# Patient Record
Sex: FEMALE | Race: WHITE | NOT HISPANIC OR LATINO | Employment: FULL TIME | URBAN - METROPOLITAN AREA
[De-identification: names, ages, dates, MRNs, and addresses within clinical notes are randomized per-mention and may not be internally consistent; named-entity substitution may affect disease eponyms.]

---

## 2017-02-16 DIAGNOSIS — J30.9 ALLERGIC RHINITIS, CAUSE UNSPECIFIED: ICD-10-CM

## 2017-02-16 RX ORDER — MONTELUKAST SODIUM 10 MG/1
TABLET ORAL
Qty: 90 TABLET | Refills: 1 | Status: SHIPPED | OUTPATIENT
Start: 2017-02-16 | End: 2017-08-25 | Stop reason: SDUPTHER

## 2017-02-20 ENCOUNTER — PATIENT MESSAGE (OUTPATIENT)
Dept: FAMILY MEDICINE | Facility: CLINIC | Age: 47
End: 2017-02-20

## 2017-02-20 DIAGNOSIS — Z00.00 ROUTINE PHYSICAL EXAMINATION: ICD-10-CM

## 2017-02-20 DIAGNOSIS — E11.9 TYPE 2 DIABETES MELLITUS WITHOUT COMPLICATION, WITHOUT LONG-TERM CURRENT USE OF INSULIN: ICD-10-CM

## 2017-02-20 DIAGNOSIS — I10 HTN (HYPERTENSION), BENIGN: Primary | ICD-10-CM

## 2017-02-24 ENCOUNTER — LAB VISIT (OUTPATIENT)
Dept: LAB | Facility: HOSPITAL | Age: 47
End: 2017-02-24
Attending: INTERNAL MEDICINE
Payer: COMMERCIAL

## 2017-02-24 DIAGNOSIS — Z00.00 ROUTINE PHYSICAL EXAMINATION: ICD-10-CM

## 2017-02-24 DIAGNOSIS — I10 HTN (HYPERTENSION), BENIGN: ICD-10-CM

## 2017-02-24 DIAGNOSIS — E11.9 TYPE 2 DIABETES MELLITUS WITHOUT COMPLICATION, WITHOUT LONG-TERM CURRENT USE OF INSULIN: ICD-10-CM

## 2017-02-24 LAB
ALBUMIN SERPL BCP-MCNC: 3.4 G/DL
ALP SERPL-CCNC: 48 U/L
ALT SERPL W/O P-5'-P-CCNC: 40 U/L
ANION GAP SERPL CALC-SCNC: 11 MMOL/L
AST SERPL-CCNC: 51 U/L
BASOPHILS # BLD AUTO: 0.03 K/UL
BASOPHILS NFR BLD: 0.4 %
BILIRUB SERPL-MCNC: 0.7 MG/DL
BUN SERPL-MCNC: 18 MG/DL
CALCIUM SERPL-MCNC: 9.5 MG/DL
CHLORIDE SERPL-SCNC: 102 MMOL/L
CHOLEST/HDLC SERPL: 3.6 {RATIO}
CO2 SERPL-SCNC: 25 MMOL/L
CREAT SERPL-MCNC: 0.8 MG/DL
DIFFERENTIAL METHOD: ABNORMAL
EOSINOPHIL # BLD AUTO: 0.3 K/UL
EOSINOPHIL NFR BLD: 4.3 %
ERYTHROCYTE [DISTWIDTH] IN BLOOD BY AUTOMATED COUNT: 15 %
EST. GFR  (AFRICAN AMERICAN): >60 ML/MIN/1.73 M^2
EST. GFR  (NON AFRICAN AMERICAN): >60 ML/MIN/1.73 M^2
GLUCOSE SERPL-MCNC: 170 MG/DL
HCT VFR BLD AUTO: 41.7 %
HDL/CHOLESTEROL RATIO: 27.5 %
HDLC SERPL-MCNC: 167 MG/DL
HDLC SERPL-MCNC: 46 MG/DL
HGB BLD-MCNC: 13.2 G/DL
LDLC SERPL CALC-MCNC: 94.4 MG/DL
LYMPHOCYTES # BLD AUTO: 2.1 K/UL
LYMPHOCYTES NFR BLD: 30.6 %
MCH RBC QN AUTO: 25.5 PG
MCHC RBC AUTO-ENTMCNC: 31.7 %
MCV RBC AUTO: 81 FL
MONOCYTES # BLD AUTO: 0.4 K/UL
MONOCYTES NFR BLD: 5.5 %
NEUTROPHILS # BLD AUTO: 4.1 K/UL
NEUTROPHILS NFR BLD: 59.1 %
NONHDLC SERPL-MCNC: 121 MG/DL
PLATELET # BLD AUTO: 270 K/UL
PMV BLD AUTO: 10.4 FL
POTASSIUM SERPL-SCNC: 4.3 MMOL/L
PROT SERPL-MCNC: 7.1 G/DL
RBC # BLD AUTO: 5.18 M/UL
SODIUM SERPL-SCNC: 138 MMOL/L
TRIGL SERPL-MCNC: 133 MG/DL
WBC # BLD AUTO: 6.95 K/UL

## 2017-02-24 PROCEDURE — 85025 COMPLETE CBC W/AUTO DIFF WBC: CPT

## 2017-02-24 PROCEDURE — 80061 LIPID PANEL: CPT

## 2017-02-24 PROCEDURE — 80053 COMPREHEN METABOLIC PANEL: CPT

## 2017-02-24 PROCEDURE — 83036 HEMOGLOBIN GLYCOSYLATED A1C: CPT

## 2017-02-24 PROCEDURE — 36415 COLL VENOUS BLD VENIPUNCTURE: CPT | Mod: PO

## 2017-02-25 LAB
ESTIMATED AVG GLUCOSE: 180 MG/DL
HBA1C MFR BLD HPLC: 7.9 %

## 2017-03-06 ENCOUNTER — OFFICE VISIT (OUTPATIENT)
Dept: FAMILY MEDICINE | Facility: CLINIC | Age: 47
End: 2017-03-06
Payer: COMMERCIAL

## 2017-03-06 ENCOUNTER — TELEPHONE (OUTPATIENT)
Dept: ADMINISTRATIVE | Facility: HOSPITAL | Age: 47
End: 2017-03-06

## 2017-03-06 VITALS
OXYGEN SATURATION: 96 % | BODY MASS INDEX: 47.57 KG/M2 | HEIGHT: 65 IN | HEART RATE: 88 BPM | WEIGHT: 285.5 LBS | TEMPERATURE: 98 F | SYSTOLIC BLOOD PRESSURE: 124 MMHG | DIASTOLIC BLOOD PRESSURE: 86 MMHG

## 2017-03-06 DIAGNOSIS — R06.02 SHORTNESS OF BREATH: ICD-10-CM

## 2017-03-06 DIAGNOSIS — I10 HTN (HYPERTENSION), BENIGN: Chronic | ICD-10-CM

## 2017-03-06 DIAGNOSIS — E11.9 TYPE 2 DIABETES MELLITUS WITHOUT COMPLICATION, WITHOUT LONG-TERM CURRENT USE OF INSULIN: Chronic | ICD-10-CM

## 2017-03-06 DIAGNOSIS — Z82.49 FAMILY HISTORY OF HEART DISEASE IN MALE FAMILY MEMBER BEFORE AGE 55: ICD-10-CM

## 2017-03-06 DIAGNOSIS — Z00.00 ROUTINE MEDICAL EXAM: Primary | ICD-10-CM

## 2017-03-06 DIAGNOSIS — Z23 FLU VACCINE NEED: ICD-10-CM

## 2017-03-06 DIAGNOSIS — K21.9 GASTROESOPHAGEAL REFLUX DISEASE WITHOUT ESOPHAGITIS: Chronic | ICD-10-CM

## 2017-03-06 DIAGNOSIS — E66.01 MORBID OBESITY WITH BMI OF 45.0-49.9, ADULT: Chronic | ICD-10-CM

## 2017-03-06 PROCEDURE — 3079F DIAST BP 80-89 MM HG: CPT | Mod: S$GLB,,, | Performed by: INTERNAL MEDICINE

## 2017-03-06 PROCEDURE — 99396 PREV VISIT EST AGE 40-64: CPT | Mod: 25,S$GLB,, | Performed by: INTERNAL MEDICINE

## 2017-03-06 PROCEDURE — 90471 IMMUNIZATION ADMIN: CPT | Mod: S$GLB,,, | Performed by: INTERNAL MEDICINE

## 2017-03-06 PROCEDURE — 90686 IIV4 VACC NO PRSV 0.5 ML IM: CPT | Mod: S$GLB,,, | Performed by: INTERNAL MEDICINE

## 2017-03-06 PROCEDURE — 99999 PR PBB SHADOW E&M-EST. PATIENT-LVL IV: CPT | Mod: PBBFAC,,, | Performed by: INTERNAL MEDICINE

## 2017-03-06 PROCEDURE — 3074F SYST BP LT 130 MM HG: CPT | Mod: S$GLB,,, | Performed by: INTERNAL MEDICINE

## 2017-03-06 RX ORDER — METFORMIN HYDROCHLORIDE 850 MG/1
850 TABLET ORAL 2 TIMES DAILY
Qty: 180 TABLET | Refills: 1 | Status: CANCELLED | OUTPATIENT
Start: 2017-03-06

## 2017-03-06 NOTE — ASSESSMENT & PLAN NOTE
Will add januvia and maximize metformin.  Sees outside [facility].  Coupon provided.  3 month follow up.

## 2017-03-06 NOTE — TELEPHONE ENCOUNTER
"----- Message from Jaylon Kothari MD sent at 3/6/2017 11:20 AM CST -----  Regarding: RE: Eye exam  Ok thank you.  She informed me that she "was due" and will call them to make the appt.      ----- Message -----     From: Yolande Spencer LPN     Sent: 3/6/2017  11:09 AM       To: Jaylon Kothari MD  Subject: RE: Eye exam                                     Good morning, a request was sent to Dr. Olayinka Phelan's office on today to get the patient's most recent eye exam. The patient has not been seen in the office since 2013. She will send the report on today. Thanks  ----- Message -----     From: Jaylon Kothari MD     Sent: 3/6/2017  10:31 AM       To: Yolande Spencer LPN  Subject: Eye exam                                         Sees Dr. Phelan with [facility] on [place] ([facility])    Thank you,  David      "

## 2017-03-06 NOTE — MR AVS SNAPSHOT
[facility]  [address]  [place] [address]  Phone: [phone]  Fax: [phone]                  Iqra Duque   3/6/2017 9:40 AM   Office Visit    Description:  Female : 1970   Provider:  Jaylon Kothari MD   Department:  [facility]           Reason for Visit     Annual Exam           Diagnoses this Visit        Comments    Routine medical exam    -  Primary     Type 2 diabetes mellitus without complication, without long-term current use of insulin         Shortness of breath         Family history of heart disease in male family member before age 55         Flu vaccine need         HTN (hypertension), benign         Morbid obesity with BMI of 45.0-49.9, adult         Gastroesophageal reflux disease without esophagitis                To Do List           Future Appointments        Provider Department Dept Phone    2017 8:00 AM LAB, [place] [facility] [phone]    2017 9:00 AM Jaylon Kothari MD [facility] [phone]      Goals (5 Years of Data)     None      Follow-Up and Disposition     Return in about 3 months (around 2017) for DM follow up.       These Medications        Disp Refills Start End    SITagliptan-metformin (JANUMET) 50-1,000 mg per tablet 180 tablet 3 3/6/2017 3/6/2018    Take 1 tablet by mouth 2 (two) times daily with meals. - Oral    Pharmacy: [facility]/pharmacy #[id number] - [place] - [address] Ph #: [phone]         [facility]     [facility] Nurse Care Line -  Assistance  Registered nurses in the [facility] Center provide clinical advisement, health education, appointment booking, and other advisory services.  Call for this free service at [phone].             Medications           Message regarding Medications     Verify the changes and/or additions to your medication regime listed below are the same as discussed with your clinician today.  If  "any of these changes or additions are incorrect, please notify your healthcare provider.        START taking these NEW medications        Refills    SITagliptan-metformin (JANUMET) 50-1,000 mg per tablet 3    Sig: Take 1 tablet by mouth 2 (two) times daily with meals.    Class: Normal    Route: Oral      STOP taking these medications     metformin (GLUCOPHAGE) 850 MG tablet Take 1 tablet (850 mg total) by mouth 2 (two) times daily.           Verify that the below list of medications is an accurate representation of the medications you are currently taking.  If none reported, the list may be blank. If incorrect, please contact your healthcare provider. Carry this list with you in case of emergency.           Current Medications     CONTOUR TEST STRIPS Strp TEST ONCE DAILY    inhalation device (AEROCHAMBER PLUS FLOW-VU) Use as directed for inhalation.    lisinopril-hydrochlorothiazide (PRINZIDE,ZESTORETIC) 20-25 mg Tab Take 1 tablet by mouth once daily.    MICROLET LANCET Misc TEST ONCE DAILY    montelukast (SINGULAIR) 10 mg tablet TAKE 1 TABLET (10 MG TOTAL) BY MOUTH ONCE DAILY.    omeprazole (PRILOSEC) 20 MG capsule TAKE 1 CAPSULE (20 MG TOTAL) BY MOUTH EVERY MORNING.    albuterol 90 mcg/actuation inhaler Inhale 1-2 puffs into the lungs every 4 to 6 hours as needed for Wheezing or Shortness of Breath (chest tightness).    meclizine (ANTIVERT) 25 mg tablet Take 1 tablet (25 mg total) by mouth 3 (three) times daily as needed for Dizziness.    SITagliptan-metformin (JANUMET) 50-1,000 mg per tablet Take 1 tablet by mouth 2 (two) times daily with meals.           Clinical Reference Information           Your Vitals Were     BP Pulse Temp Height Weight SpO2    124/86 88 98 °F (36.7 °C) (Oral) 5' 5" (1.651 m) 129.5 kg (285 lb 7.9 oz) 96%    BMI                47.51 kg/m2          Blood Pressure          Most Recent Value    BP  124/86      Allergies as of 3/6/2017     Bactrim [Sulfamethoxazole-trimethoprim]    Keflex " [Cephalexin]    Neosporin (Neomycin-polymyx)    Amoxicillin      Immunizations Administered on Date of Encounter - 3/6/2017     Name Date Dose VIS Date Route    influenza - Quadrivalent - PF (ADULT) 3/6/2017 0.5 mL 8/7/2015 Intramuscular      Orders Placed During Today's Visit      Normal Orders This Visit    Ambulatory referral to Cardiology     Influenza - Quadrivalent (3 years & older) (PF)     Future Labs/Procedures Expected by Expires    Comprehensive metabolic panel  6/6/2017 3/6/2018    Hemoglobin A1c  6/6/2017 (Approximate) 3/6/2018      Instructions    I will see you in 3 months.        Language Assistance Services     ATTENTION: Language assistance services are available, free of charge. Please call [phone].      ATENCIÓN: Si habla wen, tiene a mims disposición servicios gratuitos de asistencia lingüística. Llame al [phone].     CHÚ Ý: N?u b?n nói Ti?ng Vi?t, có các d?ch v? h? tr? ngôn ng? mi?n phí dành cho b?n. G?i s? [phone].         [facility] complies with applicable Federal civil rights laws and does not discriminate on the basis of race, color, national origin, age, disability, or sex.

## 2017-03-06 NOTE — ASSESSMENT & PLAN NOTE
The patient is asked to make an attempt to improve diet and exercise patterns to aid in medical management of this problem.  Has been walking more.

## 2017-03-06 NOTE — PROGRESS NOTES
Chief Complaint  Chief Complaint   Patient presents with    Annual Exam       HPI  Iqra Duque is a 46 y.o. female with medical diagnoses as listed in the medical history and problem list that presents for routine physical.  Pt is known to me with her last appointment 9/14/2016.  Having some SOB with walking across the room.  No CP, palpitations,  HA, vision change.  Can walk 2.5 mi without symptoms.        PAST MEDICAL HISTORY:  Past Medical History:   Diagnosis Date    Allergy     Diabetes mellitus type II     Diverticulitis 09/2014    Fever blister     Hypertension     Migraine headache        PAST SURGICAL HISTORY:  Past Surgical History:   Procedure Laterality Date    HYSTERECTOMY  06/2009    K[facility]---DLH/BSO ATYPICAL HYPERPLASIA    TUBAL LIGATION         SOCIAL HISTORY:  Social History     Social History    Marital status:      Spouse name: N/A    Number of children: N/A    Years of education: N/A     Occupational History    [employer]     Social History Main Topics    Smoking status: Never Smoker    Smokeless tobacco: Not on file    Alcohol use No    Drug use: No    Sexual activity: Yes     Partners: Male     Birth control/ protection: None     Other Topics Concern    Not on file     Social History Narrative    No narrative on file       FAMILY HISTORY:  Family History   Problem Relation Age of Onset    Ovarian cancer Mother 43    Cancer Mother      ovarian    Diabetes Mother     Heart disease Father     Melanoma Neg Hx     Psoriasis Neg Hx     Lupus Neg Hx        ALLERGIES AND MEDICATIONS: updated and reviewed.  Review of patient's allergies indicates:   Allergen Reactions    Bactrim [sulfamethoxazole-trimethoprim] Hives     Also Septra    Keflex [cephalexin] Shortness Of Breath    Neosporin (neomycin-polymyx) Swelling    Amoxicillin Rash     Current Outpatient Prescriptions   Medication Sig Dispense Refill    CONTOUR TEST STRIPS Strp TEST ONCE  DAILY 100 strip 3    inhalation device (AEROCHAMBER PLUS FLOW-VU) Use as directed for inhalation. 1 Device 0    lisinopril-hydrochlorothiazide (PRINZIDE,ZESTORETIC) 20-25 mg Tab Take 1 tablet by mouth once daily. 90 tablet 1    MICROLET LANCET Misc TEST ONCE DAILY 100 each 0    montelukast (SINGULAIR) 10 mg tablet TAKE 1 TABLET (10 MG TOTAL) BY MOUTH ONCE DAILY. 90 tablet 1    omeprazole (PRILOSEC) 20 MG capsule TAKE 1 CAPSULE (20 MG TOTAL) BY MOUTH EVERY MORNING. 90 capsule 3    albuterol 90 mcg/actuation inhaler Inhale 1-2 puffs into the lungs every 4 to 6 hours as needed for Wheezing or Shortness of Breath (chest tightness). 1 Inhaler 6    meclizine (ANTIVERT) 25 mg tablet Take 1 tablet (25 mg total) by mouth 3 (three) times daily as needed for Dizziness. 30 tablet 0    SITagliptan-metformin (JANUMET) 50-1,000 mg per tablet Take 1 tablet by mouth 2 (two) times daily with meals. 180 tablet 3     No current facility-administered medications for this visit.          ROS  Review of Systems   Constitutional: Negative for chills, fever and unexpected weight change.   HENT: Negative for congestion, ear pain, hearing loss, rhinorrhea, sore throat and trouble swallowing.    Eyes: Negative for discharge, redness and visual disturbance.   Respiratory: Positive for shortness of breath. Negative for cough, chest tightness and wheezing.    Cardiovascular: Negative for chest pain, palpitations and leg swelling.   Gastrointestinal: Negative for abdominal pain, constipation, diarrhea, nausea and vomiting.   Endocrine: Negative for polydipsia, polyphagia and polyuria.   Genitourinary: Negative for decreased urine volume, dysuria and hematuria.   Musculoskeletal: Negative for arthralgias, joint swelling and myalgias.   Skin: Negative for color change and rash.   Neurological: Negative for dizziness, weakness, light-headedness and headaches.   Psychiatric/Behavioral: Negative for decreased concentration, dysphoric mood,  "sleep disturbance and suicidal ideas.           Physical Exam  Vitals:    03/06/17 1010 03/06/17 1041   BP: 138/86 124/86   BP Location: Left arm    Patient Position: Sitting    BP Method: Manual    Pulse: 88    Temp: 98 °F (36.7 °C)    TempSrc: Oral    SpO2: 96%    Weight: 129.5 kg (285 lb 7.9 oz)    Height: 5' 5" (1.651 m)     Body mass index is 47.51 kg/(m^2).  Weight: 129.5 kg (285 lb 7.9 oz)   Height: 5' 5" (165.1 cm)   General appearance: alert, appears stated age, cooperative and no distress  Head: Normocephalic, without obvious abnormality, atraumatic  Eyes: PERRL EOMI conjunctiva clear  Ears: normal TM's and external ear canals both ears  Nose: Nares normal. Septum midline. Mucosa normal. No drainage or sinus tenderness.  Throat: lips, mucosa, and tongue normal; teeth and gums normal  Neck: no adenopathy, no carotid bruit, no JVD, supple, symmetrical, trachea midline and thyroid not enlarged, symmetric, no tenderness/mass/nodules  Back: symmetric, no curvature. ROM normal. No CVA tenderness.  Lungs: clear to auscultation bilaterally  Heart: regular rate and rhythm, S1, S2 normal, no murmur, click, rub or gallop  Abdomen: soft, non-tender; bowel sounds normal; no masses,  no organomegaly  Extremities: extremities normal, atraumatic, no cyanosis or edema  Pulses: 2+ and symmetric  Neurologic: Mental status: Alert, oriented, thought content appropriate  Sensory: normal  Motor:grossly normal  Coordination: normal  Gait: Normal   Symmetric facial movements palate elevated symmetrically tongue midline       Health Maintenance       Date Due Completion Date    Eye Exam 7/16/1979 ---    Influenza Vaccine 8/1/2016 2/25/2016 (Declined)    Override on 2/25/2016: Declined    Mammogram 4/18/2017 4/18/2016    Foot Exam 5/11/2017 5/11/2016    Hemoglobin A1c 8/24/2017 2/24/2017    Lipid Panel 2/24/2018 2/24/2017    Urine Microalbumin 2/24/2018 2/24/2017    TETANUS VACCINE 8/29/2026 8/29/2016    Pneumococcal PPSV23 (Medium " Risk) (2) 7/16/2034 3/26/2015            Assessment & Plan  Problem List Items Addressed This Visit        Cardiac    HTN (hypertension), benign (Chronic)    Current Assessment & Plan     The current medical regimen is effective;  continue present plan and medications.             Endocrine    Type 2 diabetes mellitus without complication (Chronic)    Current Assessment & Plan     Will add januvia and maximize metformin.  Sees outside [facility].  Coupon provided.  3 month follow up.          Relevant Medications    SITagliptan-metformin (JANUMET) 50-1,000 mg per tablet    Other Relevant Orders    Hemoglobin A1c    Comprehensive metabolic panel       Fluids/Electrolytes/Nutrition/GI    Gastroesophageal reflux (Chronic)    Current Assessment & Plan     The current medical regimen is effective;  continue present plan and medications.          Morbid obesity with BMI of 45.0-49.9, adult (Chronic)    Current Assessment & Plan     The patient is asked to make an attempt to improve diet and exercise patterns to aid in medical management of this problem.  Has been walking more.            Other Visit Diagnoses     Routine medical exam    -  Primary    Shortness of breath    -   Inconsistent pattern.  Will still refer to cardiology given risk factors and early family history.      Relevant Orders    Ambulatory referral to Cardiology    Family history of heart disease in male family member before age 55    -  As above    Relevant Orders    Ambulatory referral to Cardiology    Flu vaccine need    -  vaccinate    Relevant Orders    Influenza - Quadrivalent (3 years & older) (PF)            Health Maintenance reviewed, as above.  MMG due in April.    Follow-up: Return in about 3 months (around 6/6/2017) for DM follow up.

## 2017-03-06 NOTE — TELEPHONE ENCOUNTER
JEWEL Pena MD                     Ok, thanks for the update.            Previous Messages

## 2017-03-07 DIAGNOSIS — E11.9 TYPE 2 DIABETES MELLITUS WITHOUT COMPLICATION, WITHOUT LONG-TERM CURRENT USE OF INSULIN: Chronic | ICD-10-CM

## 2017-03-07 RX ORDER — METFORMIN HYDROCHLORIDE 850 MG/1
TABLET ORAL
Qty: 180 TABLET | Refills: 1
Start: 2017-03-07

## 2017-03-15 NOTE — TELEPHONE ENCOUNTER
The patient's recent diabetic eye exam was received in our office on today. Health Maintenance was updated.

## 2017-03-16 ENCOUNTER — TELEPHONE (OUTPATIENT)
Dept: FAMILY MEDICINE | Facility: CLINIC | Age: 47
End: 2017-03-16

## 2017-03-16 NOTE — TELEPHONE ENCOUNTER
----- Message from Ewa Burt sent at 3/6/2017  2:29 PM CST -----  Contact: self  Medication SITagliptan-metformin (JANUMET) 50-1,000 mg per tablet requires a prior Auth, Patient states we can reach PA line at [phone] Thank you!

## 2017-03-20 ENCOUNTER — INITIAL CONSULT (OUTPATIENT)
Dept: CARDIOLOGY | Facility: CLINIC | Age: 47
End: 2017-03-20
Payer: COMMERCIAL

## 2017-03-20 VITALS
HEART RATE: 94 BPM | SYSTOLIC BLOOD PRESSURE: 126 MMHG | DIASTOLIC BLOOD PRESSURE: 80 MMHG | OXYGEN SATURATION: 96 % | HEIGHT: 65 IN | WEIGHT: 284 LBS | BODY MASS INDEX: 47.32 KG/M2

## 2017-03-20 DIAGNOSIS — I10 ESSENTIAL HYPERTENSION: ICD-10-CM

## 2017-03-20 DIAGNOSIS — E11.21 CONTROLLED TYPE 2 DIABETES MELLITUS WITH DIABETIC NEPHROPATHY, WITHOUT LONG-TERM CURRENT USE OF INSULIN: ICD-10-CM

## 2017-03-20 DIAGNOSIS — R06.02 SHORTNESS OF BREATH: Primary | ICD-10-CM

## 2017-03-20 DIAGNOSIS — R94.31 ABNORMAL EKG: ICD-10-CM

## 2017-03-20 DIAGNOSIS — E66.01 MORBID OBESITY, UNSPECIFIED OBESITY TYPE: ICD-10-CM

## 2017-03-20 DIAGNOSIS — E78.5 DYSLIPIDEMIA: ICD-10-CM

## 2017-03-20 PROCEDURE — 3060F POS MICROALBUMINURIA REV: CPT | Mod: S$GLB,,, | Performed by: INTERNAL MEDICINE

## 2017-03-20 PROCEDURE — 1160F RVW MEDS BY RX/DR IN RCRD: CPT | Mod: S$GLB,,, | Performed by: INTERNAL MEDICINE

## 2017-03-20 PROCEDURE — 99999 PR PBB SHADOW E&M-EST. PATIENT-LVL III: CPT | Mod: PBBFAC,,, | Performed by: INTERNAL MEDICINE

## 2017-03-20 PROCEDURE — 3079F DIAST BP 80-89 MM HG: CPT | Mod: S$GLB,,, | Performed by: INTERNAL MEDICINE

## 2017-03-20 PROCEDURE — 93000 ELECTROCARDIOGRAM COMPLETE: CPT | Mod: S$GLB,,, | Performed by: INTERNAL MEDICINE

## 2017-03-20 PROCEDURE — 99204 OFFICE O/P NEW MOD 45 MIN: CPT | Mod: S$GLB,,, | Performed by: INTERNAL MEDICINE

## 2017-03-20 PROCEDURE — 3074F SYST BP LT 130 MM HG: CPT | Mod: S$GLB,,, | Performed by: INTERNAL MEDICINE

## 2017-03-20 PROCEDURE — 3045F PR MOST RECENT HEMOGLOBIN A1C LEVEL 7.0-9.0%: CPT | Mod: S$GLB,,, | Performed by: INTERNAL MEDICINE

## 2017-03-20 PROCEDURE — 2022F DILAT RTA XM EVC RTNOPTHY: CPT | Mod: S$GLB,,, | Performed by: INTERNAL MEDICINE

## 2017-03-20 NOTE — MR AVS SNAPSHOT
[facility]  [address]  [place] [address]  Phone: [phone]                  Iqra Duque   3/20/2017 2:30 PM   Initial consult    Description:  Female : 1970   Provider:  Ciaran Vargas MD   Department:  [facility]           Reason for Visit     Establish Care                To Do List           Future Appointments        Provider Department Dept Phone    2017 8:00 AM LAB, [place] [facility] [phone]    2017 9:00 AM Jaylon Kothari MD [facility] [phone]      Goals (5 Years of Data)     None      [facility] On Call     [facility] On Call Nurse Care Line -  Assistance  Registered nurses in the [facility] provide clinical advisement, health education, appointment booking, and other advisory services.  Call for this free service at [phone].             Medications           Message regarding Medications     Verify the changes and/or additions to your medication regime listed below are the same as discussed with your clinician today.  If any of these changes or additions are incorrect, please notify your healthcare provider.        STOP taking these medications     meclizine (ANTIVERT) 25 mg tablet Take 1 tablet (25 mg total) by mouth 3 (three) times daily as needed for Dizziness.           Verify that the below list of medications is an accurate representation of the medications you are currently taking.  If none reported, the list may be blank. If incorrect, please contact your healthcare provider. Carry this list with you in case of emergency.           Current Medications     albuterol 90 mcg/actuation inhaler Inhale 1-2 puffs into the lungs every 4 to 6 hours as needed for Wheezing or Shortness of Breath (chest tightness).    CONTOUR TEST STRIPS Strp TEST ONCE DAILY    inhalation device (AEROCHAMBER PLUS FLOW-VU) Use as directed for inhalation.    lisinopril-hydrochlorothiazide  "(PRINZIDE,ZESTORETIC) 20-25 mg Tab Take 1 tablet by mouth once daily.    MICROLET LANCET Misc TEST ONCE DAILY    montelukast (SINGULAIR) 10 mg tablet TAKE 1 TABLET (10 MG TOTAL) BY MOUTH ONCE DAILY.    omeprazole (PRILOSEC) 20 MG capsule TAKE 1 CAPSULE (20 MG TOTAL) BY MOUTH EVERY MORNING.    SITagliptan-metformin (JANUMET) 50-1,000 mg per tablet Take 1 tablet by mouth 2 (two) times daily with meals.           Clinical Reference Information           Your Vitals Were     BP Pulse Height Weight SpO2 BMI    126/80 (BP Location: Left arm, Patient Position: Sitting, BP Method: Automatic) 94 5' 5" (1.651 m) 128.8 kg (284 lb) 96% 47.26 kg/m2      Blood Pressure          Most Recent Value    BP  126/80      Allergies as of 3/20/2017     Bactrim [Sulfamethoxazole-trimethoprim]    Keflex [Cephalexin]    Neosporin (Neomycin-polymyx)    Amoxicillin      Immunizations Administered on Date of Encounter - 3/20/2017     None      Language Assistance Services     ATTENTION: Language assistance services are available, free of charge. Please call [phone].      ATENCIÓN: Si habla español, tiene a mims disposición servicios gratuitos de asistencia lingüística. Llame al [phone].     [place] Ý: N?u b?n nói Ti?ng Vi?t, có các d?ch v? h? tr? ngôn ng? mi?n phí dành cho b?n. G?i s? [phone].         [facility] complies with applicable Federal civil rights laws and does not discriminate on the basis of race, color, national origin, age, disability, or sex.        "

## 2017-03-20 NOTE — PROGRESS NOTES
Subjective:    Patient ID:  Iqra Duque is a 46 y.o. female who presents for evaluation of Establish Care      HPI  Patient is here for cardiac evaluation for shortness of breath and chest heaviness.  She's had a baseline abnormal EKG.  She says she noticed her symptoms progressing over the past year.  She exercises very regularly.  She does mostly walking and can go up to 2 miles sometimes without any symptoms.  Then other times she feels very short winded even going down the hallway.  Usually this is worse with heavier activity and relieved with rest.  She gets some associated chest heaviness but no palpitations.  She denies any PND, orthopnea but occasional lower extremity edema relieved with elevation.  She denies any dizziness, presyncope or syncope.  She does have some problems sleeping at night but sleep study was refused by her insurance?    Review of Systems   Constitution: Negative.   HENT: Negative.    Eyes: Negative.    Cardiovascular: Positive for chest pain and dyspnea on exertion. Negative for irregular heartbeat, leg swelling, near-syncope, orthopnea, palpitations, paroxysmal nocturnal dyspnea and syncope.   Respiratory: Positive for shortness of breath.    Skin: Negative.    Musculoskeletal: Negative.    Gastrointestinal: Negative for abdominal pain, constipation and diarrhea.   Genitourinary: Negative for dysuria.   Neurological: Negative.    Psychiatric/Behavioral: Negative.      Past Medical History:   Diagnosis Date    Allergy     Diabetes mellitus type II     Diverticulitis 09/2014    Fever blister     Hypertension     Migraine headache      Past Surgical History:   Procedure Laterality Date    HYSTERECTOMY  06/2009    [facility]---DLH/BSO ATYPICAL HYPERPLASIA    TUBAL LIGATION       Social History   Substance Use Topics    Smoking status: Never Smoker    Smokeless tobacco: None    Alcohol use No     Family History   Problem Relation Age of Onset    Ovarian cancer Mother 43    Cancer  Mother      ovarian    Diabetes Mother     Heart disease Father     Melanoma Neg Hx     Psoriasis Neg Hx     Lupus Neg Hx         Objective:    Physical Exam   Constitutional: She is oriented to person, place, and time. She appears well-developed and well-nourished.   HENT:   Head: Normocephalic and atraumatic.   Eyes: Conjunctivae and EOM are normal. Pupils are equal, round, and reactive to light.   Neck: Normal range of motion. Neck supple. No thyromegaly present.   Cardiovascular: Normal rate and regular rhythm.    No murmur heard.  Pulmonary/Chest: Effort normal and breath sounds normal. No respiratory distress.   Abdominal: Soft. Bowel sounds are normal.   Musculoskeletal: She exhibits no edema.   Neurological: She is alert and oriented to person, place, and time.   Skin: Skin is warm and dry.   Psychiatric: She has a normal mood and affect. Her behavior is normal.       ekg normal sinus rhythm with nonspecific ST-T changes    Assessment:       1. Shortness of breath    2. Abnormal EKG    3. Essential hypertension    4. Controlled type 2 diabetes mellitus with diabetic nephropathy, without long-term current use of insulin    5. Dyslipidemia    6. Morbid obesity, unspecified obesity type         Plan:       -Current exertional symptoms with baseline abnormal EKG and multiple risk factors  -Consider sleep study referral if testing is negative  -Encouraged diet and exercise as tolerated    Return to clinic in one month with testing ASAP

## 2017-03-20 NOTE — LETTER
March 20, 2017      Jaylon Kothari MD  [address] [place] [place] [address]           [facility]  [address]  [place] [address]  Phone: [phone]          Patient: Iqra Duque   MR Number: [id number]   YOB: 1970   Date of Visit: 3/20/2017       Dear Dr. Jaylon Kothari:    Thank you for referring Iqra Duque to me for evaluation. Attached you will find relevant portions of my assessment and plan of care.    If you have questions, please do not hesitate to call me. I look forward to following Iqra Duque along with you.    Sincerely,    Ciaran Vargas MD    Enclosure  CC:  No Recipients    If you would like to receive this communication electronically, please contact [email or web address] or [phone] to request more information on [facility] Link access.    For providers and/or their staff who would like to refer a patient to [facility], please contact us through our one-stop-shop provider referral line, [facility] , at [phone].    If you feel you have received this communication in error or would no longer like to receive these types of communications, please e-mail [email or web address]

## 2017-04-03 ENCOUNTER — HOSPITAL ENCOUNTER (OUTPATIENT)
Dept: RADIOLOGY | Facility: HOSPITAL | Age: 47
Discharge: HOME | End: 2017-04-03
Attending: INTERNAL MEDICINE
Payer: COMMERCIAL

## 2017-04-03 ENCOUNTER — HOSPITAL ENCOUNTER (OUTPATIENT)
Dept: CARDIOLOGY | Facility: HOSPITAL | Age: 47
Discharge: HOME | End: 2017-04-03
Attending: INTERNAL MEDICINE
Payer: COMMERCIAL

## 2017-04-03 DIAGNOSIS — R94.31 ABNORMAL EKG: ICD-10-CM

## 2017-04-03 DIAGNOSIS — R06.02 SHORTNESS OF BREATH: ICD-10-CM

## 2017-04-03 LAB
DIASTOLIC DYSFUNCTION: NO
DIASTOLIC DYSFUNCTION: NO
ESTIMATED PA SYSTOLIC PRESSURE: 12.12
MITRAL VALVE REGURGITATION: NORMAL
RETIRED EF AND QEF - SEE NOTES: 55 (ref 55–65)
TRICUSPID VALVE REGURGITATION: NORMAL

## 2017-04-03 PROCEDURE — 93306 TTE W/DOPPLER COMPLETE: CPT

## 2017-04-03 PROCEDURE — 78452 HT MUSCLE IMAGE SPECT MULT: CPT | Mod: TC

## 2017-04-03 PROCEDURE — 93018 CV STRESS TEST I&R ONLY: CPT | Mod: ,,, | Performed by: INTERNAL MEDICINE

## 2017-04-03 PROCEDURE — 93016 CV STRESS TEST SUPVJ ONLY: CPT | Mod: ,,, | Performed by: INTERNAL MEDICINE

## 2017-04-03 PROCEDURE — 78452 HT MUSCLE IMAGE SPECT MULT: CPT | Mod: 26,,, | Performed by: INTERNAL MEDICINE

## 2017-04-03 PROCEDURE — 93306 TTE W/DOPPLER COMPLETE: CPT | Mod: 26,,, | Performed by: INTERNAL MEDICINE

## 2017-04-03 PROCEDURE — 93017 CV STRESS TEST TRACING ONLY: CPT

## 2017-04-19 DIAGNOSIS — I10 HTN (HYPERTENSION), BENIGN: Chronic | ICD-10-CM

## 2017-04-19 RX ORDER — LISINOPRIL AND HYDROCHLOROTHIAZIDE 20; 25 MG/1; MG/1
TABLET ORAL
Qty: 90 TABLET | Refills: 3 | Status: SHIPPED | OUTPATIENT
Start: 2017-04-19 | End: 2018-04-16 | Stop reason: SDUPTHER

## 2017-04-25 ENCOUNTER — OFFICE VISIT (OUTPATIENT)
Dept: CARDIOLOGY | Facility: CLINIC | Age: 47
End: 2017-04-25
Payer: COMMERCIAL

## 2017-04-25 VITALS
DIASTOLIC BLOOD PRESSURE: 78 MMHG | WEIGHT: 284 LBS | OXYGEN SATURATION: 94 % | HEIGHT: 65 IN | SYSTOLIC BLOOD PRESSURE: 119 MMHG | HEART RATE: 96 BPM | BODY MASS INDEX: 47.32 KG/M2

## 2017-04-25 DIAGNOSIS — E11.21 CONTROLLED TYPE 2 DIABETES MELLITUS WITH DIABETIC NEPHROPATHY, WITHOUT LONG-TERM CURRENT USE OF INSULIN: ICD-10-CM

## 2017-04-25 DIAGNOSIS — I10 ESSENTIAL HYPERTENSION: ICD-10-CM

## 2017-04-25 DIAGNOSIS — R06.02 SHORTNESS OF BREATH: Primary | ICD-10-CM

## 2017-04-25 DIAGNOSIS — E66.01 MORBID OBESITY, UNSPECIFIED OBESITY TYPE: ICD-10-CM

## 2017-04-25 DIAGNOSIS — R94.31 ABNORMAL EKG: ICD-10-CM

## 2017-04-25 DIAGNOSIS — E78.5 DYSLIPIDEMIA: ICD-10-CM

## 2017-04-25 PROCEDURE — 3078F DIAST BP <80 MM HG: CPT | Mod: S$GLB,,, | Performed by: INTERNAL MEDICINE

## 2017-04-25 PROCEDURE — 4010F ACE/ARB THERAPY RXD/TAKEN: CPT | Mod: S$GLB,,, | Performed by: INTERNAL MEDICINE

## 2017-04-25 PROCEDURE — 99214 OFFICE O/P EST MOD 30 MIN: CPT | Mod: S$GLB,,, | Performed by: INTERNAL MEDICINE

## 2017-04-25 PROCEDURE — 99999 PR PBB SHADOW E&M-EST. PATIENT-LVL III: CPT | Mod: PBBFAC,,, | Performed by: INTERNAL MEDICINE

## 2017-04-25 PROCEDURE — 3074F SYST BP LT 130 MM HG: CPT | Mod: S$GLB,,, | Performed by: INTERNAL MEDICINE

## 2017-04-25 PROCEDURE — 3045F PR MOST RECENT HEMOGLOBIN A1C LEVEL 7.0-9.0%: CPT | Mod: S$GLB,,, | Performed by: INTERNAL MEDICINE

## 2017-04-25 PROCEDURE — 1160F RVW MEDS BY RX/DR IN RCRD: CPT | Mod: S$GLB,,, | Performed by: INTERNAL MEDICINE

## 2017-04-25 NOTE — PROGRESS NOTES
Subjective:    Patient ID:  Iqra Duque is a 46 y.o. female who presents for follow-up of Results      HPI  Patient is here for follow-up of chest pain and shortness of breath.  She underwent diagnostic testing which was within normal limits.  She's not any progression of cardiopulmonary complaints.  She denies any current chest pain, shortness of breath or palpitations.  She's expands no PND, orthopnea or lower edema.  She denies any dizziness, presyncope or syncope.  She is interesting getting an exercise program.  She does try and monitor her food intake.  Otherwise she's better usual state of health.    Review of Systems   Constitution: Negative.   HENT: Negative.    Eyes: Negative.    Cardiovascular: Negative for chest pain, dyspnea on exertion, irregular heartbeat, leg swelling, near-syncope, orthopnea, palpitations, paroxysmal nocturnal dyspnea and syncope.   Respiratory: Negative for shortness of breath.    Skin: Negative.    Musculoskeletal: Negative.    Gastrointestinal: Negative for abdominal pain, constipation and diarrhea.   Genitourinary: Negative for dysuria.   Neurological: Negative.    Psychiatric/Behavioral: Negative.         Objective:    Physical Exam   Constitutional: She is oriented to person, place, and time. She appears well-developed and well-nourished.   HENT:   Head: Normocephalic and atraumatic.   Eyes: Conjunctivae and EOM are normal. Pupils are equal, round, and reactive to light.   Neck: Normal range of motion. Neck supple. No thyromegaly present.   Cardiovascular: Normal rate and regular rhythm.    No murmur heard.  Pulmonary/Chest: Effort normal and breath sounds normal. No respiratory distress.   Abdominal: Soft. Bowel sounds are normal.   Musculoskeletal: She exhibits no edema.   Neurological: She is alert and oriented to person, place, and time.   Skin: Skin is warm and dry.   Psychiatric: She has a normal mood and affect. Her behavior is normal.       NST:  Impression: NORMAL  MYOCARDIAL PERFUSION  1. The perfusion scan is free of evidence for myocardial ischemia or injury.   2. There is a mild intensity fixed defect in the anterior wall of the left ventricle, secondary to breast attenuation.   3. Resting wall motion is physiologic.   4. Resting LV function is normal.   5. The ventricular volumes are normal at rest and stress.   6. The extracardiac distribution of radioactivity is normal.     Echo:  CONCLUSIONS     1 - Normal left ventricular systolic function (EF 55-60%).     2 - Concentric remodeling.     3 - Trivial mitral regurgitation.     4 - Trivial tricuspid regurgitation.     Assessment:       1. Shortness of breath    2. Abnormal EKG    3. Essential hypertension    4. Dyslipidemia    5. Controlled type 2 diabetes mellitus with diabetic nephropathy, without long-term current use of insulin    6. Morbid obesity, unspecified obesity type         Plan:       -Continue medical therapy  -Continue diet and exercise --risk factor modification  -Consider sleep study referral next visit    Return to clinic in 3 months

## 2017-04-25 NOTE — MR AVS SNAPSHOT
[facility]  [address]  [place] [address]  Phone: [phone]                  Iqra Duque   2017 2:45 PM   Office Visit    Description:  Female : 1970   Provider:  Ciaran Vargas MD   Department:  [facility]           Reason for Visit     Results                To Do List           Future Appointments        Provider Department Dept Phone    2017 8:00 AM LAB, [facility] [phone]    2017 9:00 AM Jaylon Kothari MD [facility] [phone]      Goals (5 Years of Data)     None      [facility]     [facility] Nurse Care Line -  Assistance  Unless otherwise directed by your provider, please contact [facility], our nurse care line that is available for  assistance.     Registered nurses in the [facility] provide: appointment scheduling, clinical advisement, health education, and other advisory services.  Call: [phone] (toll free)               Medications           Message regarding Medications     Verify the changes and/or additions to your medication regime listed below are the same as discussed with your clinician today.  If any of these changes or additions are incorrect, please notify your healthcare provider.             Verify that the below list of medications is an accurate representation of the medications you are currently taking.  If none reported, the list may be blank. If incorrect, please contact your healthcare provider. Carry this list with you in case of emergency.           Current Medications     albuterol 90 mcg/actuation inhaler Inhale 1-2 puffs into the lungs every 4 to 6 hours as needed for Wheezing or Shortness of Breath (chest tightness).    CONTOUR TEST STRIPS Strp TEST ONCE DAILY    inhalation device (AEROCHAMBER PLUS FLOW-VU) Use as directed for inhalation.    lisinopril-hydrochlorothiazide (PRINZIDE,ZESTORETIC) 20-25 mg Tab TAKE 1 TABLET  "BY MOUTH ONCE DAILY.    MICROLET LANCET Misc TEST ONCE DAILY    montelukast (SINGULAIR) 10 mg tablet TAKE 1 TABLET (10 MG TOTAL) BY MOUTH ONCE DAILY.    omeprazole (PRILOSEC) 20 MG capsule TAKE 1 CAPSULE (20 MG TOTAL) BY MOUTH EVERY MORNING.    SITagliptan-metformin (JANUMET) 50-1,000 mg per tablet Take 1 tablet by mouth 2 (two) times daily with meals.           Clinical Reference Information           Your Vitals Were     BP Pulse Height Weight SpO2 BMI    119/78 (BP Location: Left arm, Patient Position: Sitting, BP Method: Automatic) 96 5' 5" (1.651 m) 128.8 kg (284 lb) 94% 47.26 kg/m2      Blood Pressure          Most Recent Value    BP  119/78      Allergies as of 4/25/2017     Bactrim [Sulfamethoxazole-trimethoprim]    Keflex [Cephalexin]    Neosporin (Neomycin-polymyx)    Amoxicillin      Immunizations Administered on Date of Encounter - 4/25/2017     None      Language Assistance Services     ATTENTION: Language assistance services are available, free of charge. Please call [phone].      ATENCIÓN: Si habla español, tiene a mims disposición servicios gratuitos de asistencia lingüística. Llame al [phone].     [place] Ý: N?u b?n nói Ti?ng Vi?t, có các d?ch v? h? tr? ngôn ng? mi?n phí dành cho b?n. G?i s? [phone].         [facility] complies with applicable Federal civil rights laws and does not discriminate on the basis of race, color, national origin, age, disability, or sex.        "

## 2017-05-22 ENCOUNTER — TELEPHONE (OUTPATIENT)
Dept: FAMILY MEDICINE | Facility: CLINIC | Age: 47
End: 2017-05-22

## 2017-05-22 DIAGNOSIS — Z12.31 ENCOUNTER FOR SCREENING MAMMOGRAM FOR BREAST CANCER: Primary | ICD-10-CM

## 2017-05-22 NOTE — TELEPHONE ENCOUNTER
----- Message from Yessy Simms sent at 5/22/2017 11:05 AM CDT -----  Contact: [phone]  Pt is requesting a order for a mammogram Please call pt at your earliest convenience.  Thanks !

## 2017-06-05 ENCOUNTER — LAB VISIT (OUTPATIENT)
Dept: LAB | Facility: HOSPITAL | Age: 47
End: 2017-06-05
Attending: INTERNAL MEDICINE
Payer: COMMERCIAL

## 2017-06-05 DIAGNOSIS — E11.9 TYPE 2 DIABETES MELLITUS WITHOUT COMPLICATION, WITHOUT LONG-TERM CURRENT USE OF INSULIN: Chronic | ICD-10-CM

## 2017-06-05 LAB
ALBUMIN SERPL BCP-MCNC: 3.3 G/DL
ALP SERPL-CCNC: 45 U/L
ALT SERPL W/O P-5'-P-CCNC: 36 U/L
ANION GAP SERPL CALC-SCNC: 9 MMOL/L
AST SERPL-CCNC: 38 U/L
BILIRUB SERPL-MCNC: 0.8 MG/DL
BUN SERPL-MCNC: 19 MG/DL
CALCIUM SERPL-MCNC: 9.3 MG/DL
CHLORIDE SERPL-SCNC: 99 MMOL/L
CO2 SERPL-SCNC: 27 MMOL/L
CREAT SERPL-MCNC: 0.8 MG/DL
EST. GFR  (AFRICAN AMERICAN): >60 ML/MIN/1.73 M^2
EST. GFR  (NON AFRICAN AMERICAN): >60 ML/MIN/1.73 M^2
GLUCOSE SERPL-MCNC: 149 MG/DL
POTASSIUM SERPL-SCNC: 4.4 MMOL/L
PROT SERPL-MCNC: 7 G/DL
SODIUM SERPL-SCNC: 135 MMOL/L

## 2017-06-05 PROCEDURE — 80053 COMPREHEN METABOLIC PANEL: CPT

## 2017-06-05 PROCEDURE — 36415 COLL VENOUS BLD VENIPUNCTURE: CPT | Mod: PO

## 2017-06-05 PROCEDURE — 83036 HEMOGLOBIN GLYCOSYLATED A1C: CPT

## 2017-06-06 LAB
ESTIMATED AVG GLUCOSE: 163 MG/DL
HBA1C MFR BLD HPLC: 7.3 %

## 2017-06-11 NOTE — ASSESSMENT & PLAN NOTE
Improving A1c.  Will continue to work on diet and exercise.  Continue current medications.  Recheck labs in 3 months.

## 2017-06-11 NOTE — PROGRESS NOTES
Chief Complaint  Chief Complaint   Patient presents with    Diabetes     F/U    Hypertension     F/U       HPI  Iqra Duque is a 46 y.o. female with medical diagnoses as listed in the medical history and problem list that presents for Diabetes, HTN follow up.  Pt is known to me with her last appointment 3/6/2017.  Januvia was added and metformin was maximized at the last visit.  A1c has reduced to 7.3% from 7.9%.  Still working on getting exercise into her habit.  No CP, SOB, palpitations, polyuria/phagia/dipsia      PAST MEDICAL HISTORY:  Past Medical History:   Diagnosis Date    Allergy     Diabetes mellitus type II     Diverticulitis 09/2014    Fever blister     Hypertension     Migraine headache        PAST SURGICAL HISTORY:  Past Surgical History:   Procedure Laterality Date    HYSTERECTOMY  06/2009    [facility]---DLH/BSO ATYPICAL HYPERPLASIA    TUBAL LIGATION         SOCIAL HISTORY:  Social History     Social History    Marital status:      Spouse name: N/A    Number of children: N/A    Years of education: N/A     Occupational History    [employer]     Social History Main Topics    Smoking status: Never Smoker    Smokeless tobacco: Not on file    Alcohol use No    Drug use: No    Sexual activity: Yes     Partners: Male     Birth control/ protection: None     Other Topics Concern    Not on file     Social History Narrative    No narrative on file       FAMILY HISTORY:  Family History   Problem Relation Age of Onset    Ovarian cancer Mother 43    Cancer Mother      ovarian    Diabetes Mother     Heart disease Father     Melanoma Neg Hx     Psoriasis Neg Hx     Lupus Neg Hx        ALLERGIES AND MEDICATIONS: updated and reviewed.  Review of patient's allergies indicates:   Allergen Reactions    Bactrim [sulfamethoxazole-trimethoprim] Hives     Also Septra    Keflex [cephalexin] Shortness Of Breath    Neosporin (neomycin-polymyx) Swelling    Amoxicillin  Rash     Current Outpatient Prescriptions   Medication Sig Dispense Refill    albuterol 90 mcg/actuation inhaler Inhale 1-2 puffs into the lungs every 4 to 6 hours as needed for Wheezing or Shortness of Breath (chest tightness). 1 Inhaler 6    CONTOUR TEST STRIPS Strp TEST ONCE DAILY 100 strip 3    inhalation device (AEROCHAMBER PLUS FLOW-VU) Use as directed for inhalation. 1 Device 0    lisinopril-hydrochlorothiazide (PRINZIDE,ZESTORETIC) 20-25 mg Tab TAKE 1 TABLET BY MOUTH ONCE DAILY. 90 tablet 3    MICROLET LANCET Misc TEST ONCE DAILY 100 each 0    montelukast (SINGULAIR) 10 mg tablet TAKE 1 TABLET (10 MG TOTAL) BY MOUTH ONCE DAILY. 90 tablet 1    omeprazole (PRILOSEC) 20 MG capsule TAKE 1 CAPSULE (20 MG TOTAL) BY MOUTH EVERY MORNING. 90 capsule 3    SITagliptan-metformin (JANUMET) 50-1,000 mg per tablet Take 1 tablet by mouth 2 (two) times daily with meals. 180 tablet 3     No current facility-administered medications for this visit.          ROS  Review of Systems   Constitutional: Negative for chills, fever and unexpected weight change.   HENT: Negative for congestion, ear pain, hearing loss, rhinorrhea, sore throat and trouble swallowing.    Eyes: Negative for discharge, redness and visual disturbance.   Respiratory: Negative for cough, chest tightness, shortness of breath and wheezing.    Cardiovascular: Negative for chest pain, palpitations and leg swelling.   Gastrointestinal: Negative for abdominal pain, constipation, diarrhea, nausea and vomiting.   Endocrine: Negative for polydipsia, polyphagia and polyuria.   Genitourinary: Negative for decreased urine volume, dysuria and hematuria.   Musculoskeletal: Negative for arthralgias, joint swelling and myalgias.   Skin: Negative for color change and rash.   Neurological: Negative for dizziness, weakness, light-headedness and headaches.   Psychiatric/Behavioral: Negative for decreased concentration, dysphoric mood, sleep disturbance and suicidal ideas.  "          Physical Exam  Vitals:    06/12/17 0904 06/12/17 0915   BP: (!) 134/90 114/84   BP Location: Left arm    Patient Position: Sitting    BP Method: Manual    Pulse: 74    Temp: 98.6 °F (37 °C)    TempSrc: Oral    SpO2: 96%    Weight: 128 kg (282 lb 3 oz)    Height: 5' 5" (1.651 m)     Body mass index is 46.96 kg/m².  Weight: 128 kg (282 lb 3 oz)   Height: 5' 5" (165.1 cm)   General appearance: alert, appears stated age, cooperative and no distress  Head: Normocephalic, without obvious abnormality, atraumatic  Eyes: PERRL EOMI conjunctiva clear  Neck: no adenopathy, no carotid bruit, no JVD, supple, symmetrical, trachea midline and thyroid not enlarged, symmetric, no tenderness/mass/nodules  Lungs: clear to auscultation bilaterally  Heart: regular rate and rhythm, S1, S2 normal, no murmur, click, rub or gallop  Extremities: extremities normal, atraumatic, no cyanosis or edema  Pulses: 2+ and symmetric  Neurologic: Grossly normal      Health Maintenance       Date Due Completion Date    Foot Exam 05/11/2017 5/11/2016    Mammogram 04/18/2017 4/18/2016    Influenza Vaccine 08/01/2017 3/6/2017    Override on 2/25/2016: Declined    Hemoglobin A1c 12/05/2017 6/5/2017    Lipid Panel 02/24/2018 2/24/2017    Urine Microalbumin 02/24/2018 2/24/2017    Eye Exam 03/13/2018 3/13/2017 (Done)    Override on 3/13/2017: Done (Dr. Olayinka Phelan/Optometry- negative for bilateral diabetic retinopathy,positive for mild hypertension retinopathy bilaterally)    TETANUS VACCINE 08/29/2026 8/29/2016    Pneumococcal PPSV23 (Medium Risk) (2) 07/16/2034 3/26/2015            Assessment & Plan  Problem List Items Addressed This Visit        Cardiac    HTN (hypertension), benign (Chronic)    Current Assessment & Plan     The current medical regimen is effective;  continue present plan and medications.             Endocrine    Type 2 diabetes mellitus without complication (Chronic)    Current Assessment & Plan     Improving A1c.  Will continue " to work on diet and exercise.  Continue current medications.  Recheck labs in 3 months.          Relevant Orders    Hemoglobin A1c      Other Visit Diagnoses    None.           Health Maintenance reviewed, Has OV for MMG and foot exam on 07/03/17.    Follow-up: Return in about 3 months (around 9/12/2017) for DM follow up.

## 2017-06-12 ENCOUNTER — OFFICE VISIT (OUTPATIENT)
Dept: FAMILY MEDICINE | Facility: CLINIC | Age: 47
End: 2017-06-12
Payer: COMMERCIAL

## 2017-06-12 VITALS
OXYGEN SATURATION: 96 % | BODY MASS INDEX: 47.02 KG/M2 | SYSTOLIC BLOOD PRESSURE: 114 MMHG | HEIGHT: 65 IN | WEIGHT: 282.19 LBS | DIASTOLIC BLOOD PRESSURE: 84 MMHG | HEART RATE: 74 BPM | TEMPERATURE: 99 F

## 2017-06-12 DIAGNOSIS — E11.9 TYPE 2 DIABETES MELLITUS WITHOUT COMPLICATION, WITHOUT LONG-TERM CURRENT USE OF INSULIN: Chronic | ICD-10-CM

## 2017-06-12 DIAGNOSIS — I10 HTN (HYPERTENSION), BENIGN: Chronic | ICD-10-CM

## 2017-06-12 PROCEDURE — 99214 OFFICE O/P EST MOD 30 MIN: CPT | Mod: S$GLB,,, | Performed by: INTERNAL MEDICINE

## 2017-06-12 PROCEDURE — 4010F ACE/ARB THERAPY RXD/TAKEN: CPT | Mod: S$GLB,,, | Performed by: INTERNAL MEDICINE

## 2017-06-12 PROCEDURE — 99999 PR PBB SHADOW E&M-EST. PATIENT-LVL III: CPT | Mod: PBBFAC,,, | Performed by: INTERNAL MEDICINE

## 2017-06-12 PROCEDURE — 3045F PR MOST RECENT HEMOGLOBIN A1C LEVEL 7.0-9.0%: CPT | Mod: S$GLB,,, | Performed by: INTERNAL MEDICINE

## 2017-06-13 ENCOUNTER — PATIENT MESSAGE (OUTPATIENT)
Dept: FAMILY MEDICINE | Facility: CLINIC | Age: 47
End: 2017-06-13

## 2017-07-03 ENCOUNTER — OFFICE VISIT (OUTPATIENT)
Dept: PODIATRY | Facility: CLINIC | Age: 47
End: 2017-07-03
Payer: COMMERCIAL

## 2017-07-03 ENCOUNTER — HOSPITAL ENCOUNTER (OUTPATIENT)
Dept: RADIOLOGY | Facility: HOSPITAL | Age: 47
Discharge: HOME | End: 2017-07-03
Attending: INTERNAL MEDICINE
Payer: COMMERCIAL

## 2017-07-03 VITALS
SYSTOLIC BLOOD PRESSURE: 104 MMHG | DIASTOLIC BLOOD PRESSURE: 54 MMHG | BODY MASS INDEX: 46.98 KG/M2 | WEIGHT: 282 LBS | HEIGHT: 65 IN

## 2017-07-03 DIAGNOSIS — Z12.31 ENCOUNTER FOR SCREENING MAMMOGRAM FOR BREAST CANCER: ICD-10-CM

## 2017-07-03 DIAGNOSIS — E11.9 COMPREHENSIVE DIABETIC FOOT EXAMINATION, TYPE 2 DM, ENCOUNTER FOR: Primary | ICD-10-CM

## 2017-07-03 DIAGNOSIS — L60.8 PINCER NAIL DEFORMITY: ICD-10-CM

## 2017-07-03 PROCEDURE — 77067 SCR MAMMO BI INCL CAD: CPT | Mod: 26,,, | Performed by: RADIOLOGY

## 2017-07-03 PROCEDURE — 77067 SCR MAMMO BI INCL CAD: CPT | Mod: TC

## 2017-07-03 PROCEDURE — 77063 BREAST TOMOSYNTHESIS BI: CPT | Mod: 26,,, | Performed by: RADIOLOGY

## 2017-07-03 PROCEDURE — 99213 OFFICE O/P EST LOW 20 MIN: CPT | Mod: S$GLB,,, | Performed by: PODIATRIST

## 2017-07-03 PROCEDURE — 99999 PR PBB SHADOW E&M-EST. PATIENT-LVL III: CPT | Mod: PBBFAC,,, | Performed by: PODIATRIST

## 2017-07-03 PROCEDURE — 3045F PR MOST RECENT HEMOGLOBIN A1C LEVEL 7.0-9.0%: CPT | Mod: S$GLB,,, | Performed by: PODIATRIST

## 2017-07-03 PROCEDURE — 4010F ACE/ARB THERAPY RXD/TAKEN: CPT | Mod: S$GLB,,, | Performed by: PODIATRIST

## 2017-07-03 NOTE — PROGRESS NOTES
Subjective:      Patient ID: Iqra Duque is a 46 y.o. female.    Chief Complaint: Diabetes Mellitus (pcp Dr. Kothari 6-12-17); Diabetic Foot Exam; and Nail Care    Iqra is a 46 y.o. female who presents to the clinic for evaluation and treatment of high risk feet. Iqra has a past medical history of Allergy; Diabetes mellitus type II; Diverticulitis (09/2014); Fever blister; Hypertension; and Migraine headache.  The patient has no major complaints with feet. Chief concern is how to care for feet as a diabetic. She does also relate recurring  painful ingrown toenail on the right foot, medial border. Patient has attempted self treatment with soaking.  This is a recurrent problem.  Patient  denies purulent drainage.     This patient has documented high risk feet requiring routine maintenance secondary to diabetes mellitis and those secondary complications of diabetes, as mentioned..    PCP: Jaylon Kothari MD    Date Last Seen by PCP:   Chief Complaint   Patient presents with    Diabetes Mellitus     pcp Dr. Kothari 6-12-17    Diabetic Foot Exam    Nail Care       Current shoe gear:  Flip flops     Hemoglobin A1C   Date Value Ref Range Status   06/05/2017 7.3 (H) 4.5 - 6.2 % Final     Comment:     According to ADA guidelines, hemoglobin A1C <7.0% represents  optimal control in non-pregnant diabetic patients.  Different  metrics may apply to specific populations.   Standards of Medical Care in Diabetes - 2016.  For the purpose of screening for the presence of diabetes:  <5.7%     Consistent with the absence of diabetes  5.7-6.4%  Consistent with increasing risk for diabetes   (prediabetes)  >or=6.5%  Consistent with diabetes  Currently no consensus exists for use of hemoglobin A1C  for diagnosis of diabetes for children.     02/24/2017 7.9 (H) 4.5 - 6.2 % Final     Comment:     According to ADA guidelines, hemoglobin A1C <7.0% represents  optimal control in non-pregnant diabetic patients.   Different  metrics may apply to specific populations.   Standards of Medical Care in Diabetes - 2016.  For the purpose of screening for the presence of diabetes:  <5.7%     Consistent with the absence of diabetes  5.7-6.4%  Consistent with increasing risk for diabetes   (prediabetes)  >or=6.5%  Consistent with diabetes  Currently no consensus exists for use of hemoglobin A1C  for diagnosis of diabetes for children.     08/29/2016 7.0 (H) 4.5 - 6.2 % Final     Comment:     According to ADA guidelines, hemoglobin A1C <7.0% represents  optimal control in non-pregnant diabetic patients.  Different  metrics may apply to specific populations.   Standards of Medical Care in Diabetes - 2016.  For the purpose of screening for the presence of diabetes:  <5.7%     Consistent with the absence of diabetes  5.7-6.4%  Consistent with increasing risk for diabetes   (prediabetes)  >or=6.5%  Consistent with diabetes  Currently no consensus exists for use of hemoglobin A1C  for diagnosis of diabetes for children.       Patient Active Problem List   Diagnosis    Dermatitis    HTN (hypertension), benign    Type 2 diabetes mellitus without complication    Allergic rhinitis, cause unspecified    Ganglion cyst    Gastroesophageal reflux    Chronic diarrhea of unknown origin    Morbid obesity with BMI of 45.0-49.9, adult    Muscle cramps at night    Vitamin D insufficiency    Migraine with aura and without status migrainosus, not intractable     Current Outpatient Prescriptions on File Prior to Visit   Medication Sig Dispense Refill    albuterol 90 mcg/actuation inhaler Inhale 1-2 puffs into the lungs every 4 to 6 hours as needed for Wheezing or Shortness of Breath (chest tightness). 1 Inhaler 6    CONTOUR TEST STRIPS Strp TEST ONCE DAILY 100 strip 3    inhalation device (AEROCHAMBER PLUS FLOW-VU) Use as directed for inhalation. 1 Device 0    lisinopril-hydrochlorothiazide (PRINZIDE,ZESTORETIC) 20-25 mg Tab TAKE 1 TABLET BY  MOUTH ONCE DAILY. 90 tablet 3    MICROLET LANCET Misc TEST ONCE DAILY 100 each 0    montelukast (SINGULAIR) 10 mg tablet TAKE 1 TABLET (10 MG TOTAL) BY MOUTH ONCE DAILY. 90 tablet 1    omeprazole (PRILOSEC) 20 MG capsule TAKE 1 CAPSULE (20 MG TOTAL) BY MOUTH EVERY MORNING. 90 capsule 3    SITagliptan-metformin (JANUMET) 50-1,000 mg per tablet Take 1 tablet by mouth 2 (two) times daily with meals. 180 tablet 3     No current facility-administered medications on file prior to visit.      Allergies   Allergen Reactions    Bactrim [Sulfamethoxazole-Trimethoprim] Hives     Also Septra    Keflex [Cephalexin] Shortness Of Breath    Neosporin (Neomycin-Polymyx) Swelling    Amoxicillin Rash     Past Surgical History:   Procedure Laterality Date    HYSTERECTOMY  06/2009    [facility]---DLH/BSO ATYPICAL HYPERPLASIA    TUBAL LIGATION       Family History   Problem Relation Age of Onset    Ovarian cancer Mother 43    Cancer Mother      ovarian    Diabetes Mother     Heart disease Father     Melanoma Neg Hx     Psoriasis Neg Hx     Lupus Neg Hx      Social History     Social History    Marital status:      Spouse name: N/A    Number of children: N/A    Years of education: N/A     Occupational History    [employer]     Social History Main Topics    Smoking status: Never Smoker    Smokeless tobacco: Never Used    Alcohol use No    Drug use: No    Sexual activity: Yes     Partners: Male     Birth control/ protection: None     Other Topics Concern    Not on file     Social History Narrative    No narrative on file       Review of Systems   Constitution: Negative for chills, fever and weakness.   Cardiovascular: Positive for leg swelling. Negative for chest pain and claudication.   Respiratory: Negative for cough and shortness of breath.    Skin: Positive for dry skin, itching and nail changes.   Musculoskeletal: Positive for back pain and myalgias. Negative for arthritis, falls, joint  "pain, joint swelling and muscle weakness.   Gastrointestinal: Negative for diarrhea, nausea and vomiting.   Neurological: Negative for numbness, paresthesias and tremors.        History of sciatica   Psychiatric/Behavioral: Negative for altered mental status and hallucinations.           Objective:       Vitals:    07/03/17 0856   BP: (!) 104/54   Weight: 127.9 kg (282 lb)   Height: 5' 5" (1.651 m)   PainSc: 0-No pain       Physical Exam   Constitutional: She appears well-developed and well-nourished. No distress.   Alert and oriented x 3. No evidence of depression, anxiety, or agitation. Calm, cooperative, and communicative. Appropriate interactions and affect.       Cardiovascular:   Pulses:       Dorsalis pedis pulses are 2+ on the right side, and 2+ on the left side.        Posterior tibial pulses are 2+ on the right side, and 2+ on the left side.   dorsalis pedis and posterior tibial pulses are palpable bilaterally. Digits are warm to the touch 1-5 bilaterally. Capillary refill time is within normal limits 1-5 bilaterally.         Musculoskeletal:        Right ankle: She exhibits no swelling and no ecchymosis. No tenderness. Achilles tendon exhibits no pain.        Left ankle: She exhibits no swelling and no ecchymosis. No tenderness. Achilles tendon exhibits no pain.        Right foot: There is crepitus (dorsal medial midfoot with palpable spurring in the area). There is no swelling, normal capillary refill and no deformity.        Left foot: There is no swelling and normal capillary refill.   Muscle strength is 5/5 in all groups bilaterally.    Decreased arch height noted b/l. Negative too many toes sign.      Lymphadenopathy:   No lymphatic streaking    Negative lymphadenopathy bilateral popliteal fossa and tarsal tunnel.     Neurological:   [place] 5.07 monofilament is intact bilateral feet. Sharp/dull sensation is also intact Bilateral feet.       Skin: Skin is warm, dry and intact. No abrasion, " no ecchymosis, no lesion and no rash noted. She is not diaphoretic. No cyanosis or erythema. No pallor. Nails show no clubbing.   Nails 1-5 bilaterally with incurvated borders.    POP ofMedial hallux nail margin of right foot with ingrown nail plate. There is no granuloma formation noted. no malodor. no drainage           Psychiatric: She has a normal mood and affect. Her speech is normal and behavior is normal.   Nursing note and vitals reviewed.            Assessment:       Encounter Diagnoses   Name Primary?    Comprehensive diabetic foot examination, type 2 DM, encounter for Yes    Pincer nail deformity          Plan:       Iqra was seen today for diabetes mellitus, diabetic foot exam and nail care.    Diagnoses and all orders for this visit:    Comprehensive diabetic foot examination, type 2 DM, encounter for    Pincer nail deformity      I counseled the patient on her conditions, their implications and medical management.     - Shoe inspection. Diabetic Foot Education. Patient reminded of the importance of good nutrition and blood sugar control to help prevent podiatric complications of diabetes. Patient instructed on proper foot hygeine. We discussed wearing proper shoe gear, daily foot inspections, never walking without protective shoe gear.    In depth conversation on the treatment of ingrown nail; partial nail avulsion vs chemical matrixectomy vs conservative treatment of soaking and nail trimming    Informed patient that many nail problems can be prevented by wearing the right shoes and trimming your nails properly.   The right shoes: Feet were measured.  Patient is to wear shoes that are supportive and roomy enough for toes to wiggle. Look for shoes made of natural materials such as leather, which allow  feet to breathe.   Proper trimming: To avoid problems, she was instructed to trim toenails straight across without cutting down into the corners.      She will continue to monitor the areas daily,  inspect her feet, wear protective shoe gear when ambulatory, moisturizer to maintain skin integrity and follow in this office in approximately 12 months, sooner p.r.n.

## 2017-07-03 NOTE — PATIENT INSTRUCTIONS
Recommend lotions: eucerin, aquaphor, A&D ointment, gold bond for diabetics, sween    Shoe recommendations: (try 6pm.com, zappos.com , nordstromrack.com, or shoes.com for discounted prices) you can visit [employer]SW shoes in [place] as well    Asics (GT 1000 or gel foundations), new balance, saucony (stabil c3),  Seymour (transcend), vionic, propet (tennis shoe)    soft brand, clarks, crocs, aerosoles, naturalizers, SAS, ecco, kaleb, messi gibbs, rockports (dress shoes)    Vionic, volitiles, burkenstocks, fitflops, propet (sandals)    Nike comfort thong sandals, crocs (house shoes)    Nail Home remedy:  Vicks Vapor rub OR Listerine and apple cider vinegar in a spray bottle to nails    Occasional soaks for 15-20 mins in luke warm water with 1 cup of listerine and 1 cup of apple cider vinegar are ok You may add several drops of oil of oregano or tea tree oil as well      Diabetes: Inspecting Your Feet  Diabetes increases your chances of developing foot problems. So inspect your feet every day. This helps you find small skin irritations before they become serious infections. If you have trouble seeing the bottoms of your feet, use a mirror or ask a family member or friend to help.     Pressure spots on the bottom of the foot are common areas where problems develop.   How to check your feet  Below are tips to help you look for foot problems. Try to check your feet at the same time each day, such as when you get out of bed in the morning:  · Check the top of each foot. The tops of toes, back of the heel, and outer edge of the foot can get a lot of rubbing from poor-fitting shoes.  · Check the bottom of each foot. Daily wear and tear often leads to problems at pressure spots.  · Check the toes and nails. Fungal infections often occur between toes. Toenail problems can also be a sign of fungal infections or lead to breaks in the skin.  · Check your shoes, too. Loose objects inside a shoe can injure the foot. Use your hand to feel  inside your shoes for things like vicente, loose stitching, or rough areas that could irritate your skin.  Warning signs  Look for any color changes in the foot. Redness with streaks can signal a severe infection, which needs immediate medical attention. Tell your doctor right away if you have any of these problems:  · Swelling, sometimes with color changes, may be a sign of poor blood flow or infection. Symptoms include tenderness and an increase in the size of your foot.  · Warm or hot areas on your feet may be signs of infection. A foot that is cold may not be getting enough blood.  · Sensations such as burning, tingling, or pins and needles can be signs of a problem. Also check for areas that may be numb.  · Hot spots are caused by friction or pressure. Look for hot spots in areas that get a lot of rubbing. Hot spots can turn into blisters, calluses, or sores.  · Cracks and sores are caused by dry or irritated skin. They are a sign that the skin is breaking down, which can lead to infection.  · Toenail problems to watch for include nails growing into the skin (ingrown toenail) and causing redness or pain. Thick, yellow, or discolored nails can signal a fungal infection.  · Drainage and odor can develop from untreated sores and ulcers. Call your doctor right away if you notice white or yellow drainage, bleeding, or unpleasant odor.   © 2077-3362 [employer]. [address] [place] [address], [place] [address]. All rights reserved. This information is not intended as a substitute for professional medical care. Always follow your healthcare professional's instructions.

## 2017-07-06 ENCOUNTER — PATIENT MESSAGE (OUTPATIENT)
Dept: FAMILY MEDICINE | Facility: CLINIC | Age: 47
End: 2017-07-06

## 2017-07-24 ENCOUNTER — OFFICE VISIT (OUTPATIENT)
Dept: CARDIOLOGY | Facility: CLINIC | Age: 47
End: 2017-07-24
Payer: COMMERCIAL

## 2017-07-24 ENCOUNTER — PATIENT MESSAGE (OUTPATIENT)
Dept: CARDIOLOGY | Facility: CLINIC | Age: 47
End: 2017-07-24

## 2017-07-24 ENCOUNTER — PATIENT MESSAGE (OUTPATIENT)
Dept: FAMILY MEDICINE | Facility: CLINIC | Age: 47
End: 2017-07-24

## 2017-07-24 VITALS
BODY MASS INDEX: 47.02 KG/M2 | SYSTOLIC BLOOD PRESSURE: 110 MMHG | OXYGEN SATURATION: 95 % | WEIGHT: 282.19 LBS | DIASTOLIC BLOOD PRESSURE: 80 MMHG | HEART RATE: 124 BPM | HEIGHT: 65 IN

## 2017-07-24 DIAGNOSIS — E66.01 MORBID OBESITY, UNSPECIFIED OBESITY TYPE: ICD-10-CM

## 2017-07-24 DIAGNOSIS — R00.0 TACHYCARDIA: ICD-10-CM

## 2017-07-24 DIAGNOSIS — E11.21 CONTROLLED TYPE 2 DIABETES MELLITUS WITH DIABETIC NEPHROPATHY, WITHOUT LONG-TERM CURRENT USE OF INSULIN: ICD-10-CM

## 2017-07-24 DIAGNOSIS — R94.31 ABNORMAL EKG: ICD-10-CM

## 2017-07-24 DIAGNOSIS — R06.02 SHORTNESS OF BREATH: Primary | ICD-10-CM

## 2017-07-24 DIAGNOSIS — R06.02 SOB (SHORTNESS OF BREATH): ICD-10-CM

## 2017-07-24 DIAGNOSIS — E78.5 DYSLIPIDEMIA: ICD-10-CM

## 2017-07-24 DIAGNOSIS — I10 ESSENTIAL HYPERTENSION: ICD-10-CM

## 2017-07-24 PROCEDURE — 99999 PR PBB SHADOW E&M-EST. PATIENT-LVL III: CPT | Mod: PBBFAC,,, | Performed by: INTERNAL MEDICINE

## 2017-07-24 PROCEDURE — 99214 OFFICE O/P EST MOD 30 MIN: CPT | Mod: S$GLB,,, | Performed by: INTERNAL MEDICINE

## 2017-07-24 PROCEDURE — 93000 ELECTROCARDIOGRAM COMPLETE: CPT | Mod: S$GLB,,, | Performed by: INTERNAL MEDICINE

## 2017-07-24 PROCEDURE — 4010F ACE/ARB THERAPY RXD/TAKEN: CPT | Mod: S$GLB,,, | Performed by: INTERNAL MEDICINE

## 2017-07-24 PROCEDURE — 3045F PR MOST RECENT HEMOGLOBIN A1C LEVEL 7.0-9.0%: CPT | Mod: S$GLB,,, | Performed by: INTERNAL MEDICINE

## 2017-07-24 NOTE — PROGRESS NOTES
Subjective:    Patient ID:  Iqra Duque is a 47 y.o. female who presents for follow-up of No chief complaint on file.      HPI   previous history:  Patient is here for follow-up of chest pain and shortness of breath.  She underwent diagnostic testing which was within normal limits.  She's not any progression of cardiopulmonary complaints.  She denies any current chest pain, shortness of breath or palpitations.  She's expands no PND, orthopnea or lower edema.  She denies any dizziness, presyncope or syncope.  She is interesting getting an exercise program.  She does try and monitor her food intake.  Otherwise she's better usual state of health.    Today:  Here for follow-up of chest pain shortness of breath.  Patient is tachycardic today.  This is the same stuff that she's previously felt in the past was never been documented as sustained as high.  She's 120 bpm in clinic.  She occasionally drops lower but she feels short of breath with this.  She says that she did do some walking this morning.  She mainly is limited by sciatica as well.  She denies any PND, orthopnea or lower edema.  She's not expressing dizziness, presyncope or syncope.    Review of Systems   Constitution: Negative.   HENT: Negative.    Eyes: Negative.    Cardiovascular: Positive for irregular heartbeat and palpitations. Negative for chest pain, dyspnea on exertion, leg swelling, near-syncope, orthopnea, paroxysmal nocturnal dyspnea and syncope.   Respiratory: Negative for shortness of breath.    Skin: Negative.    Musculoskeletal: Negative.    Gastrointestinal: Negative for abdominal pain, constipation and diarrhea.   Genitourinary: Negative for dysuria.   Neurological: Negative.    Psychiatric/Behavioral: Negative.         Objective:    Physical Exam   Constitutional: She is oriented to person, place, and time. She appears well-developed and well-nourished.   HENT:   Head: Normocephalic and atraumatic.   Eyes: Conjunctivae and EOM are normal.  Pupils are equal, round, and reactive to light.   Neck: Normal range of motion. Neck supple. No thyromegaly present.   Cardiovascular: Regular rhythm.  Tachycardia present.    No murmur heard.  Pulmonary/Chest: Effort normal and breath sounds normal. No respiratory distress.   Abdominal: Soft. Bowel sounds are normal.   Musculoskeletal: She exhibits no edema.   Neurological: She is alert and oriented to person, place, and time.   Skin: Skin is warm and dry.   Psychiatric: She has a normal mood and affect. Her behavior is normal.       NST:  Impression: NORMAL MYOCARDIAL PERFUSION  1. The perfusion scan is free of evidence for myocardial ischemia or injury.   2. There is a mild intensity fixed defect in the anterior wall of the left ventricle, secondary to breast attenuation.   3. Resting wall motion is physiologic.   4. Resting LV function is normal.   5. The ventricular volumes are normal at rest and stress.   6. The extracardiac distribution of radioactivity is normal.     Echo:  CONCLUSIONS     1 - Normal left ventricular systolic function (EF 55-60%).     2 - Concentric remodeling.     3 - Trivial mitral regurgitation.     4 - Trivial tricuspid regurgitation.     EKG shows sinus tach versus A. tach at 120 bpm    Assessment:       1. Shortness of breath    2. Abnormal EKG    3. Essential hypertension    4. Dyslipidemia    5. Controlled type 2 diabetes mellitus with diabetic nephropathy, without long-term current use of insulin    6. Morbid obesity, unspecified obesity type         Plan:       -Continue medical therapy  -Check Holter monitor (*will call for abnormal results)  -Consider restarting propranolol if persistently elevated  -Go to ED for worsening symptoms  -Continue diet and exercise --risk factor modification  -Consider sleep study referral     Return to clinic in 3 months

## 2017-08-01 ENCOUNTER — HOSPITAL ENCOUNTER (OUTPATIENT)
Dept: CARDIOLOGY | Facility: HOSPITAL | Age: 47
Discharge: HOME | End: 2017-08-01
Attending: INTERNAL MEDICINE
Payer: COMMERCIAL

## 2017-08-25 DIAGNOSIS — J30.9 ALLERGIC RHINITIS: ICD-10-CM

## 2017-08-25 RX ORDER — MONTELUKAST SODIUM 10 MG/1
TABLET ORAL
Qty: 90 TABLET | Refills: 1 | Status: SHIPPED | OUTPATIENT
Start: 2017-08-25 | End: 2018-03-01 | Stop reason: SDUPTHER

## 2017-09-07 ENCOUNTER — LAB VISIT (OUTPATIENT)
Dept: LAB | Facility: HOSPITAL | Age: 47
End: 2017-09-07
Attending: INTERNAL MEDICINE
Payer: COMMERCIAL

## 2017-09-07 DIAGNOSIS — E11.9 TYPE 2 DIABETES MELLITUS WITHOUT COMPLICATION, WITHOUT LONG-TERM CURRENT USE OF INSULIN: Chronic | ICD-10-CM

## 2017-09-07 LAB
ESTIMATED AVG GLUCOSE: 154 MG/DL
HBA1C MFR BLD HPLC: 7 %

## 2017-09-07 PROCEDURE — 83036 HEMOGLOBIN GLYCOSYLATED A1C: CPT

## 2017-09-07 PROCEDURE — 36415 COLL VENOUS BLD VENIPUNCTURE: CPT | Mod: PO

## 2017-09-11 ENCOUNTER — OFFICE VISIT (OUTPATIENT)
Dept: FAMILY MEDICINE | Facility: CLINIC | Age: 47
End: 2017-09-11
Payer: COMMERCIAL

## 2017-09-11 VITALS
DIASTOLIC BLOOD PRESSURE: 72 MMHG | HEIGHT: 65 IN | TEMPERATURE: 98 F | OXYGEN SATURATION: 98 % | BODY MASS INDEX: 47.06 KG/M2 | WEIGHT: 282.44 LBS | HEART RATE: 110 BPM | SYSTOLIC BLOOD PRESSURE: 114 MMHG

## 2017-09-11 DIAGNOSIS — E11.9 TYPE 2 DIABETES MELLITUS WITHOUT COMPLICATION, WITHOUT LONG-TERM CURRENT USE OF INSULIN: Primary | Chronic | ICD-10-CM

## 2017-09-11 PROCEDURE — 3078F DIAST BP <80 MM HG: CPT | Mod: S$GLB,,, | Performed by: INTERNAL MEDICINE

## 2017-09-11 PROCEDURE — 3045F PR MOST RECENT HEMOGLOBIN A1C LEVEL 7.0-9.0%: CPT | Mod: S$GLB,,, | Performed by: INTERNAL MEDICINE

## 2017-09-11 PROCEDURE — 99999 PR PBB SHADOW E&M-EST. PATIENT-LVL III: CPT | Mod: PBBFAC,,, | Performed by: INTERNAL MEDICINE

## 2017-09-11 PROCEDURE — 3074F SYST BP LT 130 MM HG: CPT | Mod: S$GLB,,, | Performed by: INTERNAL MEDICINE

## 2017-09-11 PROCEDURE — 3008F BODY MASS INDEX DOCD: CPT | Mod: S$GLB,,, | Performed by: INTERNAL MEDICINE

## 2017-09-11 PROCEDURE — 99213 OFFICE O/P EST LOW 20 MIN: CPT | Mod: S$GLB,,, | Performed by: INTERNAL MEDICINE

## 2017-09-11 PROCEDURE — 4010F ACE/ARB THERAPY RXD/TAKEN: CPT | Mod: S$GLB,,, | Performed by: INTERNAL MEDICINE

## 2017-09-11 NOTE — ASSESSMENT & PLAN NOTE
The current medical regimen is effective;  continue present plan and medications. Follow up in 6 months

## 2017-09-11 NOTE — PROGRESS NOTES
Chief Complaint  Chief Complaint   Patient presents with    Diabetes    Follow-up       HPI  Iqra Duque is a 47 y.o. female with medical diagnoses as listed in the medical history and problem list that presents for DM follow up.  Pt is known to me with her last appointment 6/12/2017.  She has been taking and tolerating her medication daily.  No hypoglycemia, polydipsia, uria phagia.        PAST MEDICAL HISTORY:  Past Medical History:   Diagnosis Date    Allergy     Diabetes mellitus type II     Diverticulitis 09/2014    Fever blister     Hypertension     Migraine headache        PAST SURGICAL HISTORY:  Past Surgical History:   Procedure Laterality Date    HYSTERECTOMY  06/2009    [facility]---DLH/BSO ATYPICAL HYPERPLASIA    TUBAL LIGATION         SOCIAL HISTORY:  Social History     Social History    Marital status:      Spouse name: N/A    Number of children: N/A    Years of education: N/A     Occupational History    [employer]     Social History Main Topics    Smoking status: Never Smoker    Smokeless tobacco: Never Used    Alcohol use No    Drug use: No    Sexual activity: Yes     Partners: Male     Birth control/ protection: None     Other Topics Concern    Not on file     Social History Narrative    No narrative on file       FAMILY HISTORY:  Family History   Problem Relation Age of Onset    Ovarian cancer Mother 43    Cancer Mother      ovarian    Diabetes Mother     Heart disease Father     Melanoma Neg Hx     Psoriasis Neg Hx     Lupus Neg Hx        ALLERGIES AND MEDICATIONS: updated and reviewed.  Review of patient's allergies indicates:   Allergen Reactions    Bactrim [sulfamethoxazole-trimethoprim] Hives     Also Septra    Keflex [cephalexin] Shortness Of Breath    Neosporin (neomycin-polymyx) Swelling    Amoxicillin Rash     Current Outpatient Prescriptions   Medication Sig Dispense Refill    albuterol 90 mcg/actuation inhaler Inhale 1-2 puffs  "into the lungs every 4 to 6 hours as needed for Wheezing or Shortness of Breath (chest tightness). 1 Inhaler 6    CONTOUR TEST STRIPS Strp TEST ONCE DAILY 100 strip 3    inhalation device (AEROCHAMBER PLUS FLOW-VU) Use as directed for inhalation. 1 Device 0    lisinopril-hydrochlorothiazide (PRINZIDE,ZESTORETIC) 20-25 mg Tab TAKE 1 TABLET BY MOUTH ONCE DAILY. 90 tablet 3    MICROLET LANCET Misc TEST ONCE DAILY 100 each 0    montelukast (SINGULAIR) 10 mg tablet TAKE 1 TABLET (10 MG TOTAL) BY MOUTH ONCE DAILY. 90 tablet 1    omeprazole (PRILOSEC) 20 MG capsule TAKE 1 CAPSULE (20 MG TOTAL) BY MOUTH EVERY MORNING. 90 capsule 3    SITagliptan-metformin (JANUMET) 50-1,000 mg per tablet Take 1 tablet by mouth 2 (two) times daily with meals. 180 tablet 3     No current facility-administered medications for this visit.          ROS  Review of Systems   Respiratory: Negative for cough and shortness of breath.    Cardiovascular: Negative for chest pain, palpitations and leg swelling.   Endocrine: Negative for polydipsia, polyphagia and polyuria.   Skin: Negative for pallor.   Neurological: Positive for headaches. Negative for dizziness, tremors, seizures, speech difficulty and weakness.   Psychiatric/Behavioral: Negative for confusion. The patient is nervous/anxious.            Physical Exam  Vitals:    09/11/17 0943 09/11/17 1014   BP: 130/80 114/72   BP Location: Left arm    Patient Position: Sitting    BP Method: Large (Manual)    Pulse: 110    Temp: 98.3 °F (36.8 °C)    SpO2: 98%    Weight: 128.1 kg (282 lb 6.6 oz)    Height: 5' 5" (1.651 m)     Body mass index is 47 kg/m².  Weight: 128.1 kg (282 lb 6.6 oz)   Height: 5' 5" (165.1 cm)   General appearance: alert, appears stated age, cooperative and no distress  Head: Normocephalic, without obvious abnormality, atraumatic  Eyes: PERRL EOMI conjunctiva clear  Lungs: clear to auscultation bilaterally  Heart: regular rate and rhythm, S1, S2 normal, no murmur, click, rub " or gallop  Extremities: extremities normal, atraumatic, no cyanosis or edema  Pulses: 2+ and symmetric  Neurologic: Grossly normal      Health Maintenance       Date Due Completion Date    Influenza Vaccine 08/01/2017 3/6/2017    Override on 2/25/2016: Declined    Lipid Panel 02/24/2018 2/24/2017    Urine Microalbumin 02/24/2018 2/24/2017    Hemoglobin A1c 03/07/2018 9/7/2017    Eye Exam 03/13/2018 3/13/2017 (Done)    Override on 3/13/2017: Done (Dr. Olayinka Phelan/Optometry- negative for bilateral diabetic retinopathy,positive for mild hypertension retinopathy bilaterally)    Foot Exam 07/03/2018 7/3/2017 (Done)    Override on 7/3/2017: Done    Mammogram 07/03/2018 7/3/2017    TETANUS VACCINE 08/29/2026 8/29/2016    Pneumococcal PPSV23 (Medium Risk) (2) 07/16/2034 3/26/2015            Assessment & Plan  Problem List Items Addressed This Visit        Endocrine    Type 2 diabetes mellitus without complication - Primary (Chronic)    Current Assessment & Plan     The current medical regimen is effective;  continue present plan and medications. Follow up in 6 months           Other Visit Diagnoses    None.           Health Maintenance reviewed, Flu in Oct.    Follow-up: Return in about 6 months (around 3/11/2018) for Routine Physical.

## 2017-10-12 ENCOUNTER — PATIENT MESSAGE (OUTPATIENT)
Dept: ADMINISTRATIVE | Facility: OTHER | Age: 47
End: 2017-10-12

## 2017-10-13 ENCOUNTER — TELEPHONE (OUTPATIENT)
Dept: FAMILY MEDICINE | Facility: CLINIC | Age: 47
End: 2017-10-13

## 2017-10-13 RX ORDER — METFORMIN HYDROCHLORIDE 1000 MG/1
1000 TABLET ORAL 2 TIMES DAILY WITH MEALS
Qty: 180 TABLET | Refills: 0 | Status: SHIPPED | OUTPATIENT
Start: 2017-10-13 | End: 2018-01-08 | Stop reason: SDUPTHER

## 2017-10-13 NOTE — TELEPHONE ENCOUNTER
Patient states that Nitinumet is no longer being covered by her insurance and she now has nothing for her diabetes.  She reports taking only metformin in the past.  Please advise.

## 2017-10-13 NOTE — TELEPHONE ENCOUNTER
----- Message from Jolanta Berman sent at 10/12/2017  4:16 PM CDT -----  Contact: SELF  Patient states her insurance is denying -- SITagliptan-metformin (JANUMET) 50-1,000 mg per tablet .      [phone]   LL

## 2017-10-13 NOTE — TELEPHONE ENCOUNTER
I will send a script for metformin and januvia separately, she needs to call her insurance company and find out what is covered. If the januvia is not covered she can take the metformin while we adjust her medications

## 2017-10-18 ENCOUNTER — PATIENT MESSAGE (OUTPATIENT)
Dept: FAMILY MEDICINE | Facility: CLINIC | Age: 47
End: 2017-10-18

## 2017-10-30 ENCOUNTER — OFFICE VISIT (OUTPATIENT)
Dept: CARDIOLOGY | Facility: CLINIC | Age: 47
End: 2017-10-30
Payer: COMMERCIAL

## 2017-10-30 VITALS
OXYGEN SATURATION: 97 % | HEART RATE: 91 BPM | BODY MASS INDEX: 46.96 KG/M2 | WEIGHT: 282.19 LBS | SYSTOLIC BLOOD PRESSURE: 141 MMHG | DIASTOLIC BLOOD PRESSURE: 78 MMHG

## 2017-10-30 DIAGNOSIS — E11.21 CONTROLLED TYPE 2 DIABETES MELLITUS WITH DIABETIC NEPHROPATHY, WITHOUT LONG-TERM CURRENT USE OF INSULIN: ICD-10-CM

## 2017-10-30 DIAGNOSIS — R94.31 ABNORMAL EKG: ICD-10-CM

## 2017-10-30 DIAGNOSIS — I10 ESSENTIAL HYPERTENSION: ICD-10-CM

## 2017-10-30 DIAGNOSIS — R00.0 TACHYCARDIA: ICD-10-CM

## 2017-10-30 DIAGNOSIS — E78.5 DYSLIPIDEMIA: ICD-10-CM

## 2017-10-30 DIAGNOSIS — R06.02 SHORTNESS OF BREATH: Primary | ICD-10-CM

## 2017-10-30 DIAGNOSIS — E66.01 MORBID OBESITY, UNSPECIFIED OBESITY TYPE: ICD-10-CM

## 2017-10-30 PROCEDURE — 99214 OFFICE O/P EST MOD 30 MIN: CPT | Mod: S$GLB,,, | Performed by: INTERNAL MEDICINE

## 2017-10-30 PROCEDURE — 99999 PR PBB SHADOW E&M-EST. PATIENT-LVL III: CPT | Mod: PBBFAC,,, | Performed by: INTERNAL MEDICINE

## 2017-10-30 NOTE — PROGRESS NOTES
Subjective:    Patient ID:  Iqra Duque is a 47 y.o. female who presents for follow-up of Follow-up (3 mo)      HPI   previous history:  Here for follow-up of chest pain shortness of breath.  Patient is tachycardic today.  This is the same stuff that she's previously felt in the past was never been documented as sustained as high.  She's 120 bpm in clinic.  She occasionally drops lower but she feels short of breath with this.  She says that she did do some walking this morning.  She mainly is limited by sciatica as well.  She denies any PND, orthopnea or lower edema.  She's not expressing dizziness, presyncope or syncope.    Today:  Here for follow-up of chest pain and shortness of breath.  She denies any worsening cardiopulmonary complaints.  She didn't walk this morning because she want to be tachycardic like which was what was*last.  Holter monitor showed no significant arrhythmias overall average normal heart rate after last visit.  She's had no significant worsening complaints.  She expresses no chest pain, shortness of breath or palpitations.  She denies any PND, orthopnea or lower edema.  She's not expressing dizziness, presyncope or syncope.  She's been walking a couple times per week.    Review of Systems   Constitution: Negative.   HENT: Negative.    Eyes: Negative.    Cardiovascular: Negative for chest pain, dyspnea on exertion, irregular heartbeat, leg swelling, near-syncope, orthopnea, palpitations, paroxysmal nocturnal dyspnea and syncope.   Respiratory: Negative for shortness of breath.    Skin: Negative.    Musculoskeletal: Negative.    Gastrointestinal: Negative for abdominal pain, constipation and diarrhea.   Genitourinary: Negative for dysuria.   Neurological: Negative.    Psychiatric/Behavioral: Negative.         Objective:    Physical Exam   Constitutional: She is oriented to person, place, and time. She appears well-developed and well-nourished.   HENT:   Head: Normocephalic and atraumatic.    Eyes: Conjunctivae and EOM are normal. Pupils are equal, round, and reactive to light.   Neck: Normal range of motion. Neck supple. No thyromegaly present.   Cardiovascular: Regular rhythm.  Tachycardia present.    No murmur heard.  Pulmonary/Chest: Effort normal and breath sounds normal. No respiratory distress.   Abdominal: Soft. Bowel sounds are normal.   Musculoskeletal: She exhibits no edema.   Neurological: She is alert and oriented to person, place, and time.   Skin: Skin is warm and dry.   Psychiatric: She has a normal mood and affect. Her behavior is normal.       NST:  Impression: NORMAL MYOCARDIAL PERFUSION  1. The perfusion scan is free of evidence for myocardial ischemia or injury.   2. There is a mild intensity fixed defect in the anterior wall of the left ventricle, secondary to breast attenuation.   3. Resting wall motion is physiologic.   4. Resting LV function is normal.   5. The ventricular volumes are normal at rest and stress.   6. The extracardiac distribution of radioactivity is normal.     Echo:  CONCLUSIONS     1 - Normal left ventricular systolic function (EF 55-60%).     2 - Concentric remodeling.     3 - Trivial mitral regurgitation.     4 - Trivial tricuspid regurgitation.       Assessment:       1. Shortness of breath    2. Abnormal EKG    3. Essential hypertension    4. Dyslipidemia    5. Controlled type 2 diabetes mellitus with diabetic nephropathy, without long-term current use of insulin    6. Morbid obesity, unspecified obesity type    7. Tachycardia         Plan:       -Continue medical therapy  -Consider restarting propranolol if persistently elevated  -Go to ED for worsening symptoms  -Continue diet and exercise --risk factor modification  -Consider sleep study referral     Return to clinic in 6 months

## 2017-11-30 ENCOUNTER — OFFICE VISIT (OUTPATIENT)
Dept: FAMILY MEDICINE | Facility: CLINIC | Age: 47
End: 2017-11-30
Payer: COMMERCIAL

## 2017-11-30 VITALS
TEMPERATURE: 99 F | HEIGHT: 65 IN | OXYGEN SATURATION: 97 % | HEART RATE: 86 BPM | BODY MASS INDEX: 47.35 KG/M2 | SYSTOLIC BLOOD PRESSURE: 132 MMHG | DIASTOLIC BLOOD PRESSURE: 80 MMHG | WEIGHT: 284.19 LBS

## 2017-11-30 DIAGNOSIS — J32.9 RHINOSINUSITIS: Primary | ICD-10-CM

## 2017-11-30 DIAGNOSIS — H65.92 MIDDLE EAR EFFUSION, LEFT: ICD-10-CM

## 2017-11-30 PROCEDURE — 99214 OFFICE O/P EST MOD 30 MIN: CPT | Mod: S$GLB,,, | Performed by: NURSE PRACTITIONER

## 2017-11-30 PROCEDURE — 99999 PR PBB SHADOW E&M-EST. PATIENT-LVL IV: CPT | Mod: PBBFAC,,, | Performed by: NURSE PRACTITIONER

## 2017-11-30 RX ORDER — LEVOCETIRIZINE DIHYDROCHLORIDE 5 MG/1
5 TABLET, FILM COATED ORAL NIGHTLY
Qty: 30 TABLET | Refills: 3 | Status: SHIPPED | OUTPATIENT
Start: 2017-11-30 | End: 2018-06-22

## 2017-11-30 RX ORDER — METHYLPREDNISOLONE 4 MG/1
TABLET ORAL
Qty: 1 PACKAGE | Refills: 0 | Status: SHIPPED | OUTPATIENT
Start: 2017-11-30 | End: 2017-12-21

## 2017-11-30 RX ORDER — CODEINE PHOSPHATE AND GUAIFENESIN 10; 100 MG/5ML; MG/5ML
5 SOLUTION ORAL 3 TIMES DAILY PRN
Qty: 118 ML | Refills: 0 | Status: SHIPPED | OUTPATIENT
Start: 2017-11-30 | End: 2017-12-10

## 2017-11-30 NOTE — PATIENT INSTRUCTIONS
Viral Upper Respiratory Illness (Adult)  You have a viral upper respiratory illness (URI), which is another term for the common cold. This illness is contagious during the first few days. It is spread through the air by coughing and sneezing. It may also be spread by direct contact (touching the sick person and then touching your own eyes, nose, or mouth). Frequent handwashing will decrease risk of spread. Most viral illnesses go away within 7 to 10 days with rest and simple home remedies. Sometimes the illness may last for several weeks. Antibiotics will not kill a virus, and they are generally not prescribed for this condition.    Home care  · If symptoms are severe, rest at home for the first 2 to 3 days. When you resume activity, don't let yourself get too tired.  · Avoid being exposed to cigarette smoke (yours or others).  · You may use acetaminophen or ibuprofen to control pain and fever, unless another medicine was prescribed. (Note: If you have chronic liver or kidney disease, have ever had a stomach ulcer or gastrointestinal bleeding, or are taking blood-thinning medicines, talk with your healthcare provider before using these medicines.) Aspirin should never be given to anyone under 18 years of age who is ill with a viral infection or fever. It may cause severe liver or brain damage.  · Your appetite may be poor, so a light diet is fine. Avoid dehydration by drinking 6 to 8 glasses of fluids per day (water, soft drinks, juices, tea, or soup). Extra fluids will help loosen secretions in the nose and lungs.  · Over-the-counter cold medicines will not shorten the length of time youre sick, but they may be helpful for the following symptoms: cough, sore throat, and nasal and sinus congestion. (Note: Do not use decongestants if you have high blood pressure.)  Follow-up care  Follow up with your healthcare provider, or as advised.  When to seek medical advice  Call your healthcare provider right away if any  of these occur:  · Cough with lots of colored sputum (mucus)  · Severe headache; face, neck, or ear pain  · Difficulty swallowing due to throat pain  · Fever of 100.4°F (38°C)  Call 911, or get immediate medical care  Call emergency services right away if any of these occur:  · Chest pain, shortness of breath, wheezing, or difficulty breathing  · Coughing up blood  · Inability to swallow due to throat pain  Date Last Reviewed: 9/13/2015  © 6265-3852 [employer]. [address] [place] [address]. All rights reserved. This information is not intended as a substitute for professional medical care. Always follow your healthcare professional's instructions.         no

## 2017-11-30 NOTE — PROGRESS NOTES
Subjective:       Patient ID: Iqra Duque is a 47 y.o. female.    Chief Complaint: Headache; Otalgia (left ear); and Adenopathy    Otalgia    There is pain in the left ear. This is a new problem. The current episode started today. The problem occurs constantly. The problem has been unchanged. There has been no fever. The pain is at a severity of 4/10. The pain is moderate. Associated symptoms include coughing, diarrhea (yesterday- resolved), headaches, neck pain (left side) and vomiting (yesterday- resolved). Pertinent negatives include no abdominal pain, ear discharge, hearing loss, rash, rhinorrhea or sore throat. She has tried nothing for the symptoms.     Review of Systems   HENT: Positive for congestion and ear pain. Negative for ear discharge, hearing loss, rhinorrhea and sore throat.    Respiratory: Positive for cough. Negative for chest tightness.    Gastrointestinal: Positive for diarrhea (yesterday- resolved), nausea (yesterday- resolved) and vomiting (yesterday- resolved). Negative for abdominal pain.   Musculoskeletal: Positive for neck pain (left side).   Skin: Negative for rash.   Neurological: Positive for headaches. Negative for weakness.   Hematological: Positive for adenopathy (left side).       Objective:      Physical Exam   Constitutional: She appears well-developed and well-nourished. No distress.   HENT:   Head: Normocephalic and atraumatic.   Right Ear: Tympanic membrane, external ear and ear canal normal.   Left Ear: External ear and ear canal normal. A middle ear effusion is present.   Nose: Mucosal edema and rhinorrhea present. Right sinus exhibits no maxillary sinus tenderness and no frontal sinus tenderness. Left sinus exhibits no maxillary sinus tenderness and no frontal sinus tenderness.   Mouth/Throat: Oropharynx is clear and moist. Mucous membranes are not dry. No oropharyngeal exudate, posterior oropharyngeal edema or posterior oropharyngeal erythema.   Cardiovascular: Normal  rate and regular rhythm.    No murmur heard.  Pulmonary/Chest: Effort normal and breath sounds normal. She has no wheezes. She has no rales.   Lymphadenopathy:     She has no cervical adenopathy.   Skin: Skin is warm and dry. Capillary refill takes less than 2 seconds.   Psychiatric: She has a normal mood and affect.   Nursing note and vitals reviewed.      Assessment:       1. Rhinosinusitis    2. Middle ear effusion, left        Plan:       Iqra was seen today for headache, otalgia and adenopathy.    Diagnoses and all orders for this visit:    Rhinosinusitis  -     guaifenesin-codeine 100-10 mg/5 ml (TUSSI-ORGANIDIN NR)  mg/5 mL syrup; Take 5 mLs by mouth 3 (three) times daily as needed for Cough.  -     methylPREDNISolone (MEDROL DOSEPACK) 4 mg tablet; use as directed  -     levocetirizine (XYZAL) 5 MG tablet; Take 1 tablet (5 mg total) by mouth every evening.    Middle ear effusion, left  -     methylPREDNISolone (MEDROL DOSEPACK) 4 mg tablet; use as directed    HOME CARE:  · Drink plenty of water, hot tea, and other liquids to stay well hydrated. This thins the mucus and promotes sinus drainage.  · Apply heat to the painful areas of the face. Use a towel soaked in hot water. Or,  the shower and direct the hot spray onto your face. This is a good way to inhale warm water vapor and get heat on your face at the same time. (Cover your mouth and nose with your hands so you can still breathe as you do this.)  · Use a vaporizer with products such as Vicks VapoRub (contains menthol) at night. Suck on peppermint, menthol or eucalyptus hard candies during the day.  · An expectorant containing guaifenesin (such as Robitussin), helps to thin the mucus and promote drainage from the sinuses.  · Over-the-counter decongestants may be used unless a similar medicine was prescribed. Nasal sprays work the fastest. Use one that contains phenylephrine (Enrike-synephrine, Sinex and others) or oxymetazoline (Afrin).  First blow the nose gently to remove mucus, then apply the drops. Do not use these medicines more often than directed on the label or for more than three days or symptoms may worsen. You may also use tablets containing pseudoephedrine (Sudafed). Many sinus remedies combine ingredients, which may increase side effects. Read the labels or ask the pharmacist for help. NOTE: Persons with high blood pressure should not use decongestants. They can raise blood pressure.  · Antihistamines are useful if allergies are a cause of your sinusitis. The mildest one is chlorpheniramine (available without a prescription). The dose for adults is 8-12mg three times a day. [NOTE: Do not use chlorpheniramine if you have glaucoma or if you are a man with trouble urinating due to an enlarged prostate.] Claritin (loratidine) is an antihistamine that causes less drowsiness and is a good alternative for daytime use.  · Do not use nasal rinses or irrigation during an acute sinus infection, unless advised by your doctor. Rinsing may spread the infection to other sinuses.  · You may use acetaminophen (Tylenol) or ibuprofen (Motrin, Advil) to control pain, unless another pain medicine was prescribed. [ NOTE: If you have chronic liver or kidney disease or ever had a stomach ulcer, talk with your doctor before using these medicines.] (Aspirin should never be used in anyone under 18 years of age who is ill with a fever. It may cause severe liver damage.)  · Finish the full course, even if you are feeling better after a few days.  FOLLOW UP with your doctor or this facility in one week or as instructed by our staff if not improving.  GET PROMPT MEDICAL ATTENTION if any of the following occur:  · Facial pain or headache becomes more severe  · Stiff neck  · Unusual drowsiness or confusion, or not acting like your normal self  · Swelling of the forehead or eyelids  · Vision problems including blurred or double vision  · Fever of 100.4ºF (38ºC) or  higher, or as directed by your healthcare provider  · Seizure  © 0595-8480 [employer], [address] [place] [address], [place] [address]. All rights reserved. This information is not intended as a substitute for professional medical care. Always follow your healthcare professional's instructions.

## 2017-12-05 DIAGNOSIS — K21.9 GASTROESOPHAGEAL REFLUX: ICD-10-CM

## 2017-12-05 RX ORDER — OMEPRAZOLE 20 MG/1
CAPSULE, DELAYED RELEASE ORAL
Qty: 90 CAPSULE | Refills: 3 | Status: SHIPPED | OUTPATIENT
Start: 2017-12-05 | End: 2019-01-27 | Stop reason: SDUPTHER

## 2017-12-13 ENCOUNTER — TELEPHONE (OUTPATIENT)
Dept: FAMILY MEDICINE | Facility: CLINIC | Age: 47
End: 2017-12-13

## 2017-12-13 DIAGNOSIS — E11.9 TYPE 2 DIABETES MELLITUS WITHOUT COMPLICATION, WITHOUT LONG-TERM CURRENT USE OF INSULIN: Primary | Chronic | ICD-10-CM

## 2017-12-13 NOTE — TELEPHONE ENCOUNTER
----- Message from Delmar Barrett sent at 12/13/2017 12:14 PM CST -----  Contact: pt [phone]  Calling to speak with nurse to get lab orders

## 2017-12-14 ENCOUNTER — LAB VISIT (OUTPATIENT)
Dept: LAB | Facility: HOSPITAL | Age: 47
End: 2017-12-14
Attending: INTERNAL MEDICINE
Payer: COMMERCIAL

## 2017-12-14 DIAGNOSIS — E11.9 TYPE 2 DIABETES MELLITUS WITHOUT COMPLICATION, WITHOUT LONG-TERM CURRENT USE OF INSULIN: Chronic | ICD-10-CM

## 2017-12-14 LAB
ANION GAP SERPL CALC-SCNC: 9 MMOL/L
BUN SERPL-MCNC: 14 MG/DL
CALCIUM SERPL-MCNC: 10 MG/DL
CHLORIDE SERPL-SCNC: 100 MMOL/L
CO2 SERPL-SCNC: 30 MMOL/L
CREAT SERPL-MCNC: 0.8 MG/DL
EST. GFR  (AFRICAN AMERICAN): >60 ML/MIN/1.73 M^2
EST. GFR  (NON AFRICAN AMERICAN): >60 ML/MIN/1.73 M^2
ESTIMATED AVG GLUCOSE: 174 MG/DL
GLUCOSE SERPL-MCNC: 179 MG/DL
HBA1C MFR BLD HPLC: 7.7 %
POTASSIUM SERPL-SCNC: 4.7 MMOL/L
SODIUM SERPL-SCNC: 139 MMOL/L

## 2017-12-14 PROCEDURE — 36415 COLL VENOUS BLD VENIPUNCTURE: CPT | Mod: PO

## 2017-12-14 PROCEDURE — 80048 BASIC METABOLIC PNL TOTAL CA: CPT

## 2017-12-14 PROCEDURE — 83036 HEMOGLOBIN GLYCOSYLATED A1C: CPT

## 2017-12-18 ENCOUNTER — OFFICE VISIT (OUTPATIENT)
Dept: FAMILY MEDICINE | Facility: CLINIC | Age: 47
End: 2017-12-18
Payer: COMMERCIAL

## 2017-12-18 VITALS
SYSTOLIC BLOOD PRESSURE: 124 MMHG | BODY MASS INDEX: 47.44 KG/M2 | OXYGEN SATURATION: 97 % | HEART RATE: 104 BPM | WEIGHT: 284.75 LBS | TEMPERATURE: 98 F | HEIGHT: 65 IN | DIASTOLIC BLOOD PRESSURE: 84 MMHG

## 2017-12-18 DIAGNOSIS — E11.9 TYPE 2 DIABETES MELLITUS WITHOUT COMPLICATION, WITHOUT LONG-TERM CURRENT USE OF INSULIN: Primary | Chronic | ICD-10-CM

## 2017-12-18 DIAGNOSIS — Z23 FLU VACCINE NEED: ICD-10-CM

## 2017-12-18 DIAGNOSIS — I10 ESSENTIAL HYPERTENSION: Chronic | ICD-10-CM

## 2017-12-18 PROCEDURE — 90686 IIV4 VACC NO PRSV 0.5 ML IM: CPT | Mod: S$GLB,,, | Performed by: INTERNAL MEDICINE

## 2017-12-18 PROCEDURE — 99214 OFFICE O/P EST MOD 30 MIN: CPT | Mod: 25,S$GLB,, | Performed by: INTERNAL MEDICINE

## 2017-12-18 PROCEDURE — 90471 IMMUNIZATION ADMIN: CPT | Mod: S$GLB,,, | Performed by: INTERNAL MEDICINE

## 2017-12-18 PROCEDURE — 99999 PR PBB SHADOW E&M-EST. PATIENT-LVL III: CPT | Mod: PBBFAC,,, | Performed by: INTERNAL MEDICINE

## 2017-12-18 NOTE — ASSESSMENT & PLAN NOTE
Will work on getting her signed up for the digital HTN program.  Will also allow for closer home HR monitoring for cardiology.  The current medical regimen is effective;  continue present plan and medications.

## 2017-12-18 NOTE — PROGRESS NOTES
Patient tolerated injection well, immunization record provided.  Instructed to remain in lobby for 15 minutes and to report any adverse reactions right away.  Verbalized understanding.

## 2017-12-18 NOTE — ASSESSMENT & PLAN NOTE
Discussed the following options, change to metformin XR 1000mg BID, add sulfonylurea, wait until the new year and send in DPP4.  She would like to go with option 3 and keep option 2 as a back up plan.  Will also attempt to sign her up for digital DM.

## 2017-12-18 NOTE — PROGRESS NOTES
Assessment & Plan  Problem List Items Addressed This Visit        Cardiac/Vascular    Essential hypertension (Chronic)    Current Assessment & Plan     Will work on getting her signed up for the digital HTN program.  Will also allow for closer home HR monitoring for cardiology.  The current medical regimen is effective;  continue present plan and medications.          Relevant Orders    Hypertension Digital Medicine (HDMP) Enrollment Order (Completed)    CBC auto differential    Comprehensive metabolic panel    Lipid panel    TSH       Endocrine    Type 2 diabetes mellitus without complication - Primary (Chronic)    Current Assessment & Plan     Discussed the following options, change to metformin XR 1000mg BID, add sulfonylurea, wait until the new year and send in DPP4.  She would like to go with option 3 and keep option 2 as a back up plan.  Will also attempt to sign her up for digital DM.          Relevant Orders    Diabetes Digital Medicine (DDMP) Enrollment Order (Completed)    Diabetes Digital Medicine (DDMP): Assign Onboarding Questionnaires (Completed)    Hemoglobin A1c    TSH      Other Visit Diagnoses     Flu vaccine need    -  vaccinate    Relevant Orders    Influenza - Quadrivalent (3 years & older) (PF) (Completed)            Health Maintenance reviewed, as above.    Follow-up: Return in about 3 months (around 3/18/2018) for Routine Physical.  ______________________________________________________________________    Chief Complaint  Chief Complaint   Patient presents with    Diabetes    Follow-up       HPI  Iqra Duque is a 47 y.o. female with medical diagnoses as listed in the medical history and problem list that presents for HTN DM follow up.  Pt is known to me with her last appointment 11/30/2017.      Due to insurance changes she was not able to stay on her Januvia.  She was well controlled on this.  We attempted to go with metformin and additional dietary changes but her A1c went up to 7.7%.   She will have new insurance starting Jan 1.  She is otherwise taking and tolerating her medications without percieved side effects.  No CP SOB palpitations hypoglycemic symptoms.     She is recovering from a recent URI.     PAST MEDICAL HISTORY:  Past Medical History:   Diagnosis Date    Allergy     Diabetes mellitus type II     Diverticulitis 09/2014    Fever blister     Hypertension     Migraine headache        PAST SURGICAL HISTORY:  Past Surgical History:   Procedure Laterality Date    HYSTERECTOMY  06/2009    KJB---DLH/BSO ATYPICAL HYPERPLASIA    TUBAL LIGATION         SOCIAL HISTORY:  Social History     Social History    Marital status:      Spouse name: N/A    Number of children: N/A    Years of education: N/A     Occupational History    [employer]     Social History Main Topics    Smoking status: Never Smoker    Smokeless tobacco: Never Used    Alcohol use No    Drug use: No    Sexual activity: Yes     Partners: Male     Birth control/ protection: None     Other Topics Concern    Not on file     Social History Narrative    No narrative on file       FAMILY HISTORY:  Family History   Problem Relation Age of Onset    Ovarian cancer Mother 43    Cancer Mother      ovarian    Diabetes Mother     Heart disease Father     Melanoma Neg Hx     Psoriasis Neg Hx     Lupus Neg Hx        ALLERGIES AND MEDICATIONS: updated and reviewed.  Review of patient's allergies indicates:   Allergen Reactions    Bactrim [sulfamethoxazole-trimethoprim] Hives     Also Septra    Keflex [cephalexin] Shortness Of Breath    Neosporin (neomycin-polymyx) Swelling    Amoxicillin Rash     Current Outpatient Prescriptions   Medication Sig Dispense Refill    albuterol 90 mcg/actuation inhaler Inhale 1-2 puffs into the lungs every 4 to 6 hours as needed for Wheezing or Shortness of Breath (chest tightness). 1 Inhaler 6    CONTOUR TEST STRIPS Strp TEST ONCE DAILY 100 strip 3     inhalation device (AEROCHAMBER PLUS FLOW-VU) Use as directed for inhalation. 1 Device 0    levocetirizine (XYZAL) 5 MG tablet Take 1 tablet (5 mg total) by mouth every evening. 30 tablet 3    lisinopril-hydrochlorothiazide (PRINZIDE,ZESTORETIC) 20-25 mg Tab TAKE 1 TABLET BY MOUTH ONCE DAILY. 90 tablet 3    metformin (GLUCOPHAGE) 1000 MG tablet Take 1 tablet (1,000 mg total) by mouth 2 (two) times daily with meals. 180 tablet 0    MICROLET LANCET Misc TEST ONCE DAILY 100 each 0    montelukast (SINGULAIR) 10 mg tablet TAKE 1 TABLET (10 MG TOTAL) BY MOUTH ONCE DAILY. 90 tablet 1    omeprazole (PRILOSEC) 20 MG capsule TAKE 1 CAPSULE (20 MG TOTAL) BY MOUTH EVERY MORNING. 90 capsule 3    methylPREDNISolone (MEDROL DOSEPACK) 4 mg tablet use as directed 1 Package 0     No current facility-administered medications for this visit.          ROS  Review of Systems   Constitutional: Positive for fatigue. Negative for chills, fever and unexpected weight change.   HENT: Negative for congestion, ear pain, hearing loss, rhinorrhea, sore throat and trouble swallowing.    Eyes: Negative for discharge, redness and visual disturbance.   Respiratory: Negative for cough, chest tightness, shortness of breath and wheezing.    Cardiovascular: Negative for palpitations and leg swelling.   Gastrointestinal: Negative for abdominal pain, constipation, diarrhea, nausea and vomiting.   Endocrine: Positive for polydipsia, polyphagia and polyuria.   Genitourinary: Negative for decreased urine volume, dysuria and hematuria.   Musculoskeletal: Negative for arthralgias, joint swelling and myalgias.   Skin: Positive for pallor. Negative for color change and rash.   Neurological: Positive for dizziness, speech difficulty and headaches. Negative for tremors, seizures, weakness and light-headedness.   Psychiatric/Behavioral: Positive for confusion. Negative for decreased concentration, dysphoric mood, sleep disturbance and suicidal ideas. The  "patient is nervous/anxious.            Physical Exam  Vitals:    12/18/17 0814 12/18/17 0830   BP: (!) 140/70 124/84   Pulse: (!) 115 104   Temp: 98.3 °F (36.8 °C)    SpO2: 97%    Weight: 129.2 kg (284 lb 11.6 oz)    Height: 5' 5" (1.651 m)     Body mass index is 47.38 kg/m².  Weight: 129.2 kg (284 lb 11.6 oz)   Height: 5' 5" (165.1 cm)   Physical Exam   Constitutional: She is oriented to person, place, and time. She appears well-developed and well-nourished. No distress.   HENT:   Head: Normocephalic and atraumatic.   Eyes: Conjunctivae, EOM and lids are normal. Pupils are equal, round, and reactive to light. No scleral icterus.   Neck: Full passive range of motion without pain. Neck supple. No JVD present. Carotid bruit is not present. No thyromegaly present.   Cardiovascular: Regular rhythm, normal heart sounds, intact distal pulses and normal pulses.  Tachycardia present.  Exam reveals no S3, no S4 and no friction rub.    No murmur heard.  Pulmonary/Chest: Effort normal and breath sounds normal. She has no wheezes. She has no rhonchi. She has no rales.   Abdominal: Soft. Bowel sounds are normal. There is no tenderness.   Musculoskeletal: She exhibits no edema or tenderness.   Lymphadenopathy:        Head (right side): No submental and no submandibular adenopathy present.        Head (left side): No submental and no submandibular adenopathy present.     She has no cervical adenopathy.   Neurological: She is alert and oriented to person, place, and time.   Motor grossly intact.  Sensory grossly intact.  Symmetric facial movements palate elevated symmetrically tongue midline   Skin: Skin is warm and dry. No rash noted.   Psychiatric: She has a normal mood and affect. Her speech is normal and behavior is normal. Thought content normal.           Health Maintenance       Date Due Completion Date    Influenza Vaccine 08/01/2017 3/6/2017    Override on 2/25/2016: Declined    Lipid Panel 02/24/2018 2/24/2017    Urine " Microalbumin 02/24/2018 2/24/2017    Eye Exam 03/13/2018 3/13/2017 (Done)    Override on 3/13/2017: Done (Dr. Olayinka Phelan/Optometry- negative for bilateral diabetic retinopathy,positive for mild hypertension retinopathy bilaterally)    Hemoglobin A1c 06/14/2018 12/14/2017    Foot Exam 07/03/2018 7/3/2017 (Done)    Override on 7/3/2017: Done    Mammogram 07/03/2018 7/3/2017    TETANUS VACCINE 08/29/2026 8/29/2016    Pneumococcal PPSV23 (Medium Risk) (2) 07/16/2034 3/26/2015

## 2018-01-01 ENCOUNTER — TELEPHONE (OUTPATIENT)
Dept: FAMILY MEDICINE | Facility: CLINIC | Age: 48
End: 2018-01-01

## 2018-01-01 DIAGNOSIS — E11.9 TYPE 2 DIABETES MELLITUS WITHOUT COMPLICATION, WITHOUT LONG-TERM CURRENT USE OF INSULIN: Chronic | ICD-10-CM

## 2018-01-01 NOTE — TELEPHONE ENCOUNTER
----- Message from Jaylon Kothari MD sent at 12/18/2017  8:29 AM CST -----  Regarding: order Nitinuvia  Send in Januvia 100

## 2018-01-08 RX ORDER — METFORMIN HYDROCHLORIDE 1000 MG/1
1000 TABLET ORAL 2 TIMES DAILY WITH MEALS
Qty: 180 TABLET | Refills: 0 | Status: SHIPPED | OUTPATIENT
Start: 2018-01-08 | End: 2018-04-25 | Stop reason: SDUPTHER

## 2018-01-16 ENCOUNTER — PATIENT MESSAGE (OUTPATIENT)
Dept: ADMINISTRATIVE | Facility: OTHER | Age: 48
End: 2018-01-16

## 2018-01-22 ENCOUNTER — PATIENT MESSAGE (OUTPATIENT)
Dept: ADMINISTRATIVE | Facility: OTHER | Age: 48
End: 2018-01-22

## 2018-01-22 DIAGNOSIS — E11.9 DIABETES: ICD-10-CM

## 2018-01-22 RX ORDER — LANCETS
EACH MISCELLANEOUS
Qty: 100 EACH | Refills: 3 | Status: SHIPPED | OUTPATIENT
Start: 2018-01-22

## 2018-01-23 ENCOUNTER — PATIENT OUTREACH (OUTPATIENT)
Dept: OTHER | Facility: OTHER | Age: 48
End: 2018-01-23

## 2018-01-23 RX ORDER — METFORMIN HYDROCHLORIDE 1000 MG/1
1000 TABLET ORAL 2 TIMES DAILY WITH MEALS
Qty: 180 TABLET | Refills: 1 | Status: SHIPPED | OUTPATIENT
Start: 2018-01-23 | End: 2018-04-12 | Stop reason: SDUPTHER

## 2018-01-23 NOTE — PROGRESS NOTES
Last 5 Patient Entered Readings                                      Current 30 Day Average: 117/93     Recent Readings 1/22/2018 11/30/2017 10/30/2017 10/30/2017    SBP (mmHg) 117 132 141 141    DBP (mmHg) 93 80 78 70    Pulse 87 80 80 80        Mrs. Iqra Duque is a 47 y.o. female who is newly enrolled in the [facility].     The following information was reviewed/updated:  Preferred pharmacy   [facility]/pharmacy #[id number] - [place] - [address]  [address]  [place] [address]  Phone: [phone] Fax: [phone]    Patient prefers a 90 days supply  Review of patient's allergies indicates:   Allergen Reactions    Bactrim [sulfamethoxazole-trimethoprim] Hives     Also Septra    Keflex [cephalexin] Shortness Of Breath    Neosporin (neomycin-polymyx) Swelling    Amoxicillin Rash     Current Outpatient Prescriptions on File Prior to Visit   Medication Sig Dispense Refill    albuterol 90 mcg/actuation inhaler Inhale 1-2 puffs into the lungs every 4 to 6 hours as needed for Wheezing or Shortness of Breath (chest tightness). 1 Inhaler 6    CONTOUR TEST STRIPS Strp TEST ONCE DAILY 100 strip 3    inhalation device (AEROCHAMBER PLUS FLOW-VU) Use as directed for inhalation. 1 Device 0    levocetirizine (XYZAL) 5 MG tablet Take 1 tablet (5 mg total) by mouth every evening. 30 tablet 3    lisinopril-hydrochlorothiazide (PRINZIDE,ZESTORETIC) 20-25 mg Tab TAKE 1 TABLET BY MOUTH ONCE DAILY. 90 tablet 3    metFORMIN (GLUCOPHAGE) 1000 MG tablet TAKE 1 TABLET (1,000 MG TOTAL) BY MOUTH 2 (TWO) TIMES DAILY WITH MEALS. 180 tablet 0    MICROLET LANCET Misc TEST ONCE DAILY 100 each 3    montelukast (SINGULAIR) 10 mg tablet TAKE 1 TABLET (10 MG TOTAL) BY MOUTH ONCE DAILY. 90 tablet 1    omeprazole (PRILOSEC) 20 MG capsule TAKE 1 CAPSULE (20 MG TOTAL) BY MOUTH EVERY MORNING. 90 capsule 3    SITagliptin (JANUVIA) 100 MG Tab Take 1 tablet (100 mg total) by mouth once daily. 90 tablet 3      No current facility-administered medications on file prior to visit.      IF ALIZA screen positive    ALIZA screening results for this patient suggest a high likelihood of sleep apnea, which can contribute to hypertension. Patient has not been previously diagnosed with sleep apnea and is interested in referral at this time at [place]. She was unable to do a sleep study with her previous insurance. She now has another company and a referral was sent to the [facility].     Jaylon Kothari MD indicated he would like to see patient / have patient  referred to a specialist for further evaluation.     Reviewed non-pharmacologic therapies and impact on BP:    1. Low-sodium diet- 11 mmHg reduction 2-4 weeks. I have reviewed D.A.S.H diet sodium restrictions (<2000mg/day). Trying to modify meals to reduce her sodium intake. She adds salts to foods and trying to find other options. She does add salt to her foods when cooking. She chooses low sodium options when available.   2. Exercise- 7 mmHg reduction 4-12 weeks. I have recommended patient engage in exercise as tolerated at least 30 minutes 5x per week to improve cardiovascular health. Patient does not exercise. She was up to three walking trips a week but has stopped over the past three weeks.   3. Alcohol intake- 3 mmHg reduction 4-12 weeks. I have discussed with patient the maximum recommended number of 1 drink(s) per day for men/women. Patient rarely consumes alcohol. Two drinks in the last month or longer.     Explained that we expect patient to obtain several blood pressures per week at random times of day.   Our goal is to get  BP to consistently below 130/80mmHg and make the process convenient so patient can avoid extra trips to the office. Getting your blood pressure below 130/80mmHg (definition of control) will reduce your risk for heart attack, kidney failure, stroke and death (as well as kidney failure, eye disease, & dementia).     Patient is not  meeting the goal already.   When asked what the patient thinks is causing BP to be elevated, she states: stress at work and poor diet.    Instructed patient not to allow anyone else to use phone and BP cuff.   I'm not available for emergencies. Patient will call [facility] on-call ([phone] or [phone]) or 911 if needed.     Discussed appropriate BP measuring technique:  Before taking your blood pressure  Find a quiet place. You will need to listen for your heartbeat.  Roll up the sleeve on your left arm or remove any tight-sleeved clothing, if needed. (It's best to take your blood pressure from your left arm if you are right-handed.You can use the other arm if you have been told by your health care provider to do so.)  Rest in a chair next to a table for 5 to 10 minutes. (Your left arm should rest comfortably at heart level.)  Sit up straight with your back against the chair, legs uncrossed and on the ground.  Rest your forearm on the table with the palm of your hand facing up.  You should not talk, read the newspaper, or watch television during this process.      Patient and I agreed that she will continue to monitor blood pressure and sodium intake, and continue to remain adherent to medications.     I will plan to follow-up with the patient in 2 weeks.   Emailed patient link to [facility]'s HTN webpage and my contact information in case she has any questions.     Patient did not take her BP 10/30-11/30 as indicated below. Her only reading is from 1/22.  Last 5 Patient Entered Readings                                      Current 30 Day Average: 117/93     Recent Readings 1/22/2018 11/30/2017 10/30/2017 10/30/2017    SBP (mmHg) 117 132 141 141    DBP (mmHg) 93 80 78 70    Pulse 87 80 80 80        48 yo female with a PMH of migraines, DM, cramps at night, HTN, and diverticulitis. Patient has a poor diet, undiagnosed ALIZA and stress at work attributing to her HTN. I will follow-up in two weeks when I have a good  sample of readings to adjust medications if needed.

## 2018-01-23 NOTE — LETTER
Tegan Singh, PharmD  [address] [place] [address]     Dear Iqra Duque,    Welcome to the [facility] Digital [facility] Program!         My name is Tegan Singh PharmD and I am your dedicated Digital Medicine clinician.  As an expert in medication management, I will help ensure that the medications you are taking continue to provide you with the intended benefits.      I am Lashaun Ramey and I will be your health  for the duration of the program.  My  job is to help you identify lifestyle changes to improve your blood pressure control.  We will talk about nutrition, exercise, and other ways that you may be able to adjust your current habits to better your health. Together, we will work to improve your overall health and encourage you to meet your goals for a healthier lifestyle.    What we expect from YOU:    You will need to take blood pressure readings multiple times a week and no less than one reading per week.   It is important that you take your measurements at different times during the day, when possible.     What you should expect from your Digital Medicine Care Team:   We will provide you with education about high blood pressure, including lifestyle changes that could help you to control your blood pressure.   We will review your weekly readings and provide you with monthly blood pressure progress reports after you have been in the program for more than 30 days.   We will send monthly progress reports on your blood pressure control to your physician so they can follow along with your progress as well.    You will be able to reach me by phone at [phone] or through your [facility] account by clicking my name under Care Team on the right side of the home screen.    I look forward to working with you to achieve your blood pressure goals!    Sincerely,    Tegan Singh PharmD  Your personal clinician    Please visit  [email or web address] to learn more about high blood pressure and what you can do lower your blood pressure.                                                                                           Iqra Duque  [address] [place] [address]

## 2018-01-29 ENCOUNTER — PATIENT OUTREACH (OUTPATIENT)
Dept: OTHER | Facility: OTHER | Age: 48
End: 2018-01-29

## 2018-01-29 NOTE — LETTER
Lashaun Ramey  [address] [facility] [place] [address]     Dear Iqra,    Thank you for enrolling in the [facility] Program. To participate in our program, we ask that you submit a blood pressure reading at least once weekly through your [facility] Account and maintain regular contact with your Care Team.  We have not heard from you in order to complete your introduction with our Health .     The Digital Medicine Care Team Health  has attempted to reach you on multiple occasions to determine if you would like to continue participating in the program. While we encourage you to continue participating fully, we understand that circumstances may change.      To continue participating in the program, please contact me at [phone]. If we do not hear back, you will be un-enrolled and your physician will be notified of your decision.      We look forward to hearing from you soon.    Sincerely,     Lashaun Ramey MA [facility]  Your Personal Health                                                                                                                         Iqra Duque  [address] [place] [address]

## 2018-01-29 NOTE — PROGRESS NOTES
"Last 5 Patient Entered Readings                                      Current 30 Day Average: 117/79     Recent Readings 1/28/2018 1/27/2018 1/26/2018 1/26/2018 1/25/2018    SBP (mmHg) 116 114 124 124 120    DBP (mmHg) 79 70 86 86 77    Pulse 87 91 80 91 88        Hypertension Digital Medicine Program (HDMP): Health  Introduction    Introduced Mrs. Iqra Duque to HDMP. Discussed health  role and recommended lifestyle modifications.    Diet (i.e. Low sodium, food labels): Reports that she avoids processed meats due to nitrates and eggs as they bother her stomach, and that she and her  have started to cut back on salt.  States they have started substituting vegetables and fruits for chips and reduce how often the dine out.  States for breakfast she usually has 2 pieces of toast with peanut butter, for lunch a ham or turkey sandwich with chips +fruit/veg and 1/3 of a 20oz bottle of soda, and that for dinner "it varies".  Reports they have recently also eliminated microwave popcorn from their diet.  Reviewed benefits of continued reduction in sodium and educated patient on "salty six" and goal of <2000mg sodium per day or items with <140 mg/serving. Will send salt-free seasonings list and link to [employer] to patient through [employer] message.     Exercise: Per patient, has started walking with  2-3 times per week after work.  Did not specify time, and will review this further at follow up.     Alcohol/Tobacco: Rarely consumes alcohol per previous report; denies tobacco use.     Medication Adherence: has been compliant with the medicaiton regimen     Other goals: Patient states she would like to take time to review her HbA1c with her PCP to help form her health and wellness goals. No further questions/concerns. Will follow up with patient in a few weeks to assess progress. BP is currently at goal.     Reviewed AHA recommendations:  Limit sodium intake to <2000mg/day  Perform 150 minutes " of physical activity per week    Patient is aware of the importance of taking daily BP readings at various times of the day  Patient aware that the health  can be used as a resource for lifestyle modifications to help reduce or maintain a healthy BP  Patient is aware of the importance of medication adherence.  [facility]. Patient should call 911 or [facility] if one arises.

## 2018-02-06 NOTE — PROGRESS NOTES
Last 5 Patient Entered Readings                                      Current 30 Day Average: 115/77     Recent Readings 2/6/2018 2/6/2018 2/4/2018 2/3/2018 2/3/2018    SBP (mmHg) 117 128 106 111 105    DBP (mmHg) 82 91 73 67 67    Pulse 91 74 91 107 106        Patient is controlled. I will follow-up after she speaks with Lashaun in two weeks.

## 2018-02-20 ENCOUNTER — TELEPHONE (OUTPATIENT)
Dept: SLEEP MEDICINE | Facility: CLINIC | Age: 48
End: 2018-02-20

## 2018-02-20 ENCOUNTER — PATIENT OUTREACH (OUTPATIENT)
Dept: OTHER | Facility: OTHER | Age: 48
End: 2018-02-20

## 2018-02-20 NOTE — TELEPHONE ENCOUNTER
----- Message from Tegan Singh, PharmD sent at 2/20/2018  4:18 PM CST -----  Good Afternoon,    I sent you all a referral in January for this patient however today she mentioned waking up gasping for air several times throughout the night. I feel this warrants immediate attention to your department. Let me know if I can help in any way.     Thanks!    Tegan

## 2018-02-20 NOTE — PROGRESS NOTES
Last 5 Patient Entered Readings                                      Current 30 Day Average: 113/74     Recent Readings 2/19/2018 2/18/2018 2/17/2018 2/16/2018 2/16/2018    SBP (mmHg) 113 125 101 112 115    DBP (mmHg) 71 72 67 73 80    Pulse 108 92 103 77 90        Patient's BP average is controlled based on 2017 ACC/AHA HTN guidelines of goal BP <130/80.      Patient denies s/s of hypotension (lightheadedness, dizziness, nausea, fatigue) associated with low readings. Instructed patient to inform me if this occurs, patient confirms understanding.      Patient denies s/s of hypertension (SOB, CP, severe headaches, changes in vision) associated with high readings. Instructed patient to go to the ED if BP > 180/110 and accompanied by hypertensive s/s, patient confirms understanding.     Patient's health , Lashaun Ramey, will be following up every 3-4 weeks. I will continue to monitor regularly and will follow up in 2 months, sooner if BP begins to trend upward or downward.    Patient has my contact information and knows to call with any concerns or clinical changes.     Current HTN regimen:  Hypertension Medications             lisinopril-hydrochlorothiazide (PRINZIDE,ZESTORETIC) 20-25 mg Tab TAKE 1 TABLET BY MOUTH ONCE DAILY.        I am sending a message to [facility] sleep clinic since she is waking up in the middle of the night gasping for air.

## 2018-03-01 DIAGNOSIS — J30.9 ALLERGIC RHINITIS: ICD-10-CM

## 2018-03-02 RX ORDER — MONTELUKAST SODIUM 10 MG/1
TABLET ORAL
Qty: 90 TABLET | Refills: 3 | Status: SHIPPED | OUTPATIENT
Start: 2018-03-02 | End: 2019-04-27 | Stop reason: SDUPTHER

## 2018-03-05 LAB
LEFT EYE DM RETINOPATHY: NEGATIVE
RIGHT EYE DM RETINOPATHY: NEGATIVE

## 2018-03-09 DIAGNOSIS — E11.9 TYPE 2 DIABETES MELLITUS WITHOUT COMPLICATION: ICD-10-CM

## 2018-03-09 RX ORDER — BLOOD SUGAR DIAGNOSTIC
STRIP MISCELLANEOUS
Qty: 100 STRIP | Refills: 1 | Status: SHIPPED | OUTPATIENT
Start: 2018-03-09

## 2018-03-12 ENCOUNTER — LAB VISIT (OUTPATIENT)
Dept: LAB | Facility: HOSPITAL | Age: 48
End: 2018-03-12
Attending: INTERNAL MEDICINE
Payer: COMMERCIAL

## 2018-03-12 DIAGNOSIS — E11.9 TYPE 2 DIABETES MELLITUS WITHOUT COMPLICATION, WITHOUT LONG-TERM CURRENT USE OF INSULIN: Chronic | ICD-10-CM

## 2018-03-12 DIAGNOSIS — I10 ESSENTIAL HYPERTENSION: Chronic | ICD-10-CM

## 2018-03-12 LAB
ALBUMIN SERPL BCP-MCNC: 3.3 G/DL
ALP SERPL-CCNC: 45 U/L
ALT SERPL W/O P-5'-P-CCNC: 35 U/L
ANION GAP SERPL CALC-SCNC: 7 MMOL/L
AST SERPL-CCNC: 35 U/L
BASOPHILS # BLD AUTO: 0.05 K/UL
BASOPHILS NFR BLD: 0.7 %
BILIRUB SERPL-MCNC: 0.7 MG/DL
BUN SERPL-MCNC: 16 MG/DL
CALCIUM SERPL-MCNC: 9.5 MG/DL
CHLORIDE SERPL-SCNC: 102 MMOL/L
CHOLEST SERPL-MCNC: 156 MG/DL
CHOLEST/HDLC SERPL: 3.5 {RATIO}
CO2 SERPL-SCNC: 30 MMOL/L
CREAT SERPL-MCNC: 0.8 MG/DL
DIFFERENTIAL METHOD: ABNORMAL
EOSINOPHIL # BLD AUTO: 0.3 K/UL
EOSINOPHIL NFR BLD: 4.6 %
ERYTHROCYTE [DISTWIDTH] IN BLOOD BY AUTOMATED COUNT: 14.6 %
EST. GFR  (AFRICAN AMERICAN): >60 ML/MIN/1.73 M^2
EST. GFR  (NON AFRICAN AMERICAN): >60 ML/MIN/1.73 M^2
ESTIMATED AVG GLUCOSE: 160 MG/DL
GLUCOSE SERPL-MCNC: 142 MG/DL
HBA1C MFR BLD HPLC: 7.2 %
HCT VFR BLD AUTO: 39.7 %
HDLC SERPL-MCNC: 45 MG/DL
HDLC SERPL: 28.8 %
HGB BLD-MCNC: 12.6 G/DL
IMM GRANULOCYTES # BLD AUTO: 0.01 K/UL
IMM GRANULOCYTES NFR BLD AUTO: 0.1 %
LDLC SERPL CALC-MCNC: 85.4 MG/DL
LYMPHOCYTES # BLD AUTO: 1.9 K/UL
LYMPHOCYTES NFR BLD: 27.7 %
MCH RBC QN AUTO: 25.5 PG
MCHC RBC AUTO-ENTMCNC: 31.7 G/DL
MCV RBC AUTO: 80 FL
MONOCYTES # BLD AUTO: 0.6 K/UL
MONOCYTES NFR BLD: 8.3 %
NEUTROPHILS # BLD AUTO: 4 K/UL
NEUTROPHILS NFR BLD: 58.6 %
NONHDLC SERPL-MCNC: 111 MG/DL
NRBC BLD-RTO: 0 /100 WBC
PLATELET # BLD AUTO: 270 K/UL
PMV BLD AUTO: 10.7 FL
POTASSIUM SERPL-SCNC: 4.7 MMOL/L
PROT SERPL-MCNC: 6.7 G/DL
RBC # BLD AUTO: 4.94 M/UL
SODIUM SERPL-SCNC: 139 MMOL/L
TRIGL SERPL-MCNC: 128 MG/DL
TSH SERPL DL<=0.005 MIU/L-ACNC: 1.01 UIU/ML
WBC # BLD AUTO: 6.78 K/UL

## 2018-03-12 PROCEDURE — 36415 COLL VENOUS BLD VENIPUNCTURE: CPT | Mod: PO

## 2018-03-12 PROCEDURE — 80061 LIPID PANEL: CPT

## 2018-03-12 PROCEDURE — 83036 HEMOGLOBIN GLYCOSYLATED A1C: CPT

## 2018-03-12 PROCEDURE — 84443 ASSAY THYROID STIM HORMONE: CPT

## 2018-03-12 PROCEDURE — 85025 COMPLETE CBC W/AUTO DIFF WBC: CPT

## 2018-03-12 PROCEDURE — 80053 COMPREHEN METABOLIC PANEL: CPT

## 2018-03-13 ENCOUNTER — PATIENT OUTREACH (OUTPATIENT)
Dept: OTHER | Facility: OTHER | Age: 48
End: 2018-03-13

## 2018-03-13 NOTE — PROGRESS NOTES
Last 5 Patient Entered Readings                                      Current 30 Day Average: 114/72     Recent Readings 3/19/2018 3/19/2018 3/18/2018 3/17/2018 3/16/2018    SBP (mmHg) 120 125 114 92 111    DBP (mmHg) 80 89 70 54 72    Pulse - 86 82 85 90      Hypertension Digital Medicine Program (HDMP): Health  Follow Up    Lifestyle Modifications:    1.Low sodium diet: yes Reports her and her  have been monitoring sodium, cut out fried foods, and have reduced portion sizes to help improve diet.  States this is going well, however is finding she would like a greater variety of foods to choose from and requests recipes to be sent via e-mail. Will send these today.  Patient notes that with dietary changes she is feeling better and her A1c has improved, which is reinforcing.     2.Physical activity: yes Walks 2-3x/week with . Notes that when he isn't there motivation can be challenging.  States she is also experiencing increased stress with work and that she believes this is contributing to headaches but is unsure.  Reports going to chiropractor to help address neck pain and headaches.     3.Hypotension/Hypertension symptoms: yes Reporting headaches in the morning, but reports these are not associated with elevated bp readings.  States she believes they may be due to stress or poor sleeping posture. Denies other s/sx.     4.Patient has been compliant with the medication regimen.     Follow up with Mrs. Iqra PIPER Flick completed. No further questions or concerns. I will follow up in a few weeks to assess progress. BP is within goal range. Will send requested resources via e-mail.

## 2018-03-19 ENCOUNTER — OFFICE VISIT (OUTPATIENT)
Dept: FAMILY MEDICINE | Facility: CLINIC | Age: 48
End: 2018-03-19
Payer: COMMERCIAL

## 2018-03-19 VITALS
HEART RATE: 116 BPM | TEMPERATURE: 97 F | HEIGHT: 65 IN | BODY MASS INDEX: 46.48 KG/M2 | OXYGEN SATURATION: 98 % | SYSTOLIC BLOOD PRESSURE: 120 MMHG | DIASTOLIC BLOOD PRESSURE: 80 MMHG | WEIGHT: 279 LBS

## 2018-03-19 DIAGNOSIS — I10 ESSENTIAL HYPERTENSION: Chronic | ICD-10-CM

## 2018-03-19 DIAGNOSIS — R51.9 NONINTRACTABLE EPISODIC HEADACHE, UNSPECIFIED HEADACHE TYPE: ICD-10-CM

## 2018-03-19 DIAGNOSIS — E11.9 TYPE 2 DIABETES MELLITUS WITHOUT COMPLICATION, WITHOUT LONG-TERM CURRENT USE OF INSULIN: Chronic | ICD-10-CM

## 2018-03-19 DIAGNOSIS — E66.01 MORBID OBESITY WITH BMI OF 45.0-49.9, ADULT: Chronic | ICD-10-CM

## 2018-03-19 DIAGNOSIS — F41.9 ANXIETY: ICD-10-CM

## 2018-03-19 DIAGNOSIS — J30.9 CHRONIC ALLERGIC RHINITIS, UNSPECIFIED SEASONALITY, UNSPECIFIED TRIGGER: Chronic | ICD-10-CM

## 2018-03-19 DIAGNOSIS — Z00.00 ROUTINE MEDICAL EXAM: Primary | ICD-10-CM

## 2018-03-19 PROCEDURE — 99396 PREV VISIT EST AGE 40-64: CPT | Mod: S$GLB,,, | Performed by: INTERNAL MEDICINE

## 2018-03-19 PROCEDURE — 99999 PR PBB SHADOW E&M-EST. PATIENT-LVL III: CPT | Mod: PBBFAC,,, | Performed by: INTERNAL MEDICINE

## 2018-03-19 NOTE — PATIENT INSTRUCTIONS
Please call [phone] to schedule your appointment with the counselor at the [facility].  This appointment must be scheduled by the patient themselves; we are unable to schedule it for you.

## 2018-03-19 NOTE — PROGRESS NOTES
Assessment & Plan  Problem List Items Addressed This Visit        Cardiac/Vascular    Essential hypertension (Chronic)    Current Assessment & Plan     The current medical regimen is effective;  continue present plan and medications.             Endocrine    Type 2 diabetes mellitus without complication (Chronic)    Current Assessment & Plan     The current medical regimen is effective;  continue present plan and medications. Check sugars in the PM if she has HA.           Morbid obesity with BMI of 45.0-49.9, adult (Chronic)    Current Assessment & Plan     Doing well with diet and exercise.              Other    Allergic rhinitis (Chronic)    Current Assessment & Plan     The current medical regimen is effective;  continue present plan and medications.            Other Visit Diagnoses     Routine medical exam    -  Primary  -  Discussed healthy diet, regular exercise, necessary labs, age appropriate cancer screening, and routine vaccinations.       Anxiety    -  Referred to LCSW.  We did discuss using propranolol PRN if needed    Relevant Orders    Ambulatory referral to Psychiatry        Headache - check sugars.  Has sleep appointment coming up.  If persists, would consider imaging.     Health Maintenance reviewed, as above.     Follow-up: Follow-up in about 6 months (around 9/19/2018) for follow up for chronic conditions.  ______________________________________________________________________    Chief Complaint  Chief Complaint   Patient presents with    Annual Exam       HPI  Iqra Duque is a 47 y.o. female with medical diagnoses as listed in the medical history and problem list that presents for routine physical.  Pt is known to me with her last appointment 12/18/2017.  She had labs prior to this OV that showed stable CBC, CMP, TSH.  Her LDL is well controlled.  A1c is now down to 7.2%.      She has been having nightly HAs for about 3 weeks.  Occipital HA.  Resolves spontaneously.  Not the same as her  "migraine HAs.  Still in the process of seeing sleep medicine.  No other loss of balance, numbness, tingling, weakness.  She also has a new dog that sleeps in the bed with her.  She checked her BP with the HA and it was high normal.  Hasn't checked sugars yet with the symptoms.  Has lsot 5# with walking. She has had some tremors during the day that she feels is hypoglycemic symptoms.  Resolves with a small snack or having some milk.      Also having some social anxiety type symptoms.  Mostly in large crowds.  It does prevent her from doing specific activities.  Had a bridal shower recently that she participated in and she "got through it," by sitting in a room off to the side with less people.  Stated it "exhausted her," and she was tired all the next day from fighting her way through the day.  Often uses deep breathing or telling her self everything is ok to cope.  Not interested in meds at this time.  Would consider seeing LCSW.       PAST MEDICAL HISTORY:  Past Medical History:   Diagnosis Date    Allergy     Diabetes mellitus type II     Diverticulitis 09/2014    Fever blister     Hypertension     Migraine headache        PAST SURGICAL HISTORY:  Past Surgical History:   Procedure Laterality Date    HYSTERECTOMY  06/2009    KJB---DLH/BSO ATYPICAL HYPERPLASIA    TUBAL LIGATION         SOCIAL HISTORY:  Social History     Social History    Marital status:      Spouse name: N/A    Number of children: N/A    Years of education: N/A     Occupational History    [employer]     Social History Main Topics    Smoking status: Never Smoker    Smokeless tobacco: Never Used    Alcohol use No    Drug use: No    Sexual activity: Yes     Partners: Male     Birth control/ protection: None     Other Topics Concern    Not on file     Social History Narrative    No narrative on file       FAMILY HISTORY:  Family History   Problem Relation Age of Onset    Ovarian cancer Mother 43    " Cancer Mother      ovarian    Diabetes Mother     Heart disease Father     Melanoma Neg Hx     Psoriasis Neg Hx     Lupus Neg Hx        ALLERGIES AND MEDICATIONS: updated and reviewed.  Review of patient's allergies indicates:   Allergen Reactions    Bactrim [sulfamethoxazole-trimethoprim] Hives     Also Septra    Keflex [cephalexin] Shortness Of Breath    Neosporin (neomycin-polymyx) Swelling    Amoxicillin Rash     Current Outpatient Prescriptions   Medication Sig Dispense Refill    albuterol 90 mcg/actuation inhaler Inhale 1-2 puffs into the lungs every 4 to 6 hours as needed for Wheezing or Shortness of Breath (chest tightness). 1 Inhaler 6    CONTOUR TEST STRIPS Strp TEST ONCE DAILY 100 strip 1    inhalation device (AEROCHAMBER PLUS FLOW-VU) Use as directed for inhalation. 1 Device 0    lisinopril-hydrochlorothiazide (PRINZIDE,ZESTORETIC) 20-25 mg Tab TAKE 1 TABLET BY MOUTH ONCE DAILY. 90 tablet 3    metFORMIN (GLUCOPHAGE) 1000 MG tablet TAKE 1 TABLET (1,000 MG TOTAL) BY MOUTH 2 (TWO) TIMES DAILY WITH MEALS. 180 tablet 0    metFORMIN (GLUCOPHAGE) 1000 MG tablet TAKE 1 TABLET (1,000 MG TOTAL) BY MOUTH 2 (TWO) TIMES DAILY WITH MEALS. 180 tablet 1    MICROLET LANCET Misc TEST ONCE DAILY 100 each 3    montelukast (SINGULAIR) 10 mg tablet TAKE 1 TABLET (10 MG TOTAL) BY MOUTH ONCE DAILY. 90 tablet 3    omeprazole (PRILOSEC) 20 MG capsule TAKE 1 CAPSULE (20 MG TOTAL) BY MOUTH EVERY MORNING. 90 capsule 3    SITagliptin (JANUVIA) 100 MG Tab Take 1 tablet (100 mg total) by mouth once daily. 90 tablet 3    levocetirizine (XYZAL) 5 MG tablet Take 1 tablet (5 mg total) by mouth every evening. 30 tablet 3     No current facility-administered medications for this visit.          ROS  Review of Systems   Constitutional: Positive for fatigue. Negative for chills, fever and unexpected weight change.   HENT: Negative for congestion, ear pain, hearing loss, rhinorrhea, sore throat and trouble swallowing.   "  Eyes: Negative for discharge, redness and visual disturbance.   Respiratory: Negative for cough, chest tightness, shortness of breath and wheezing.    Cardiovascular: Negative for chest pain, palpitations and leg swelling.   Gastrointestinal: Negative for abdominal pain, constipation, diarrhea, nausea and vomiting.   Endocrine: Positive for polydipsia. Negative for polyphagia and polyuria.   Genitourinary: Negative for decreased urine volume, dysuria and hematuria.   Musculoskeletal: Negative for arthralgias, joint swelling and myalgias.   Skin: Negative for color change, pallor and rash.   Neurological: Positive for tremors and headaches. Negative for dizziness, seizures, speech difficulty, weakness and light-headedness.   Psychiatric/Behavioral: Negative for confusion, decreased concentration, dysphoric mood, sleep disturbance and suicidal ideas. The patient is nervous/anxious.            Physical Exam  Vitals:    03/19/18 0914   BP: 120/80   Pulse: (!) 116   Temp: 97.3 °F (36.3 °C)   SpO2: 98%   Weight: 126.6 kg (279 lb)   Height: 5' 5" (1.651 m)    Body mass index is 46.43 kg/m².  Weight: 126.6 kg (279 lb)   Height: 5' 5" (165.1 cm)   Physical Exam   Constitutional: She is oriented to person, place, and time. She appears well-developed and well-nourished. No distress.   HENT:   Head: Normocephalic and atraumatic.   Right Ear: Tympanic membrane, external ear and ear canal normal.   Left Ear: Tympanic membrane, external ear and ear canal normal.   Nose: Nose normal. Right sinus exhibits no maxillary sinus tenderness and no frontal sinus tenderness. Left sinus exhibits no maxillary sinus tenderness and no frontal sinus tenderness.   Mouth/Throat: Uvula is midline, oropharynx is clear and moist and mucous membranes are normal. No tonsillar exudate.   Eyes: Conjunctivae, EOM and lids are normal. Pupils are equal, round, and reactive to light. No scleral icterus.   Neck: Full passive range of motion without pain. " Neck supple. No JVD present. No spinous process tenderness and no muscular tenderness present. Carotid bruit is not present. No thyromegaly present.   Cardiovascular: Normal rate, regular rhythm, S1 normal, S2 normal and intact distal pulses.  Exam reveals no S3, no S4 and no friction rub.    No murmur heard.  Pulmonary/Chest: Effort normal and breath sounds normal. She has no wheezes. She has no rhonchi. She has no rales.   Abdominal: Soft. Bowel sounds are normal. She exhibits no distension. There is no hepatosplenomegaly. There is no tenderness. There is no rebound and no CVA tenderness.   Musculoskeletal: Normal range of motion. She exhibits no edema or tenderness.   Lymphadenopathy:        Head (right side): No submental and no submandibular adenopathy present.        Head (left side): No submental and no submandibular adenopathy present.     She has no cervical adenopathy.   Neurological: She is alert and oriented to person, place, and time. Coordination normal.   Motor grossly intact.  Sensory grossly intact.  Symmetric facial movements palate elevated symmetrically tongue midline     Skin: Skin is warm and dry. No rash noted. No cyanosis. Nails show no clubbing.   Psychiatric: She has a normal mood and affect. Her speech is normal and behavior is normal. Thought content normal. Cognition and memory are normal.           Health Maintenance       Date Due Completion Date    Low Dose Statin 07/16/1990 ---    Eye Exam 03/13/2018 3/13/2017 (Done)    Override on 3/13/2017: Done (Dr. Olayinka Phelan/Optometry- negative for bilateral diabetic retinopathy,positive for mild hypertension retinopathy bilaterally)    Urine Microalbumin 02/24/2018 2/24/2017    Foot Exam 07/03/2018 7/3/2017 (Done)    Override on 7/3/2017: Done    Mammogram 07/03/2018 7/3/2017    Hemoglobin A1c 09/12/2018 3/12/2018    Lipid Panel 03/12/2019 3/12/2018    TETANUS VACCINE 08/29/2026 8/29/2016    Pneumococcal PPSV23 (Medium Risk) (2) 07/16/2034  3/26/2015

## 2018-03-19 NOTE — ASSESSMENT & PLAN NOTE
The current medical regimen is effective;  continue present plan and medications. Check sugars in the PM if she has HA.

## 2018-03-22 ENCOUNTER — PATIENT MESSAGE (OUTPATIENT)
Dept: FAMILY MEDICINE | Facility: CLINIC | Age: 48
End: 2018-03-22

## 2018-04-12 ENCOUNTER — OFFICE VISIT (OUTPATIENT)
Dept: SLEEP MEDICINE | Facility: CLINIC | Age: 48
End: 2018-04-12
Payer: COMMERCIAL

## 2018-04-12 VITALS
WEIGHT: 281.31 LBS | OXYGEN SATURATION: 97 % | HEIGHT: 65 IN | HEART RATE: 86 BPM | SYSTOLIC BLOOD PRESSURE: 122 MMHG | BODY MASS INDEX: 46.87 KG/M2 | DIASTOLIC BLOOD PRESSURE: 76 MMHG

## 2018-04-12 DIAGNOSIS — E66.01 CLASS 3 SEVERE OBESITY DUE TO EXCESS CALORIES WITH SERIOUS COMORBIDITY AND BODY MASS INDEX (BMI) OF 45.0 TO 49.9 IN ADULT: ICD-10-CM

## 2018-04-12 DIAGNOSIS — G47.00 INSOMNIA, UNSPECIFIED TYPE: ICD-10-CM

## 2018-04-12 DIAGNOSIS — G47.33 OSA (OBSTRUCTIVE SLEEP APNEA): Primary | ICD-10-CM

## 2018-04-12 PROCEDURE — 99999 PR PBB SHADOW E&M-EST. PATIENT-LVL III: CPT | Mod: PBBFAC,,, | Performed by: INTERNAL MEDICINE

## 2018-04-12 PROCEDURE — 99204 OFFICE O/P NEW MOD 45 MIN: CPT | Mod: S$GLB,,, | Performed by: INTERNAL MEDICINE

## 2018-04-12 NOTE — PATIENT INSTRUCTIONS
Bariatric surgery [phone]     Litzy or Skyler will contact you to schedule your sleep study. Their number is [phone] (ext 2). The [facility] is located on 7th floor of the [facility].    We will call you when the sleep study results are ready - if you have not heard from us by 2 weeks from the date of the study, please call [phone] (ext 1).    You are advised to abstain from driving should you feel sleepy or drowsy.

## 2018-04-12 NOTE — LETTER
April 12, 2018      aJylon Kothari MD  [address] [place] [address]           [facility]  [address] [place] [address]  Phone: [phone]  Fax: [phone]          Patient: Iqra Duque   MR Number: [id number]   YOB: 1970   Date of Visit: 4/12/2018       Dear Dr. Jaylon Kothari:    Thank you for referring Iqra Duque to me for evaluation. Attached you will find relevant portions of my assessment and plan of care.    If you have questions, please do not hesitate to call me. I look forward to following Iqra Duque along with you.    Sincerely,    Francisco Grover MD    Enclosure  CC:  No Recipients    If you would like to receive this communication electronically, please contact [email or web address] or [phone] to request more information on [employer] access.    For providers and/or their staff who would like to refer a patient to [facility], please contact us through our one-stop-shop provider referral line, [facility], at [phone].    If you feel you have received this communication in error or would no longer like to receive these types of communications, please e-mail [email or web address]

## 2018-04-12 NOTE — PROGRESS NOTES
Iqra Duque  was seen as a new patient at the request of  Jaylon Kothari MD for the evaluation of  tang.    CHIEF COMPLAINT:    Chief Complaint   Patient presents with    Snoring    Sleep Apnea    Fatigue       HISTORY OF PRESENT ILLNESS: Iqra Duque is a 47 y.o. female is here for sleep evaluation.   Patient with loud snoring.  Patient endorsed waking up gasping for air nightly.  Feeling tire upon awake nightly.  No parasomnia.  No cataplexy.  No rls symptoms.     [place] Sleepiness Scale score during initial sleep evaluation was 11.    SLEEP ROUTINE:  Activity the hour prior to sleep: watch tv     Bed partner:    Time to bed:  10 pm   Lights off:  Yes   Sleep onset latency:  5-10 minutes        Disruptions or awakenings:    2-3 times (can take up to an hour to fall back to sleep)    Wakeup time:      6:15 am   Perceived sleep quality:  tire       Daytime naps:      60 minutes on days off  Weekend sleep routine:      11 pm  To 8 am (still tire)  Caffeine use: 12 oz of coke or tea per day  exercise habit:   Walk 2-3 times per week (15-30 minutes)      PAST MEDICAL HISTORY:    Active Ambulatory Problems     Diagnosis Date Noted    Dermatitis 12/21/2012    Essential hypertension 12/21/2012    Type 2 diabetes mellitus without complication 12/21/2012    Allergic rhinitis 11/19/2014    Ganglion cyst 11/19/2014    Gastroesophageal reflux 03/26/2015    Chronic diarrhea of unknown origin 03/26/2015    Morbid obesity with BMI of 45.0-49.9, adult 06/11/2015    Muscle cramps at night 02/26/2016    Vitamin D insufficiency 03/29/2016    Migraine with aura and without status migrainosus, not intractable 08/29/2016     Resolved Ambulatory Problems     Diagnosis Date Noted    Acute upper respiratory infections of unspecified site 12/21/2012    Abdominal pain, acute, left lower quadrant 09/19/2014    Diverticulitis 09/24/2014    Bereavement due to life event 11/19/2014     Past Medical History:    Diagnosis Date    Allergy     Diabetes mellitus type II     Diverticulitis 09/2014    Fever blister     Hypertension     Migraine headache                 PAST SURGICAL HISTORY:    Past Surgical History:   Procedure Laterality Date    HYSTERECTOMY  06/2009    [facility]---DLH/BSO ATYPICAL HYPERPLASIA    TUBAL LIGATION           FAMILY HISTORY:                Family History   Problem Relation Age of Onset    Ovarian cancer Mother 43    Cancer Mother      ovarian    Diabetes Mother     Heart disease Father     Melanoma Neg Hx     Psoriasis Neg Hx     Lupus Neg Hx        SOCIAL HISTORY:          Tobacco:   History   Smoking Status    Never Smoker   Smokeless Tobacco    Never Used       alcohol use:    History   Alcohol Use No                 Occupation:   for [employer]    ALLERGIES:    Review of patient's allergies indicates:   Allergen Reactions    Bactrim [sulfamethoxazole-trimethoprim] Hives     Also Septra    Keflex [cephalexin] Shortness Of Breath    Neosporin (neomycin-polymyx) Swelling    Amoxicillin Rash       CURRENT MEDICATIONS:    Current Outpatient Prescriptions   Medication Sig Dispense Refill    albuterol 90 mcg/actuation inhaler Inhale 1-2 puffs into the lungs every 4 to 6 hours as needed for Wheezing or Shortness of Breath (chest tightness). 1 Inhaler 6    CONTOUR TEST STRIPS Strp TEST ONCE DAILY 100 strip 1    inhalation device (AEROCHAMBER PLUS FLOW-VU) Use as directed for inhalation. 1 Device 0    levocetirizine (XYZAL) 5 MG tablet Take 1 tablet (5 mg total) by mouth every evening. 30 tablet 3    lisinopril-hydrochlorothiazide (PRINZIDE,ZESTORETIC) 20-25 mg Tab TAKE 1 TABLET BY MOUTH ONCE DAILY. 90 tablet 3    metFORMIN (GLUCOPHAGE) 1000 MG tablet TAKE 1 TABLET (1,000 MG TOTAL) BY MOUTH 2 (TWO) TIMES DAILY WITH MEALS. 180 tablet 0    MICROLET LANCET Misc TEST ONCE DAILY 100 each 3    montelukast (SINGULAIR) 10 mg tablet TAKE 1 TABLET (10 MG TOTAL) BY MOUTH ONCE DAILY.  "90 tablet 3    omeprazole (PRILOSEC) 20 MG capsule TAKE 1 CAPSULE (20 MG TOTAL) BY MOUTH EVERY MORNING. 90 capsule 3    SITagliptin (JANUVIA) 100 MG Tab Take 1 tablet (100 mg total) by mouth once daily. 90 tablet 3     No current facility-administered medications for this visit.                   REVIEW OF SYSTEMS:     Sleep related symptoms as per HPI.  CONST:Denies weight gain; lost 5 lbs since 12/16  HEENT: + sinus congestion  PULM: +intermittent dyspnea; no coughing or wheezing  CARD:  + palpitations upon awake  GI:  + acid reflux controlled with prilosec  : Denies polyuria  NEURO: + headaches upon awake  PSYCH: anxious  HEME: Denies anemia   Otherwise, a balance of systems reviewed is negative.          PHYSICAL EXAM:  Vitals:    04/12/18 0823   BP: 122/76   Pulse: 86   SpO2: 97%   Weight: 127.6 kg (281 lb 4.9 oz)   Height: 5' 5" (1.651 m)   PainSc: 0-No pain     Body mass index is 46.81 kg/m².     GENERAL: Normal development, well groomed  HEENT:  Conjunctivae are non-erythematous; Pupils equal, round, and reactive to light; Nose is symmetrical; Nasal mucosa is pink and moist; Septum is midline; Inferior turbinates are normal; Nasal airflow is normal; Posterior pharynx is pink; Modified Mallampati: 4; Posterior palate is normal; Tonsils +1; Uvula is normal and pink;Tongue is normal; Dentition is fair; No TMJ tenderness; Jaw opening and protrusion without click and without discomfort.  NECK: Supple. Neck circumference is 18 inches. No thyromegaly. No palpable nodes.     SKIN: On face and neck: No abrasions, no rashes, no lesions.  No subcutaneous nodules are palpable.  RESPIRATORY: Chest is clear to auscultation.  Normal chest expansion and non-labored breathing at rest.  CARDIOVASCULAR: Normal S1, S2.  No murmurs, gallops or rubs. No carotid bruits bilaterally.  EXTREMITIES: No edema. No clubbing. No cyanosis. Station normal. Gait normal.        NEURO/PSYCH: Oriented to time, place and person. Normal " attention span and concentration. Affect is full. Mood is normal.                                              DATA no prior sleep study  Echo 4-3-17  CONCLUSIONS     1 - Normal left ventricular systolic function (EF 55-60%).     2 - Concentric remodeling.     3 - Trivial mitral regurgitation.     4 - Trivial tricuspid regurgitation.     Lab Results   Component Value Date    TSH 1.008 03/12/2018     ASSESSMENT    ICD-10-CM ICD-9-CM    1. ALIZA (obstructive sleep apnea) G47.33 327.23 Home Sleep Studies   2. Insomnia, unspecified type G47.00 780.52    3. Class 3 severe obesity due to excess calories with serious comorbidity and body mass index (BMI) of 45.0 to 49.9 in adult E66.01 278.01     Z68.42 V85.42        PLAN:    Sleep Apnea NEC. The patient symptomatically has loud snoring, restless sleep, daytime somnolence, morning headache, gasping sensation with findings of elevated bmi, high grade mallampatti, enlarged neck, htn. This warrants further investigation for possible obstructive sleep apnea.  Patient will be contacted after sleep study is done.      Insomnia - maintenance is the issue.  May related to untreated aliza.  Will monitor.      Obesity - aware of need for drastic weight loss.  Candidate for bariatric surgery.  S/p nutritionist appointment.      Diagnostic: Polysomnogram. The nature of this procedure and its indication was discussed with the patient.     Education: During our discussion today, we talked about the etiology of obstructive sleep apnea as well as the potential ramifications of untreated sleep apnea, which could include daytime sleepiness, hypertension, heart disease and/or stroke.     Precautions: The patient was advised to abstain from driving should they feel sleepy or drowsy.       Thank you for allowing me the opportunity to participate in the care of your patient.    Patient will No Follow-up on file. with md/np.    Please cc note to  Jaylon Kothari MD.    This is 45 minutes visit,  over 50% of time spent in direct consultation with patient.

## 2018-04-16 ENCOUNTER — TELEPHONE (OUTPATIENT)
Dept: ADMINISTRATIVE | Facility: HOSPITAL | Age: 48
End: 2018-04-16

## 2018-04-16 DIAGNOSIS — I10 HTN (HYPERTENSION), BENIGN: Chronic | ICD-10-CM

## 2018-04-16 RX ORDER — LISINOPRIL AND HYDROCHLOROTHIAZIDE 20; 25 MG/1; MG/1
TABLET ORAL
Qty: 90 TABLET | Refills: 3 | Status: SHIPPED | OUTPATIENT
Start: 2018-04-16 | End: 2018-10-12

## 2018-04-18 ENCOUNTER — PATIENT OUTREACH (OUTPATIENT)
Dept: OTHER | Facility: OTHER | Age: 48
End: 2018-04-18

## 2018-04-18 NOTE — PROGRESS NOTES
Last 5 Patient Entered Readings                                      Current 30 Day Average: 115/74     Recent Readings 4/17/2018 4/16/2018 4/15/2018 4/12/2018 4/11/2018    SBP (mmHg) 110 114 109 121 129    DBP (mmHg) 69 67 67 84 75    Pulse 82 81 85 101 79      Left voicemail to follow up with Ms. Iqra PIPER Neto.    Per 30 day average, blood pressure is well controlled 115/74 mmHg. Encouraged adherence to low sodium diet and physical activity guidelines. Advised patient to call or message with questions or concerns. WCB in 12 weeks.

## 2018-04-19 ENCOUNTER — TELEPHONE (OUTPATIENT)
Dept: SLEEP MEDICINE | Facility: HOSPITAL | Age: 48
End: 2018-04-19

## 2018-04-25 RX ORDER — METFORMIN HYDROCHLORIDE 1000 MG/1
1000 TABLET ORAL 2 TIMES DAILY WITH MEALS
Qty: 180 TABLET | Refills: 1 | Status: SHIPPED | OUTPATIENT
Start: 2018-04-25 | End: 2018-10-12

## 2018-05-07 ENCOUNTER — PATIENT OUTREACH (OUTPATIENT)
Dept: OTHER | Facility: OTHER | Age: 48
End: 2018-05-07

## 2018-05-07 NOTE — PROGRESS NOTES
Last 5 Patient Entered Readings                                      Current 30 Day Average: 113/73     Recent Readings 5/5/2018 5/4/2018 5/3/2018 5/2/2018 5/1/2018    SBP (mmHg) 109 109 115 109 115    DBP (mmHg) 74 78 70 74 74    Pulse 82 84 98 89 91          Digital Medicine: Health  Follow Up    Called to follow up with Mrs. Iqra Duque but received busy signal.   Current BP average 113/73 mmHg is at goal of 130/80 mmHg. Will try again next week. Inquire about mailed resources for variety in meal planning along with headaches/stress.

## 2018-05-11 ENCOUNTER — TELEPHONE (OUTPATIENT)
Dept: FAMILY MEDICINE | Facility: CLINIC | Age: 48
End: 2018-05-11

## 2018-05-11 DIAGNOSIS — Z12.31 BREAST CANCER SCREENING BY MAMMOGRAM: Primary | ICD-10-CM

## 2018-05-14 ENCOUNTER — TELEPHONE (OUTPATIENT)
Dept: SLEEP MEDICINE | Facility: HOSPITAL | Age: 48
End: 2018-05-14

## 2018-05-18 ENCOUNTER — HOSPITAL ENCOUNTER (OUTPATIENT)
Dept: SLEEP MEDICINE | Facility: HOSPITAL | Age: 48
Discharge: HOME | End: 2018-05-18
Attending: INTERNAL MEDICINE
Payer: COMMERCIAL

## 2018-05-18 DIAGNOSIS — G47.33 OSA (OBSTRUCTIVE SLEEP APNEA): ICD-10-CM

## 2018-05-18 PROCEDURE — 95800 SLP STDY UNATTENDED: CPT

## 2018-05-18 PROCEDURE — 95800 PR SLEEP STUDY, UNATTENDED, RECORD HEART RATE/O2 SAT/RESP ANAL/SLEEP TIME: ICD-10-PCS | Mod: 26,,, | Performed by: INTERNAL MEDICINE

## 2018-05-18 PROCEDURE — 95800 SLP STDY UNATTENDED: CPT | Mod: 26,,, | Performed by: INTERNAL MEDICINE

## 2018-05-18 NOTE — PROGRESS NOTES
The patient ID was verified. She was instructed on how to turn the Home Sleep Testing device on and off, how to apply the sensors. She was encouraged to sleep on supine position and must have 6 hours of sleep. The patient was instructed not to get the device wet and return it to a  at the hospital. All questions were answered prior to patient leaving. She was provided the  after visit summary.

## 2018-05-18 NOTE — PATIENT INSTRUCTIONS
Your home sleep study will be scored and interpreted by one of our physicians who are board certified in sleep medicine.  Within two weeks the results will be sent to the physician who referred you. Your physician should then contact you to go over the results, along with any recommendations. If you do not hear from your physician within two weeks, please call them.

## 2018-05-24 ENCOUNTER — TELEPHONE (OUTPATIENT)
Dept: SLEEP MEDICINE | Facility: CLINIC | Age: 48
End: 2018-05-24

## 2018-05-24 NOTE — TELEPHONE ENCOUNTER
----- Message from Yelena Dutton NP sent at 5/23/2018 10:37 AM CDT -----  Contact: Self   Please schedule her for follow up to discuss results and treatment options? Thanks.  ----- Message -----  From: Maria Dolores Burt  Sent: 5/23/2018  10:12 AM  To: [facility] Kirk Staff    Patient says she need sleep study results . Please call at [phone]

## 2018-05-28 NOTE — PROGRESS NOTES
Last 5 Patient Entered Readings                                      Current 30 Day Average: 112/71     Recent Readings 5/27/2018 5/26/2018 5/25/2018 5/23/2018 5/20/2018    SBP (mmHg) 109 97 116 124 120    DBP (mmHg) 69 66 71 69 72    Pulse 87 88 77 83 82            Digital Medicine: Health  Follow Up    Left voicemail to follow up with Mrs. Iqra Duque.  Current BP average 112/71 mmHg is at goal, 130/80 mmHg. Instructed to call back with any questions/comments/concerns. Will check in again in 4 weeks and send [facility] message if no answer at that time.

## 2018-05-30 ENCOUNTER — PATIENT MESSAGE (OUTPATIENT)
Dept: CARDIOLOGY | Facility: CLINIC | Age: 48
End: 2018-05-30

## 2018-05-30 ENCOUNTER — PATIENT MESSAGE (OUTPATIENT)
Dept: PHYSICAL MEDICINE AND REHAB | Facility: CLINIC | Age: 48
End: 2018-05-30

## 2018-05-31 NOTE — PROGRESS NOTES
"Last 5 Patient Entered Readings                                      Current 30 Day Average: 112/71     Recent Readings 5/31/2018 5/28/2018 5/27/2018 5/26/2018 5/25/2018    SBP (mmHg) 109 114 109 97 116    DBP (mmHg) 74 72 69 66 71    Pulse 83 87 87 88 77          Digital Medicine: Health  Follow Up    Patient replied via [employer] stating she is "doing well" and has no concerns or issues to report at this time. Encouraged to continue with implementing regular exercise, medication adherence, in addition to sodium monitoring. Patient also encouraged to call should any issues/concerns arise.  Will call again in 4-6 weeks to assess progress. BP is well controlled.     Follow up with Mrs. Iqra PIPER Neto completed. No further questions or concerns. Will continue follow up to achieve health goals.    "

## 2018-06-05 ENCOUNTER — OFFICE VISIT (OUTPATIENT)
Dept: PSYCHIATRY | Facility: CLINIC | Age: 48
End: 2018-06-05
Payer: COMMERCIAL

## 2018-06-05 DIAGNOSIS — F41.1 GENERALIZED ANXIETY DISORDER: Primary | ICD-10-CM

## 2018-06-05 PROCEDURE — 90791 PSYCH DIAGNOSTIC EVALUATION: CPT | Mod: S$GLB,,, | Performed by: SOCIAL WORKER

## 2018-06-05 NOTE — PATIENT INSTRUCTIONS
[facility] - DEPARTMENT OF PSYCHIATRY   NEW PATIENT ORIENTATION INFORMATION  OUTPATIENT SERVICES COUNSELING CONTRACT    We appreciate the opportunity to participate in your medical care and hope the following protocols will make it easier for you to receive quality treatment in our department.    1. PUNCTUALITY: Your appointment is scheduled for a fixed amount of time reserved especially for you.  To get the benefit of your appointment, please arrive early enough to allow time for parking and registration.  If you are late for your appointment, your clinician is not able to offer additional time.  Please make every effort to be on time.  You may be asked to reschedule.    2. PAYMENT FOR SERVICES:   Payments are expected at the time of service.  Please contact [phone] if you need to resolve issues involving your account at [facility] or to set up a payment plan.    3. CANCELLATION / MISSED APPOINTMENTS:   In order to receive quality care, all appointments must be kept.  Appointment may be cancelled, ONLY by talking with an  at phone number [phone], between 8:00 a.m. and 5:00 p.m., Monday through Friday, at least 24 hours before your appointment time.  Your clinician reserves this time specifically for you, and if you will be unable to use it, it is necessary that you cancel in a timely manner.  If you do not give at least 24-hour notice of cancellation a fee may be assessed.  Please note that insurance does not cover no-show charges, so you will be billed directly.  If you are consistently late, cancel, or do not show for your appointments, our department reserves the right to terminate treatment.    4. CALLING THE DEPARTMENT:   MESSAGES, SCHEDULE OR CANCEL APPOINTMENTS- In general you can reach the department by calling [phone], between 8:00 a.m. and 5:00 p.m., Monday through Friday, to schedule or cancel appointments or leave a message for your clinician.  It is advisable  to schedule your visits far in advance to obtain the most convenient times for your appointments.   AFTER HOURS, WEEKEND OR HOLIDAYS- For urgent questions after hours, weekends and holidays, calling the department number [phone] will connect you to the [facility] On Call nursing staff or the Psychiatry Inpatient Unit.  The [facility] On Call nursing staff will speak with you and direct your call/care as necessary.   EMERGENCY-  In case of a crisis when there is a concern of harm to self or others, call 911 or the office [phone] between 8:00 a.m. and 5:00 p.m., Monday through Friday.  After hours, weekends or holidays, please call 911 or go to the Emergency Department where you can be thoroughly evaluated by a physician.    5. TEAM APPROACH:  Most patients receive medication management through our team system.  In the team system, your primary physician will be a primary care physician, psychiatrist, nurse practitioner, or psychiatry resident. Please contact your primary physician first in matters regarding medications or acute medical problems.      6. FOLLOW UP APPOINTMENTS:  Follow-up appointments can be made in person at the [facility] office, or by calling [phone], from 8am to 5pm, between Monday and Friday. Appointment cannot be made for psychiatry appointments at the [facility] location due to confidentiality.  It is advisable to schedule your office visits far enough in advance to obtain the most convenient times for your appointments.    7. CONFIDENTIALITY:  As a therapy patient, all information you share about yourself will be kept confidential within the [facility] system. Information can only be shared with your written permission. Legal exceptions to confidentiality are clear and imminent danger to you or others, any child/elderly abuse or neglect, or a court order.      Revised May 2, 2018

## 2018-06-05 NOTE — PROGRESS NOTES
"Psychiatry Initial Visit (PhD/LCSW)  Diagnostic Interview - CPT 98208    Date: 6/5/2018    Site: [facility]    Referral source: Dr. Kothari    Clinical status of patient: Outpatient    Iqra Duque, a 47 y.o. female, for initial evaluation visit.  Met with patient.    Chief complaint/reason for encounter: anxiety    History of present illness: Patient is a referral from Dr. Kothari for anxiety. Patient reports reason for seeking treatment for anxiety. Reports that she has always been subconscious when she is around people. States that she is in the process of doing some tests for symptoms that she has been experiencing. She has been waking up in the middle of the night gasping for air, unsure if it's anxiety or sleep apnea. Reports that she does not like change, "hate my job" but doesn't want to change because she doesn't want to go somewhere new. Notes irritability but tries to manage it better because she knows that it has been getting worse. Presents with "what if" thoughts, rumination, racing thoughts, and over-thinking. Reports that she doesn't like to be in crowds. Reports that when in a crowd she worries about what people are thinking about her or are judging her. Endorses excessive worrying, will worrying about "anything".  Some memory problems, worries that it could be Alzheimer's symptoms (family history) which makes her anxiety worse. Reports that in the past her anxiety has made her feel physically ill, headaches, and feels like she doesn't want to do anything. Not interested in doing things anymore.     Reports that sleep is "okay". Goes to bed around 1030pm. Wakes up on average 2 times a night. States that she will wake up gasping for air. Most of the time she is able to return to sleep fairly easily. States that she will use the bathroom, readjust in bed, and go back to sleep. States that occasionally she will go to recliner and watch TV if she can't sleep. Feels tired after social events. Reports " "that doesn't have an appetite, "nothing sounds good", eats because she has to for her diabetes. Reports that appetite hasn't always been this way. Notes that it is also getting worse. Denies problems with ADL's.     Pain: noncontributory    Symptoms:   · Mood: diminished interest and social isolation  · Anxiety: decreased memory, excessive anxiety/worry, irritability and anxiety attacks  · Substance abuse: denied  · Cognitive functioning: denied  · Health behaviors: noncontributory    Psychiatric history: has participated in counseling/psychotherapy on an outpatient basis in the past (in the past when  was in the  due to adjustment issues)    Medical history: diabetes, hypertension    Family history of psychiatric illness: mother - possibly depressed, Father's side - Alzheimer's Disease    Social history (marriage, employment, etc.): Born and raised in [place]. Lived there until she was 17 y/o. Reports that childhood was "okay". Neither parent had full time job so the family was "poor". 1 older and 1 younger brother. Got  when she was 17 y/o and moved out. Graduated high school. Some college, but never finished. Mother passed in 1988.  x1, 29 years, [place]. 2 daughters, Yasmine (26 y/o) Ofelia (24 y/o). 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  works as  for [employer]. No financial problems, some strain with obligations. Never arrested, no .     Substance use:   Alcohol: infrequent   Drugs: none   Tobacco: none   Caffeine: 1 coca cola daily    Current medications and drug reactions (include OTC, herbal): see medication list    Strengths and liabilities: Strength: Patient accepts guidance/feedback, Strength: Patient is expressive/articulate., Strength: Patient is intelligent., Strength: Patient is motivated for change., Liability: Patient lacks coping skills.    Current Evaluation:     Mental Status Exam:  General Appearance:  unremarkable, age appropriate, " overweight   Speech: normal tone, normal rate, normal pitch, normal volume      Level of Cooperation: cooperative      Thought Processes: normal and logical   Mood: euthymic      Thought Content: normal, no suicidality, no homicidality, delusions, or paranoia   Affect: congruent and appropriate   Orientation: Oriented x3   Memory: recent >  intact, remote >  intact   Attention Span & Concentration: intact   Fund of General Knowledge: intact and appropriate to age and level of education   Abstract Reasoning: did not assess   Judgment & Insight: fair     Language  intact     Diagnostic Impression - Plan:       ICD-10-CM ICD-9-CM   1. Generalized anxiety disorder F41.1 300.02   R/O Social Anxiety    Plan:individual psychotherapy and medication management by physician    Return to Clinic: 2 weeks, 1 month    Length of Service (minutes): 45

## 2018-06-22 ENCOUNTER — OFFICE VISIT (OUTPATIENT)
Dept: FAMILY MEDICINE | Facility: CLINIC | Age: 48
End: 2018-06-22
Payer: COMMERCIAL

## 2018-06-22 VITALS
WEIGHT: 277.88 LBS | HEIGHT: 65 IN | BODY MASS INDEX: 46.3 KG/M2 | TEMPERATURE: 99 F | HEART RATE: 92 BPM | SYSTOLIC BLOOD PRESSURE: 110 MMHG | OXYGEN SATURATION: 95 % | DIASTOLIC BLOOD PRESSURE: 80 MMHG

## 2018-06-22 DIAGNOSIS — G43.109 MIGRAINE WITH AURA AND WITHOUT STATUS MIGRAINOSUS, NOT INTRACTABLE: Primary | ICD-10-CM

## 2018-06-22 PROCEDURE — 3008F BODY MASS INDEX DOCD: CPT | Mod: CPTII,S$GLB,, | Performed by: NURSE PRACTITIONER

## 2018-06-22 PROCEDURE — 99214 OFFICE O/P EST MOD 30 MIN: CPT | Mod: 25,S$GLB,, | Performed by: NURSE PRACTITIONER

## 2018-06-22 PROCEDURE — 3079F DIAST BP 80-89 MM HG: CPT | Mod: CPTII,S$GLB,, | Performed by: NURSE PRACTITIONER

## 2018-06-22 PROCEDURE — 99999 PR PBB SHADOW E&M-EST. PATIENT-LVL IV: CPT | Mod: PBBFAC,,, | Performed by: NURSE PRACTITIONER

## 2018-06-22 PROCEDURE — 3074F SYST BP LT 130 MM HG: CPT | Mod: CPTII,S$GLB,, | Performed by: NURSE PRACTITIONER

## 2018-06-22 PROCEDURE — 96372 THER/PROPH/DIAG INJ SC/IM: CPT | Mod: S$GLB,,, | Performed by: NURSE PRACTITIONER

## 2018-06-22 RX ORDER — MECLIZINE HYDROCHLORIDE 25 MG/1
25 TABLET ORAL 3 TIMES DAILY PRN
Qty: 30 TABLET | Refills: 0 | Status: SHIPPED | OUTPATIENT
Start: 2018-06-22 | End: 2018-10-12

## 2018-06-22 RX ORDER — DEXAMETHASONE SODIUM PHOSPHATE 4 MG/ML
8 INJECTION, SOLUTION INTRA-ARTICULAR; INTRALESIONAL; INTRAMUSCULAR; INTRAVENOUS; SOFT TISSUE
Status: COMPLETED | OUTPATIENT
Start: 2018-06-22 | End: 2018-06-22

## 2018-06-22 RX ORDER — ONDANSETRON 8 MG/1
8 TABLET, ORALLY DISINTEGRATING ORAL EVERY 8 HOURS PRN
Qty: 12 TABLET | Refills: 0 | Status: SHIPPED | OUTPATIENT
Start: 2018-06-22 | End: 2018-10-12

## 2018-06-22 RX ORDER — CYCLOBENZAPRINE HCL 5 MG
5 TABLET ORAL 3 TIMES DAILY PRN
Qty: 30 TABLET | Refills: 0 | Status: SHIPPED | OUTPATIENT
Start: 2018-06-22 | End: 2018-07-02

## 2018-06-22 RX ORDER — CETIRIZINE HYDROCHLORIDE 10 MG/1
10 TABLET ORAL DAILY
COMMUNITY

## 2018-06-22 RX ORDER — KETOROLAC TROMETHAMINE 30 MG/ML
60 INJECTION, SOLUTION INTRAMUSCULAR; INTRAVENOUS
Status: COMPLETED | OUTPATIENT
Start: 2018-06-22 | End: 2018-06-22

## 2018-06-22 RX ADMIN — KETOROLAC TROMETHAMINE 60 MG: 30 INJECTION, SOLUTION INTRAMUSCULAR; INTRAVENOUS at 07:06

## 2018-06-22 RX ADMIN — DEXAMETHASONE SODIUM PHOSPHATE 8 MG: 4 INJECTION, SOLUTION INTRA-ARTICULAR; INTRALESIONAL; INTRAMUSCULAR; INTRAVENOUS; SOFT TISSUE at 07:06

## 2018-06-22 NOTE — PROGRESS NOTES
Subjective:       Patient ID: Iqra Duque is a 47 y.o. female.    Chief Complaint: Headache and Dizziness    Headache    This is a chronic problem. The current episode started 1 to 4 weeks ago. The problem occurs daily. The problem has been gradually worsening. The pain is located in the bilateral region. The pain does not radiate. The pain quality is not similar to prior headaches. The quality of the pain is described as aching. The pain is at a severity of 3/10. The pain is mild. Associated symptoms include dizziness. Pertinent negatives include no neck pain or rhinorrhea. The symptoms are aggravated by activity and food. She has tried NSAIDs for the symptoms. The treatment provided mild relief. Her past medical history is significant for migraine headaches.       Past Medical History:   Diagnosis Date    Allergy     Diabetes mellitus type II     Diverticulitis 09/2014    Fever blister     Hypertension     Migraine headache        Social History     Social History    Marital status:      Spouse name: N/A    Number of children: N/A    Years of education: N/A     Occupational History    [employer]     Social History Main Topics    Smoking status: Never Smoker    Smokeless tobacco: Never Used    Alcohol use No    Drug use: No    Sexual activity: Yes     Partners: Male     Birth control/ protection: None     Other Topics Concern    Not on file     Social History Narrative    No narrative on file       Past Surgical History:   Procedure Laterality Date    HYSTERECTOMY  06/2009    KJB---DLH/BSO ATYPICAL HYPERPLASIA    TUBAL LIGATION         Review of Systems   Constitutional: Negative for activity change and unexpected weight change.   HENT: Negative for rhinorrhea and trouble swallowing.    Eyes: Negative for discharge and visual disturbance.   Respiratory: Negative for chest tightness and wheezing.    Gastrointestinal: Positive for diarrhea. Negative for blood in  "stool and constipation.   Endocrine: Negative for polydipsia and polyuria.   Genitourinary: Negative for difficulty urinating, dysuria, hematuria and menstrual problem.   Musculoskeletal: Negative for arthralgias, joint swelling and neck pain.   Neurological: Positive for dizziness and headaches.   Psychiatric/Behavioral: Positive for dysphoric mood. Negative for confusion.   All other systems reviewed and are negative.      Objective:   /80 (BP Location: Left arm, Patient Position: Sitting, BP Method: Large (Manual))   Pulse 92   Temp 99.3 °F (37.4 °C) (Oral)   Ht 5' 5" (1.651 m)   Wt 126.1 kg (277 lb 14.2 oz)   SpO2 95%   BMI 46.24 kg/m²      Physical Exam   Constitutional: She is oriented to person, place, and time. She appears well-developed and well-nourished. No distress.   HENT:   Head: Normocephalic.   Eyes: Pupils are equal, round, and reactive to light.   Cardiovascular: Normal rate, regular rhythm and normal heart sounds.    Pulmonary/Chest: Effort normal and breath sounds normal. No respiratory distress. She has no decreased breath sounds. She has no wheezes. She has no rhonchi. She has no rales.   Neurological: She is alert and oriented to person, place, and time.   Skin: She is not diaphoretic. No pallor.   Psychiatric: She has a normal mood and affect. Her speech is normal and behavior is normal.       Assessment:       1. Migraine with aura and without status migrainosus, not intractable        Plan:       Iqra was seen today for headache and dizziness.    Diagnoses and all orders for this visit:    Migraine with aura and without status migrainosus, not intractable  -     ketorolac injection 60 mg; Inject 2 mLs (60 mg total) into the muscle one time.  -     dexamethasone injection 8 mg; Inject 2 mLs (8 mg total) into the muscle one time.  -     ondansetron (ZOFRAN-ODT) 8 MG TbDL; Take 1 tablet (8 mg total) by mouth every 8 (eight) hours as needed.  -     meclizine (ANTIVERT) 25 mg " tablet; Take 1 tablet (25 mg total) by mouth 3 (three) times daily as needed.  -     cyclobenzaprine (FLEXERIL) 5 MG tablet; Take 1 tablet (5 mg total) by mouth 3 (three) times daily as needed for Muscle spasms.        Follow-up if symptoms worsen or fail to improve.

## 2018-06-26 ENCOUNTER — OFFICE VISIT (OUTPATIENT)
Dept: SLEEP MEDICINE | Facility: CLINIC | Age: 48
End: 2018-06-26
Payer: COMMERCIAL

## 2018-06-26 VITALS
BODY MASS INDEX: 46.58 KG/M2 | WEIGHT: 279.56 LBS | DIASTOLIC BLOOD PRESSURE: 70 MMHG | SYSTOLIC BLOOD PRESSURE: 110 MMHG | HEIGHT: 65 IN | HEART RATE: 72 BPM

## 2018-06-26 DIAGNOSIS — E11.9 TYPE 2 DIABETES MELLITUS WITHOUT COMPLICATION, WITHOUT LONG-TERM CURRENT USE OF INSULIN: Chronic | ICD-10-CM

## 2018-06-26 DIAGNOSIS — G47.33 OBSTRUCTIVE SLEEP APNEA: Primary | ICD-10-CM

## 2018-06-26 DIAGNOSIS — E66.01 MORBID OBESITY WITH BMI OF 45.0-49.9, ADULT: Chronic | ICD-10-CM

## 2018-06-26 PROCEDURE — 3074F SYST BP LT 130 MM HG: CPT | Mod: CPTII,S$GLB,, | Performed by: NURSE PRACTITIONER

## 2018-06-26 PROCEDURE — 3078F DIAST BP <80 MM HG: CPT | Mod: CPTII,S$GLB,, | Performed by: NURSE PRACTITIONER

## 2018-06-26 PROCEDURE — 99214 OFFICE O/P EST MOD 30 MIN: CPT | Mod: S$GLB,,, | Performed by: NURSE PRACTITIONER

## 2018-06-26 PROCEDURE — 3045F PR MOST RECENT HEMOGLOBIN A1C LEVEL 7.0-9.0%: CPT | Mod: CPTII,S$GLB,, | Performed by: NURSE PRACTITIONER

## 2018-06-26 PROCEDURE — 3008F BODY MASS INDEX DOCD: CPT | Mod: CPTII,S$GLB,, | Performed by: NURSE PRACTITIONER

## 2018-06-26 PROCEDURE — 99999 PR PBB SHADOW E&M-EST. PATIENT-LVL III: CPT | Mod: PBBFAC,,, | Performed by: NURSE PRACTITIONER

## 2018-06-26 NOTE — PROGRESS NOTES
"Iqra Duque  was seen as a f/u today for the mgt of ALIZA. Last seen by MD 4/12/18, this is my initial visit with her    She has since undergone a home sleep study which we reviewed together today in detail. Continued loud snoring.  Patient endorsed waking up gasping for air nightly.  Feeling tired upon awakening.   No parasomnia.  No cataplexy.  No rls symptoms.+disrupted sleep but returns more easily to sleep onw.ESS=9. +daytime sleepiness. Would love to get a good night sleep. Denies sleepiness while driving, remains engaged. AM headaches. Lot of wake time night of study due to AC being out.     HST AHI 31/low sat 76%, mild ahi 5 SIDE    HgBA1c 7.2      Occupation:   for [employer]    REVIEW OF SYSTEMS: Sleep related symptoms as per HPI, + sinus congestion.  Otherwise, a balance of systems reviewed is negative.          PHYSICAL EXAM:  /70   Pulse 72   Ht 5' 5" (1.651 m)   Wt 126.8 kg (279 lb 8.7 oz)   BMI 46.52 kg/m²   GENERAL: Normal development, well groomed ,morbid obese                           Echo 4-3-17  CONCLUSIONS     1 - Normal left ventricular systolic function (EF 55-60%).     2 - Concentric remodeling.     3 - Trivial mitral regurgitation.     4 - Trivial tricuspid regurgitation.     Lab Results   Component Value Date    TSH 1.008 03/12/2018     ASSESSMENT  ALIZA, severe position dependent.   Medical co-morbities- morbid obesity, DM2 w/o complication      PLAN:  We discussed potential treatment options, which could include weight loss (10-15%), body positioning, continuous positive airway pressure (CPAP-defintive), mandibular advancement splint by dentist, or referral for surgical consideration. Discussed etiology of ALIZA and potential ramifications of untreated ALIZA, including heart disease, hypertension, cognitive difficulties, stroke, and diabetes.      APAP 6-20cm setup pending auth. RTC 4-5 wks AFTER setup adherence  Encouraged weight loss efforts for potential improvement of ALIZA " and overall health benefits  See PCP re: DM mgt

## 2018-06-29 ENCOUNTER — OFFICE VISIT (OUTPATIENT)
Dept: PSYCHIATRY | Facility: CLINIC | Age: 48
End: 2018-06-29
Payer: COMMERCIAL

## 2018-06-29 DIAGNOSIS — F41.1 GENERALIZED ANXIETY DISORDER: Primary | ICD-10-CM

## 2018-06-29 PROCEDURE — 90834 PSYTX W PT 45 MINUTES: CPT | Mod: S$GLB,,, | Performed by: SOCIAL WORKER

## 2018-06-29 NOTE — PROGRESS NOTES
"Individual Psychotherapy (PhD/LCSW)    6/29/2018    Site:  [facility]         Therapeutic Intervention: Met with patient.  Outpatient - Insight oriented psychotherapy 45 min - CPT code 23537 and Outpatient - Supportive psychotherapy 45 min - CPT Code 24099    Chief complaint/reason for encounter: anxiety     Interval history and content of current session: Patient returned to clinic for follow up psychotherapy. Patient reports that she is doing "okay". States that things have settled since last visit. Reports that she helped her eldest move back home. States that she wasn't happy at her job so she decided to come home. Reports that she's not sure how long she will be back at the house because she hasn't started her new job. Discussed that she has decided that she wants to leave her job by January 2019. States that she has always wanted to own her own business. Has been working on ideas with daughters about what kind of business they would like to have. Discussed that she recently went to a shop in [facility] where people play games and sell merchandise. Discussed that she doesn't have any plans but has that as her goal for the next couple of weeks.     Treatment plan:  · Target symptoms: anxiety   · Why chosen therapy is appropriate versus another modality: relevant to diagnosis, patient responds to this modality, evidence based practice  · Outcome monitoring methods: self-report, lab data, observation  · Therapeutic intervention type: insight oriented psychotherapy, behavior modifying psychotherapy, supportive psychotherapy    Risk parameters:  Patient reports no suicidal ideation  Patient reports no homicidal ideation  Patient reports no self-injurious behavior  Patient reports no violent behavior    Verbal deficits: None    Patient's response to intervention:  The patient's response to intervention is accepting.    Progress toward goals and other mental status changes:  The patient's progress toward goals is fair " .    Diagnosis:     ICD-10-CM ICD-9-CM   1. Generalized anxiety disorder F41.1 300.02       Plan:  individual psychotherapy    Return to clinic: 2 weeks    Length of Service (minutes): 45     Cassandra Rockweiler, LCSW

## 2018-07-02 ENCOUNTER — PATIENT MESSAGE (OUTPATIENT)
Dept: PULMONOLOGY | Facility: CLINIC | Age: 48
End: 2018-07-02

## 2018-07-17 ENCOUNTER — OFFICE VISIT (OUTPATIENT)
Dept: PODIATRY | Facility: CLINIC | Age: 48
End: 2018-07-17
Payer: COMMERCIAL

## 2018-07-17 ENCOUNTER — HOSPITAL ENCOUNTER (OUTPATIENT)
Dept: RADIOLOGY | Facility: HOSPITAL | Age: 48
Discharge: HOME | End: 2018-07-17
Attending: INTERNAL MEDICINE
Payer: COMMERCIAL

## 2018-07-17 VITALS
DIASTOLIC BLOOD PRESSURE: 84 MMHG | BODY MASS INDEX: 46.48 KG/M2 | WEIGHT: 279 LBS | HEIGHT: 65 IN | SYSTOLIC BLOOD PRESSURE: 131 MMHG

## 2018-07-17 DIAGNOSIS — M20.42 HAMMER TOES OF BOTH FEET: ICD-10-CM

## 2018-07-17 DIAGNOSIS — E11.9 COMPREHENSIVE DIABETIC FOOT EXAMINATION, TYPE 2 DM, ENCOUNTER FOR: Primary | ICD-10-CM

## 2018-07-17 DIAGNOSIS — Z12.31 BREAST CANCER SCREENING BY MAMMOGRAM: ICD-10-CM

## 2018-07-17 DIAGNOSIS — L60.8 PINCER NAIL DEFORMITY: ICD-10-CM

## 2018-07-17 DIAGNOSIS — M20.41 HAMMER TOES OF BOTH FEET: ICD-10-CM

## 2018-07-17 PROCEDURE — 3075F SYST BP GE 130 - 139MM HG: CPT | Mod: CPTII,S$GLB,, | Performed by: PODIATRIST

## 2018-07-17 PROCEDURE — 99999 PR PBB SHADOW E&M-EST. PATIENT-LVL III: CPT | Mod: PBBFAC,,, | Performed by: PODIATRIST

## 2018-07-17 PROCEDURE — 99213 OFFICE O/P EST LOW 20 MIN: CPT | Mod: S$GLB,,, | Performed by: PODIATRIST

## 2018-07-17 PROCEDURE — 77063 BREAST TOMOSYNTHESIS BI: CPT | Mod: 26,,, | Performed by: RADIOLOGY

## 2018-07-17 PROCEDURE — 3008F BODY MASS INDEX DOCD: CPT | Mod: CPTII,S$GLB,, | Performed by: PODIATRIST

## 2018-07-17 PROCEDURE — 77067 SCR MAMMO BI INCL CAD: CPT | Mod: TC,PO

## 2018-07-17 PROCEDURE — 3045F PR MOST RECENT HEMOGLOBIN A1C LEVEL 7.0-9.0%: CPT | Mod: CPTII,S$GLB,, | Performed by: PODIATRIST

## 2018-07-17 PROCEDURE — 3079F DIAST BP 80-89 MM HG: CPT | Mod: CPTII,S$GLB,, | Performed by: PODIATRIST

## 2018-07-17 PROCEDURE — 77067 SCR MAMMO BI INCL CAD: CPT | Mod: 26,,, | Performed by: RADIOLOGY

## 2018-07-17 NOTE — PATIENT INSTRUCTIONS
Recommend lotions: eucerin, eucerin for diabetics, aquaphor, A&D ointment, gold bond for diabetics, sween, Labolt's Bees all purpose baby ointment,  urea 40 with aloe (found on amazon.com)    Shoe recommendations: (try 6pm.com, zappos.com , nordstromrack.Marqeta, or shoes.Marqeta for discounted prices) you can visit [employer] shoes in [place]  or [employer] [facility] in the [facility] (there are also several shoe brand outlets in the [facility])    Asics (GT 2000 or gel foundations), new balance stability type shoes, saucony (stabil c3),  Seymour (GTS or Beast or transcend), vionic, propet (tennis shoe)    sofft brand, clarks, crocs, aerosoles, naturalizers, SAS, ecco, born, messi gibbs, rockports (dress shoes)    Vionic, burkenstocks, fitflops, propet (sandals)  Nike comfort thong sandals, crocs, propet (house shoes)    Nail Home remedy:  Vicks Vapor rub to nails for easier managability    Occasional soaks for 15-20 mins in luke warm water with 1 cup of listerine and 1 cup of apple cider vinegar are ok You may add several drops of oil of oregano or tea tree oil as well        Diabetes: Inspecting Your Feet  Diabetes increases your chances of developing foot problems. So inspect your feet every day. This helps you find small skin irritations before they become serious infections. If you have trouble seeing the bottoms of your feet, use a mirror or ask a family member or friend to help.     Pressure spots on the bottom of the foot are common areas where problems develop.   How to check your feet  Below are tips to help you look for foot problems. Try to check your feet at the same time each day, such as when you get out of bed in the morning:  · Check the top of each foot. The tops of toes, back of the heel, and outer edge of the foot can get a lot of rubbing from poor-fitting shoes.  · Check the bottom of each foot. Daily wear and tear often leads to problems at pressure spots.  · Check the toes and nails. Fungal infections often occur  between toes. Toenail problems can also be a sign of fungal infections or lead to breaks in the skin.  · Check your shoes, too. Loose objects inside a shoe can injure the foot. Use your hand to feel inside your shoes for things like vicente, loose stitching, or rough areas that could irritate your skin.  Warning signs  Look for any color changes in the foot. Redness with streaks can signal a severe infection, which needs immediate medical attention. Tell your doctor right away if you have any of these problems:  · Swelling, sometimes with color changes, may be a sign of poor blood flow or infection. Symptoms include tenderness and an increase in the size of your foot.  · Warm or hot areas on your feet may be signs of infection. A foot that is cold may not be getting enough blood.  · Sensations such as burning, tingling, or pins and needles can be signs of a problem. Also check for areas that may be numb.  · Hot spots are caused by friction or pressure. Look for hot spots in areas that get a lot of rubbing. Hot spots can turn into blisters, calluses, or sores.  · Cracks and sores are caused by dry or irritated skin. They are a sign that the skin is breaking down, which can lead to infection.  · Toenail problems to watch for include nails growing into the skin (ingrown toenail) and causing redness or pain. Thick, yellow, or discolored nails can signal a fungal infection.  · Drainage and odor can develop from untreated sores and ulcers. Call your doctor right away if you notice white or yellow drainage, bleeding, or unpleasant odor.   © 0462-2504 [employer]. [address] [place] [address]. All rights reserved. This information is not intended as a substitute for professional medical care. Always follow your healthcare professional's instructions.        Step-by-Step:  Inspecting Your Feet (Diabetes)    Date Last Reviewed: 10/1/2016  © 6908-2358 [employer]. [address] [place] [address]  Road, [place] [address]. All rights reserved. This information is not intended as a substitute for professional medical care. Always follow your healthcare professional's instructions.           Donald

## 2018-07-17 NOTE — LETTER
July 17, 2018      Jaylon Kothari MD  [address] [place] [address]           [facility]  [address] [place] [address]  Phone: [phone]          Patient: Iqra Duque   MR Number: [id number]   YOB: 1970   Date of Visit: 7/17/2018       Dear Dr. Jaylon Kothari:    Thank you for referring Iqra Duque to me for evaluation. Attached you will find relevant portions of my assessment and plan of care.    If you have questions, please do not hesitate to call me. I look forward to following Iqra Duque along with you.    Sincerely,    Heather Hicks DPM    Enclosure  CC:  No Recipients    If you would like to receive this communication electronically, please contact [email or web address] or [phone] to request more information on [employer] access.    For providers and/or their staff who would like to refer a patient to [facility], please contact us through our one-stop-shop provider referral line, Brandon Romo, at [phone].    If you feel you have received this communication in error or would no longer like to receive these types of communications, please e-mail [email or web address]

## 2018-07-17 NOTE — PROGRESS NOTES
Subjective:      Patient ID: Iqra Duque is a 48 y.o. female.    Chief Complaint: Diabetic Foot Exam (Pcp Dr. Kothari 3/19/18); Diabetes Mellitus; and Nail Care    Iqra is a 48 y.o. female who presents to the clinic for evaluation and treatment of high risk feet. Iqra has a past medical history of Allergy; Diabetes mellitus type II; Diverticulitis (09/2014); Fever blister; Hypertension; and Migraine headache.  The patient has no major complaints with feet. Chief concern is how to care for feet as a diabetic. She does also relate recurring  painful ingrown toenail on the right foot, medial border. Patient has attempted self treatment with soaking.  This is a recurrent problem.  Patient  denies purulent drainage.     This patient has documented high risk feet requiring routine maintenance secondary to diabetes mellitis and those secondary complications of diabetes, as mentioned..    PCP: Jaylon Kothari MD    Date Last Seen by PCP:   Chief Complaint   Patient presents with    Diabetic Foot Exam     Pcp Dr. Kothari 3/19/18    Diabetes Mellitus    Nail Care       Current shoe gear:  Flip flops     Hemoglobin A1C   Date Value Ref Range Status   03/12/2018 7.2 (H) 4.0 - 5.6 % Final     Comment:     According to ADA guidelines, hemoglobin A1c <7.0% represents  optimal control in non-pregnant diabetic patients. Different  metrics may apply to specific patient populations.   Standards of Medical Care in Diabetes-2016.  For the purpose of screening for the presence of diabetes:  <5.7%     Consistent with the absence of diabetes  5.7-6.4%  Consistent with increasing risk for diabetes   (prediabetes)  >or=6.5%  Consistent with diabetes  Currently, no consensus exists for use of hemoglobin A1c  for diagnosis of diabetes for children.  This Hemoglobin A1c assay has significant interference with fetal   hemoglobin   (HbF). The results are invalid for patients with abnormal amounts of   HbF,   including those with  known Hereditary Persistence   of Fetal Hemoglobin. Heterozygous hemoglobin variants (HbAS, HbAC,   HbAD, HbAE, HbA2) do not significantly interfere with this assay;   however, presence of multiple variants in a sample may impact the %   interference.     12/14/2017 7.7 (H) 4.0 - 5.6 % Final     Comment:     According to ADA guidelines, hemoglobin A1c <7.0% represents  optimal control in non-pregnant diabetic patients. Different  metrics may apply to specific patient populations.   Standards of Medical Care in Diabetes-2016.  For the purpose of screening for the presence of diabetes:  <5.7%     Consistent with the absence of diabetes  5.7-6.4%  Consistent with increasing risk for diabetes   (prediabetes)  >or=6.5%  Consistent with diabetes  Currently, no consensus exists for use of hemoglobin A1c  for diagnosis of diabetes for children.  This Hemoglobin A1c assay has significant interference with fetal   hemoglobin   (HbF). The results are invalid for patients with abnormal amounts of   HbF,   including those with known Hereditary Persistence   of Fetal Hemoglobin. Heterozygous hemoglobin variants (HbAS, HbAC,   HbAD, HbAE, HbA2) do not significantly interfere with this assay;   however, presence of multiple variants in a sample may impact the %   interference.     09/07/2017 7.0 (H) 4.0 - 5.6 % Final     Comment:     According to ADA guidelines, hemoglobin A1c <7.0% represents  optimal control in non-pregnant diabetic patients. Different  metrics may apply to specific patient populations.   Standards of Medical Care in Diabetes-2016.  For the purpose of screening for the presence of diabetes:  <5.7%     Consistent with the absence of diabetes  5.7-6.4%  Consistent with increasing risk for diabetes   (prediabetes)  >or=6.5%  Consistent with diabetes  Currently, no consensus exists for use of hemoglobin A1c  for diagnosis of diabetes for children.  This Hemoglobin A1c assay has significant interference with fetal    hemoglobin   (HbF). The results are invalid for patients with abnormal amounts of   HbF,   including those with known Hereditary Persistence   of Fetal Hemoglobin. Heterozygous hemoglobin variants (HbAS, HbAC,   HbAD, HbAE, HbA2) do not significantly interfere with this assay;   however, presence of multiple variants in a sample may impact the %   interference.       Patient Active Problem List   Diagnosis    Dermatitis    Essential hypertension    Type 2 diabetes mellitus without complication    Allergic rhinitis    Ganglion cyst    Gastroesophageal reflux    Chronic diarrhea of unknown origin    Morbid obesity with BMI of 45.0-49.9, adult    Muscle cramps at night    Vitamin D insufficiency    Migraine with aura and without status migrainosus, not intractable    ALIZA (obstructive sleep apnea)     Current Outpatient Prescriptions on File Prior to Visit   Medication Sig Dispense Refill    albuterol 90 mcg/actuation inhaler Inhale 1-2 puffs into the lungs every 4 to 6 hours as needed for Wheezing or Shortness of Breath (chest tightness). 1 Inhaler 6    cetirizine (ZYRTEC) 10 MG tablet Take 10 mg by mouth once daily.      CONTOUR TEST STRIPS Strp TEST ONCE DAILY 100 strip 1    inhalation device (AEROCHAMBER PLUS FLOW-VU) Use as directed for inhalation. 1 Device 0    lisinopril-hydrochlorothiazide (PRINZIDE,ZESTORETIC) 20-25 mg Tab TAKE 1 TABLET BY MOUTH ONCE DAILY. 90 tablet 3    meclizine (ANTIVERT) 25 mg tablet Take 1 tablet (25 mg total) by mouth 3 (three) times daily as needed. 30 tablet 0    metFORMIN (GLUCOPHAGE) 1000 MG tablet TAKE 1 TABLET (1,000 MG TOTAL) BY MOUTH 2 (TWO) TIMES DAILY WITH MEALS. 180 tablet 1    MICROLET LANCET Misc TEST ONCE DAILY 100 each 3    montelukast (SINGULAIR) 10 mg tablet TAKE 1 TABLET (10 MG TOTAL) BY MOUTH ONCE DAILY. 90 tablet 3    omeprazole (PRILOSEC) 20 MG capsule TAKE 1 CAPSULE (20 MG TOTAL) BY MOUTH EVERY MORNING. 90 capsule 3    ondansetron  (ZOFRAN-ODT) 8 MG TbDL Take 1 tablet (8 mg total) by mouth every 8 (eight) hours as needed. 12 tablet 0    SITagliptin (JANUVIA) 100 MG Tab Take 1 tablet (100 mg total) by mouth once daily. 90 tablet 3     No current facility-administered medications on file prior to visit.      Allergies   Allergen Reactions    Bactrim [Sulfamethoxazole-Trimethoprim] Hives     Also Septra    Keflex [Cephalexin] Shortness Of Breath    Neosporin (Neomycin-Polymyx) Swelling    Amoxicillin Rash     Past Surgical History:   Procedure Laterality Date    HYSTERECTOMY  06/2009    KJB---DLH/BSO ATYPICAL HYPERPLASIA    OOPHORECTOMY      TUBAL LIGATION       Family History   Problem Relation Age of Onset    Ovarian cancer Mother 43    Cancer Mother         ovarian    Diabetes Mother     Heart disease Father     Melanoma Neg Hx     Psoriasis Neg Hx     Lupus Neg Hx      Social History     Social History    Marital status:      Spouse name: N/A    Number of children: N/A    Years of education: N/A     Occupational History    Dis[employer]     Social History Main Topics    Smoking status: Never Smoker    Smokeless tobacco: Never Used    Alcohol use No    Drug use: No    Sexual activity: Yes     Partners: Male     Birth control/ protection: None     Other Topics Concern    Not on file     Social History Narrative    No narrative on file       Review of Systems   Constitution: Negative for chills, fever and weakness.   Cardiovascular: Positive for leg swelling. Negative for chest pain and claudication.   Respiratory: Negative for cough and shortness of breath.    Skin: Positive for dry skin and nail changes. Negative for itching.   Musculoskeletal: Positive for back pain and myalgias. Negative for arthritis, falls, joint pain, joint swelling and muscle weakness.   Gastrointestinal: Negative for diarrhea, nausea and vomiting.   Neurological: Negative for numbness, paresthesias and tremors.         "History of sciatica   Psychiatric/Behavioral: Negative for altered mental status and hallucinations.           Objective:       Vitals:    07/17/18 0852   BP: 131/84   Weight: 126.6 kg (279 lb)   Height: 5' 5" (1.651 m)   PainSc: 0-No pain       Physical Exam   Constitutional: She appears well-developed and well-nourished. No distress.   Alert and oriented x 3. No evidence of depression, anxiety, or agitation. Calm, cooperative, and communicative. Appropriate interactions and affect.       Cardiovascular:   Pulses:       Dorsalis pedis pulses are 2+ on the right side, and 2+ on the left side.        Posterior tibial pulses are 2+ on the right side, and 2+ on the left side.   dorsalis pedis and posterior tibial pulses are palpable bilaterally. Digits are warm to the touch 1-5 bilaterally. Capillary refill time is within normal limits 1-5 bilaterally.         Musculoskeletal:        Right ankle: She exhibits no swelling and no ecchymosis. No tenderness. Achilles tendon exhibits no pain.        Left ankle: She exhibits no swelling and no ecchymosis. No tenderness. Achilles tendon exhibits no pain.        Right foot: There is crepitus (dorsal medial midfoot with palpable spurring in the area). There is no swelling, normal capillary refill and no deformity.        Left foot: There is no swelling and normal capillary refill.   Muscle strength is 5/5 in all groups bilaterally.    Decreased arch height noted b/l. Negative too many toes sign.      Lymphadenopathy:   No lymphatic streaking    Negative lymphadenopathy bilateral popliteal fossa and tarsal tunnel.     Neurological:   Yawkey-Alphonso 5.07 monofilament is intact bilateral feet. Sharp/dull sensation is also intact Bilateral feet.       Skin: Skin is warm, dry and intact. No abrasion, no ecchymosis, no lesion and no rash noted. She is not diaphoretic. No cyanosis or erythema. No pallor. Nails show no clubbing.   Nails 1-5 bilaterally with incurvated borders.    POP " ofMedial hallux nail margin of right foot with ingrown nail plate. There is no granuloma formation noted. no malodor. no drainage           Psychiatric: She has a normal mood and affect. Her speech is normal and behavior is normal.   Nursing note and vitals reviewed.            Assessment:       Encounter Diagnoses   Name Primary?    Comprehensive diabetic foot examination, type 2 DM, encounter for Yes    Pincer nail deformity     Hammer toes of both feet          Plan:       Iqra was seen today for diabetic foot exam, diabetes mellitus and nail care.    Diagnoses and all orders for this visit:    Comprehensive diabetic foot examination, type 2 DM, encounter for    Pincer nail deformity    Hammer toes of both feet      I counseled the patient on her conditions, their implications and medical management.     - Shoe inspection. Diabetic Foot Education. Patient reminded of the importance of good nutrition and blood sugar control to help prevent podiatric complications of diabetes. Patient instructed on proper foot hygeine. We discussed wearing proper shoe gear, daily foot inspections, never walking without protective shoe gear.    In depth conversation on the treatment of ingrown nail; partial nail avulsion vs chemical matrixectomy vs conservative treatment of soaking and nail trimming    Informed patient that many nail problems can be prevented by wearing the right shoes and trimming your nails properly.   The right shoes: Feet were measured.  Patient is to wear shoes that are supportive and roomy enough for toes to wiggle. Look for shoes made of natural materials such as leather, which allow  feet to breathe.   Proper trimming: To avoid problems, she was instructed to trim toenails straight across without cutting down into the corners.      She will continue to monitor the areas daily, inspect her feet, wear protective shoe gear when ambulatory, moisturizer to maintain skin integrity and follow in this office  in approximately 12 months, sooner p.r.n.

## 2018-07-19 ENCOUNTER — PATIENT OUTREACH (OUTPATIENT)
Dept: OTHER | Facility: OTHER | Age: 48
End: 2018-07-19

## 2018-07-19 NOTE — PROGRESS NOTES
Last 5 Patient Entered Readings                                      Current 30 Day Average: 111/73     Recent Readings 7/18/2018 7/18/2018 7/14/2018 7/13/2018 7/11/2018    SBP (mmHg) 95 106 115 98 102    DBP (mmHg) 63 56 71 65 70    Pulse 86 94 95 97 90      Sent [facility] message to follow up with Ms. Iqra PIPER Neto.    Per 30 day average, blood pressure is well controlled 111/73 mmHg. Encouraged adherence to low sodium diet and physical activity guidelines. Advised patient to call or message with questions or concerns. Follow up in 12 weeks.

## 2018-07-20 ENCOUNTER — PATIENT OUTREACH (OUTPATIENT)
Dept: OTHER | Facility: OTHER | Age: 48
End: 2018-07-20

## 2018-07-20 NOTE — PROGRESS NOTES
Last 5 Patient Entered Readings                                      Current 30 Day Average: 111/72     Recent Readings 7/18/2018 7/18/2018 7/14/2018 7/13/2018 7/11/2018    SBP (mmHg) 95 106 115 98 102    DBP (mmHg) 63 56 71 65 70    Pulse 86 94 95 97 90      LMFCB to discuss low readings and see if hypotension symptoms are occurring.     8/3: Patient's BP average is controlled based on 2017 ACC/AHA HTN guidelines of goal BP <130/80.      Patient denies s/s of hypertension (SOB, CP, severe headaches, changes in vision) associated with high readings. Instructed patient to go to the ED if BP > 180/110 and accompanied by hypertensive s/s, patient confirms understanding.     Patient's health , Lashaun Ramey, will be following up every 3-4 weeks. I will continue to monitor regularly and will follow up in 3-4 months, sooner if BP begins to trend upward or downward.    Patient has my contact information and knows to call with any concerns or clinical changes.     Current HTN regimen:  Hypertension Medications             lisinopril-hydrochlorothiazide (PRINZIDE,ZESTORETIC) 20-25 mg Tab TAKE 1 TABLET BY MOUTH ONCE DAILY.      She mentioned experiencing fatigue and lightheadedness on occasion. She does not want to make adjustments in her medications at this time and would like to wait. She will contact me if any symptoms worsen or become more frequent.

## 2018-07-27 ENCOUNTER — OFFICE VISIT (OUTPATIENT)
Dept: PSYCHIATRY | Facility: CLINIC | Age: 48
End: 2018-07-27
Payer: COMMERCIAL

## 2018-07-27 DIAGNOSIS — F41.1 GENERALIZED ANXIETY DISORDER: Primary | ICD-10-CM

## 2018-07-27 PROCEDURE — 90834 PSYTX W PT 45 MINUTES: CPT | Mod: S$GLB,,, | Performed by: SOCIAL WORKER

## 2018-07-27 NOTE — PROGRESS NOTES
"Individual Psychotherapy (PhD/LCSW)    7/27/2018    Site:  [facility]         Therapeutic Intervention: Met with patient.  Outpatient - Insight oriented psychotherapy 45 min - CPT code 03139 and Outpatient - Supportive psychotherapy 45 min - CPT Code 06463    Chief complaint/reason for encounter: anxiety     Interval history and content of current session: Patient returned to clinic for follow up psychotherapy. Patient reports that she is "okay". States that she has been dealing with some increased anxiety over the last few days. States that she got her CPAP and has been sleeping better. No longer has the dogs in the room and believes that is a big help too because she use to get up to take them out at night. Reports that she has woken up a few times feeling anxious with the mask on. States that she would go to the bathroom and be able to return to sleep usually with no problems. Discussed that yesterday she felt "very annoyed". States that she woke up and her morning didn't start off right and thinks it just continued throughout the day. Reports that she told her daughter not to park on the grass because she was killing the grass. Instead of calling sister to move car so that she could park in front of her she parked on the grass. Patient states that she then went to work and was bombard all day with questions. Discussed that she continues to feel frustrated at her job. Thinks that the coowner micromanages to much and that she is going to make the manager quit. Discussed that she feels stuck in job because she doesn't have enough skills to go elsewhere and get paid the same amount. Discussed how at times we allow feelings and thoughts to dictate our behavior but we do not have to let that happen. Discussed that when she goes to work but doesn't work on change situation she is "choosing" to be miserable. Discussed that she has the ability to look for other jobs but doesn't so she doesn't know if she would be able to " "make the same amount of money at another job. Discussed that anxiety and being scared of the unknown or possible worse situations can "keep" a person in a place of misery but unknown doesn't always mean worse. Discussed taking time over the next few weeks to research possible jobs and not allowing anxiety to dictate actions/behaviors.     Treatment plan:  · Target symptoms: anxiety   · Why chosen therapy is appropriate versus another modality: relevant to diagnosis, patient responds to this modality, evidence based practice  · Outcome monitoring methods: self-report, lab data, observation  · Therapeutic intervention type: insight oriented psychotherapy, behavior modifying psychotherapy, supportive psychotherapy    Risk parameters:  Patient reports no suicidal ideation  Patient reports no homicidal ideation  Patient reports no self-injurious behavior  Patient reports no violent behavior    Verbal deficits: None    Patient's response to intervention:  The patient's response to intervention is accepting.    Progress toward goals and other mental status changes:  The patient's progress toward goals is fair .    Diagnosis:     ICD-10-CM ICD-9-CM   1. Generalized anxiety disorder F41.1 300.02       Plan:  individual psychotherapy    Return to clinic: 2 weeks    Length of Service (minutes): 45     Cassandra Rockweiler, LCSW    "

## 2018-07-30 ENCOUNTER — PATIENT OUTREACH (OUTPATIENT)
Dept: OTHER | Facility: OTHER | Age: 48
End: 2018-07-30

## 2018-07-30 NOTE — PROGRESS NOTES
Last 5 Patient Entered Readings                                      Current 30 Day Average: 107/69     Recent Readings 7/29/2018 7/28/2018 7/27/2018 7/25/2018 7/24/2018    SBP (mmHg) 102 106 105 106 114    DBP (mmHg) 71 65 82 67 74    Pulse 99 98 90 82 103            Digital Medicine: Health  Follow Up    Left voicemail to follow up with MsBassam Iqra PIPER Neto.  Current BP average 107/69 mmHg is at goal, 130/80 mmHg. FU regarding any s/sx of hypotension associated with lower readings.

## 2018-07-30 NOTE — PROGRESS NOTES
Last 5 Patient Entered Readings                                      Current 30 Day Average: 107/69     Recent Readings 7/29/2018 7/28/2018 7/27/2018 7/25/2018 7/24/2018    SBP (mmHg) 102 106 105 106 114    DBP (mmHg) 71 65 82 67 74    Pulse 99 98 90 82 103          Digital Medicine: Health  Follow Up    Lifestyle Modifications:    1.Dietary Modifications (Sodium intake <2,000mg/day, food labels, dining out): Deferred.     2.Physical Activity: Patient reports she has begun using CPAP in July and that she feels she is still adjusting. Notes sleep quality seems to be improving, however there are still times she finds it challenging to get comfortable but that this is improving overall as she adjusts. Declines further coaching/resources for overall health and wellness at this time.     3.Medication Therapy: Patient has been compliant with the medication regimen.    4.Patient has the following medication side effects/concerns: Patient is reporting dizziness and headaches that are relieved by taking medication for vertigo. States she recently reduced her meclizine as this was improving, but has noted the symptoms returned with the decreased use of this medication. Denies any other symptoms or concerns at this time.     Follow up with MsBassam Iqra PIPER Neto completed. No further questions or concerns. Will continue follow up to achieve health goals.

## 2018-08-24 ENCOUNTER — OFFICE VISIT (OUTPATIENT)
Dept: PSYCHIATRY | Facility: CLINIC | Age: 48
End: 2018-08-24
Payer: COMMERCIAL

## 2018-08-24 DIAGNOSIS — F41.1 GENERALIZED ANXIETY DISORDER: Primary | ICD-10-CM

## 2018-08-24 PROCEDURE — 90834 PSYTX W PT 45 MINUTES: CPT | Mod: S$GLB,,, | Performed by: SOCIAL WORKER
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
## 2018-09-13 ENCOUNTER — OFFICE VISIT (OUTPATIENT)
Dept: PSYCHIATRY | Facility: CLINIC | Age: 48
End: 2018-09-13
Payer: COMMERCIAL

## 2018-09-13 DIAGNOSIS — F41.1 GENERALIZED ANXIETY DISORDER: Primary | ICD-10-CM

## 2018-09-13 PROCEDURE — 90834 PSYTX W PT 45 MINUTES: CPT | Mod: S$GLB,,, | Performed by: SOCIAL WORKER

## 2018-09-13 NOTE — PROGRESS NOTES
"Individual Psychotherapy (PhD/LCSW)    9/13/2018    Site:  [facility]         Therapeutic Intervention: Met with patient.  Outpatient - Insight oriented psychotherapy 45 min - CPT code 03027 and Outpatient - Supportive psychotherapy 45 min - CPT Code 54936    Chief complaint/reason for encounter: anxiety     Interval history and content of current session: Patient returned to clinic for follow up psychotherapy. Patient reports that she is doing "okay". Reports that things haven't changed much since last session. States that she continues to encounter stress at work. Reports that she finds that people come to her or that she does things that are not part of her job. 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 States that she gets frustrated because when people ask her to do things she isn't able to do her own work which stresses her out. Discussed communicating with coworkers about who to go to with problems in the future to decrease stress. Discussed that at times we take on things that we don't have to and bring stress on ourselves. Discussed that she is worried about manager leave because she thinks that they are going to try and pressure her into the position. 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 Discussed being firm and saying no. Discussed coping skills. Discussed protocol while therapist is out on maternity leave.     Treatment plan:  · Target symptoms: anxiety   · Why chosen therapy is appropriate versus another modality: relevant to diagnosis, patient responds to this modality, evidence based practice  · Outcome monitoring methods: self-report, lab data, observation  · Therapeutic intervention type: insight oriented psychotherapy, behavior modifying psychotherapy, supportive psychotherapy    Risk parameters:  Patient reports no suicidal ideation  Patient reports no homicidal " ideation  Patient reports no self-injurious behavior  Patient reports no violent behavior    Verbal deficits: None    Patient's response to intervention:  The patient's response to intervention is accepting.    Progress toward goals and other mental status changes:  The patient's progress toward goals is fair .    Diagnosis:     ICD-10-CM ICD-9-CM   1. Generalized anxiety disorder F41.1 300.02       Plan:  individual psychotherapy    Return to clinic: 2 weeks    Length of Service (minutes): 45     Cassandra Rockweiler, LCSW

## 2018-09-17 ENCOUNTER — PATIENT OUTREACH (OUTPATIENT)
Dept: OTHER | Facility: OTHER | Age: 48
End: 2018-09-17

## 2018-09-17 ENCOUNTER — PATIENT MESSAGE (OUTPATIENT)
Dept: ADMINISTRATIVE | Facility: OTHER | Age: 48
End: 2018-09-17

## 2018-09-17 NOTE — PROGRESS NOTES
Last 5 Patient Entered Readings                                      Current 30 Day Average: 111/74     Recent Readings 9/14/2018 9/13/2018 9/12/2018 9/10/2018 9/8/2018    SBP (mmHg) 102 132 106 121 103    DBP (mmHg) 70 90 72 65 68    Pulse 72 87 77 91 100          Digital Medicine: Health  Follow Up    Left voicemail to follow up with Ms. Iqra Duque.  Current BP average 111/74 mmHg is at goal, 130/80 mmHg.Fu on CPAP use. BP well managed. Will try again in 4 weeks patient encouraged to cb with any questions/concerns.

## 2018-09-17 NOTE — PROGRESS NOTES
"Last 5 Patient Entered Readings                                      Current 30 Day Average: 111/74     Recent Readings 9/14/2018 9/13/2018 9/12/2018 9/10/2018 9/8/2018    SBP (mmHg) 102 132 106 121 103    DBP (mmHg) 70 90 72 65 68    Pulse 72 87 77 91 100          Digital Medicine: Health  Follow Up    Lifestyle Modifications:    1.Dietary Modifications (Sodium intake <2,000mg/day, food labels, dining out): Reports no dietary changes at this time. Encouraged to continue with low sodium intake. When asked if there are any areas of health and wellness she'd like to focus on, she states  "I am feeling pretty good about everything right now" and declines further coaching.     2.Physical Activity: Reports she has been sleeping with CPap regularly and that she's noticing improved energy especially later in the day. Encouraged to continue.     3.Medication Therapy: Patient has been compliant with the medication regimen. States reading of 132/90 mmHg was taken prior to taking her blood pressure medications. Discussed technique and encouraged her to take bp readings at least one hour after medications.     4.Patient has the following medication side effects/concerns: No side effects/concerns per patient.     Follow up with Ms. Iqra Duque completed. No further questions or concerns. Will continue to follow up to achieve health goals.    "

## 2018-10-08 ENCOUNTER — TELEPHONE (OUTPATIENT)
Dept: FAMILY MEDICINE | Facility: CLINIC | Age: 48
End: 2018-10-08

## 2018-10-08 DIAGNOSIS — E11.9 TYPE 2 DIABETES MELLITUS WITHOUT COMPLICATION, WITHOUT LONG-TERM CURRENT USE OF INSULIN: Primary | Chronic | ICD-10-CM

## 2018-10-08 NOTE — TELEPHONE ENCOUNTER
----- Message from Caron Ochoa sent at 10/8/2018  2:16 PM CDT -----  Contact: Self  Pt is calling to speak with staff regarding lab orders. Please call pt at [phone]

## 2018-10-08 NOTE — TELEPHONE ENCOUNTER
Spoke with patient and she informed me that she has office visit with provider on 11/12/18 and need order put in for A1c before office visit. Please advise.

## 2018-10-09 NOTE — TELEPHONE ENCOUNTER
Nonfasting labs ordered.  Just RAINA her OV is for October 12th.  Not November as stated in the note below.     Thank you,  David

## 2018-10-10 ENCOUNTER — LAB VISIT (OUTPATIENT)
Dept: LAB | Facility: HOSPITAL | Age: 48
End: 2018-10-10
Attending: INTERNAL MEDICINE
Payer: COMMERCIAL

## 2018-10-10 DIAGNOSIS — E11.9 TYPE 2 DIABETES MELLITUS WITHOUT COMPLICATION, WITHOUT LONG-TERM CURRENT USE OF INSULIN: Chronic | ICD-10-CM

## 2018-10-10 LAB
ANION GAP SERPL CALC-SCNC: 10 MMOL/L
BUN SERPL-MCNC: 13 MG/DL
CALCIUM SERPL-MCNC: 9.9 MG/DL
CHLORIDE SERPL-SCNC: 101 MMOL/L
CO2 SERPL-SCNC: 26 MMOL/L
CREAT SERPL-MCNC: 0.7 MG/DL
EST. GFR  (AFRICAN AMERICAN): >60 ML/MIN/1.73 M^2
EST. GFR  (NON AFRICAN AMERICAN): >60 ML/MIN/1.73 M^2
ESTIMATED AVG GLUCOSE: 160 MG/DL
GLUCOSE SERPL-MCNC: 159 MG/DL
HBA1C MFR BLD HPLC: 7.2 %
POTASSIUM SERPL-SCNC: 4.2 MMOL/L
SODIUM SERPL-SCNC: 137 MMOL/L

## 2018-10-10 PROCEDURE — 36415 COLL VENOUS BLD VENIPUNCTURE: CPT | Mod: PO

## 2018-10-10 PROCEDURE — 80048 BASIC METABOLIC PNL TOTAL CA: CPT

## 2018-10-10 PROCEDURE — 83036 HEMOGLOBIN GLYCOSYLATED A1C: CPT

## 2018-10-12 ENCOUNTER — OFFICE VISIT (OUTPATIENT)
Dept: FAMILY MEDICINE | Facility: CLINIC | Age: 48
End: 2018-10-12
Payer: COMMERCIAL

## 2018-10-12 VITALS
HEIGHT: 65 IN | OXYGEN SATURATION: 97 % | HEART RATE: 106 BPM | BODY MASS INDEX: 46.48 KG/M2 | SYSTOLIC BLOOD PRESSURE: 110 MMHG | DIASTOLIC BLOOD PRESSURE: 80 MMHG | WEIGHT: 279 LBS | TEMPERATURE: 98 F

## 2018-10-12 DIAGNOSIS — E11.9 TYPE 2 DIABETES MELLITUS WITHOUT COMPLICATION, WITHOUT LONG-TERM CURRENT USE OF INSULIN: Primary | Chronic | ICD-10-CM

## 2018-10-12 DIAGNOSIS — G89.29 CHRONIC BILATERAL LOW BACK PAIN WITHOUT SCIATICA: ICD-10-CM

## 2018-10-12 DIAGNOSIS — I10 ESSENTIAL HYPERTENSION: Chronic | ICD-10-CM

## 2018-10-12 DIAGNOSIS — Z23 NEED FOR INFLUENZA VACCINATION: ICD-10-CM

## 2018-10-12 DIAGNOSIS — M54.50 CHRONIC BILATERAL LOW BACK PAIN WITHOUT SCIATICA: ICD-10-CM

## 2018-10-12 DIAGNOSIS — E66.01 MORBID OBESITY WITH BMI OF 45.0-49.9, ADULT: Chronic | ICD-10-CM

## 2018-10-12 DIAGNOSIS — J06.9 VIRAL URI: ICD-10-CM

## 2018-10-12 PROCEDURE — 90471 IMMUNIZATION ADMIN: CPT | Mod: S$GLB,,, | Performed by: INTERNAL MEDICINE

## 2018-10-12 PROCEDURE — 99214 OFFICE O/P EST MOD 30 MIN: CPT | Mod: 25,S$GLB,, | Performed by: INTERNAL MEDICINE

## 2018-10-12 PROCEDURE — 90686 IIV4 VACC NO PRSV 0.5 ML IM: CPT | Mod: S$GLB,,, | Performed by: INTERNAL MEDICINE

## 2018-10-12 PROCEDURE — 3074F SYST BP LT 130 MM HG: CPT | Mod: CPTII,S$GLB,, | Performed by: INTERNAL MEDICINE

## 2018-10-12 PROCEDURE — 3008F BODY MASS INDEX DOCD: CPT | Mod: CPTII,S$GLB,, | Performed by: INTERNAL MEDICINE

## 2018-10-12 PROCEDURE — 3079F DIAST BP 80-89 MM HG: CPT | Mod: CPTII,S$GLB,, | Performed by: INTERNAL MEDICINE

## 2018-10-12 PROCEDURE — 99999 PR PBB SHADOW E&M-EST. PATIENT-LVL III: CPT | Mod: PBBFAC,,, | Performed by: INTERNAL MEDICINE

## 2018-10-12 PROCEDURE — 3045F PR MOST RECENT HEMOGLOBIN A1C LEVEL 7.0-9.0%: CPT | Mod: CPTII,S$GLB,, | Performed by: INTERNAL MEDICINE

## 2018-10-12 RX ORDER — LISINOPRIL AND HYDROCHLOROTHIAZIDE 12.5; 2 MG/1; MG/1
1 TABLET ORAL DAILY
Qty: 90 TABLET | Refills: 3 | Status: SHIPPED | OUTPATIENT
Start: 2018-10-12 | End: 2019-10-17 | Stop reason: SDUPTHER

## 2018-10-12 RX ORDER — METFORMIN HYDROCHLORIDE 1000 MG/1
1000 TABLET, FILM COATED, EXTENDED RELEASE ORAL 2 TIMES DAILY WITH MEALS
Qty: 180 TABLET | Refills: 3 | Status: SHIPPED | OUTPATIENT
Start: 2018-10-12 | End: 2018-11-06 | Stop reason: SDUPTHER

## 2018-10-12 RX ORDER — BENZONATATE 200 MG/1
200 CAPSULE ORAL 3 TIMES DAILY PRN
Qty: 30 CAPSULE | Refills: 0 | Status: SHIPPED | OUTPATIENT
Start: 2018-10-12 | End: 2019-08-12

## 2018-10-12 NOTE — ASSESSMENT & PLAN NOTE
Stable A1c.  Keep januvia the same.  Change metformin to XR 1000mg BID.  Will send to [facility] to see which formulation is preferred.

## 2018-10-12 NOTE — ASSESSMENT & PLAN NOTE
Home BP looking great.  Will reduce HCTZ component to 12.5mg.  Keep ACe-I dose the same.  Monitor with digital HTN

## 2018-10-12 NOTE — PROGRESS NOTES
Assessment & Plan  Problem List Items Addressed This Visit        Cardiac/Vascular    Essential hypertension (Chronic)    Current Assessment & Plan     Home BP looking great.  Will reduce HCTZ component to 12.5mg.  Keep ACe-I dose the same.  Monitor with digital HTN         Relevant Medications    lisinopril-hydrochlorothiazide (PRINZIDE,ZESTORETIC) 20-12.5 mg per tablet       Endocrine    Type 2 diabetes mellitus without complication - Primary (Chronic)    Current Assessment & Plan     Stable A1c.  Keep januvia the same.  Change metformin to XR 1000mg BID.  Will send to [facility] to see which formulation is preferred.          Relevant Medications    metFORMIN (GLUMETZA) 1000 MG (MOD) 24 hr tablet    Other Relevant Orders    Hemoglobin A1c    Basic metabolic panel    Morbid obesity with BMI of 45.0-49.9, adult (Chronic)    Current Assessment & Plan     Requesting referral to bariatric surgery         Relevant Orders    Ambulatory Referral to Bariatric Surgery ([facility])       Orthopedic    Chronic bilateral low back pain without sciatica    Overview     Seeing outside chiropractor         Current Assessment & Plan     Reported DDD.  Improved with chiropractor adjustments.  Monitor            Other Visit Diagnoses     Need for influenza vaccination    -  vaccinate    Relevant Orders    Influenza - Quadrivalent (3 years & older) (PF)    Viral URI    -  I counseled the patient on fluids, rest, OTC medications that can safely be used, hand/cough hygiene, expected course of illness, and when further medical attention would be warranted.       Relevant Medications    benzonatate (TESSALON) 200 MG capsule            Health Maintenance reviewed, as above.    Follow-up: Follow-up in about 6 months (around 4/12/2019) for Routine Physical.  ______________________________________________________________________    Chief Complaint  Chief Complaint   Patient presents with    Diabetes    Hypertension    Follow-up        STEFANIA Duque is a 48 y.o. female with medical diagnoses as listed in the medical history and problem list that presents for HTN DM follow up.  Pt is known to me with her last appointment 6/22/2018.  She had labs prior to this OV that showed reassuring BMP and A1c of 7.2% that was unchanged.      She recently had URI symptoms that resulted in cough and tight cough.  Also led to upset stomach.  No F/C/NS.  Had some shaking but this has resolved.  Overall getting better.       PAST MEDICAL HISTORY:  Past Medical History:   Diagnosis Date    Allergy     Diabetes mellitus type II     Diverticulitis 09/2014    Fever blister     Hypertension     Migraine headache        PAST SURGICAL HISTORY:  Past Surgical History:   Procedure Laterality Date    EGD (ESOPHAGOGASTRODUODENOSCOPY) N/A 6/21/2013    Performed by Gomez Stark MD at [facility]    HYSTERECTOMY  06/2009    KJB---DLH/BSO ATYPICAL HYPERPLASIA    OOPHORECTOMY      TUBAL LIGATION         SOCIAL HISTORY:  Social History     Socioeconomic History    Marital status:      Spouse name: Not on file    Number of children: Not on file    Years of education: Not on file    Highest education level: Not on file   Social Needs    Financial resource strain: Not on file    Food insecurity - worry: Not on file    Food insecurity - inability: Not on file    Transportation needs - medical: Not on file    Transportation needs - non-medical: Not on file   Occupational History    Occupation: Dispatcher     Employer: [employer]   Tobacco Use    Smoking status: Never Smoker    Smokeless tobacco: Never Used   Substance and Sexual Activity    Alcohol use: No    Drug use: No    Sexual activity: Yes     Partners: Male     Birth control/protection: None   Other Topics Concern    Are you pregnant or think you may be? Not Asked    Breast-feeding Not Asked   Social History Narrative    Not on file       FAMILY HISTORY:  Family History    Problem Relation Age of Onset    Ovarian cancer Mother 43    Cancer Mother         ovarian    Diabetes Mother     Heart disease Father     Melanoma Neg Hx     Psoriasis Neg Hx     Lupus Neg Hx        ALLERGIES AND MEDICATIONS: updated and reviewed.  Review of patient's allergies indicates:   Allergen Reactions    Bactrim [sulfamethoxazole-trimethoprim] Hives     Also Septra    Keflex [cephalexin] Shortness Of Breath    Neosporin (neomycin-polymyx) Swelling    Amoxicillin Rash     Current Outpatient Medications   Medication Sig Dispense Refill    albuterol 90 mcg/actuation inhaler Inhale 1-2 puffs into the lungs every 4 to 6 hours as needed for Wheezing or Shortness of Breath (chest tightness). 1 Inhaler 6    cetirizine (ZYRTEC) 10 MG tablet Take 10 mg by mouth once daily.      inhalation device (AEROCHAMBER PLUS FLOW-VU) Use as directed for inhalation. 1 Device 0    MICROLET LANCET Misc TEST ONCE DAILY 100 each 3    montelukast (SINGULAIR) 10 mg tablet TAKE 1 TABLET (10 MG TOTAL) BY MOUTH ONCE DAILY. 90 tablet 3    omeprazole (PRILOSEC) 20 MG capsule TAKE 1 CAPSULE (20 MG TOTAL) BY MOUTH EVERY MORNING. 90 capsule 3    SITagliptin (JANUVIA) 100 MG Tab Take 1 tablet (100 mg total) by mouth once daily. 90 tablet 3    benzonatate (TESSALON) 200 MG capsule Take 1 capsule (200 mg total) by mouth 3 (three) times daily as needed for Cough. 30 capsule 0    CONTOUR TEST STRIPS Strp TEST ONCE DAILY 100 strip 1    lisinopril-hydrochlorothiazide (PRINZIDE,ZESTORETIC) 20-12.5 mg per tablet Take 1 tablet by mouth once daily. 90 tablet 3    metFORMIN (GLUMETZA) 1000 MG (MOD) 24 hr tablet Take 1 tablet (1,000 mg total) by mouth 2 (two) times daily with meals. 180 tablet 3     No current facility-administered medications for this visit.          ROS  Review of Systems   Constitutional: Positive for fatigue. Negative for chills, fever and unexpected weight change.   HENT: Negative for congestion, ear pain,  "hearing loss, rhinorrhea, sore throat and trouble swallowing.    Eyes: Negative for discharge, redness and visual disturbance.   Respiratory: Positive for cough. Negative for chest tightness, shortness of breath and wheezing.    Cardiovascular: Negative for chest pain, palpitations and leg swelling.   Gastrointestinal: Negative for abdominal pain, constipation, diarrhea, nausea and vomiting.   Endocrine: Negative for polydipsia, polyphagia and polyuria.   Genitourinary: Negative for decreased urine volume, dysuria and hematuria.   Musculoskeletal: Negative for arthralgias, joint swelling and myalgias.   Skin: Positive for pallor. Negative for color change and rash.   Neurological: Positive for dizziness and headaches. Negative for tremors, seizures, speech difficulty, weakness and light-headedness.   Psychiatric/Behavioral: Negative for confusion, decreased concentration, dysphoric mood, sleep disturbance and suicidal ideas. The patient is nervous/anxious.            Physical Exam  Vitals:    10/12/18 0809   BP: 110/80   Pulse: 106   Temp: 98.3 °F (36.8 °C)   SpO2: 97%   Weight: 126.6 kg (279 lb)   Height: 5' 5" (1.651 m)    Body mass index is 46.43 kg/m².  Weight: 126.6 kg (279 lb)   Height: 5' 5" (165.1 cm)   Physical Exam   Constitutional: She is oriented to person, place, and time. She appears well-developed and well-nourished. No distress.   HENT:   Head: Normocephalic and atraumatic.   Eyes: Conjunctivae, EOM and lids are normal. Pupils are equal, round, and reactive to light. No scleral icterus.   Neck: Full passive range of motion without pain. Neck supple. No JVD present. Carotid bruit is not present. No thyromegaly present.   Cardiovascular: Normal rate, regular rhythm, normal heart sounds, intact distal pulses and normal pulses. Exam reveals no S3, no S4 and no friction rub.   No murmur heard.  Pulmonary/Chest: Effort normal and breath sounds normal. She has no wheezes. She has no rhonchi. She has no " rales.   Abdominal: Soft. Bowel sounds are normal. There is no tenderness.   Musculoskeletal: She exhibits no edema or tenderness.   Lymphadenopathy:        Head (right side): No submental and no submandibular adenopathy present.        Head (left side): No submental and no submandibular adenopathy present.     She has no cervical adenopathy.   Neurological: She is alert and oriented to person, place, and time.   Motor grossly intact.  Sensory grossly intact.  Symmetric facial movements palate elevated symmetrically tongue midline   Skin: Skin is warm and dry. No rash noted.   Psychiatric: She has a normal mood and affect. Her speech is normal and behavior is normal. Thought content normal.           Health Maintenance       Date Due Completion Date    Low Dose Statin 07/16/1990 ---    Foot Exam 07/03/2018 7/3/2017 (Done)    Override on 7/3/2017: Done    Influenza Vaccine 08/01/2018 12/18/2017    Override on 2/25/2016: Declined    Eye Exam 03/05/2019 3/5/2018 (Done)    Override on 3/5/2018: Done (Dr. Olayinka Phelan([facility])- negative for diabetic retinopathy and positive for mild htn retinopathy bilat)    Override on 3/13/2017: Done (Dr. Olayinka Phelan/[facility]- negative for bilateral diabetic retinopathy,positive for mild hypertension retinopathy bilaterally)    Lipid Panel 03/12/2019 3/12/2018    Hemoglobin A1c 04/10/2019 10/10/2018    Urine Microalbumin 10/10/2019 10/10/2018    Mammogram 07/17/2020 7/17/2018    TETANUS VACCINE 08/29/2026 8/29/2016    Pneumococcal PPSV23 (Medium Risk) (2) 07/16/2034 3/26/2015

## 2018-10-22 ENCOUNTER — TELEPHONE (OUTPATIENT)
Dept: FAMILY MEDICINE | Facility: CLINIC | Age: 48
End: 2018-10-22

## 2018-10-22 NOTE — LETTER
October 22, 2018    Iqra Duque  [address] [place] [address]             [facility]  [address]  [place] [address]  Phone: [phone]  Fax: [phone] Dear Ms. Duque:    I have been unable to reach you by phone for your appointment to Bariatrics .  Please call me at the clinic [phone] to book your appointment.      If you have any questions or concerns, please don't hesitate to call.    Sincerely,        Chelly Rivera MA

## 2018-10-26 ENCOUNTER — PATIENT OUTREACH (OUTPATIENT)
Dept: OTHER | Facility: OTHER | Age: 48
End: 2018-10-26

## 2018-10-26 NOTE — PROGRESS NOTES
Last 5 Patient Entered Readings                                      Current 30 Day Average: 109/69     Recent Readings 10/24/2018 10/22/2018 10/21/2018 10/18/2018 10/17/2018    SBP (mmHg) 105 94 116 103 135    DBP (mmHg) 66 57 69 67 82    Pulse 88 80 102 95 103        Patient's BP average is controlled based on 2017 ACC/AHA HTN guidelines of goal BP <130/80.      Patient denies s/s of hypotension (lightheadedness, dizziness, nausea, fatigue) associated with low readings. Instructed patient to inform me if this occurs, patient confirms understanding.      Patient denies s/s of hypertension (SOB, CP, severe headaches, changes in vision) associated with high readings. Instructed patient to go to the ED if BP > 180/110 and accompanied by hypertensive s/s, patient confirms understanding.     Patient's health , Lashaun Ramey, will be following up every 3-4 weeks. I will continue to monitor regularly and will follow up in 3-4 months, sooner if BP begins to trend upward or downward.    Patient has my contact information and knows to call with any concerns or clinical changes.     Current HTN regimen:  Hypertension Medications             lisinopril-hydrochlorothiazide (PRINZIDE,ZESTORETIC) 20-12.5 mg per tablet Take 1 tablet by mouth once daily.

## 2018-11-05 RX ORDER — METFORMIN HYDROCHLORIDE 1000 MG/1
1000 TABLET ORAL 2 TIMES DAILY WITH MEALS
Qty: 180 TABLET | Refills: 1 | OUTPATIENT
Start: 2018-11-05 | End: 2019-11-05

## 2018-11-06 ENCOUNTER — PATIENT MESSAGE (OUTPATIENT)
Dept: FAMILY MEDICINE | Facility: CLINIC | Age: 48
End: 2018-11-06

## 2018-11-06 DIAGNOSIS — E11.9 TYPE 2 DIABETES MELLITUS WITHOUT COMPLICATION, WITHOUT LONG-TERM CURRENT USE OF INSULIN: Chronic | ICD-10-CM

## 2018-11-06 RX ORDER — METFORMIN HYDROCHLORIDE 1000 MG/1
1000 TABLET ORAL 2 TIMES DAILY WITH MEALS
Qty: 180 TABLET | Refills: 0 | OUTPATIENT
Start: 2018-11-06 | End: 2019-11-06

## 2018-11-06 RX ORDER — METFORMIN HYDROCHLORIDE 1000 MG/1
1000 TABLET, FILM COATED, EXTENDED RELEASE ORAL 2 TIMES DAILY WITH MEALS
Qty: 180 TABLET | Refills: 1 | Status: SHIPPED | OUTPATIENT
Start: 2018-11-06 | End: 2019-02-08 | Stop reason: SDUPTHER

## 2018-11-19 ENCOUNTER — PATIENT OUTREACH (OUTPATIENT)
Dept: OTHER | Facility: OTHER | Age: 48
End: 2018-11-19

## 2018-11-19 NOTE — PROGRESS NOTES
Last 5 Patient Entered Readings                                      Current 30 Day Average: 108/71     Recent Readings 11/18/2018 11/16/2018 11/15/2018 11/13/2018 11/11/2018    SBP (mmHg) 123 102 104 114 133    DBP (mmHg) 79 75 69 79 72    Pulse 107 83 90 85 90          Digital Medicine: Health  Follow Up    Left voicemail to follow up with Ms. Iqra Duque.  Current BP average 108/71 mmHg is at goal, 130/80 mmHg BP is well managed. FU in 6-8 weeks.

## 2019-01-07 DIAGNOSIS — E11.9 TYPE 2 DIABETES MELLITUS WITHOUT COMPLICATION, WITHOUT LONG-TERM CURRENT USE OF INSULIN: Chronic | ICD-10-CM

## 2019-01-07 RX ORDER — SITAGLIPTIN 100 MG/1
TABLET, FILM COATED ORAL
Qty: 90 TABLET | Refills: 3 | Status: SHIPPED | OUTPATIENT
Start: 2019-01-07 | End: 2019-11-08

## 2019-01-18 NOTE — PROGRESS NOTES
Last 5 Patient Entered Readings                                      Current 30 Day Average: 113/75     Recent Readings 1/16/2019 1/16/2019 1/9/2019 1/7/2019 1/5/2019    SBP (mmHg) 113 104 118 105 130    DBP (mmHg) 77 64 84 68 75    Pulse 97 71 86 88 98        BP remains at goal. No BP medications require adjustments at this time.

## 2019-01-27 DIAGNOSIS — K21.9 GASTROESOPHAGEAL REFLUX: ICD-10-CM

## 2019-01-27 RX ORDER — OMEPRAZOLE 20 MG/1
CAPSULE, DELAYED RELEASE ORAL
Qty: 90 CAPSULE | Refills: 3 | Status: SHIPPED | OUTPATIENT
Start: 2019-01-27 | End: 2020-01-28

## 2019-02-04 RX ORDER — METFORMIN HYDROCHLORIDE 1000 MG/1
1000 TABLET ORAL 2 TIMES DAILY WITH MEALS
Qty: 180 TABLET | Refills: 0 | OUTPATIENT
Start: 2019-02-04 | End: 2020-02-04

## 2019-02-07 ENCOUNTER — PATIENT MESSAGE (OUTPATIENT)
Dept: FAMILY MEDICINE | Facility: CLINIC | Age: 49
End: 2019-02-07

## 2019-02-07 DIAGNOSIS — E11.9 TYPE 2 DIABETES MELLITUS WITHOUT COMPLICATION, WITHOUT LONG-TERM CURRENT USE OF INSULIN: Chronic | ICD-10-CM

## 2019-02-08 RX ORDER — METFORMIN HYDROCHLORIDE 1000 MG/1
1000 TABLET, FILM COATED, EXTENDED RELEASE ORAL 2 TIMES DAILY WITH MEALS
Qty: 180 TABLET | Refills: 0 | Status: SHIPPED | OUTPATIENT
Start: 2019-02-08 | End: 2019-02-11

## 2019-02-11 ENCOUNTER — PATIENT MESSAGE (OUTPATIENT)
Dept: FAMILY MEDICINE | Facility: CLINIC | Age: 49
End: 2019-02-11

## 2019-02-11 DIAGNOSIS — E11.9 TYPE 2 DIABETES MELLITUS WITHOUT COMPLICATION, WITHOUT LONG-TERM CURRENT USE OF INSULIN: Chronic | ICD-10-CM

## 2019-02-11 RX ORDER — METFORMIN HYDROCHLORIDE 1000 MG/1
1000 TABLET ORAL 2 TIMES DAILY WITH MEALS
Qty: 180 TABLET | Refills: 1 | Status: SHIPPED | OUTPATIENT
Start: 2019-02-11 | End: 2019-08-12 | Stop reason: DRUGHIGH

## 2019-03-14 LAB
LEFT EYE DM RETINOPATHY: POSITIVE
RIGHT EYE DM RETINOPATHY: POSITIVE

## 2019-03-15 ENCOUNTER — OFFICE VISIT (OUTPATIENT)
Dept: FAMILY MEDICINE | Facility: CLINIC | Age: 49
End: 2019-03-15
Payer: COMMERCIAL

## 2019-03-15 VITALS
BODY MASS INDEX: 46.2 KG/M2 | WEIGHT: 277.31 LBS | HEART RATE: 92 BPM | HEIGHT: 65 IN | OXYGEN SATURATION: 97 % | SYSTOLIC BLOOD PRESSURE: 126 MMHG | DIASTOLIC BLOOD PRESSURE: 70 MMHG | TEMPERATURE: 98 F

## 2019-03-15 DIAGNOSIS — B96.89 ACUTE BACTERIAL SINUSITIS: Primary | ICD-10-CM

## 2019-03-15 DIAGNOSIS — J01.90 ACUTE BACTERIAL SINUSITIS: Primary | ICD-10-CM

## 2019-03-15 PROCEDURE — 3008F PR BODY MASS INDEX (BMI) DOCUMENTED: ICD-10-PCS | Mod: CPTII,S$GLB,, | Performed by: NURSE PRACTITIONER

## 2019-03-15 PROCEDURE — 3078F PR MOST RECENT DIASTOLIC BLOOD PRESSURE < 80 MM HG: ICD-10-PCS | Mod: CPTII,S$GLB,, | Performed by: NURSE PRACTITIONER

## 2019-03-15 PROCEDURE — 3074F SYST BP LT 130 MM HG: CPT | Mod: CPTII,S$GLB,, | Performed by: NURSE PRACTITIONER

## 2019-03-15 PROCEDURE — 3078F DIAST BP <80 MM HG: CPT | Mod: CPTII,S$GLB,, | Performed by: NURSE PRACTITIONER

## 2019-03-15 PROCEDURE — 99214 OFFICE O/P EST MOD 30 MIN: CPT | Mod: S$GLB,,, | Performed by: NURSE PRACTITIONER

## 2019-03-15 PROCEDURE — 99999 PR PBB SHADOW E&M-EST. PATIENT-LVL IV: ICD-10-PCS | Mod: PBBFAC,,, | Performed by: NURSE PRACTITIONER

## 2019-03-15 PROCEDURE — 3008F BODY MASS INDEX DOCD: CPT | Mod: CPTII,S$GLB,, | Performed by: NURSE PRACTITIONER

## 2019-03-15 PROCEDURE — 99999 PR PBB SHADOW E&M-EST. PATIENT-LVL IV: CPT | Mod: PBBFAC,,, | Performed by: NURSE PRACTITIONER

## 2019-03-15 PROCEDURE — 99214 PR OFFICE/OUTPT VISIT, EST, LEVL IV, 30-39 MIN: ICD-10-PCS | Mod: S$GLB,,, | Performed by: NURSE PRACTITIONER

## 2019-03-15 PROCEDURE — 3074F PR MOST RECENT SYSTOLIC BLOOD PRESSURE < 130 MM HG: ICD-10-PCS | Mod: CPTII,S$GLB,, | Performed by: NURSE PRACTITIONER

## 2019-03-15 RX ORDER — FLUTICASONE PROPIONATE 50 MCG
1 SPRAY, SUSPENSION (ML) NASAL DAILY
Qty: 16 G | Refills: 0 | Status: SHIPPED | OUTPATIENT
Start: 2019-03-15 | End: 2019-11-08

## 2019-03-15 RX ORDER — AZITHROMYCIN 250 MG/1
TABLET, FILM COATED ORAL
Qty: 6 TABLET | Refills: 0 | Status: SHIPPED | OUTPATIENT
Start: 2019-03-15 | End: 2019-03-20

## 2019-03-15 RX ORDER — CODEINE PHOSPHATE AND GUAIFENESIN 10; 100 MG/5ML; MG/5ML
5 SOLUTION ORAL 3 TIMES DAILY PRN
Qty: 118 ML | Refills: 0 | Status: SHIPPED | OUTPATIENT
Start: 2019-03-15 | End: 2019-03-25

## 2019-03-15 NOTE — PATIENT INSTRUCTIONS
"                                                          Sinusitis   If your condition worsens or fails to improve we recommend that you receive another evaluation at the ER immediately or contact your PCP to discuss your concerns or return here. You must understand that you've received an urgent care treatment only and that you may be released before all your medical problems are known or treated. You the patient will arrange for followup care as instructed.   If we discussed that I think your illness is viral it will not respond to antibiotics and it will last 10-14 days. However, if over the next few days the symptoms worsen start the antibiotics I have given you.   -  If we discussed that you require antibiotics start them now and take them to completion.   -  If you are female and on BCP and do take the antibiotics, use additional methods to prevent pregnancy while on the antibiotics and for one cycle after.   -  Flonase (fluticasone) is a nasal spray which is available over the counter and may help with your symptoms   -  Zyrtec D, Claritin D or allegra D can also help with symptoms of congestion and drainage.   -  If you have hypertension avoid using the "D" which is the decongestant. Instead, you can use Coricidin HBP for your cold and cough symptoms.     -  If you just have drainage you can take plain Zyrtec, Claritin or Allegra   -  If you just have a congested feeling you can take pseudoephedrine (unless you have high blood pressure) which you have to sign for behind the counter. Do not buy the phenylephrine which is on the shelf as it is not effective   -  Rest and fluids are also important.   -  Tylenol or ibuprofen can also be used as directed for pain unless you have an allergy to them or medical condition such as stomach ulcers, kidney or liver disease or blood thinners etc for which you should not be taking these type of medications.   -  If you are flying in the next few days Afrin nose drops for " the airplane flight upon take off and landing may help. Other than at those times refrain from using afrin.   -  If you were prescribed a narcotic do not drive or operate heavy machinery while taking these medications.

## 2019-03-15 NOTE — PROGRESS NOTES
Subjective:       Patient ID: Iqra Duque is a 48 y.o. female.    Chief Complaint: Sore Throat and Cough    Cough   This is a recurrent problem. The current episode started in the past 7 days. The problem has been gradually worsening. The problem occurs every few minutes. The cough is productive of sputum. Associated symptoms include ear congestion, ear pain, headaches (intermitent ), nasal congestion, postnasal drip, rhinorrhea and a sore throat. Pertinent negatives include no chest pain, chills, fever, heartburn, hemoptysis, myalgias, rash, shortness of breath, sweats, weight loss or wheezing. Associated symptoms comments: Mouth breathing  . The symptoms are aggravated by lying down. She has tried body position changes, leukotriene antagonists and prescription cough suppressant for the symptoms. The treatment provided no relief. Her past medical history is significant for emphysema and environmental allergies. There is no history of asthma, bronchiectasis, bronchitis or pneumonia.     Review of Systems   Constitutional: Negative for chills, diaphoresis, fatigue, fever and weight loss.   HENT: Positive for congestion, ear pain, postnasal drip, rhinorrhea, sneezing, sore throat and voice change (intermittent hoarse voice). Negative for sinus pressure and sinus pain.    Eyes: Positive for discharge (watery) and itching.   Respiratory: Positive for cough. Negative for hemoptysis, shortness of breath and wheezing.    Cardiovascular: Negative for chest pain.   Gastrointestinal: Negative for heartburn.   Musculoskeletal: Negative for myalgias.   Skin: Negative for rash.   Allergic/Immunologic: Positive for environmental allergies.   Neurological: Positive for headaches (intermitent ). Negative for dizziness and weakness.       Objective:      Physical Exam   Constitutional: She appears well-developed and well-nourished. No distress.   HENT:   Head: Normocephalic and atraumatic.   Right Ear: Tympanic membrane,  external ear and ear canal normal.   Left Ear: Tympanic membrane, external ear and ear canal normal.   Nose: Mucosal edema and rhinorrhea present. Right sinus exhibits no maxillary sinus tenderness and no frontal sinus tenderness. Left sinus exhibits no maxillary sinus tenderness and no frontal sinus tenderness.   Mouth/Throat: Uvula is midline, oropharynx is clear and moist and mucous membranes are normal. Mucous membranes are not dry. No oropharyngeal exudate, posterior oropharyngeal edema or posterior oropharyngeal erythema.   Eyes: Conjunctivae and EOM are normal. Pupils are equal, round, and reactive to light.   Cardiovascular: Normal rate and regular rhythm.   No murmur heard.  Pulmonary/Chest: Effort normal and breath sounds normal. She has no wheezes. She has no rales.   Lymphadenopathy:     She has no cervical adenopathy.   Skin: Skin is warm and dry.   Nursing note and vitals reviewed.      Assessment:       1. Acute bacterial sinusitis        Plan:       Iqra was seen today for sore throat and cough.    Diagnoses and all orders for this visit:    Acute bacterial sinusitis  -     fluticasone (FLONASE) 50 mcg/actuation nasal spray; 1 spray (50 mcg total) by Each Nare route once daily.  -     azithromycin (Z-CONSTANCE) 250 MG tablet; Take 2 tablets by mouth on day 1; Take 1 tablet by mouth on days 2-5  -     guaifenesin-codeine 100-10 mg/5 ml (TUSSI-ORGANIDIN NR)  mg/5 mL syrup; Take 5 mLs by mouth 3 (three) times daily as needed for Cough.    HOME CARE:  · Drink plenty of water, hot tea, and other liquids to stay well hydrated. This thins the mucus and promotes sinus drainage.  · Apply heat to the painful areas of the face. Use a towel soaked in hot water. Or,  the shower and direct the hot spray onto your face. This is a good way to inhale warm water vapor and get heat on your face at the same time. (Cover your mouth and nose with your hands so you can still breathe as you do this.)  · Use a  vaporizer with products such as Vicks VapoRub (contains menthol) at night. Suck on peppermint, menthol or eucalyptus hard candies during the day.  · An expectorant containing guaifenesin (such as Robitussin), helps to thin the mucus and promote drainage from the sinuses.  · Over-the-counter decongestants may be used unless a similar medicine was prescribed. Nasal sprays work the fastest. Use one that contains phenylephrine (Enrike-synephrine, Sinex and others) or oxymetazoline (Afrin). First blow the nose gently to remove mucus, then apply the drops. Do not use these medicines more often than directed on the label or for more than three days or symptoms may worsen. You may also use tablets containing pseudoephedrine (Sudafed). Many sinus remedies combine ingredients, which may increase side effects. Read the labels or ask the pharmacist for help. NOTE: Persons with high blood pressure should not use decongestants. They can raise blood pressure.  · Antihistamines are useful if allergies are a cause of your sinusitis. The mildest one is chlorpheniramine (available without a prescription). The dose for adults is 8-12mg three times a day. [NOTE: Do not use chlorpheniramine if you have glaucoma or if you are a man with trouble urinating due to an enlarged prostate.] Claritin (loratidine) is an antihistamine that causes less drowsiness and is a good alternative for daytime use.  · Do not use nasal rinses or irrigation during an acute sinus infection, unless advised by your doctor. Rinsing may spread the infection to other sinuses.  · You may use acetaminophen (Tylenol) or ibuprofen (Motrin, Advil) to control pain, unless another pain medicine was prescribed. [ NOTE: If you have chronic liver or kidney disease or ever had a stomach ulcer, talk with your doctor before using these medicines.] (Aspirin should never be used in anyone under 18 years of age who is ill with a fever. It may cause severe liver damage.)  · Finish the  full course, even if you are feeling better after a few days.  FOLLOW UP with your doctor or this facility in one week or as instructed by our staff if not improving.  GET PROMPT MEDICAL ATTENTION if any of the following occur:  · Facial pain or headache becomes more severe  · Stiff neck  · Unusual drowsiness or confusion, or not acting like your normal self  · Swelling of the forehead or eyelids  · Vision problems including blurred or double vision  · Fever of 100.4ºF (38ºC) or higher, or as directed by your healthcare provider  · Seizure  © 0394-9788 [facility] [facility], [address] [place] [address], [place] [address]. All rights reserved. This information is not intended as a substitute for professional medical care. Always follow your healthcare professional's instructions.

## 2019-03-28 ENCOUNTER — PATIENT MESSAGE (OUTPATIENT)
Dept: FAMILY MEDICINE | Facility: CLINIC | Age: 49
End: 2019-03-28

## 2019-03-28 ENCOUNTER — PATIENT MESSAGE (OUTPATIENT)
Dept: ADMINISTRATIVE | Facility: OTHER | Age: 49
End: 2019-03-28

## 2019-04-01 ENCOUNTER — PATIENT OUTREACH (OUTPATIENT)
Dept: OTHER | Facility: OTHER | Age: 49
End: 2019-04-01

## 2019-04-01 NOTE — PROGRESS NOTES
Last 5 Patient Entered Readings                                      Current 30 Day Average: 108/66     Recent Readings 3/25/2019 3/18/2019 3/10/2019 3/6/2019 2/26/2019    SBP (mmHg) 120 104 105 102 100    DBP (mmHg) 68 68 68 60 67    Pulse 91 93 116 90 86          Digital Medicine: Health  Introduction Call     Left voicemail and requested call back in order to complete Digital Medicine health  introduction call.       4/24: Clinician is attempting contact. Will push outreach two weeks.

## 2019-04-15 ENCOUNTER — PATIENT OUTREACH (OUTPATIENT)
Dept: OTHER | Facility: OTHER | Age: 49
End: 2019-04-15

## 2019-04-15 NOTE — PROGRESS NOTES
Last 5 Patient Entered Readings                                      Current 30 Day Average: 108/68     Recent Readings 4/13/2019 4/8/2019 4/1/2019 3/25/2019 3/18/2019    SBP (mmHg) 105 111 99 120 104    DBP (mmHg) 65 77 64 68 68    Pulse 86 83 86 91 93        No answer to follow-up. She remains at goal.

## 2019-04-27 DIAGNOSIS — J30.9 ALLERGIC RHINITIS: ICD-10-CM

## 2019-04-28 RX ORDER — MONTELUKAST SODIUM 10 MG/1
TABLET ORAL
Qty: 90 TABLET | Refills: 3 | Status: SHIPPED | OUTPATIENT
Start: 2019-04-28 | End: 2020-01-07 | Stop reason: SDUPTHER

## 2019-05-13 NOTE — PROGRESS NOTES
Last 5 Patient Entered Readings                                      Current 30 Day Average: 110/72     Recent Readings 5/6/2019 4/28/2019 4/22/2019 4/13/2019 4/8/2019    SBP (mmHg) 111 124 101 105 111    DBP (mmHg) 77 79 67 65 77    Pulse 92 92 90 86 83          Left voicemail. Second attempt. Sending [facility] message.

## 2019-06-26 NOTE — PROGRESS NOTES
Last 5 Patient Entered Readings                                      Current 30 Day Average: 111/65     Recent Readings 6/16/2019 6/8/2019 6/2/2019 5/29/2019 5/29/2019    SBP (mmHg) 112 120 106 104 119    DBP (mmHg) 66 73 64 59 72    Pulse 99 94 87 79 104        10:29 AM: Left voicemail and sent [facility] message to follow up with Ms. Iqra Duque.    Per 30 day average, blood pressure is well controlled 111/65 mmHg. Encouraged adherence to low sodium diet and physical activity guidelines. Advised patient to call or message with questions or concerns. Follow up in 12 weeks.     10:34 PM:  HPI:  Called patient to follow up. Patient endorses adherence to medication regimen. \  She started a new job making it harder to reach the patient.  Patient denies hypotensive s/sx (lightheadedness, dizziness, nausea, fatigue); patient denies hypertensive s/sx (SOB, CP, severe headaches, changes in vision). Instructed patient to seek medical care if BP > 180/110 and is accompanied by hypertensive s/sx associated, patient confirms understanding.     Assessment:  Reviewed recent readings. Per 2017 ACC/ AHA HTN guidelines (goal of BP < 130/80), current 30-day average is well controlled.     Plan:  Continue current medication regimen.   Encouraged patient to log into her [place] domingo to have her most recent readings come through to Epic.  I will continue to monitor regularly and will follow-up in 12 weeks, sooner if blood pressure begins to trend upward or downward.     Current medication regimen:  Hypertension Medications             lisinopril-hydrochlorothiazide (PRINZIDE,ZESTORETIC) 20-12.5 mg per tablet Take 1 tablet by mouth once daily.          Patient denies having questions or concerns. Patient has my contact information and knows to call with any concerns or clinical changes.

## 2019-06-26 NOTE — PROGRESS NOTES
Last 5 Patient Entered Readings                                      Current 30 Day Average: 111/65     Recent Readings 6/16/2019 6/8/2019 6/2/2019 5/29/2019 5/29/2019    SBP (mmHg) 112 120 106 104 119    DBP (mmHg) 66 73 64 59 72    Pulse 99 94 87 79 104          Third failed attempt.

## 2019-07-08 ENCOUNTER — PATIENT MESSAGE (OUTPATIENT)
Dept: FAMILY MEDICINE | Facility: CLINIC | Age: 49
End: 2019-07-08

## 2019-07-10 ENCOUNTER — PATIENT MESSAGE (OUTPATIENT)
Dept: PRIMARY CARE CLINIC | Facility: CLINIC | Age: 49
End: 2019-07-10

## 2019-07-10 ENCOUNTER — LAB VISIT (OUTPATIENT)
Dept: LAB | Facility: HOSPITAL | Age: 49
End: 2019-07-10
Attending: INTERNAL MEDICINE
Payer: COMMERCIAL

## 2019-07-10 DIAGNOSIS — E11.9 TYPE 2 DIABETES MELLITUS WITHOUT COMPLICATION, WITHOUT LONG-TERM CURRENT USE OF INSULIN: Chronic | ICD-10-CM

## 2019-07-10 LAB
ANION GAP SERPL CALC-SCNC: 13 MMOL/L (ref 8–16)
BUN SERPL-MCNC: 14 MG/DL (ref 6–20)
CALCIUM SERPL-MCNC: 9.2 MG/DL (ref 8.7–10.5)
CHLORIDE SERPL-SCNC: 101 MMOL/L (ref 95–110)
CO2 SERPL-SCNC: 24 MMOL/L (ref 23–29)
CREAT SERPL-MCNC: 0.7 MG/DL (ref 0.5–1.4)
EST. GFR  (AFRICAN AMERICAN): >60 ML/MIN/1.73 M^2
EST. GFR  (NON AFRICAN AMERICAN): >60 ML/MIN/1.73 M^2
ESTIMATED AVG GLUCOSE: 189 MG/DL (ref 68–131)
GLUCOSE SERPL-MCNC: 135 MG/DL (ref 70–110)
HBA1C MFR BLD HPLC: 8.2 % (ref 4–5.6)
POTASSIUM SERPL-SCNC: 4.2 MMOL/L (ref 3.5–5.1)
SODIUM SERPL-SCNC: 138 MMOL/L (ref 136–145)

## 2019-07-10 PROCEDURE — 36415 COLL VENOUS BLD VENIPUNCTURE: CPT | Mod: PO

## 2019-07-10 PROCEDURE — 80048 BASIC METABOLIC PNL TOTAL CA: CPT

## 2019-07-10 PROCEDURE — 83036 HEMOGLOBIN GLYCOSYLATED A1C: CPT

## 2019-07-11 ENCOUNTER — TELEPHONE (OUTPATIENT)
Dept: FAMILY MEDICINE | Facility: CLINIC | Age: 49
End: 2019-07-11

## 2019-07-11 NOTE — TELEPHONE ENCOUNTER
Connie contact patient and schedule a routine follow-up with Dr. Kothari, her diabetes control has worsened, and she needs an appointment to discuss this further.    Thanks!  STEPH Collado

## 2019-07-16 NOTE — PROGRESS NOTES
Last 5 Patient Entered Readings                                      Current 30 Day Average: 107/65     Recent Readings 7/5/2019 6/26/2019 6/16/2019 6/8/2019 6/2/2019    SBP (mmHg) 112 97 112 120 106    DBP (mmHg) 70 58 66 73 64    Pulse 89 79 99 94 87          Lefty voicemail. 4th failed attempt.

## 2019-07-21 ENCOUNTER — PATIENT OUTREACH (OUTPATIENT)
Dept: ADMINISTRATIVE | Facility: OTHER | Age: 49
End: 2019-07-21

## 2019-07-21 DIAGNOSIS — Z12.11 SCREEN FOR COLON CANCER: Primary | ICD-10-CM

## 2019-07-24 ENCOUNTER — OFFICE VISIT (OUTPATIENT)
Dept: PODIATRY | Facility: CLINIC | Age: 49
End: 2019-07-24
Payer: COMMERCIAL

## 2019-07-24 VITALS — WEIGHT: 277 LBS | HEIGHT: 65 IN | BODY MASS INDEX: 46.15 KG/M2

## 2019-07-24 DIAGNOSIS — L60.8 PINCER NAIL DEFORMITY: ICD-10-CM

## 2019-07-24 DIAGNOSIS — M20.5X2 HALLUX LIMITUS, ACQUIRED, LEFT: ICD-10-CM

## 2019-07-24 DIAGNOSIS — M20.41 HAMMER TOES OF BOTH FEET: ICD-10-CM

## 2019-07-24 DIAGNOSIS — M20.42 HAMMER TOES OF BOTH FEET: ICD-10-CM

## 2019-07-24 DIAGNOSIS — B35.3 TINEA PEDIS OF BOTH FEET: ICD-10-CM

## 2019-07-24 DIAGNOSIS — E11.9 COMPREHENSIVE DIABETIC FOOT EXAMINATION, TYPE 2 DM, ENCOUNTER FOR: Primary | ICD-10-CM

## 2019-07-24 DIAGNOSIS — M20.5X1 HALLUX LIMITUS, ACQUIRED, RIGHT: ICD-10-CM

## 2019-07-24 PROCEDURE — 3045F PR MOST RECENT HEMOGLOBIN A1C LEVEL 7.0-9.0%: ICD-10-PCS | Mod: CPTII,S$GLB,, | Performed by: PODIATRIST

## 2019-07-24 PROCEDURE — 99214 OFFICE O/P EST MOD 30 MIN: CPT | Mod: S$GLB,,, | Performed by: PODIATRIST

## 2019-07-24 PROCEDURE — 99999 PR PBB SHADOW E&M-EST. PATIENT-LVL III: CPT | Mod: PBBFAC,,, | Performed by: PODIATRIST

## 2019-07-24 PROCEDURE — 3008F BODY MASS INDEX DOCD: CPT | Mod: CPTII,S$GLB,, | Performed by: PODIATRIST

## 2019-07-24 PROCEDURE — 99214 PR OFFICE/OUTPT VISIT, EST, LEVL IV, 30-39 MIN: ICD-10-PCS | Mod: S$GLB,,, | Performed by: PODIATRIST

## 2019-07-24 PROCEDURE — 3008F PR BODY MASS INDEX (BMI) DOCUMENTED: ICD-10-PCS | Mod: CPTII,S$GLB,, | Performed by: PODIATRIST

## 2019-07-24 PROCEDURE — 99999 PR PBB SHADOW E&M-EST. PATIENT-LVL III: ICD-10-PCS | Mod: PBBFAC,,, | Performed by: PODIATRIST

## 2019-07-24 PROCEDURE — 3045F PR MOST RECENT HEMOGLOBIN A1C LEVEL 7.0-9.0%: CPT | Mod: CPTII,S$GLB,, | Performed by: PODIATRIST

## 2019-07-24 NOTE — PATIENT INSTRUCTIONS
Recommend lotions: eucerin, eucerin for diabetics, aquaphor, A&D ointment, gold bond for diabetics, sween, [place]'s Bees all purpose baby ointment,  urea 40 with aloe (found on amazon.com)    Shoe recommendations: (try 6pm.com, zappos.com , nordstromrack.[employer], or shoes.[employer] for discounted prices) you can visit DSW shoes in [place]  or [facility] in the [facility] (there are also several shoe brand outlets in the [facility])    Asics (GT 2000 or gel foundations), new balance stability type shoes, saucony (stabil c3),  Seymour (GTS or Beast or transcend), propet (tennis shoe)    Sofmikey Vanegas (women) Ovidio&Kris (men), clarks, crocs, aerosoles, naturalizers, SAS, ecco, born, messi gibbs, rockports (dress shoes)    Vionic, burkenstocks, fitflops, propet (sandals)  Nike comfort thong sandals, crocs, propet (house shoes)    Nail Home remedy:  Vicks Vapor rub to nails for easier manageability        Diabetes: Inspecting Your Feet  Diabetes increases your chances of developing foot problems. So inspect your feet every day. This helps you find small skin irritations before they become serious infections. If you have trouble seeing the bottoms of your feet, use a mirror or ask a family member or friend to help.     Pressure spots on the bottom of the foot are common areas where problems develop.   How to check your feet  Below are tips to help you look for foot problems. Try to check your feet at the same time each day, such as when you get out of bed in the morning:  · Check the top of each foot. The tops of toes, back of the heel, and outer edge of the foot can get a lot of rubbing from poor-fitting shoes.  · Check the bottom of each foot. Daily wear and tear often leads to problems at pressure spots.  · Check the toes and nails. Fungal infections often occur between toes. Toenail problems can also be a sign of fungal infections or lead to breaks in the skin.  · Check your shoes, too. Loose objects inside a shoe can injure  the foot. Use your hand to feel inside your shoes for things like vicente, loose stitching, or rough areas that could irritate your skin.  Warning signs  Look for any color changes in the foot. Redness with streaks can signal a severe infection, which needs immediate medical attention. Tell your doctor right away if you have any of these problems:  · Swelling, sometimes with color changes, may be a sign of poor blood flow or infection. Symptoms include tenderness and an increase in the size of your foot.  · Warm or hot areas on your feet may be signs of infection. A foot that is cold may not be getting enough blood.  · Sensations such as burning, tingling, or pins and needles can be signs of a problem. Also check for areas that may be numb.  · Hot spots are caused by friction or pressure. Look for hot spots in areas that get a lot of rubbing. Hot spots can turn into blisters, calluses, or sores.  · Cracks and sores are caused by dry or irritated skin. They are a sign that the skin is breaking down, which can lead to infection.  · Toenail problems to watch for include nails growing into the skin (ingrown toenail) and causing redness or pain. Thick, yellow, or discolored nails can signal a fungal infection.  · Drainage and odor can develop from untreated sores and ulcers. Call your doctor right away if you notice white or yellow drainage, bleeding, or unpleasant odor.   © 4362-3765 [employer]. [address] [place] [address]. All rights reserved. This information is not intended as a substitute for professional medical care. Always follow your healthcare professional's instructions.        Step-by-Step:  Inspecting Your Feet (Diabetes)    Date Last Reviewed: 10/1/2016  © 4759-8823 [employer]. [address] [place] [address]. All rights reserved. This information is not intended as a substitute for professional medical care. Always follow your healthcare  professional's instructions.    Athletes Foot     Athletes Foot is caused by a fungal infection in the skin. It affects the skin between the toes where it causes fissures (cracks in the skin). It can also affect the bottom of the foot where it causes dry white scales and peeling of the skin. This infection is more likely to occur when the foot is in hot, sweaty socks and shoes for long periods of time.   This infection is treated with skin creams or oral medicine.     Home Care:   It is important to keep the feet dry. Use absorbent cotton socks and change them if they become sweaty; or wear an open-toe shoe or sandal. Wash the feet at least once a day with soap and water.   Rotate your shoes. If you must wear the same shoes everyday then spray the shoes with lysol or antifungal spray and allow that to dry overnight before wearing the shoes again  Apply the antifungal cream as prescribed. Some antifungal creams are available without a prescription (Lotrimin, Tinactin).   It may take a week before the rash starts to improve and it can take about three to four weeks to completely clear. Continue the medicine until the rash is all gone.   Use over-the-counter antifungal powders or sprays on your feet after exposure to high-risk environments (public showers, gyms and locker rooms) may prevent future infections. You may wish to use appropriate footwear to reduce exposure.  Clean tubs and bathroom floor with bleach  Clean feet with Nizoral shampoo or dial antibacterial soap and then dry completely.    Follow Up   with your doctor as recommended by our staff if the rash is not starting to improve after TEN days of treatment, or if the rash continues to spread.     Get Prompt Medical Attention   if any of the following occur:   Increasing redness or swelling of the foot   Pus draining from cracks in the skin   Fever of 100.4ºF (38ºC) or higher, or as directed by your healthcare provider    © 2009-4150 [employer]  [place] [address]. All rights reserved. This information is not intended as a substitute for professional medical care. Always follow your healthcare professional's instructions.

## 2019-07-24 NOTE — PROGRESS NOTES
Subjective:      Patient ID: Iqra Duque is a 49 y.o. female.    Chief Complaint: Foot Problem (bialteral athletes feet (PCP Dr Tolbert 3/11/2019))    Iqra is a 49 y.o. female who presents to the clinic for evaluation and treatment of high risk feet. Iqra has a past medical history of Allergy, Diabetes mellitus type II, Diverticulitis (09/2014), Fever blister, Hypertension, and Migraine headache.  The patient has no major complaints with feet. Chief concern is how to care for feet as a diabetic. She does also relate itching and burning to the feet in the evening, after shoe removal, after water exposure.  This is a recurrent problem.  Patient  denies purulent drainage.     This patient has documented high risk feet requiring routine maintenance secondary to diabetes mellitis and those secondary complications of diabetes, as mentioned..    PCP: Jaylon Kothari MD    Date Last Seen by PCP:   Chief Complaint   Patient presents with    Foot Problem     bialteral athletes feet (PCP Dr Tolbert 3/11/2019)       Current shoe gear:   tennis shoes       Hemoglobin A1C   Date Value Ref Range Status   07/10/2019 8.2 (H) 4.0 - 5.6 % Final     Comment:     ADA Screening Guidelines:  5.7-6.4%  Consistent with prediabetes  >or=6.5%  Consistent with diabetes  High levels of fetal hemoglobin interfere with the HbA1C  assay. Heterozygous hemoglobin variants (HbS, HgC, etc)do  not significantly interfere with this assay.   However, presence of multiple variants may affect accuracy.     10/10/2018 7.2 (H) 4.0 - 5.6 % Final     Comment:     ADA Screening Guidelines:  5.7-6.4%  Consistent with prediabetes  >or=6.5%  Consistent with diabetes  High levels of fetal hemoglobin interfere with the HbA1C  assay. Heterozygous hemoglobin variants (HbS, HgC, etc)do  not significantly interfere with this assay.   However, presence of multiple variants may affect accuracy.     03/12/2018 7.2 (H) 4.0 - 5.6 % Final     Comment:      According to ADA guidelines, hemoglobin A1c <7.0% represents  optimal control in non-pregnant diabetic patients. Different  metrics may apply to specific patient populations.   Standards of Medical Care in Diabetes-2016.  For the purpose of screening for the presence of diabetes:  <5.7%     Consistent with the absence of diabetes  5.7-6.4%  Consistent with increasing risk for diabetes   (prediabetes)  >or=6.5%  Consistent with diabetes  Currently, no consensus exists for use of hemoglobin A1c  for diagnosis of diabetes for children.  This Hemoglobin A1c assay has significant interference with fetal   hemoglobin   (HbF). The results are invalid for patients with abnormal amounts of   HbF,   including those with known Hereditary Persistence   of Fetal Hemoglobin. Heterozygous hemoglobin variants (HbAS, HbAC,   HbAD, HbAE, HbA2) do not significantly interfere with this assay;   however, presence of multiple variants in a sample may impact the %   interference.       Patient Active Problem List   Diagnosis    Dermatitis    Essential hypertension    Type 2 diabetes mellitus without complication    Allergic rhinitis    Ganglion cyst    Gastroesophageal reflux    Chronic diarrhea of unknown origin    Morbid obesity with BMI of 45.0-49.9, adult    Muscle cramps at night    Vitamin D insufficiency    Migraine with aura and without status migrainosus, not intractable    ALIZA (obstructive sleep apnea)    Chronic bilateral low back pain without sciatica     Current Outpatient Medications on File Prior to Visit   Medication Sig Dispense Refill    albuterol 90 mcg/actuation inhaler Inhale 1-2 puffs into the lungs every 4 to 6 hours as needed for Wheezing or Shortness of Breath (chest tightness). 1 Inhaler 6    benzonatate (TESSALON) 200 MG capsule Take 1 capsule (200 mg total) by mouth 3 (three) times daily as needed for Cough. 30 capsule 0    cetirizine (ZYRTEC) 10 MG tablet Take 10 mg by mouth once daily.       CONTOUR TEST STRIPS Strp TEST ONCE DAILY 100 strip 1    fluticasone (FLONASE) 50 mcg/actuation nasal spray 1 spray (50 mcg total) by Each Nare route once daily. 16 g 0    inhalation device (AEROCHAMBER PLUS FLOW-VU) Use as directed for inhalation. 1 Device 0    JANUVIA 100 mg Tab TAKE 1 TABLET (100 MG TOTAL) BY MOUTH ONCE DAILY. 90 tablet 3    lisinopril-hydrochlorothiazide (PRINZIDE,ZESTORETIC) 20-12.5 mg per tablet Take 1 tablet by mouth once daily. 90 tablet 3    metFORMIN (GLUCOPHAGE) 1000 MG tablet Take 1 tablet (1,000 mg total) by mouth 2 (two) times daily with meals. 180 tablet 1    MICROLET LANCET Misc TEST ONCE DAILY 100 each 3    montelukast (SINGULAIR) 10 mg tablet TAKE 1 TABLET (10 MG TOTAL) BY MOUTH ONCE DAILY. 90 tablet 3    omeprazole (PRILOSEC) 20 MG capsule TAKE 1 CAPSULE (20 MG TOTAL) BY MOUTH EVERY MORNING. 90 capsule 3     No current facility-administered medications on file prior to visit.      Allergies   Allergen Reactions    Bactrim [Sulfamethoxazole-Trimethoprim] Hives     Also Septra    Keflex [Cephalexin] Shortness Of Breath    Neosporin (Neomycin-Polymyx) Swelling    Amoxicillin Rash     Past Surgical History:   Procedure Laterality Date    EGD (ESOPHAGOGASTRODUODENOSCOPY) N/A 6/21/2013    [facility]    HYSTERECTOMY  06/2009    KJB---DLH/BSO ATYPICAL HYPERPLASIA    OOPHORECTOMY      TUBAL LIGATION       Family History   Problem Relation Age of Onset    Ovarian cancer Mother 43    Cancer Mother         ovarian    Diabetes Mother     Heart disease Father     Melanoma Neg Hx     Psoriasis Neg Hx     Lupus Neg Hx      Social History     Socioeconomic History    Marital status:      Spouse name: Not on file    Number of children: Not on file    Years of education: Not on file    Highest education level: Not on file   Occupational History    Occupation: Dispatcher     Employer: [employer]   Social Needs     "Financial resource strain: Not on file    Food insecurity:     Worry: Not on file     Inability: Not on file    Transportation needs:     Medical: Not on file     Non-medical: Not on file   Tobacco Use    Smoking status: Never Smoker    Smokeless tobacco: Never Used   Substance and Sexual Activity    Alcohol use: No    Drug use: No    Sexual activity: Yes     Partners: Male     Birth control/protection: None   Lifestyle    Physical activity:     Days per week: Not on file     Minutes per session: Not on file    Stress: Not on file   Relationships    Social connections:     Talks on phone: Not on file     Gets together: Not on file     Attends Yarsanism service: Not on file     Active member of club or organization: Not on file     Attends meetings of clubs or organizations: Not on file     Relationship status: Not on file   Other Topics Concern    Are you pregnant or think you may be? Not Asked    Breast-feeding Not Asked   Social History Narrative    Not on file       Review of Systems   Constitution: Negative for chills and fever.   Cardiovascular: Positive for leg swelling. Negative for chest pain and claudication.   Respiratory: Negative for cough and shortness of breath.    Skin: Positive for dry skin and nail changes. Negative for itching.   Musculoskeletal: Positive for back pain and myalgias. Negative for arthritis, falls, joint pain, joint swelling and muscle weakness.   Gastrointestinal: Negative for diarrhea, nausea and vomiting.   Neurological: Negative for numbness, paresthesias, tremors and weakness.        History of sciatica   Psychiatric/Behavioral: Negative for altered mental status and hallucinations.           Objective:       Vitals:    07/24/19 1047   Weight: 125.6 kg (277 lb)   Height: 5' 5" (1.651 m)   PainSc: 0-No pain       Physical Exam   Constitutional: She appears well-developed and well-nourished. No distress.   Alert and oriented x 3. No evidence of depression, anxiety, or " agitation. Calm, cooperative, and communicative. Appropriate interactions and affect.       Cardiovascular:   Pulses:       Dorsalis pedis pulses are 2+ on the right side, and 2+ on the left side.        Posterior tibial pulses are 2+ on the right side, and 2+ on the left side.   dorsalis pedis and posterior tibial pulses are palpable bilaterally. Digits are warm to the touch 1-5 bilaterally. Capillary refill time is within normal limits 1-5 bilaterally.         Musculoskeletal:        Right ankle: She exhibits no swelling and no ecchymosis. No tenderness. Achilles tendon exhibits no pain.        Left ankle: She exhibits no swelling and no ecchymosis. No tenderness. Achilles tendon exhibits no pain.        Right foot: There is crepitus (dorsal medial midfoot with palpable spurring in the area). There is no swelling, normal capillary refill and no deformity.        Left foot: There is no swelling and normal capillary refill.   Muscle strength is 5/5 in all groups bilaterally.    Decreased arch height noted b/l. Negative too many toes sign.      Lymphadenopathy:   No lymphatic streaking    Negative lymphadenopathy bilateral popliteal fossa and tarsal tunnel.     Neurological:   Fortine-Alphonso 5.07 monofilament is intact bilateral feet. Sharp/dull sensation is also intact Bilateral feet.       Skin: Skin is warm, dry and intact. No abrasion, no ecchymosis, no lesion and no rash noted. She is not diaphoretic. No cyanosis. No pallor. Nails show no clubbing.   Nails 1-5 bilaterally with incurvated borders.    Scaling dryness in a moccasin distribution is noted to the bilateral lower extremities     Psychiatric: She has a normal mood and affect. Her speech is normal and behavior is normal.   Nursing note and vitals reviewed.            Assessment:       Encounter Diagnoses   Name Primary?    Comprehensive diabetic foot examination, type 2 DM, encounter for Yes    Tinea pedis of both feet     Pincer nail deformity      Hammer toes of both feet     Hallux limitus, acquired, left     Hallux limitus, acquired, right          Plan:       Iqra was seen today for foot problem.    Diagnoses and all orders for this visit:    Comprehensive diabetic foot examination, type 2 DM, encounter for    Tinea pedis of both feet    Pincer nail deformity    Hammer toes of both feet    Hallux limitus, acquired, left    Hallux limitus, acquired, right      I counseled the patient on her conditions, their implications and medical management.     - Shoe inspection. Diabetic Foot Education. Patient reminded of the importance of good nutrition and blood sugar control to help prevent podiatric complications of diabetes. Patient instructed on proper foot hygeine. We discussed wearing proper shoe gear, daily foot inspections, never walking without protective shoe gear.    In depth conversation on the treatment of ingrown nail; partial nail avulsion vs chemical matrixectomy vs conservative treatment of soaking and nail trimming    Informed patient that many nail problems can be prevented by wearing the right shoes and trimming your nails properly.   The right shoes: Feet were measured.  Patient is to wear shoes that are supportive and roomy enough for toes to wiggle. Look for shoes made of natural materials such as leather, which allow  feet to breathe.   Proper trimming: To avoid problems, she was instructed to trim toenails straight across without cutting down into the corners.     Instructed patient on the importance of keeping feet dry. Patient instructed to use absorbent cotton socks and change them if they become sweaty; or wear an open-toe shoe or sandal. Wash the feet at least once a day with soap and water. Apply the OTC antifungal gel twice daily. Instructed patient that it takes time for symptoms to completely dissipate. Patient instructed to use lysol or over-the-counter antifungal powders or sprays to shoes daily and allow them to air dry,  switching shoes from every other day would be optimal. Patient is to avoid barefoot walking in  high-risk environments (public showers, gyms and locker rooms) may prevent future infections.     discussed how elevated blood sugars contribute to immunosuppression and difficulty treating fungal and bacterial infection    Patient to RTC if:  Increasing redness or swelling of the foot   Pus draining from cracks in the skin   Fever of 100.4ºF (38ºC) or higher     She will continue to monitor the areas daily, inspect her feet, wear protective shoe gear when ambulatory, moisturizer to maintain skin integrity and follow in this office in approximately 12 months, sooner p.r.n.

## 2019-07-24 NOTE — LETTER
July 24, 2019      [facility]  [address]  [place] [address]  Phone: [phone]       Patient: Iqra Duque   YOB: 1970  Date of Visit: 07/24/2019    To Whom It May Concern:    Gertrude Duque  was at [facility] on 07/24/2019. Primary shoe needs to be made of a breathable material with supportive base, such as a tennis shoe. If you have any questions or concerns, or if I can be of further assistance, please do not hesitate to contact me.    Sincerely,    Nathalia Sinha MA

## 2019-08-08 ENCOUNTER — OFFICE VISIT (OUTPATIENT)
Dept: PSYCHIATRY | Facility: CLINIC | Age: 49
End: 2019-08-08
Payer: COMMERCIAL

## 2019-08-08 ENCOUNTER — PATIENT MESSAGE (OUTPATIENT)
Dept: FAMILY MEDICINE | Facility: CLINIC | Age: 49
End: 2019-08-08

## 2019-08-08 DIAGNOSIS — F41.1 GENERALIZED ANXIETY DISORDER: Primary | ICD-10-CM

## 2019-08-08 DIAGNOSIS — E11.9 TYPE 2 DIABETES MELLITUS WITHOUT COMPLICATION, WITHOUT LONG-TERM CURRENT USE OF INSULIN: Chronic | ICD-10-CM

## 2019-08-08 PROCEDURE — 90834 PSYTX W PT 45 MINUTES: CPT | Mod: S$GLB,,, | Performed by: SOCIAL WORKER

## 2019-08-08 PROCEDURE — 90834 PR PSYCHOTHERAPY W/PATIENT, 45 MIN: ICD-10-PCS | Mod: S$GLB,,, | Performed by: SOCIAL WORKER

## 2019-08-08 RX ORDER — METFORMIN HYDROCHLORIDE 1000 MG/1
TABLET ORAL
Qty: 180 TABLET | Refills: 1 | OUTPATIENT
Start: 2019-08-08
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
## 2019-08-12 ENCOUNTER — OFFICE VISIT (OUTPATIENT)
Dept: FAMILY MEDICINE | Facility: CLINIC | Age: 49
End: 2019-08-12
Payer: COMMERCIAL

## 2019-08-12 ENCOUNTER — PATIENT OUTREACH (OUTPATIENT)
Dept: OTHER | Facility: OTHER | Age: 49
End: 2019-08-12

## 2019-08-12 VITALS
HEIGHT: 65 IN | HEART RATE: 110 BPM | BODY MASS INDEX: 47.13 KG/M2 | WEIGHT: 282.88 LBS | SYSTOLIC BLOOD PRESSURE: 138 MMHG | TEMPERATURE: 98 F | DIASTOLIC BLOOD PRESSURE: 74 MMHG | OXYGEN SATURATION: 98 %

## 2019-08-12 DIAGNOSIS — E55.9 VITAMIN D INSUFFICIENCY: ICD-10-CM

## 2019-08-12 DIAGNOSIS — Z12.11 SCREENING FOR COLON CANCER: ICD-10-CM

## 2019-08-12 DIAGNOSIS — E11.9 TYPE 2 DIABETES MELLITUS WITHOUT COMPLICATION, WITHOUT LONG-TERM CURRENT USE OF INSULIN: Primary | Chronic | ICD-10-CM

## 2019-08-12 DIAGNOSIS — I10 ESSENTIAL HYPERTENSION: Chronic | ICD-10-CM

## 2019-08-12 PROCEDURE — 99999 PR PBB SHADOW E&M-EST. PATIENT-LVL III: CPT | Mod: PBBFAC,,, | Performed by: INTERNAL MEDICINE

## 2019-08-12 PROCEDURE — 3045F PR MOST RECENT HEMOGLOBIN A1C LEVEL 7.0-9.0%: CPT | Mod: CPTII,S$GLB,, | Performed by: INTERNAL MEDICINE

## 2019-08-12 PROCEDURE — 99214 OFFICE O/P EST MOD 30 MIN: CPT | Mod: S$GLB,,, | Performed by: INTERNAL MEDICINE

## 2019-08-12 PROCEDURE — 3075F PR MOST RECENT SYSTOLIC BLOOD PRESS GE 130-139MM HG: ICD-10-PCS | Mod: CPTII,S$GLB,, | Performed by: INTERNAL MEDICINE

## 2019-08-12 PROCEDURE — 3078F PR MOST RECENT DIASTOLIC BLOOD PRESSURE < 80 MM HG: ICD-10-PCS | Mod: CPTII,S$GLB,, | Performed by: INTERNAL MEDICINE

## 2019-08-12 PROCEDURE — 3045F PR MOST RECENT HEMOGLOBIN A1C LEVEL 7.0-9.0%: ICD-10-PCS | Mod: CPTII,S$GLB,, | Performed by: INTERNAL MEDICINE

## 2019-08-12 PROCEDURE — 3008F PR BODY MASS INDEX (BMI) DOCUMENTED: ICD-10-PCS | Mod: CPTII,S$GLB,, | Performed by: INTERNAL MEDICINE

## 2019-08-12 PROCEDURE — 3078F DIAST BP <80 MM HG: CPT | Mod: CPTII,S$GLB,, | Performed by: INTERNAL MEDICINE

## 2019-08-12 PROCEDURE — 99999 PR PBB SHADOW E&M-EST. PATIENT-LVL III: ICD-10-PCS | Mod: PBBFAC,,, | Performed by: INTERNAL MEDICINE

## 2019-08-12 PROCEDURE — 3075F SYST BP GE 130 - 139MM HG: CPT | Mod: CPTII,S$GLB,, | Performed by: INTERNAL MEDICINE

## 2019-08-12 PROCEDURE — 99214 PR OFFICE/OUTPT VISIT, EST, LEVL IV, 30-39 MIN: ICD-10-PCS | Mod: S$GLB,,, | Performed by: INTERNAL MEDICINE

## 2019-08-12 PROCEDURE — 3008F BODY MASS INDEX DOCD: CPT | Mod: CPTII,S$GLB,, | Performed by: INTERNAL MEDICINE

## 2019-08-12 RX ORDER — METFORMIN HYDROCHLORIDE 500 MG/1
1000 TABLET, EXTENDED RELEASE ORAL 2 TIMES DAILY WITH MEALS
Qty: 360 TABLET | Refills: 1 | Status: SHIPPED | OUTPATIENT
Start: 2019-08-12 | End: 2020-02-17 | Stop reason: SDUPTHER

## 2019-08-12 NOTE — PROGRESS NOTES
Last 5 Patient Entered Readings                                      Current 30 Day Average: 115/74     Recent Readings 8/9/2019 7/22/2019 7/5/2019 6/26/2019 6/16/2019    SBP (mmHg) 121 109 112 97 112    DBP (mmHg) 72 75 70 58 66    Pulse 82 94 89 79 99          Digital Medicine: Health  Follow Up    Left voicemail to follow up with Mrs. Iqra Duque.  Current BP average 115/74 mmHg is at goal, 130/80.  Patient BP is trending up, but patient is still within goal.   WCB in 2-3 weeks to f/u.

## 2019-08-12 NOTE — ASSESSMENT & PLAN NOTE
The current medical regimen is effective;  continue present plan and medications. Home readings much better than in the office.

## 2019-08-12 NOTE — PROGRESS NOTES
Assessment & Plan  Problem List Items Addressed This Visit        Cardiac/Vascular    Essential hypertension (Chronic)    Current Assessment & Plan     The current medical regimen is effective;  continue present plan and medications. Home readings much better than in the office.          Relevant Orders    CBC auto differential       Endocrine    Type 2 diabetes mellitus without complication - Primary (Chronic)    Current Assessment & Plan     Change metformin to 1000mg of XR BID.  Would add DPP-4 next.  Foot exam at follow up         Relevant Medications    metFORMIN (GLUCOPHAGE-XR) 500 MG 24 hr tablet    Other Relevant Orders    Lipid panel    Hemoglobin A1c    Comprehensive metabolic panel    Microalbumin/creatinine urine ratio    Vitamin D insufficiency (Chronic)    Current Assessment & Plan     Recheck labs         Relevant Orders    Vitamin D      Other Visit Diagnoses     Screening for colon cancer    -  Screening C-scope ordered    Relevant Orders    Case request GI: COLONOSCOPY (Completed)            Health Maintenance reviewed, as above.  Flu in the fall.    Follow-up: Follow up in about 3 months (around 11/12/2019) for Routine Physical.  ______________________________________________________________________    Chief Complaint  Chief Complaint   Patient presents with    Hypertension    Diabetes    Follow-up       STEFANIA Haydenmark Duque is a 49 y.o. female with medical diagnoses as listed in the medical history and problem list that presents for HTN and DM follow up.  Pt is known to me with her last appointment 3/15/2019.      Answers for HPI/ROS submitted by the patient on 8/11/2019   Diabetes problem  Diabetes type: type 2  MedicAlert ID: No  Disease duration: 10 years  blurred vision: No  foot paresthesias: No  foot ulcerations: No  visual change: No  weight loss: No  Symptom course: worsening  hunger: Yes  mood changes: Yes  sleepiness: Yes  sweats: No  blackouts: No  hospitalization: No  nocturnal  hypoglycemia: No  required assistance: No  required glucagon: No  CVA: No  heart disease: No  impotence: No  nephropathy: No  peripheral neuropathy: No  PVD: No  retinopathy: No  autonomic neuropathy: No  CAD risks: family history, hypertension, obesity, post-menopausal, sedentary lifestyle, diabetes mellitus  Current treatments: oral agent (dual therapy)  Treatment compliance: most of the time  Home blood tests: 1-2 x per week  Monitoring compliance: inadequate  Blood glucose trend: increasing steadily  Weight trend: stable  Current diet: generally unhealthy  Meal planning: avoidance of concentrated sweets  Exercise: intermittently  Dietitian visit: No  Eye exam current: Yes  Sees podiatrist: Yes    She had a job change a couple of months ago and is working nights more.  Taking and tolerating medications as prescribed without perceived side effects.  No CP, SOB, palpitations, hypoglycemic symptoms.      Has been having more HAs recently.  Left sided.  No jaw claudication, F/C/NS.        PAST MEDICAL HISTORY:  Past Medical History:   Diagnosis Date    Allergy     Diabetes mellitus type II     Diverticulitis 09/2014    Fever blister     Hypertension     Migraine headache        PAST SURGICAL HISTORY:  Past Surgical History:   Procedure Laterality Date    EGD (ESOPHAGOGASTRODUODENOSCOPY) N/A 6/21/2013    Performed by [facility]    HYSTERECTOMY  06/2009    [facility]---DLH/BSO ATYPICAL HYPERPLASIA    OOPHORECTOMY      TUBAL LIGATION         SOCIAL HISTORY:  Social History     Socioeconomic History    Marital status:      Spouse name: Not on file    Number of children: Not on file    Years of education: Not on file    Highest education level: Not on file   Occupational History    Occupation: Dispatcher     Employer: [employer]   Social Needs    Financial resource strain: Not very hard    Food insecurity:     Worry: Never true     Inability: Never true    Transportation  needs:     Medical: No     Non-medical: No   Tobacco Use    Smoking status: Never Smoker    Smokeless tobacco: Never Used   Substance and Sexual Activity    Alcohol use: No     Frequency: Monthly or less     Drinks per session: 1 or 2     Binge frequency: Never    Drug use: No    Sexual activity: Yes     Partners: Male     Birth control/protection: None   Lifestyle    Physical activity:     Days per week: 0 days     Minutes per session: Not on file    Stress: Rather much   Relationships    Social connections:     Talks on phone: More than three times a week     Gets together: Once a week     Attends [employer] service: Not on file     Active member of club or organization: No     Attends meetings of clubs or organizations: Not on file     Relationship status:    Other Topics Concern    Are you pregnant or think you may be? Not Asked    Breast-feeding Not Asked   Social History Narrative    Not on file       FAMILY HISTORY:  Family History   Problem Relation Age of Onset    Ovarian cancer Mother 43    Cancer Mother         ovarian    Diabetes Mother     Heart disease Father     Melanoma Neg Hx     Psoriasis Neg Hx     Lupus Neg Hx        ALLERGIES AND MEDICATIONS: updated and reviewed.  Review of patient's allergies indicates:   Allergen Reactions    Bactrim [sulfamethoxazole-trimethoprim] Hives     Also Septra    Keflex [cephalexin] Shortness Of Breath    Neosporin (neomycin-polymyx) Swelling    Amoxicillin Rash     Current Outpatient Medications   Medication Sig Dispense Refill    albuterol 90 mcg/actuation inhaler Inhale 1-2 puffs into the lungs every 4 to 6 hours as needed for Wheezing or Shortness of Breath (chest tightness). 1 Inhaler 6    cetirizine (ZYRTEC) 10 MG tablet Take 10 mg by mouth once daily.      CONTOUR TEST STRIPS Strp TEST ONCE DAILY 100 strip 1    fluticasone (FLONASE) 50 mcg/actuation nasal spray 1 spray (50 mcg total) by Each Nare route once daily. 16 g 0     inhalation device (AEROCHAMBER PLUS FLOW-VU) Use as directed for inhalation. 1 Device 0    JANUVIA 100 mg Tab TAKE 1 TABLET (100 MG TOTAL) BY MOUTH ONCE DAILY. 90 tablet 3    lisinopril-hydrochlorothiazide (PRINZIDE,ZESTORETIC) 20-12.5 mg per tablet Take 1 tablet by mouth once daily. 90 tablet 3    MICROLET LANCET Misc TEST ONCE DAILY 100 each 3    montelukast (SINGULAIR) 10 mg tablet TAKE 1 TABLET (10 MG TOTAL) BY MOUTH ONCE DAILY. 90 tablet 3    omeprazole (PRILOSEC) 20 MG capsule TAKE 1 CAPSULE (20 MG TOTAL) BY MOUTH EVERY MORNING. 90 capsule 3    metFORMIN (GLUCOPHAGE-XR) 500 MG 24 hr tablet Take 2 tablets (1,000 mg total) by mouth 2 (two) times daily with meals. 360 tablet 1     No current facility-administered medications for this visit.          ROS  Review of Systems   Constitutional: Positive for fatigue. Negative for chills, fever and unexpected weight change.   HENT: Positive for congestion. Negative for ear pain, hearing loss, rhinorrhea, sore throat and trouble swallowing.    Eyes: Negative for discharge, redness and visual disturbance.   Respiratory: Positive for cough. Negative for chest tightness, shortness of breath and wheezing.    Cardiovascular: Negative for chest pain, palpitations and leg swelling.   Gastrointestinal: Negative for abdominal pain, constipation, diarrhea, nausea and vomiting.   Endocrine: Positive for polydipsia. Negative for polyphagia and polyuria.   Genitourinary: Negative for decreased urine volume, dysuria and hematuria.   Musculoskeletal: Negative for arthralgias, joint swelling and myalgias.   Skin: Negative for color change, pallor and rash.   Neurological: Positive for dizziness and headaches. Negative for tremors, seizures, speech difficulty, weakness and light-headedness.   Psychiatric/Behavioral: Negative for confusion, decreased concentration, dysphoric mood, sleep disturbance and suicidal ideas. The patient is nervous/anxious.            Physical Exam  Vitals:  "   08/12/19 1339   BP: 138/74   Pulse: 110   Temp: 98.1 °F (36.7 °C)   TempSrc: Oral   SpO2: 98%   Weight: 128.3 kg (282 lb 13.6 oz)   Height: 5' 5" (1.651 m)    Body mass index is 47.07 kg/m².  Weight: 128.3 kg (282 lb 13.6 oz)   Height: 5' 5" (165.1 cm)   Physical Exam   Constitutional: She is oriented to person, place, and time. She appears well-developed and well-nourished. No distress.   HENT:   Head: Normocephalic and atraumatic.   Eyes: Pupils are equal, round, and reactive to light. Conjunctivae, EOM and lids are normal. No scleral icterus.   Neck: Full passive range of motion without pain. Neck supple. No JVD present. Carotid bruit is not present. No thyromegaly present.   Cardiovascular: Normal rate, regular rhythm, normal heart sounds, intact distal pulses and normal pulses. Exam reveals no S3, no S4 and no friction rub.   No murmur heard.  Pulmonary/Chest: Effort normal and breath sounds normal. She has no wheezes. She has no rhonchi. She has no rales.   Abdominal: Soft. Bowel sounds are normal. There is no tenderness.   Musculoskeletal: She exhibits no edema or tenderness.   Lymphadenopathy:        Head (right side): No submental and no submandibular adenopathy present.        Head (left side): No submental and no submandibular adenopathy present.     She has no cervical adenopathy.   Neurological: She is alert and oriented to person, place, and time.   Motor grossly intact.  Sensory grossly intact.  Symmetric facial movements palate elevated symmetrically tongue midline   Skin: Skin is warm and dry. No rash noted.   Psychiatric: She has a normal mood and affect. Her speech is normal and behavior is normal. Thought content normal.           Health Maintenance       Date Due Completion Date    Low Dose Statin 07/16/1990 ---    Lipid Panel 03/12/2019 3/12/2018    Shingles Vaccine (1 of 2) 07/16/2019 ---    Colonoscopy 07/16/2019 ---    Foot Exam 07/17/2019 7/17/2018 (Done)    Override on 7/17/2018: Done "    Override on 7/3/2017: Done    Influenza Vaccine (1) 08/01/2019 10/12/2018    Override on 2/25/2016: Declined    Urine Microalbumin 10/10/2019 10/10/2018    Hemoglobin A1c 10/10/2019 7/10/2019    Eye Exam 03/14/2020 3/14/2019    Mammogram 07/17/2020 7/17/2018    TETANUS VACCINE 08/29/2026 8/29/2016

## 2019-08-27 DIAGNOSIS — Z12.11 COLON CANCER SCREENING: Primary | ICD-10-CM

## 2019-09-12 ENCOUNTER — OFFICE VISIT (OUTPATIENT)
Dept: PSYCHIATRY | Facility: CLINIC | Age: 49
End: 2019-09-12
Payer: COMMERCIAL

## 2019-09-12 DIAGNOSIS — F41.1 GENERALIZED ANXIETY DISORDER: Primary | ICD-10-CM

## 2019-09-12 PROCEDURE — 90834 PSYTX W PT 45 MINUTES: CPT | Mod: S$GLB,,, | Performed by: SOCIAL WORKER

## 2019-09-12 PROCEDURE — 90834 PR PSYCHOTHERAPY W/PATIENT, 45 MIN: ICD-10-PCS | Mod: S$GLB,,, | Performed by: SOCIAL WORKER

## 2019-09-12 NOTE — PROGRESS NOTES
"Individual Psychotherapy (PhD/LCSW)    9/12/2019    Site:  Health system         Therapeutic Intervention: Met with patient.  Outpatient - Insight oriented psychotherapy 45 min - CPT code 77898 and Outpatient - Supportive psychotherapy 45 min - CPT Code 67098    Chief complaint/reason for encounter: anxiety     Interval history and content of current session: Patient returned to clinic for follow up psychotherapy. Patient reports that she is doing "okay". Reports that she is "tired", has been working a lot. People have been leaving work so she has been doing a lot of overtime. States that she also recently had an abscess on a tooth. Had to call in one day for work and got written up. States that she now has two call ins and if she gets another in the next couple of months she could be subject to dismissal. States that she tries not to worry about it because she had no choice in the matter. Discussed that still at times irritated with work because they have to do "stupid" stuff. Discussed younger daughter and her maturing lately. States that she has concerns about daughter's relationship. States that she worries about her being in a relationship for 5 years but no engagement. Discussed that she gets "wrapped" up in other's problems. Reports that she doesn't know how to separate herself at times. Discussed that she had a cousin that has been  "too many times" to count. States that she is frustrated that cousin will just get rid of husbands when she isn't in love anymore and doesn't want to put in the work. Discussed that she even does this with strangers. Discussed that when she does this she gets frustrated with herself. Discussed ways of letting go of emotions that belong to others.     Treatment plan:  · Target symptoms: anxiety   · Why chosen therapy is appropriate versus another modality: relevant to diagnosis, patient responds to this modality, evidence based practice  · Outcome monitoring methods: self-report, " lab data, observation  · Therapeutic intervention type: insight oriented psychotherapy, behavior modifying psychotherapy, supportive psychotherapy    Risk parameters:  Patient reports no suicidal ideation  Patient reports no homicidal ideation  Patient reports no self-injurious behavior  Patient reports no violent behavior    Verbal deficits: None    Patient's response to intervention:  The patient's response to intervention is accepting.    Progress toward goals and other mental status changes:  The patient's progress toward goals is fair .    Diagnosis:     ICD-10-CM ICD-9-CM   1. Generalized anxiety disorder F41.1 300.02       Plan:  individual psychotherapy    Return to clinic: 2 weeks    Length of Service (minutes): 45     Cassandra Rockweiler, LCSW-BACS

## 2019-09-13 NOTE — PROGRESS NOTES
Digital Medicine: Health  Follow-Up    Patient started a new job within the last month or so and she is still trying to figure out a routine. Patient works nights now so that has presented a new barrier to getting a routine with meals and physical activity.    The history is provided by the patient. No  was used.           Diet:       She eats 4 meals and She cooks for self and has meals prepared by family.    Patient does the shopping for groceries and lets family grocery shop.  She gets groceries from the grocery store.      Eating style: late-night eater and Patient works night shift so eats most meals at night    Barriers to a Healthy Diet: time/convenience    Patient works night shift and will break her meals into 4 to facilitate portion control and spread her calorie intake so she doesn't get sluggish during the course of the night. Patient states she will do coffee or green tea every now and again but not on a consistent basis. Patient is also working on cutting out cokes to decrease sugar intake. Patient also states that she drinks 4-5 20 oz bottles of water daily.     Intervention(s): portion control and meal planning    Physical Activity:   When asked if exercising, patient responded: no    Patient states that she is not a gym person but she knows that she needs to increase physical activity. Patient states that she and her daughters have tried to plan out walking together but since they are three adults it is hard to get a time that works out for all of their schedules and then they feel that time spent together could be doing something else.    Medication Adherence:       Patient reports no s/s or issues taking medications as prescribed.       [facility]    INTERVENTION(S)  encouragement/support          Topic    Lipid (Cholesterol) Test     Urine Protein Check        Last 5 Patient Entered Readings                                      Current 30 Day Average: 113/73     Recent  Readings 9/8/2019 8/19/2019 8/16/2019 8/14/2019 8/9/2019    SBP (mmHg) 107 118 116 110 121    DBP (mmHg) 69 76 74 72 72    Pulse 97 85 93 95 82

## 2019-10-03 ENCOUNTER — PATIENT MESSAGE (OUTPATIENT)
Dept: ADMINISTRATIVE | Facility: OTHER | Age: 49
End: 2019-10-03

## 2019-10-14 ENCOUNTER — PATIENT OUTREACH (OUTPATIENT)
Dept: OTHER | Facility: OTHER | Age: 49
End: 2019-10-14

## 2019-10-15 ENCOUNTER — PATIENT MESSAGE (OUTPATIENT)
Dept: FAMILY MEDICINE | Facility: CLINIC | Age: 49
End: 2019-10-15

## 2019-10-16 DIAGNOSIS — Z12.11 COLON CANCER SCREENING: ICD-10-CM

## 2019-10-17 DIAGNOSIS — I10 ESSENTIAL HYPERTENSION: Chronic | ICD-10-CM

## 2019-10-17 RX ORDER — LISINOPRIL AND HYDROCHLOROTHIAZIDE 12.5; 2 MG/1; MG/1
TABLET ORAL
Qty: 90 TABLET | Refills: 3 | Status: SHIPPED | OUTPATIENT
Start: 2019-10-17 | End: 2020-10-15

## 2019-10-21 ENCOUNTER — HOSPITAL ENCOUNTER (OUTPATIENT)
Facility: HOSPITAL | Age: 49
Discharge: HOME | End: 2019-10-21
Attending: INTERNAL MEDICINE | Admitting: INTERNAL MEDICINE
Payer: COMMERCIAL

## 2019-10-21 ENCOUNTER — ANESTHESIA (OUTPATIENT)
Dept: ENDOSCOPY | Facility: HOSPITAL | Age: 49
End: 2019-10-21
Payer: COMMERCIAL

## 2019-10-21 ENCOUNTER — ANESTHESIA EVENT (OUTPATIENT)
Dept: ENDOSCOPY | Facility: HOSPITAL | Age: 49
End: 2019-10-21
Payer: COMMERCIAL

## 2019-10-21 VITALS
OXYGEN SATURATION: 98 % | SYSTOLIC BLOOD PRESSURE: 138 MMHG | BODY MASS INDEX: 46.98 KG/M2 | RESPIRATION RATE: 18 BRPM | WEIGHT: 282 LBS | HEIGHT: 65 IN | DIASTOLIC BLOOD PRESSURE: 74 MMHG | TEMPERATURE: 98 F | HEART RATE: 74 BPM

## 2019-10-21 DIAGNOSIS — Z12.11 COLON CANCER SCREENING: ICD-10-CM

## 2019-10-21 LAB — POCT GLUCOSE: 159 MG/DL (ref 70–110)

## 2019-10-21 PROCEDURE — D9220A PRA ANESTHESIA: Mod: 33,ANES,, | Performed by: ANESTHESIOLOGY

## 2019-10-21 PROCEDURE — D9220A PRA ANESTHESIA: ICD-10-PCS | Mod: 33,ANES,, | Performed by: ANESTHESIOLOGY

## 2019-10-21 PROCEDURE — 45385 PR COLONOSCOPY,REMV LESN,SNARE: ICD-10-PCS | Mod: 33,,, | Performed by: INTERNAL MEDICINE

## 2019-10-21 PROCEDURE — 88305 TISSUE SPECIMEN TO PATHOLOGY - SURGERY: ICD-10-PCS | Mod: 26,,, | Performed by: PATHOLOGY

## 2019-10-21 PROCEDURE — 27201089 HC SNARE, DISP (ANY): Performed by: INTERNAL MEDICINE

## 2019-10-21 PROCEDURE — 88305 TISSUE EXAM BY PATHOLOGIST: CPT | Performed by: PATHOLOGY

## 2019-10-21 PROCEDURE — 37000008 HC ANESTHESIA 1ST 15 MINUTES: Performed by: INTERNAL MEDICINE

## 2019-10-21 PROCEDURE — 37000009 HC ANESTHESIA EA ADD 15 MINS: Performed by: INTERNAL MEDICINE

## 2019-10-21 PROCEDURE — 63600175 PHARM REV CODE 636 W HCPCS: Performed by: NURSE ANESTHETIST, CERTIFIED REGISTERED

## 2019-10-21 PROCEDURE — D9220A PRA ANESTHESIA: Mod: 33,CRNA,, | Performed by: NURSE ANESTHETIST, CERTIFIED REGISTERED

## 2019-10-21 PROCEDURE — 45385 COLONOSCOPY W/LESION REMOVAL: CPT | Mod: 33,,, | Performed by: INTERNAL MEDICINE

## 2019-10-21 PROCEDURE — 88305 TISSUE EXAM BY PATHOLOGIST: CPT | Mod: 26,,, | Performed by: PATHOLOGY

## 2019-10-21 PROCEDURE — D9220A PRA ANESTHESIA: ICD-10-PCS | Mod: 33,CRNA,, | Performed by: NURSE ANESTHETIST, CERTIFIED REGISTERED

## 2019-10-21 PROCEDURE — 45385 COLONOSCOPY W/LESION REMOVAL: CPT | Performed by: INTERNAL MEDICINE

## 2019-10-21 PROCEDURE — 82962 GLUCOSE BLOOD TEST: CPT | Performed by: INTERNAL MEDICINE

## 2019-10-21 PROCEDURE — 63600175 PHARM REV CODE 636 W HCPCS: Performed by: ANESTHESIOLOGY

## 2019-10-21 RX ORDER — PROPOFOL 10 MG/ML
VIAL (ML) INTRAVENOUS
Status: COMPLETED
Start: 2019-10-21 | End: 2019-10-21

## 2019-10-21 RX ORDER — LIDOCAINE HYDROCHLORIDE 20 MG/ML
INJECTION, SOLUTION EPIDURAL; INFILTRATION; INTRACAUDAL; PERINEURAL
Status: DISCONTINUED
Start: 2019-10-21 | End: 2019-10-21 | Stop reason: HOSPADM

## 2019-10-21 RX ORDER — SODIUM CHLORIDE 9 MG/ML
INJECTION, SOLUTION INTRAVENOUS CONTINUOUS PRN
Status: DISCONTINUED | OUTPATIENT
Start: 2019-10-21 | End: 2019-10-21

## 2019-10-21 RX ORDER — PROPOFOL 10 MG/ML
VIAL (ML) INTRAVENOUS
Status: DISCONTINUED | OUTPATIENT
Start: 2019-10-21 | End: 2019-10-21

## 2019-10-21 RX ORDER — SODIUM CHLORIDE 9 MG/ML
INJECTION, SOLUTION INTRAVENOUS ONCE
Status: COMPLETED | OUTPATIENT
Start: 2019-10-21 | End: 2019-10-21

## 2019-10-21 RX ORDER — LIDOCAINE HCL/PF 100 MG/5ML
SYRINGE (ML) INTRAVENOUS
Status: DISCONTINUED | OUTPATIENT
Start: 2019-10-21 | End: 2019-10-21

## 2019-10-21 RX ADMIN — PROPOFOL 20 MG: 10 INJECTION, EMULSION INTRAVENOUS at 07:10

## 2019-10-21 RX ADMIN — SODIUM CHLORIDE: 0.9 INJECTION, SOLUTION INTRAVENOUS at 06:10

## 2019-10-21 RX ADMIN — LIDOCAINE HYDROCHLORIDE 100 MG: 20 INJECTION, SOLUTION INTRAVENOUS at 07:10

## 2019-10-21 RX ADMIN — SODIUM CHLORIDE: 0.9 INJECTION, SOLUTION INTRAVENOUS at 07:10

## 2019-10-21 RX ADMIN — PROPOFOL 100 MG: 10 INJECTION, EMULSION INTRAVENOUS at 07:10

## 2019-10-21 NOTE — [employer] PATIENT INSTRUCTIONS
Discharge Summary/Instructions after an Endoscopic Procedure  Patient Name: Iqra Duque  Patient MRN: [id number]  Patient YOB: 1970 Monday, October 21, 2019  Gomez Stark MD  RESTRICTIONS:  During your procedure today, you received medications for sedation.  These   medications may affect your judgment, balance and coordination.  Therefore,   for 24 hours, you have the following restrictions:   - DO NOT drive a car, operate machinery, make legal/financial decisions,   sign important papers or drink alcohol.    ACTIVITY:  Today: no heavy lifting, straining or running due to procedural   sedation/anesthesia.  The following day: return to full activity including work.  DIET:  Eat and drink normally unless instructed otherwise.     TREATMENT FOR COMMON SIDE EFFECTS:  - Mild abdominal pain, nausea, belching, bloating or excessive gas:  rest,   eat lightly and use a heating pad.  - Sore Throat: treat with throat lozenges and/or gargle with warm salt   water.  - Because air was used during the procedure, expelling large amounts of air   from your rectum or belching is normal.  - If a bowel prep was taken, you may not have a bowel movement for 1-3 days.    This is normal.  SYMPTOMS TO WATCH FOR AND REPORT TO YOUR PHYSICIAN:  1. Abdominal pain or bloating, other than gas cramps.  2. Chest pain.  3. Back pain.  4. Signs of infection such as: chills or fever occurring within 24 hours   after the procedure.  5. Rectal bleeding, which would show as bright red, maroon, or black stools.   (A tablespoon of blood from the rectum is not serious, especially if   hemorrhoids are present.)  6. Vomiting.  7. Weakness or dizziness.  GO DIRECTLY TO THE NEAREST EMERGENCY ROOM IF YOU HAVE ANY OF THE FOLLOWING:      Difficulty breathing              Chills and/or fever over 101 F   Persistent vomiting and/or vomiting blood   Severe abdominal pain   Severe chest pain   Black, tarry stools   Bleeding- more than one  tablespoon   Any other symptom or condition that you feel may need urgent attention  Your doctor recommends these additional instructions:  If any biopsies were taken, your doctors clinic will contact you in 1 to 2   weeks with any results.  - Await pathology results.   - Repeat colonoscopy in 5 years for surveillance based on pathology results.     - Return to referring physician as previously scheduled.   - Discharge patient to home (via wheelchair).  For questions, problems or results please call your physician - Gomez Stark MD at Work:  [phone].  [facility] can be reached at [phone]     IF A COMPLICATION OR EMERGENCY SITUATION ARISES AND YOU ARE UNABLE TO REACH   YOUR PHYSICIAN - GO DIRECTLY TO THE EMERGENCY ROOM.  Gomez Stark MD  10/21/2019 7:30:06 AM  This report has been verified and signed electronically.  [employer]

## 2019-10-21 NOTE — ANESTHESIA POSTPROCEDURE EVALUATION
Anesthesia Post Evaluation    Patient: Iqra Duque    Procedure(s) Performed: Procedure(s) (LRB):  COLONOSCOPY (N/A)    Final Anesthesia Type: general  Patient location during evaluation: PACU  Patient participation: Yes- Able to Participate  Level of consciousness: sedated  Post-procedure vital signs: reviewed and stable  Pain management: adequate  Airway patency: patent  PONV status at discharge: No PONV  Anesthetic complications: no      Cardiovascular status: blood pressure returned to baseline  Respiratory status: unassisted  Hydration status: euvolemic  Follow-up not needed.          Vitals Value Taken Time   /67 10/21/2019  7:30 AM   Temp 36.4 °C (97.5 °F) 10/21/2019  7:30 AM   Pulse 88 10/21/2019  7:30 AM   Resp 16 10/21/2019  7:30 AM   SpO2 96 % 10/21/2019  7:30 AM         No case tracking events are documented in the log.      Pain/Jose Elias Score: Jose Elias Score: 9 (10/21/2019  7:30 AM)  Modified Jose Elias Score: 19 (10/21/2019  7:30 AM)

## 2019-10-21 NOTE — H&P
"Chief Complaint:  "I need a colonoscopy."    HPI:  The patient is a 49 year old woman presenting for a screening colonoscopy.  She has never undergone a colonoscopy.  The patient denies any abdominal pain, weight loss, nausea, emesis, diarrhea, constipation, melena, or hematochezia.  The patient also denies a family history of colon cancer.    Past Medical History:   Diagnosis Date    Allergy     Diabetes mellitus type II     Diverticulitis 09/2014    Fever blister     Hypertension     Migraine headache      Past Surgical History:   Procedure Laterality Date    HYSTERECTOMY  06/2009    [facility]---DLH/BSO ATYPICAL HYPERPLASIA    OOPHORECTOMY      TUBAL LIGATION       Family History   Problem Relation Age of Onset    Ovarian cancer Mother 43    Cancer Mother         ovarian    Diabetes Mother     Heart disease Father     Melanoma Neg Hx     Psoriasis Neg Hx     Lupus Neg Hx      Social History     Socioeconomic History    Marital status:      Spouse name: Not on file    Number of children: Not on file    Years of education: Not on file    Highest education level: Not on file   Occupational History    Occupation: Dispatcher     Employer: [employer]   Social Needs    Financial resource strain: Not very hard    Food insecurity:     Worry: Never true     Inability: Never true    Transportation needs:     Medical: No     Non-medical: No   Tobacco Use    Smoking status: Never Smoker    Smokeless tobacco: Never Used   Substance and Sexual Activity    Alcohol use: No     Frequency: Monthly or less     Drinks per session: 1 or 2     Binge frequency: Never    Drug use: No    Sexual activity: Yes     Partners: Male     Birth control/protection: None   Lifestyle    Physical activity:     Days per week: 0 days     Minutes per session: Not on file    Stress: Rather much   Relationships    Social connections:     Talks on phone: More than three times a week     Gets together: Once a week " "    Attends Christianity service: Not on file     Active member of club or organization: No     Attends meetings of clubs or organizations: Not on file     Relationship status:    Other Topics Concern    Are you pregnant or think you may be? Not Asked    Breast-feeding Not Asked   Social History Narrative    Not on file     No current facility-administered medications for this encounter.     Review of patient's allergies indicates:   Allergen Reactions    Bactrim [sulfamethoxazole-trimethoprim] Hives     Also Septra    Keflex [cephalexin] Shortness Of Breath    Neosporin (neomycin-polymyx) Swelling    Amoxicillin Rash     ROS:  No chest pain or dyspnea.  No dysuria.  No heartburn or dysphagia.  Otherwise as stated above.  Ten other systems negative.    Vitals:    10/21/19 0641   BP: 130/73   BP Location: Left arm   Patient Position: Lying   Pulse: 85   Resp: 18   Temp: 98.2 °F (36.8 °C)   TempSrc: Oral   SpO2: 97%   Weight: 127.9 kg (282 lb)   Height: 5' 5" (1.651 m)     P.E.:  GEN: A x O x 3, NAD  SKIN: No jaundice  HEENT: EOMI, PERRL, anicteric sclera  CV: RRR, no M/R/G  Chest: CTA B  Abdomen: soft, NTND, normoactive BS  Ext: No C/C/E.  2+ dorsalis pedis pulses B  Neuro: No asterixes or tremors.  CN II-XII intact  Musculoskeletal: 5/5 strength bilaterally    Labs:  Lab Results   Component Value Date    WBC 6.78 03/12/2018    HGB 12.6 03/12/2018    HCT 39.7 03/12/2018    MCV 80 (L) 03/12/2018     03/12/2018       CMP  Sodium   Date Value Ref Range Status   07/10/2019 138 136 - 145 mmol/L Final     Potassium   Date Value Ref Range Status   07/10/2019 4.2 3.5 - 5.1 mmol/L Final     Chloride   Date Value Ref Range Status   07/10/2019 101 95 - 110 mmol/L Final     CO2   Date Value Ref Range Status   07/10/2019 24 23 - 29 mmol/L Final     Glucose   Date Value Ref Range Status   07/10/2019 135 (H) 70 - 110 mg/dL Final     BUN, Bld   Date Value Ref Range Status   07/10/2019 14 6 - 20 mg/dL Final "     Creatinine   Date Value Ref Range Status   07/10/2019 0.7 0.5 - 1.4 mg/dL Final     Calcium   Date Value Ref Range Status   07/10/2019 9.2 8.7 - 10.5 mg/dL Final     Total Protein   Date Value Ref Range Status   03/12/2018 6.7 6.0 - 8.4 g/dL Final     Albumin   Date Value Ref Range Status   03/12/2018 3.3 (L) 3.5 - 5.2 g/dL Final     Total Bilirubin   Date Value Ref Range Status   03/12/2018 0.7 0.1 - 1.0 mg/dL Final     Comment:     For infants and newborns, interpretation of results should be based  on gestational age, weight and in agreement with clinical  observations.  Premature Infant recommended reference ranges:  Up to 24 hours.............<8.0 mg/dL  Up to 48 hours............<12.0 mg/dL  3-5 days..................<15.0 mg/dL  6-29 days.................<15.0 mg/dL       Alkaline Phosphatase   Date Value Ref Range Status   03/12/2018 45 (L) 55 - 135 U/L Final     AST   Date Value Ref Range Status   03/12/2018 35 10 - 40 U/L Final     ALT   Date Value Ref Range Status   03/12/2018 35 10 - 44 U/L Final     Anion Gap   Date Value Ref Range Status   07/10/2019 13 8 - 16 mmol/L Final     eGFR if    Date Value Ref Range Status   07/10/2019 >60.0 >60 mL/min/1.73 m^2 Final     eGFR if non    Date Value Ref Range Status   07/10/2019 >60.0 >60 mL/min/1.73 m^2 Final     Comment:     Calculation used to obtain the estimated glomerular filtration  rate (eGFR) is the CKD-EPI equation.          No results for input(s): PT, INR, APTT in the last 24 hours.    A/P:  The patient is a 49 year old woman presenting for a colonoscopy.  1.  Colonoscopy - she can undergo a colonoscopy.  I have explained the risks, benefits, and alternatives of the procedure in detail.  The patient voices understanding and all questions have been answered.  The patient agrees to proceed as planned.

## 2019-10-21 NOTE — DISCHARGE SUMMARY
[facility]  Discharge Summary      Admit Date: 10/21/2019    Discharge Date and Time:  10/21/2019 7:26 AM    Attending Physician: Gomez Stark MD     Reason for Admission: Screening colonoscopy    Procedures Performed: Procedure(s) (LRB):  COLONOSCOPY (N/A)    Hospital Course (synopsis of major diagnoses, care, treatment, and services provided during the course of the hospital stay): Outpatient colonoscopy     Consults: none    Significant Diagnostic Studies: Colonoscopy    Final Diagnoses:    Principal Problem: <principal problem not specified>   Secondary Diagnoses: Colonoscopy    Discharged Condition: good    Disposition: Home or Self Care    Follow Up/Patient Instructions: Follow-up with referring physician             Resume previous diet and activity.    Medications:  Reconciled Home Medications:      Medication List      ASK your doctor about these medications    albuterol 90 mcg/actuation inhaler  Commonly known as:  PROVENTIL/VENTOLIN HFA  Inhale 1-2 puffs into the lungs every 4 to 6 hours as needed for Wheezing or Shortness of Breath (chest tightness).     cetirizine 10 MG tablet  Commonly known as:  ZYRTEC  Take 10 mg by mouth once daily.     CONTOUR TEST STRIPS Strp  Generic drug:  blood sugar diagnostic  TEST ONCE DAILY     fluticasone propionate 50 mcg/actuation nasal spray  Commonly known as:  FLONASE  1 spray (50 mcg total) by Each Nare route once daily.     inhalation spacing device  Use as directed for inhalation.     JANUVIA 100 MG Tab  Generic drug:  SITagliptin  TAKE 1 TABLET (100 MG TOTAL) BY MOUTH ONCE DAILY.     lisinopril-hydrochlorothiazide 20-12.5 mg per tablet  Commonly known as:  PRINZIDE,ZESTORETIC  TAKE 1 TABLET BY MOUTH EVERY DAY     metFORMIN 500 MG 24 hr tablet  Commonly known as:  GLUCOPHAGE-XR  Take 2 tablets (1,000 mg total) by mouth 2 (two) times daily with meals.     MICROLET LANCET Misc  Generic drug:  lancets  TEST ONCE DAILY     montelukast 10 mg  tablet  Commonly known as:  SINGULAIR  TAKE 1 TABLET (10 MG TOTAL) BY MOUTH ONCE DAILY.     omeprazole 20 MG capsule  Commonly known as:  PRILOSEC  TAKE 1 CAPSULE (20 MG TOTAL) BY MOUTH EVERY MORNING.     * polyethylene glycol 240-22.72-6.72 -5.84 gram Solr  Commonly known as:  COLYTE  Take 4,000 mLs (4 L total) by mouth as needed.     * polyethylene glycol 240-22.72-6.72 -5.84 gram Solr  Commonly known as:  COLYTE  Take 4,000 mLs (4 L total) by mouth once. for 1 dose  Ask about: Should I take this medication?         * This list has 2 medication(s) that are the same as other medications prescribed for you. Read the directions carefully, and ask your doctor or other care provider to review them with you.              No discharge procedures on file.

## 2019-10-21 NOTE — TRANSFER OF CARE
"Anesthesia Transfer of Care Note    Patient: Iqra Duque    Procedure(s) Performed: Procedure(s) (LRB):  COLONOSCOPY (N/A)    Patient location: GI    Anesthesia Type: general (tiva)    Transport from OR: Transported from OR on 2-3 L/min O2 by NC with adequate spontaneous ventilation    Post pain: adequate analgesia    Post assessment: no apparent anesthetic complications    Post vital signs: stable    Level of consciousness: awake    Nausea/Vomiting: no nausea/vomiting    Complications: none    Transfer of care protocol was followed      Last vitals:   Visit Vitals  /67 (BP Location: Left arm, Patient Position: Lying)   Pulse 88   Temp 36.4 °C (97.5 °F) (Oral)   Resp 16   Ht 5' 5" (1.651 m)   Wt 127.9 kg (282 lb)   SpO2 96%   Breastfeeding? No   BMI 46.93 kg/m²     "

## 2019-10-21 NOTE — DISCHARGE INSTRUCTIONS
Diverticulosis    Diverticulosis means that small pouches have formed in the wall of your large intestine (colon). Most often, this problem causes no symptoms and is common as people age. But the pouches in the colon are at risk of becoming infected. When this happens, the condition is called diverticulitis. Although most people with diverticulosis never develop diverticulitis, it is still not uncommon. Rectal bleeding can also occur and in less common situations, a type of colon inflammation called colitis.  While most people do not have symptoms, some people with diverticulosis may have:  · Abdominal cramps and pain  · Bloating  · Constipation  · Change in bowel habits  Causes  The exact cause of diverticulosis (and diverticulitis) has not been proved, but a few things are associated with the condition:  · Low-fiber diet  · Constipation  · Lack of exercise  Your healthcare provider will talk with you about how to manage your condition. Diet changes may be all that are needed to help control diverticulosis and prevent progression to diverticulitis. If you develop diverticulitis, you will likely need other treatments.  Home care  You may be told to take fiber supplements daily. Fiber adds bulk to the stool so that it passes through the colon more easily. Stool softeners may be recommended. You may also be given medications for pain relief. Be sure to take all medications as directed.  In the past, people were told to avoid corn, nuts, and seeds. This is no longer necessary.  Follow these guidelines when caring for yourself at home:  · Eat unprocessed foods that are high in fiber. Whole grains, fruits, and vegetables are good choices.  · Drink 6 to 8 glasses of water every day unless your healthcare provider has you limit how much fluid you should have.  · Watch for changes in your bowel movements. Tell your provider if you notice any changes.  · Begin an exercise program. Ask your provider how to get started.  Generally, walking is the best.  · Get plenty of rest and sleep.  Follow-up care  Follow up with your healthcare provider, or as advised. Regular visits may be needed to check on your health. Sometimes special procedures such as colonoscopy, are needed after an episode of diverticulitis or blooding. Be sure to keep all your appointments.  If a stool sample was taken, or cultures were done, you should be told if they are positive, or if your treatment needs to be changed. You can call as directed for the results.  If X-rays were done, a radiologist will look at them. You will be told if there is a change in your treatment.  If antibiotics were prescribed, be sure to finish them all.  When to seek medical advice  Call your healthcare provider right away if any of these occur:  · Fever of 100.4°F (38°C) or higher, or as directed by your healthcare provider  · Severe cramps in the lower left side of the abdomen or pain that is getting worse  · Tenderness in the lower left side of the abdomen or worsening pain throughout the abdomen  · Diarrhea or constipation that doesn't get better within 24 hours  · Nausea and vomiting  · Bleeding from the rectum  Call 911  Call emergency services if any of the following occur:  · Trouble breathing  · Confusion  · Very drowsy or trouble awakening  · Fainting or loss of consciousness  · Rapid heart rate  · Chest pain  Date Last Reviewed: 12/30/2015 © 2000-2017 [employer]. [address] [place] [address]. All rights reserved. This information is not intended as a substitute for professional medical care. Always follow your healthcare professional's instructions.        High-Fiber Diet  Fiber is in fruits, vegetables, cereals, and grains. Fiber passes through your body undigested. A high-fiber diet helps food move through your intestinal tract. The added bulk is helpful in preventing constipation. In people with diverticulosis, fiber helps clean out the pouches  along the colon wall. It also prevents new pouches from forming. A high-fiber diet reduces the risk of colon cancer. It also lowers blood cholesterol and prevents high blood sugar in people with diabetes.    The fiber-rich foods listed below should be part of your diet. If you are not used to high-fiber foods, start with 1 or 2 foods from this list. Every 3 to 4 days add a new one to your diet. Do this until you are eating 4 high-fiber foods per day. This should give you 20 to 35 grams of fiber a day. It is also important to drink a lot of water when you are on this diet. You should have 6 to 8 glasses of water a day. Water makes the fiber swell and increases the benefit.  Foods high in dietary fiber  The following foods are high in dietary fiber:  · Breads. Breads made with 100% whole-wheat flour; eileen, wheat, or rye crackers; whole-grain tortillas, bran muffins.  · Cereals. Whole-grain and bran cereals with bran (shredded wheat, wheat flakes, raisin bran, corn bran); oatmeal, rolled oats, granola, and brown rice.  · Fruits. Fresh fruits and their edible skins (pears, prunes, raisins, berries, apples, and apricots); bananas, citrus fruit, mangoes, pineapple; and prune juice.  · Nuts. Any nuts and seeds.  · Vegetables. Best served raw or lightly cooked. All types, especially: green peas, celery, eggplant, potatoes, spinach, broccoli, Duck sprouts, winter squash, carrots, cauliflower, soybeans, lentils, and fresh and dried beans of all kinds.  · Other. Popcorn, any spices.  Date Last Reviewed: 8/1/2016  © 1904-5397 [employer]. [address] [place] [address], [place] [address]. All rights reserved. This information is not intended as a substitute for professional medical care. Always follow your healthcare professional's instructions.        Understanding Colon and Rectal Polyps    The colon (also called the large intestine) is a muscular tube that forms the last part of the digestive tract. It absorbs  water and stores food waste. The colon is about 4 to 6 feet long. The rectum is the last 6 inches of the colon. The colon and rectum have a smooth lining composed of millions of cells. Changes in these cells can lead to growths in the colon that can become cancerous and should be removed. Multiple tests are available to screen for colon cancer, but the colonoscopy is the most recommended test. During colonoscopy, these polyps can be removed. How often you need this test depends on many things including your condition, your family history, symptoms, and what the findings were at the previous colonoscopy.   When the colon lining changes  Changes that happen in the cells that line the colon or rectum can lead to growths called polyps. Over a period of years, polyps can turn cancerous. Removing polyps early may prevent cancer from ever forming.  Polyps  Polyps are fleshy clumps of tissue that form on the lining of the colon or rectum. Small polyps are usually benign (not cancerous). However, over time, cells in a polyp can change and become cancerous. Certain types of polyps known as adenomatous polyps are premalignant. The risk for invasive cancer increases with the size of the polyp and certain cell and gene features. This means that they can become cancerous if they're not removed. Hyperplastic polyps are benign. They can grow quite large and not turn cancerous.   Cancer  Almost all colorectal cancers start when polyp cells begin growing abnormally. As a cancerous tumor grows, it may involve more and more of the colon or rectum. In time, cancer can also grow beyond the colon or rectum and spread to nearby organs or to glands called lymph nodes. The cells can also travel to other parts of the body. This is known as metastasis. The earlier a cancerous tumor is removed, the better the chance of preventing its spread.    Date Last Reviewed: 8/1/2016  © 8102-3224 [employer]. [address] [place] [address]  [place] [address]. All rights reserved. This information is not intended as a substitute for professional medical care. Always follow your healthcare professional's instructions.

## 2019-10-21 NOTE — ANESTHESIA PREPROCEDURE EVALUATION
10/21/2019  Iqra Duque is a 49 y.o., female. NPO for colonoscopy    Anesthesia Evaluation         Review of Systems  Anesthesia Hx:  Denies Family Hx of Anesthesia complications.  Denies Personal Hx of Anesthesia complications.   Cardiovascular:   Hypertension    Pulmonary:   Sleep Apnea    Hepatic/GI:   GERD    Neurological:   Headaches    Endocrine:   Diabetes        Physical Exam  General:  Morbid Obesity    Airway/Jaw/Neck:  Airway Findings: Mouth Opening: Small, but > 3cm Mallampati: III      Dental:  Dental Findings: In tact   Chest/Lungs:  Chest/Lungs Findings: Clear to auscultation     Heart/Vascular:  Heart Findings: Rate: Normal  Rhythm: Regular Rhythm         ECHO (7/2017)    CONCLUSIONS     1 - Normal left ventricular systolic function (EF 55-60%).     2 - Concentric remodeling.     3 - Trivial mitral regurgitation.     4 - Trivial tricuspid regurgitation.     Anesthesia Plan  Type of Anesthesia, risks & benefits discussed:  Anesthesia Type:  general  Patient's Preference:   Intra-op Monitoring Plan: standard ASA monitors  Intra-op Monitoring Plan Comments:   Post Op Pain Control Plan:   Post Op Pain Control Plan Comments:   Induction:    Beta Blocker:         Informed Consent:  Anesthesia consent signed with patient.  ASA Score: 3     Day of Surgery Review of History & Physical:            Ready For Surgery From Anesthesia Perspective.

## 2019-10-25 ENCOUNTER — PATIENT OUTREACH (OUTPATIENT)
Dept: ADMINISTRATIVE | Facility: HOSPITAL | Age: 49
End: 2019-10-25

## 2019-10-25 DIAGNOSIS — Z12.31 ENCOUNTER FOR SCREENING MAMMOGRAM FOR MALIGNANT NEOPLASM OF BREAST: Primary | ICD-10-CM

## 2019-10-25 RX ORDER — ACETAMINOPHEN AND CODEINE PHOSPHATE 300; 30 MG/1; MG/1
1 TABLET ORAL EVERY 6 HOURS PRN
Refills: 0 | COMMUNITY
Start: 2019-09-01 | End: 2019-11-08

## 2019-10-25 RX ORDER — CLINDAMYCIN HYDROCHLORIDE 300 MG/1
300 CAPSULE ORAL 3 TIMES DAILY
Refills: 0 | COMMUNITY
Start: 2019-09-01 | End: 2019-11-08

## 2019-11-04 ENCOUNTER — LAB VISIT (OUTPATIENT)
Dept: LAB | Facility: HOSPITAL | Age: 49
End: 2019-11-04
Attending: INTERNAL MEDICINE
Payer: COMMERCIAL

## 2019-11-04 ENCOUNTER — OFFICE VISIT (OUTPATIENT)
Dept: PSYCHIATRY | Facility: CLINIC | Age: 49
End: 2019-11-04
Payer: COMMERCIAL

## 2019-11-04 DIAGNOSIS — E11.9 TYPE 2 DIABETES MELLITUS WITHOUT COMPLICATION, WITHOUT LONG-TERM CURRENT USE OF INSULIN: Chronic | ICD-10-CM

## 2019-11-04 DIAGNOSIS — E55.9 VITAMIN D INSUFFICIENCY: ICD-10-CM

## 2019-11-04 DIAGNOSIS — I10 ESSENTIAL HYPERTENSION: Chronic | ICD-10-CM

## 2019-11-04 DIAGNOSIS — F41.1 GENERALIZED ANXIETY DISORDER: Primary | ICD-10-CM

## 2019-11-04 LAB
25(OH)D3+25(OH)D2 SERPL-MCNC: 12 NG/ML (ref 30–96)
ALBUMIN SERPL BCP-MCNC: 3.3 G/DL (ref 3.5–5.2)
ALP SERPL-CCNC: 51 U/L (ref 55–135)
ALT SERPL W/O P-5'-P-CCNC: 45 U/L (ref 10–44)
ANION GAP SERPL CALC-SCNC: 11 MMOL/L (ref 8–16)
AST SERPL-CCNC: 50 U/L (ref 10–40)
BASOPHILS # BLD AUTO: 0.06 K/UL (ref 0–0.2)
BASOPHILS NFR BLD: 0.8 % (ref 0–1.9)
BILIRUB SERPL-MCNC: 0.8 MG/DL (ref 0.1–1)
BUN SERPL-MCNC: 15 MG/DL (ref 6–20)
CALCIUM SERPL-MCNC: 9.7 MG/DL (ref 8.7–10.5)
CHLORIDE SERPL-SCNC: 100 MMOL/L (ref 95–110)
CHOLEST SERPL-MCNC: 163 MG/DL (ref 120–199)
CHOLEST/HDLC SERPL: 3.3 {RATIO} (ref 2–5)
CO2 SERPL-SCNC: 28 MMOL/L (ref 23–29)
CREAT SERPL-MCNC: 0.7 MG/DL (ref 0.5–1.4)
DIFFERENTIAL METHOD: ABNORMAL
EOSINOPHIL # BLD AUTO: 0.3 K/UL (ref 0–0.5)
EOSINOPHIL NFR BLD: 3.6 % (ref 0–8)
ERYTHROCYTE [DISTWIDTH] IN BLOOD BY AUTOMATED COUNT: 15 % (ref 11.5–14.5)
EST. GFR  (AFRICAN AMERICAN): >60 ML/MIN/1.73 M^2
EST. GFR  (NON AFRICAN AMERICAN): >60 ML/MIN/1.73 M^2
ESTIMATED AVG GLUCOSE: 203 MG/DL (ref 68–131)
GLUCOSE SERPL-MCNC: 134 MG/DL (ref 70–110)
HBA1C MFR BLD HPLC: 8.7 % (ref 4–5.6)
HCT VFR BLD AUTO: 41.7 % (ref 37–48.5)
HDLC SERPL-MCNC: 49 MG/DL (ref 40–75)
HDLC SERPL: 30.1 % (ref 20–50)
HGB BLD-MCNC: 12.5 G/DL (ref 12–16)
IMM GRANULOCYTES # BLD AUTO: 0.01 K/UL (ref 0–0.04)
IMM GRANULOCYTES NFR BLD AUTO: 0.1 % (ref 0–0.5)
LDLC SERPL CALC-MCNC: 90.8 MG/DL (ref 63–159)
LYMPHOCYTES # BLD AUTO: 3.2 K/UL (ref 1–4.8)
LYMPHOCYTES NFR BLD: 42.1 % (ref 18–48)
MCH RBC QN AUTO: 24.5 PG (ref 27–31)
MCHC RBC AUTO-ENTMCNC: 30 G/DL (ref 32–36)
MCV RBC AUTO: 82 FL (ref 82–98)
MONOCYTES # BLD AUTO: 0.4 K/UL (ref 0.3–1)
MONOCYTES NFR BLD: 5.6 % (ref 4–15)
NEUTROPHILS # BLD AUTO: 3.6 K/UL (ref 1.8–7.7)
NEUTROPHILS NFR BLD: 47.8 % (ref 38–73)
NONHDLC SERPL-MCNC: 114 MG/DL
NRBC BLD-RTO: 0 /100 WBC
PLATELET # BLD AUTO: 271 K/UL (ref 150–350)
PMV BLD AUTO: 10.8 FL (ref 9.2–12.9)
POTASSIUM SERPL-SCNC: 4.1 MMOL/L (ref 3.5–5.1)
PROT SERPL-MCNC: 6.9 G/DL (ref 6–8.4)
RBC # BLD AUTO: 5.11 M/UL (ref 4–5.4)
SODIUM SERPL-SCNC: 139 MMOL/L (ref 136–145)
TRIGL SERPL-MCNC: 116 MG/DL (ref 30–150)
WBC # BLD AUTO: 7.48 K/UL (ref 3.9–12.7)

## 2019-11-04 PROCEDURE — 36415 COLL VENOUS BLD VENIPUNCTURE: CPT | Mod: PO

## 2019-11-04 PROCEDURE — 80061 LIPID PANEL: CPT

## 2019-11-04 PROCEDURE — 80053 COMPREHEN METABOLIC PANEL: CPT

## 2019-11-04 PROCEDURE — 90834 PSYTX W PT 45 MINUTES: CPT | Mod: S$GLB,,, | Performed by: SOCIAL WORKER

## 2019-11-04 PROCEDURE — 82306 VITAMIN D 25 HYDROXY: CPT

## 2019-11-04 PROCEDURE — 83036 HEMOGLOBIN GLYCOSYLATED A1C: CPT

## 2019-11-04 PROCEDURE — 85025 COMPLETE CBC W/AUTO DIFF WBC: CPT

## 2019-11-04 PROCEDURE — 90834 PR PSYCHOTHERAPY W/PATIENT, 45 MIN: ICD-10-PCS | Mod: S$GLB,,, | Performed by: SOCIAL WORKER

## 2019-11-04 NOTE — PROGRESS NOTES
"Individual Psychotherapy (PhD/LCSW)    11/4/2019    Site:  [facility]         Therapeutic Intervention: Met with patient.  Outpatient - Insight oriented psychotherapy 45 min - CPT code 10724 and Outpatient - Supportive psychotherapy 45 min - CPT Code 30052    Chief complaint/reason for encounter: anxiety     Interval history and content of current session: Patient returned to clinic for follow up psychotherapy. Patient reports that she is doing "okay". Reports that she has been working a lot which is somewhat stressful. Has adjusted to the night shift again. States that she has tried to do normal day time things with family but at times struggles to stay awake. States that mood has been "okay". In the past was "crabby" when she was tired but is doing better with that. States that she tries to keep herself out of situations that she doesn't belong. Gave example if  is arguing with children that she doesn't insert herself into the problem. Does think that she has gone to the extreme of not dealing with things in general now though. States that she feels like she doesn't want to "rock the boat". Discussed that she is trying to figure out her food schedule again.  was the main person to take care of that and while he was in the hospital she fell off the wagon. Reports that she was doing well of eating 200 calories a night but hasn't been lately. Discussed that she also doesn't exercise as much as she should because she is always tired. Discussed ways of getting in exercise that doesn't hurt her back as much. Patient will discuss further with PCP.     Treatment plan:  · Target symptoms: anxiety   · Why chosen therapy is appropriate versus another modality: relevant to diagnosis, patient responds to this modality, evidence based practice  · Outcome monitoring methods: self-report, lab data, observation  · Therapeutic intervention type: insight oriented psychotherapy, behavior modifying psychotherapy, supportive " psychotherapy    Risk parameters:  Patient reports no suicidal ideation  Patient reports no homicidal ideation  Patient reports no self-injurious behavior  Patient reports no violent behavior    Verbal deficits: None    Patient's response to intervention:  The patient's response to intervention is accepting.    Progress toward goals and other mental status changes:  The patient's progress toward goals is fair .    Diagnosis:     ICD-10-CM ICD-9-CM   1. Generalized anxiety disorder F41.1 300.02       Plan:  individual psychotherapy    Return to clinic: 2 weeks    Length of Service (minutes): 45     Cassandra Rockweiler, LCSW-[facility]IL[facility]ARS

## 2019-11-08 ENCOUNTER — OFFICE VISIT (OUTPATIENT)
Dept: FAMILY MEDICINE | Facility: CLINIC | Age: 49
End: 2019-11-08
Payer: COMMERCIAL

## 2019-11-08 VITALS
TEMPERATURE: 98 F | HEART RATE: 86 BPM | DIASTOLIC BLOOD PRESSURE: 80 MMHG | OXYGEN SATURATION: 96 % | SYSTOLIC BLOOD PRESSURE: 128 MMHG | HEIGHT: 65 IN | BODY MASS INDEX: 46.92 KG/M2 | WEIGHT: 281.63 LBS

## 2019-11-08 DIAGNOSIS — I10 ESSENTIAL HYPERTENSION: Chronic | ICD-10-CM

## 2019-11-08 DIAGNOSIS — E66.01 MORBID OBESITY WITH BMI OF 45.0-49.9, ADULT: Chronic | ICD-10-CM

## 2019-11-08 DIAGNOSIS — E11.9 TYPE 2 DIABETES MELLITUS WITHOUT COMPLICATION, WITHOUT LONG-TERM CURRENT USE OF INSULIN: Chronic | ICD-10-CM

## 2019-11-08 DIAGNOSIS — M54.50 CHRONIC BILATERAL LOW BACK PAIN WITHOUT SCIATICA: ICD-10-CM

## 2019-11-08 DIAGNOSIS — Z00.00 ROUTINE MEDICAL EXAM: Primary | ICD-10-CM

## 2019-11-08 DIAGNOSIS — K76.0 NAFLD (NONALCOHOLIC FATTY LIVER DISEASE): Chronic | ICD-10-CM

## 2019-11-08 DIAGNOSIS — E55.9 VITAMIN D INSUFFICIENCY: Chronic | ICD-10-CM

## 2019-11-08 DIAGNOSIS — Z23 FLU VACCINE NEED: ICD-10-CM

## 2019-11-08 DIAGNOSIS — G89.29 CHRONIC BILATERAL LOW BACK PAIN WITHOUT SCIATICA: ICD-10-CM

## 2019-11-08 PROBLEM — Z12.11 COLON CANCER SCREENING: Status: RESOLVED | Noted: 2019-10-21 | Resolved: 2019-11-08

## 2019-11-08 PROCEDURE — 90686 FLU VACCINE (QUAD) GREATER THAN OR EQUAL TO 3YO PRESERVATIVE FREE IM: ICD-10-PCS | Mod: S$GLB,,, | Performed by: INTERNAL MEDICINE

## 2019-11-08 PROCEDURE — 99396 PREV VISIT EST AGE 40-64: CPT | Mod: 25,S$GLB,, | Performed by: INTERNAL MEDICINE

## 2019-11-08 PROCEDURE — 99999 PR PBB SHADOW E&M-EST. PATIENT-LVL III: CPT | Mod: PBBFAC,,, | Performed by: INTERNAL MEDICINE

## 2019-11-08 PROCEDURE — 3079F DIAST BP 80-89 MM HG: CPT | Mod: CPTII,S$GLB,, | Performed by: INTERNAL MEDICINE

## 2019-11-08 PROCEDURE — 3074F SYST BP LT 130 MM HG: CPT | Mod: CPTII,S$GLB,, | Performed by: INTERNAL MEDICINE

## 2019-11-08 PROCEDURE — 90686 IIV4 VACC NO PRSV 0.5 ML IM: CPT | Mod: S$GLB,,, | Performed by: INTERNAL MEDICINE

## 2019-11-08 PROCEDURE — 99396 PR PREVENTIVE VISIT,EST,40-64: ICD-10-PCS | Mod: 25,S$GLB,, | Performed by: INTERNAL MEDICINE

## 2019-11-08 PROCEDURE — 3074F PR MOST RECENT SYSTOLIC BLOOD PRESSURE < 130 MM HG: ICD-10-PCS | Mod: CPTII,S$GLB,, | Performed by: INTERNAL MEDICINE

## 2019-11-08 PROCEDURE — 99999 PR PBB SHADOW E&M-EST. PATIENT-LVL III: ICD-10-PCS | Mod: PBBFAC,,, | Performed by: INTERNAL MEDICINE

## 2019-11-08 PROCEDURE — 90471 IMMUNIZATION ADMIN: CPT | Mod: S$GLB,,, | Performed by: INTERNAL MEDICINE

## 2019-11-08 PROCEDURE — 3079F PR MOST RECENT DIASTOLIC BLOOD PRESSURE 80-89 MM HG: ICD-10-PCS | Mod: CPTII,S$GLB,, | Performed by: INTERNAL MEDICINE

## 2019-11-08 PROCEDURE — 90471 FLU VACCINE (QUAD) GREATER THAN OR EQUAL TO 3YO PRESERVATIVE FREE IM: ICD-10-PCS | Mod: S$GLB,,, | Performed by: INTERNAL MEDICINE

## 2019-11-08 RX ORDER — ERGOCALCIFEROL 1.25 MG/1
50000 CAPSULE ORAL
Qty: 12 CAPSULE | Refills: 1 | Status: SHIPPED | OUTPATIENT
Start: 2019-11-10 | End: 2020-05-11

## 2019-11-08 NOTE — PROGRESS NOTES
Assessment & Plan  Problem List Items Addressed This Visit        Cardiac/Vascular    Essential hypertension (Chronic)    Current Assessment & Plan     The current medical regimen is effective;  continue present plan and medications.             Endocrine    Vitamin D insufficiency (Chronic)    Current Assessment & Plan     Start weekly high dose replacement.          Relevant Medications    ergocalciferol (ERGOCALCIFEROL) 50,000 unit Cap (Start on 11/10/2019)    Type 2 diabetes mellitus without complication (Chronic)    Current Assessment & Plan     Continue metformin.  Change DPP4 to GLP1 since she does not have adequate control.  Counseled briefly on sulfonylurea as well.  Discussed need for major lifestyle modification but this will be difficult with her job sched etc.          Relevant Medications    dulaglutide (TRULICITY) 0.75 mg/0.5 mL PnIj    Other Relevant Orders    Hemoglobin A1c    Comprehensive metabolic panel    Microalbumin/creatinine urine ratio    Morbid obesity with BMI of 45.0-49.9, adult (Chronic)    Current Assessment & Plan     The patient is asked to make an attempt to improve diet and exercise patterns to aid in medical management of this problem. We specifically discussed cutting calorie intake by 500-1000 calories per day for a goal of a 1-2 pound weight loss and recommendations for a mostly plant based diet with limited red meats/refined grains/processed foods/added sugars.             GI    NAFLD (nonalcoholic fatty liver disease) (Chronic)    Current Assessment & Plan     Counseled on findings of old ultrasounds and recent labs.  Discussed diagnosis and risks associated with this diagnosis.  The patient is asked to make an attempt to improve diet and exercise patterns to aid in medical management of this problem. We specifically discussed cutting calorie intake by 500-1000 calories per day for a goal of a 1-2 pound weight loss and recommendations for a mostly plant based diet with  limited red meats/refined grains/processed foods/added sugars.             Orthopedic    Chronic bilateral low back pain without sciatica (Chronic)    Overview     Seeing outside chiropractor         Current Assessment & Plan     Recent exacerbation after trip and fall.  I counseled the patient on OTC medications that can safely be used.           Other Visit Diagnoses     Routine medical exam    -  Primary  -    Discussed healthy diet, regular exercise, necessary labs, age appropriate cancer screening, and routine vaccinations.       Flu vaccine need    -  vaccinate    Relevant Orders    Influenza - Quadrivalent (6 months+) (PF) (Completed)            Health Maintenance reviewed, as above.    Follow-up: Follow up in about 3 months (around 2/8/2020) for follow up for chronic conditions.  ______________________________________________________________________    Chief Complaint  Chief Complaint   Patient presents with    Annual Exam       HPI  Iqra Duque is a 49 y.o. female with medical diagnoses as listed in the medical history and problem list that presents for routine physical.  Pt is known to me with her last appointment 8/12/2019.  She had labs prior to this OV that showed    She had a trip and fall at home today.  Aggravated her back.  She has been working a night shift. Making her eating habits and exercise habits get far off her baseline.      Taking and tolerating medications as prescribed without perceived side effects.  No CP, SOB, palpitations, hypoglycemic symptoms.        PAST MEDICAL HISTORY:  Past Medical History:   Diagnosis Date    Allergy     Diabetes mellitus type II     Diverticulitis 09/2014    Fever blister     Hypertension     Migraine headache        PAST SURGICAL HISTORY:  Past Surgical History:   Procedure Laterality Date    COLONOSCOPY N/A 10/21/2019    Procedure: COLONOSCOPY;  Surgeon: Gomez Stark MD;  Location: [facility];  Service: Endoscopy;  Laterality: N/A;     HYSTERECTOMY  06/2009    LARSB---DLH/BSO ATYPICAL HYPERPLASIA    OOPHORECTOMY      TUBAL LIGATION         SOCIAL HISTORY:  Social History     Socioeconomic History    Marital status:      Spouse name: Not on file    Number of children: Not on file    Years of education: Not on file    Highest education level: Not on file   Occupational History    Occupation: Dispatcher     Employer: [employer]   Social Needs    Financial resource strain: Not very hard    Food insecurity:     Worry: Never true     Inability: Never true    Transportation needs:     Medical: No     Non-medical: No   Tobacco Use    Smoking status: Never Smoker    Smokeless tobacco: Never Used   Substance and Sexual Activity    Alcohol use: No     Frequency: Monthly or less     Drinks per session: 1 or 2     Binge frequency: Never    Drug use: No    Sexual activity: Yes     Partners: Male     Birth control/protection: None   Lifestyle    Physical activity:     Days per week: 0 days     Minutes per session: Not on file    Stress: Rather much   Relationships    Social connections:     Talks on phone: More than three times a week     Gets together: Once a week     Attends Religion service: Not on file     Active member of club or organization: No     Attends meetings of clubs or organizations: Not on file     Relationship status:    Other Topics Concern    Are you pregnant or think you may be? Not Asked    Breast-feeding Not Asked   Social History Narrative    Not on file       FAMILY HISTORY:  Family History   Problem Relation Age of Onset    Ovarian cancer Mother 43    Cancer Mother         ovarian    Diabetes Mother     Heart disease Father     Melanoma Neg Hx     Psoriasis Neg Hx     Lupus Neg Hx        ALLERGIES AND MEDICATIONS: updated and reviewed.  Review of patient's allergies indicates:   Allergen Reactions    Bactrim [sulfamethoxazole-trimethoprim] Hives     Also Septra    Keflex [cephalexin]  Shortness Of Breath    Neosporin (neomycin-polymyx) Swelling    Amoxicillin Rash    Neosporin  [benzalkonium chloride] Rash     Current Outpatient Medications   Medication Sig Dispense Refill    albuterol 90 mcg/actuation inhaler Inhale 1-2 puffs into the lungs every 4 to 6 hours as needed for Wheezing or Shortness of Breath (chest tightness). 1 Inhaler 6    cetirizine (ZYRTEC) 10 MG tablet Take 10 mg by mouth once daily.      CONTOUR TEST STRIPS Strp TEST ONCE DAILY 100 strip 1    inhalation device (AEROCHAMBER PLUS FLOW-VU) Use as directed for inhalation. 1 Device 0    lisinopril-hydrochlorothiazide (PRINZIDE,ZESTORETIC) 20-12.5 mg per tablet TAKE 1 TABLET BY MOUTH EVERY DAY 90 tablet 3    metFORMIN (GLUCOPHAGE-XR) 500 MG 24 hr tablet Take 2 tablets (1,000 mg total) by mouth 2 (two) times daily with meals. 360 tablet 1    MICROLET LANCET Misc TEST ONCE DAILY 100 each 3    montelukast (SINGULAIR) 10 mg tablet TAKE 1 TABLET (10 MG TOTAL) BY MOUTH ONCE DAILY. 90 tablet 3    omeprazole (PRILOSEC) 20 MG capsule TAKE 1 CAPSULE (20 MG TOTAL) BY MOUTH EVERY MORNING. 90 capsule 3    polyethylene glycol (COLYTE) 240-22.72-6.72 -5.84 gram SolR Take 4,000 mLs (4 L total) by mouth as needed. 1 Bottle 0    dulaglutide (TRULICITY) 0.75 mg/0.5 mL PnIj Inject 0.5 mLs (0.75 mg total) into the skin every 7 days. 1 Syringe 3    [START ON 11/10/2019] ergocalciferol (ERGOCALCIFEROL) 50,000 unit Cap Take 1 capsule (50,000 Units total) by mouth every Sunday. 12 capsule 1     No current facility-administered medications for this visit.          ROS  Review of Systems   Constitutional: Positive for fatigue. Negative for chills, fever and unexpected weight change.   HENT: Negative for congestion, ear pain, hearing loss, rhinorrhea, sore throat and trouble swallowing.    Eyes: Negative for discharge, redness and visual disturbance.   Respiratory: Negative for cough, chest tightness, shortness of breath and wheezing.   "  Cardiovascular: Negative for chest pain, palpitations and leg swelling.   Gastrointestinal: Negative for abdominal pain, constipation, diarrhea, nausea and vomiting.   Endocrine: Positive for polydipsia. Negative for polyphagia and polyuria.   Genitourinary: Negative for decreased urine volume, dysuria and hematuria.   Musculoskeletal: Positive for myalgias (fell today after trippng, mild symptoms). Negative for arthralgias and joint swelling.   Skin: Negative for color change and rash.   Neurological: Negative for dizziness, tremors, seizures, speech difficulty, weakness, light-headedness and headaches.   Psychiatric/Behavioral: Positive for confusion. Negative for decreased concentration, dysphoric mood, sleep disturbance and suicidal ideas. The patient is nervous/anxious.            Physical Exam  Vitals:    11/08/19 1525   BP: 128/80   BP Location: Left arm   Patient Position: Sitting   BP Method: Large (Manual)   Pulse: 86   Temp: 98.4 °F (36.9 °C)   TempSrc: Oral   SpO2: 96%   Weight: 127.7 kg (281 lb 10.2 oz)   Height: 5' 5" (1.651 m)    Body mass index is 46.87 kg/m².  Weight: 127.7 kg (281 lb 10.2 oz)   Height: 5' 5" (165.1 cm)   Physical Exam   Constitutional: She is oriented to person, place, and time. She appears well-developed and well-nourished. No distress.   HENT:   Head: Normocephalic and atraumatic.   Right Ear: Tympanic membrane, external ear and ear canal normal.   Left Ear: Tympanic membrane, external ear and ear canal normal.   Nose: Nose normal. Right sinus exhibits no maxillary sinus tenderness and no frontal sinus tenderness. Left sinus exhibits no maxillary sinus tenderness and no frontal sinus tenderness.   Mouth/Throat: Uvula is midline, oropharynx is clear and moist and mucous membranes are normal. No tonsillar exudate.   Eyes: Pupils are equal, round, and reactive to light. Conjunctivae, EOM and lids are normal. No scleral icterus.   Neck: Full passive range of motion without pain. " Neck supple. No JVD present. No spinous process tenderness and no muscular tenderness present. Carotid bruit is not present. No thyromegaly present.   Cardiovascular: Normal rate, regular rhythm, S1 normal, S2 normal and intact distal pulses. Exam reveals no S3, no S4 and no friction rub.   No murmur heard.  Pulmonary/Chest: Effort normal and breath sounds normal. She has no wheezes. She has no rhonchi. She has no rales.   Abdominal: Soft. Bowel sounds are normal. She exhibits no distension. There is no hepatosplenomegaly. There is no tenderness. There is no rebound and no CVA tenderness.   Musculoskeletal: Normal range of motion. She exhibits no edema.        Cervical back: She exhibits no tenderness and no bony tenderness.        Thoracic back: She exhibits no tenderness and no bony tenderness.        Lumbar back: She exhibits tenderness. She exhibits no bony tenderness.        Back:    Lymphadenopathy:        Head (right side): No submental and no submandibular adenopathy present.        Head (left side): No submental and no submandibular adenopathy present.     She has no cervical adenopathy.   Neurological: She is alert and oriented to person, place, and time. Coordination normal.   Motor grossly intact.  Sensory grossly intact.  Symmetric facial movements palate elevated symmetrically tongue midline     Skin: Skin is warm and dry. No rash noted. No cyanosis. Nails show no clubbing.   Psychiatric: She has a normal mood and affect. Her speech is normal and behavior is normal. Thought content normal. Cognition and memory are normal.           Health Maintenance       Date Due Completion Date    Low Dose Statin 07/16/1990 ---    Shingles Vaccine (1 of 2) 07/16/2019 ---    Influenza Vaccine (1) 09/01/2019 10/12/2018    Override on 2/25/2016: Declined    Urine Microalbumin 10/10/2019 10/10/2018    Hemoglobin A1c 02/04/2020 11/4/2019    Eye Exam 03/14/2020 3/14/2019    Mammogram 07/17/2020 7/17/2018    Foot Exam  07/24/2020 7/24/2019 (Done)    Override on 7/24/2019: Done (Dr. Heather Hicks ( podiatry ))    Override on 7/17/2018: Done    Override on 7/3/2017: Done    Lipid Panel 11/04/2020 11/4/2019    Colonoscopy 10/21/2024 10/21/2019    TETANUS VACCINE 08/29/2026 8/29/2016             yes...

## 2019-11-08 NOTE — ASSESSMENT & PLAN NOTE
Counseled on findings of old ultrasounds and recent labs.  Discussed diagnosis and risks associated with this diagnosis.  The patient is asked to make an attempt to improve diet and exercise patterns to aid in medical management of this problem. We specifically discussed cutting calorie intake by 500-1000 calories per day for a goal of a 1-2 pound weight loss and recommendations for a mostly plant based diet with limited red meats/refined grains/processed foods/added sugars.

## 2019-11-08 NOTE — ASSESSMENT & PLAN NOTE
Continue metformin.  Change DPP4 to GLP1 since she does not have adequate control.  Counseled briefly on sulfonylurea as well.  Discussed need for major lifestyle modification but this will be difficult with her job sched etc.

## 2019-11-08 NOTE — PATIENT INSTRUCTIONS
"Below is a list of free Apps that you may find helpful (some of them may not apply to you since this is a general list of helpful Apps):    Android & Apple users:  EatFit - Created by [facility] nutritionists.  Tons of great recipes, links to >100 restaurants in town with healthy choices, and shopping guides.    Fooducate - Helps with healthy diet and weight loss  Shop Well - Scan barcodes to foods to see if it is healthy or not.  It will also suggest healthier alternatives  Lose It - Calorie tracking and goal setting for weight loss.  Peer support is also available  Calorie Counter and Diet Tracker by MyFitnessPal - Helps count calories for food intake and calories burned during exercise  Popsugar Active - has pictures and videos of preloaded workout routines  [facility] Diabetes Pal - log for home sugars, diet, weight, and blood pressure  Headspace - Guides your through meditation.  The first 10 programs are free.  Stop, Think, and Breath - customizable meditation domingo with ability to perform "check-ins"          "

## 2019-11-08 NOTE — ASSESSMENT & PLAN NOTE
Recent exacerbation after trip and fall.  I counseled the patient on OTC medications that can safely be used.

## 2019-11-13 NOTE — PROGRESS NOTES
"Digital Medicine: Health  Follow-Up    Patient seems to be doing alright, patient's 's father had open heart surgery today and so stress has been running high at her house. Patient's  has been out of town and patient works nights so this has created plenty of anxiety. Patient has only submitted one reading in the last month. Reminded patient we need at least a reading a week to kylenue to monitor her. Will f/u in 4-6 weeks.     The history is provided by the patient.     Follow Up  Follow-up reason(s): reading review      Readings are missing.   patient reminder needed.      Intervention/Plan        Topic    Urine Protein Check        Last 5 Patient Entered Readings                                      Current 30 Day Average: 131/86     Recent Readings 11/13/2019 10/13/2019 10/3/2019 9/13/2019 9/8/2019    SBP (mmHg) 131 125 111 118 107    DBP (mmHg) 86 80 70 72 69    Pulse 95 76 74 92 97                      Diet Screening   Patient reports eating or drinking the following: fast food, water and restaurant foodShe has the following dietary restrictions: diabetic    Barriers to a Healthy Diet: time/convenience    Patient stated that given the current situation they have been "winging" most meals. She stated that her last A1c was elevated and she knows she needs to get back to meal planning so that is on her priority list.     Physical Activity Screening   When asked if exercising, patient responded: yes    Patient participates in the following activities: walking and yard/housework    Patient states that she will take ten minutes walks around her building a few times and evening to ensure that she isnt being sedentary the entire time. Encouraged patient to keep this up and to continue to look for ways to increase activity levels until she can get a routine together.     Medication Adherence Screening   She misses doses: never      Patient identified the following reasons for non-compliance: " none    Patient reports no s/s or issues taking medication as prescribed.      SDOH

## 2019-11-18 ENCOUNTER — OFFICE VISIT (OUTPATIENT)
Dept: PSYCHIATRY | Facility: CLINIC | Age: 49
End: 2019-11-18
Payer: COMMERCIAL

## 2019-11-18 DIAGNOSIS — F41.1 GENERALIZED ANXIETY DISORDER: Primary | ICD-10-CM

## 2019-11-18 PROCEDURE — 90834 PSYTX W PT 45 MINUTES: CPT | Mod: S$GLB,,, | Performed by: SOCIAL WORKER

## 2019-11-18 PROCEDURE — 90834 PR PSYCHOTHERAPY W/PATIENT, 45 MIN: ICD-10-PCS | Mod: S$GLB,,, | Performed by: SOCIAL WORKER

## 2019-11-18 NOTE — PROGRESS NOTES
Individual Psychotherapy (PhD/LCSW)    11/18/2019    Site:  [facility]         Therapeutic Intervention: Met with patient.  Outpatient - Insight oriented psychotherapy 45 min - CPT code 54752 and Outpatient - Supportive psychotherapy 45 min - CPT Code 55478    Chief complaint/reason for encounter: anxiety     Interval history and content of current session: Patient returned to clinic for follow up psychotherapy. Patient reports that she is doing okay. States that  is in [place] because ROSA M had open heart surgery. States that she worries about ROSA M and his stress level. Discussed that  and BRENDAN had issues but are putting them aside to help out their parents. Discussed that she is frustrated because she feels like  didn't consult her before planning his trip. States that she would have liked to go with him but he didn't give her the option. Believes that most of her frustration with it is that  does a lot around the house the her and her daughters aren't use to doing. Discussed her sleep schedule and how she is again sleeping better at night but knows that isn't good for her since she works night shift. Discussed stress with work because of coworker. States that she is close with her coworker but that recently she has been doing some extra work because coworker is focused elsewhere. States that it is easier for her to do both jobs instead of working with coworker. Discussed how this is dictated by her anxiety of not wanting to bother anyone. Discussed communicating with coworker so that they both don't get in trouble.     Treatment plan:  · Target symptoms: anxiety   · Why chosen therapy is appropriate versus another modality: relevant to diagnosis, patient responds to this modality, evidence based practice  · Outcome monitoring methods: self-report, lab data, observation  · Therapeutic intervention type: insight oriented psychotherapy, behavior modifying psychotherapy, supportive  psychotherapy    Risk parameters:  Patient reports no suicidal ideation  Patient reports no homicidal ideation  Patient reports no self-injurious behavior  Patient reports no violent behavior    Verbal deficits: None    Patient's response to intervention:  The patient's response to intervention is accepting.    Progress toward goals and other mental status changes:  The patient's progress toward goals is fair .    Diagnosis:     ICD-10-CM ICD-9-CM   1. Generalized anxiety disorder F41.1 300.02       Plan:  individual psychotherapy    Return to clinic: 2 weeks    Length of Service (minutes): 45     Cassandra Rockweiler, LCSW-GILMARS

## 2019-12-04 ENCOUNTER — LAB VISIT (OUTPATIENT)
Dept: LAB | Facility: HOSPITAL | Age: 49
End: 2019-12-04
Attending: NURSE PRACTITIONER
Payer: COMMERCIAL

## 2019-12-04 ENCOUNTER — OFFICE VISIT (OUTPATIENT)
Dept: FAMILY MEDICINE | Facility: CLINIC | Age: 49
End: 2019-12-04
Payer: COMMERCIAL

## 2019-12-04 VITALS
DIASTOLIC BLOOD PRESSURE: 86 MMHG | TEMPERATURE: 99 F | HEIGHT: 65 IN | HEART RATE: 100 BPM | BODY MASS INDEX: 46.65 KG/M2 | OXYGEN SATURATION: 97 % | WEIGHT: 280 LBS | SYSTOLIC BLOOD PRESSURE: 116 MMHG

## 2019-12-04 DIAGNOSIS — T36.95XA ANTIBIOTIC-INDUCED YEAST INFECTION: ICD-10-CM

## 2019-12-04 DIAGNOSIS — B37.9 ANTIBIOTIC-INDUCED YEAST INFECTION: ICD-10-CM

## 2019-12-04 DIAGNOSIS — R30.0 DYSURIA: Primary | ICD-10-CM

## 2019-12-04 DIAGNOSIS — Z87.19 HISTORY OF DIVERTICULITIS: ICD-10-CM

## 2019-12-04 DIAGNOSIS — R30.0 DYSURIA: ICD-10-CM

## 2019-12-04 DIAGNOSIS — R10.32 LEFT LOWER QUADRANT ABDOMINAL PAIN: ICD-10-CM

## 2019-12-04 LAB
BILIRUB SERPL-MCNC: NORMAL MG/DL
BLOOD URINE, POC: NORMAL
COLOR, POC UA: YELLOW
GLUCOSE UR QL STRIP: NORMAL
KETONES UR QL STRIP: NORMAL
LEUKOCYTE ESTERASE URINE, POC: NORMAL
NITRITE, POC UA: NORMAL
PH, POC UA: 5
PROTEIN, POC: NORMAL
SPECIFIC GRAVITY, POC UA: 1.02
UROBILINOGEN, POC UA: NORMAL

## 2019-12-04 PROCEDURE — 3074F PR MOST RECENT SYSTOLIC BLOOD PRESSURE < 130 MM HG: ICD-10-PCS | Mod: CPTII,S$GLB,, | Performed by: NURSE PRACTITIONER

## 2019-12-04 PROCEDURE — 99999 PR PBB SHADOW E&M-EST. PATIENT-LVL IV: CPT | Mod: PBBFAC,,, | Performed by: NURSE PRACTITIONER

## 2019-12-04 PROCEDURE — 99999 PR PBB SHADOW E&M-EST. PATIENT-LVL IV: ICD-10-PCS | Mod: PBBFAC,,, | Performed by: NURSE PRACTITIONER

## 2019-12-04 PROCEDURE — 3008F BODY MASS INDEX DOCD: CPT | Mod: CPTII,S$GLB,, | Performed by: NURSE PRACTITIONER

## 2019-12-04 PROCEDURE — 81002 POCT URINE DIPSTICK WITHOUT MICROSCOPE: ICD-10-PCS | Mod: S$GLB,,, | Performed by: NURSE PRACTITIONER

## 2019-12-04 PROCEDURE — 81002 URINALYSIS NONAUTO W/O SCOPE: CPT | Mod: S$GLB,,, | Performed by: NURSE PRACTITIONER

## 2019-12-04 PROCEDURE — 3079F PR MOST RECENT DIASTOLIC BLOOD PRESSURE 80-89 MM HG: ICD-10-PCS | Mod: CPTII,S$GLB,, | Performed by: NURSE PRACTITIONER

## 2019-12-04 PROCEDURE — 99214 PR OFFICE/OUTPT VISIT, EST, LEVL IV, 30-39 MIN: ICD-10-PCS | Mod: 25,S$GLB,, | Performed by: NURSE PRACTITIONER

## 2019-12-04 PROCEDURE — 87086 URINE CULTURE/COLONY COUNT: CPT

## 2019-12-04 PROCEDURE — 3079F DIAST BP 80-89 MM HG: CPT | Mod: CPTII,S$GLB,, | Performed by: NURSE PRACTITIONER

## 2019-12-04 PROCEDURE — 3008F PR BODY MASS INDEX (BMI) DOCUMENTED: ICD-10-PCS | Mod: CPTII,S$GLB,, | Performed by: NURSE PRACTITIONER

## 2019-12-04 PROCEDURE — 3074F SYST BP LT 130 MM HG: CPT | Mod: CPTII,S$GLB,, | Performed by: NURSE PRACTITIONER

## 2019-12-04 PROCEDURE — 99214 OFFICE O/P EST MOD 30 MIN: CPT | Mod: 25,S$GLB,, | Performed by: NURSE PRACTITIONER

## 2019-12-04 RX ORDER — CIPROFLOXACIN 500 MG/1
500 TABLET ORAL EVERY 12 HOURS
Qty: 14 TABLET | Refills: 0 | Status: SHIPPED | OUTPATIENT
Start: 2019-12-04 | End: 2019-12-11

## 2019-12-04 RX ORDER — FLUCONAZOLE 150 MG/1
150 TABLET ORAL ONCE
Qty: 1 TABLET | Refills: 0 | Status: SHIPPED | OUTPATIENT
Start: 2019-12-04 | End: 2019-12-04

## 2019-12-04 RX ORDER — METRONIDAZOLE 500 MG/1
500 TABLET ORAL EVERY 8 HOURS
Qty: 21 TABLET | Refills: 0 | Status: SHIPPED | OUTPATIENT
Start: 2019-12-04 | End: 2019-12-11

## 2019-12-04 NOTE — LETTER
December 4, 2019      [facility]  [address]  [place] [address]  Phone: [phone]  Fax: [phone]       Patient: Iqra Duque   YOB: 1970  Date of Visit: 12/04/2019    To Whom It May Concern:    Gertrude Duque  was at [facility] on 12/04/2019. She may return to work on 12/05/2019 with no restrictions. If you have any questions or concerns, or if I can be of further assistance, please do not hesitate to contact me.    Sincerely,    Luzma Izquierdo MA

## 2019-12-04 NOTE — PATIENT INSTRUCTIONS
Abdominal Pain    Abdominal pain is pain in the stomach or belly area. Everyone has this pain from time to time. In many cases it goes away on its own. But abdominal pain can sometimes be due to a serious problem, such as appendicitis. So its important to know when to seek help.  Causes of abdominal pain  There are many possible causes of abdominal pain. Common causes in adults include:  · Constipation, diarrhea, or gas  · Stomach acid flowing back up into the esophagus (acid reflux or heartburn)  · Severe acid reflux, called GERD (gastroesophageal reflux disease)  · A sore in the lining of the stomach or small intestine (peptic ulcer)  · Inflammation of the gallbladder, liver, or pancreas  · Gallstones or kidney stones  · Appendicitis   · Intestinal blockage   · An internal organ pushing through a muscle or other tissue (hernia)  · Urinary tract infections  · In women, menstrual cramps, fibroids, or endometriosis  · Inflammation or infection of the intestines  Diagnosing the cause of abdominal pain  Your healthcare provider will do a physical exam help find the cause of your pain. If needed, tests will be ordered. Belly pain has many possible causes. So it can be hard to find the reason for your pain. Giving details about your pain can help. Tell your provider where and when you feel the pain, and what makes it better or worse. Also let your provider know if you have other symptoms such as:  · Fever  · Tiredness  · Upset stomach (nausea)  · Vomiting  · Changes in bathroom habits  Treating abdominal pain  Some causes of pain need emergency medical treatment right away. These include appendicitis or a bowel blockage. Other problems can be treated with rest, fluids, or medicines. Your healthcare provider can give you specific instructions for treatment or self-care based on what is causing your pain.  If you have vomiting or diarrhea, sip water or other clear fluids. When you are ready to eat solid foods again,  start with small amounts of easy-to-digest, low-fat foods. These include apple sauce, toast, or crackers.   When to seek medical care  Call 911 or go to the hospital right away if you:  · Cant pass stool and are vomiting  · Are vomiting blood or have bloody diarrhea or black, tarry diarrhea  · Have chest, neck, or shoulder pain  · Feel like you might pass out  · Have pain in your shoulder blades with nausea  · Have sudden, severe belly pain  · Have new, severe pain unlike any you have felt before  · Have a belly that is rigid, hard, and tender to touch  Call your healthcare provider if you have:  · Pain for more than 5 days  · Bloating for more than 2 days  · Diarrhea for more than 5 days  · A fever of 100.4°F (38.0°C) or higher, or as directed by your provider  · Pain that gets worse  · Weight loss for no reason  · Continued lack of appetite  · Blood in your stool  How to prevent abdominal pain  Here are some tips to help prevent abdominal pain:  · Eat smaller amounts of food at one time.  · Avoid greasy, fried, or other high-fat foods.  · Avoid foods that give you gas.  · Exercise regularly.  · Drink plenty of fluids.  To help prevent GERD symptoms:  · Quit smoking.  · Reduce alcohol and certain foods that increase stomach acid.  · Avoid aspirin and over-the-counter pain and fever medicines (NSAIDS or nonsteroidal anti-inflammatory drugs), if possible  · Lose extra weight.  · Finish eating at least 2 hours before you go to bed or lie down.  · Raise the head of your bed.  Date Last Reviewed: 7/1/2016  © 3266-1637 [employer]. [address] [place] [address], [place] [address]. All rights reserved. This information is not intended as a substitute for professional medical care. Always follow your healthcare professional's instructions.

## 2019-12-04 NOTE — PROGRESS NOTES
Subjective:       Patient ID: Iqra Duque is a 49 y.o. female.    Chief Complaint: Urinary Tract Infection    Urinary Tract Infection    This is a new problem. The current episode started in the past 7 days. The problem has been gradually worsening. The quality of the pain is described as aching. The pain is at a severity of 4/10. The pain is mild. There has been no fever. Associated symptoms include frequency and urgency. Pertinent negatives include no chills, flank pain or hematuria. She has tried nothing for the symptoms.       Past Medical History:   Diagnosis Date    Allergy     Diabetes mellitus type II     Diverticulitis 09/2014    Fever blister     Hypertension     Migraine headache        Social History     Socioeconomic History    Marital status:      Spouse name: Not on file    Number of children: Not on file    Years of education: Not on file    Highest education level: Not on file   Occupational History    Occupation: Dispatcher     Employer: [employer]   Social Needs    Financial resource strain: Not very hard    Food insecurity:     Worry: Never true     Inability: Never true    Transportation needs:     Medical: No     Non-medical: No   Tobacco Use    Smoking status: Never Smoker    Smokeless tobacco: Never Used   Substance and Sexual Activity    Alcohol use: No     Frequency: Monthly or less     Drinks per session: 1 or 2     Binge frequency: Never    Drug use: No    Sexual activity: Yes     Partners: Male     Birth control/protection: None   Lifestyle    Physical activity:     Days per week: 0 days     Minutes per session: Not on file    Stress: Rather much   Relationships    Social connections:     Talks on phone: More than three times a week     Gets together: Once a week     Attends Orthodox service: Not on file     Active member of club or organization: No     Attends meetings of clubs or organizations: Not on file     Relationship status:   "  Other Topics Concern    Are you pregnant or think you may be? Not Asked    Breast-feeding Not Asked   Social History Narrative    Not on file       Past Surgical History:   Procedure Laterality Date    COLONOSCOPY N/A 10/21/2019    Procedure: COLONOSCOPY;  Surgeon: Gomez Stark MD;  Location: [facility];  Service: Endoscopy;  Laterality: N/A;    HYSTERECTOMY  06/2009    KJB---DLH/BSO ATYPICAL HYPERPLASIA    OOPHORECTOMY      TUBAL LIGATION         Review of Systems   Constitutional: Negative for chills and fever.   Genitourinary: Positive for decreased urine volume, dysuria, frequency and urgency. Negative for difficulty urinating, flank pain, hematuria and pelvic pain.   All other systems reviewed and are negative.      Objective:   /86 (BP Location: Left arm, Patient Position: Sitting, BP Method: Large (Manual))   Pulse 100   Temp 99 °F (37.2 °C) (Oral)   Ht 5' 5" (1.651 m)   Wt 127 kg (279 lb 15.8 oz)   SpO2 97%   BMI 46.59 kg/m²      Physical Exam   Constitutional: She is oriented to person, place, and time. She appears well-developed and well-nourished.   Cardiovascular: Normal rate, regular rhythm and normal heart sounds.   Pulmonary/Chest: Effort normal and breath sounds normal.   Abdominal: Soft. Normal appearance. There is tenderness (with palpation) in the suprapubic area and left lower quadrant. There is no rebound, no guarding and no CVA tenderness.   Neurological: She is alert and oriented to person, place, and time.   Skin: Skin is warm, dry and intact.   Psychiatric: She has a normal mood and affect. Her speech is normal and behavior is normal.   Vitals reviewed.      Recent Results (from the past 24 hour(s))   POCT urine dipstick without microscope    Collection Time: 12/04/19  8:40 AM   Result Value Ref Range    Color, UA YELLOW     Spec Grav UA 1.020     pH, UA 5     WBC, UA NEG     Nitrite, UA NEG     Protein POS     Glucose, UA NORM     Ketones, UA NEG     Urobilinogen, UA " NORM     Bilirubin NEG     Blood, UA NEG        Assessment:       1. Dysuria    2. Left lower quadrant abdominal pain    3. Antibiotic-induced yeast infection    4. History of diverticulitis        Plan:       Iqra was seen today for urinary tract infection.    Diagnoses and all orders for this visit:    Dysuria  -     POCT urine dipstick without microscope  -     Urine culture; Future  -     Will follow up after results available    Left lower quadrant abdominal pain    Antibiotic-induced yeast infection  -     fluconazole (DIFLUCAN) 150 MG Tab; Take 1 tablet (150 mg total) by mouth once. for 1 dose     History of diverticulitis  -     ciprofloxacin HCl (CIPRO) 500 MG tablet; Take 1 tablet (500 mg total) by mouth every 12 (twelve) hours. for 7 days  -     metroNIDAZOLE (FLAGYL) 500 MG tablet; Take 1 tablet (500 mg total) by mouth every 8 (eight) hours. for 7 days    Follow up if symptoms worsen or fail to improve.

## 2019-12-06 LAB
BACTERIA UR CULT: NORMAL
BACTERIA UR CULT: NORMAL

## 2019-12-10 ENCOUNTER — HOSPITAL ENCOUNTER (OUTPATIENT)
Dept: RADIOLOGY | Facility: HOSPITAL | Age: 49
Discharge: HOME | End: 2019-12-10
Attending: INTERNAL MEDICINE
Payer: COMMERCIAL

## 2019-12-10 VITALS — HEIGHT: 65 IN | BODY MASS INDEX: 46.48 KG/M2 | WEIGHT: 279 LBS

## 2019-12-10 DIAGNOSIS — Z12.31 ENCOUNTER FOR SCREENING MAMMOGRAM FOR MALIGNANT NEOPLASM OF BREAST: ICD-10-CM

## 2019-12-10 PROCEDURE — 77067 SCR MAMMO BI INCL CAD: CPT | Mod: 26,,, | Performed by: RADIOLOGY

## 2019-12-10 PROCEDURE — 77067 MAMMO DIGITAL SCREENING BILAT WITH TOMOSYNTHESIS_CAD: ICD-10-PCS | Mod: 26,,, | Performed by: RADIOLOGY

## 2019-12-10 PROCEDURE — 77067 SCR MAMMO BI INCL CAD: CPT | Mod: TC,PO

## 2019-12-10 PROCEDURE — 77063 MAMMO DIGITAL SCREENING BILAT WITH TOMOSYNTHESIS_CAD: ICD-10-PCS | Mod: 26,,, | Performed by: RADIOLOGY

## 2019-12-10 PROCEDURE — 77063 BREAST TOMOSYNTHESIS BI: CPT | Mod: 26,,, | Performed by: RADIOLOGY

## 2019-12-16 ENCOUNTER — OFFICE VISIT (OUTPATIENT)
Dept: PSYCHIATRY | Facility: CLINIC | Age: 49
End: 2019-12-16
Payer: COMMERCIAL

## 2019-12-16 ENCOUNTER — PATIENT OUTREACH (OUTPATIENT)
Dept: OTHER | Facility: OTHER | Age: 49
End: 2019-12-16

## 2019-12-16 DIAGNOSIS — F41.1 GENERALIZED ANXIETY DISORDER: Primary | ICD-10-CM

## 2019-12-16 PROCEDURE — 90832 PSYTX W PT 30 MINUTES: CPT | Mod: S$GLB,,, | Performed by: SOCIAL WORKER

## 2019-12-16 PROCEDURE — 90832 PR PSYCHOTHERAPY W/PATIENT, 30 MIN: ICD-10-PCS | Mod: S$GLB,,, | Performed by: SOCIAL WORKER
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
## 2019-12-18 ENCOUNTER — PATIENT OUTREACH (OUTPATIENT)
Dept: OTHER | Facility: OTHER | Age: 49
End: 2019-12-18

## 2019-12-18 NOTE — PROGRESS NOTES
Digital Medicine: Clinician Follow-Up    Called patient to discuss her at goal blood pressure readings.    The history is provided by the patient.     Follow Up  Follow-up reason(s): reading review    Patient's blood pressure is well controlled.    Patient is feeling well and denies any symptoms of blood pressure.      INTERVENTION(S)  reviewed appropriate dose schedule    PLAN  patient verbalizes understanding    Patient will continue her current HTN medication regimen.           Topic    Urine Protein Check        Last 5 Patient Entered Readings                                      Current 30 Day Average: 114/73     Recent Readings 12/17/2019 11/28/2019 11/21/2019 11/13/2019 10/13/2019    SBP (mmHg) 121 114 107 131 125    DBP (mmHg) 75 77 66 86 80    Pulse 81 83 90 95 76             Hypertension Medications             lisinopril-hydrochlorothiazide (PRINZIDE,ZESTORETIC) 20-12.5 mg per tablet TAKE 1 TABLET BY MOUTH EVERY DAY                             Medication Adherence Screening   She misses doses: never

## 2020-01-03 NOTE — PROGRESS NOTES
Digital Medicine: Health  Follow-Up    Patient reports that she is doing well. Patient's father in law is doing better as well. Patient stated that her  is back from his work trip and they are all working on improving their diets by meal prepping. Patient will meal prep for the week on weekends.  Patient has been getting more activity in as well by walking where and when she can. Encouraged patient to keep up the good work. Patient states that she will get lightheadedness from time to time but patient knows to slow down and let feeling pass. Patient states that it happens every once and while but not enough to pinpoint a reason or any sort of pattern. Reviewed with patient how days where she has higher pulse rates could be that she is dehydrated. Patient BP is well within goal and trending down. Will f/u in 6 weeks    The history is provided by the patient.       INTERVENTION(S)  encouragement/support          Topic    Urine Protein Check        Last 5 Patient Entered Readings                                      Current 30 Day Average: 111/72     Recent Readings 1/2/2020 12/23/2019 12/17/2019 11/28/2019 11/21/2019    SBP (mmHg) 105 106 121 114 107    DBP (mmHg) 68 72 75 77 66    Pulse 74 81 81 83 90                      Diet Screening   No change to diet.  Patient reports eating or drinking the following: fruit, water, fresh vegetables and lean proteinShe has the following dietary restrictions: diabetic    The patient states that she typically eats a non-home cooked meal (ex: dining out, take out, frozen meals) 0 times per week.  She cooks for self and has meals prepared by family.      Barriers to a Healthy Diet: no barriers to healthy eating    Patient reports that she is back to meal preppring for the week on the weekend before to eliminate eating out or eating food that she shouldn't be. This has worked in the past and so she thinks this is the best path for her to follow.     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 Patient also will go to big box stores like [employer] to get some more walking in. Encouraged patient to keep up the good work.     Medication Adherence Screening   She misses doses: never      Patient identified the following reasons for non-compliance: none    Patient reports no s/s or issues taking Bp medications as prescribed.      SDOH

## 2020-01-07 DIAGNOSIS — J30.9 ALLERGIC RHINITIS: ICD-10-CM

## 2020-01-07 RX ORDER — MONTELUKAST SODIUM 10 MG/1
10 TABLET ORAL DAILY
Qty: 90 TABLET | Refills: 3 | Status: SHIPPED | OUTPATIENT
Start: 2020-01-07 | End: 2021-01-25

## 2020-01-09 RX ORDER — SITAGLIPTIN 100 MG/1
TABLET, FILM COATED ORAL
Qty: 90 TABLET | Refills: 3 | OUTPATIENT
Start: 2020-01-09

## 2020-01-09 NOTE — TELEPHONE ENCOUNTER
Please contact patient. Received a refill request for Januvia. She should no longer be taking the Januvia and should be taking Trulicity instead. Please confirm.     Thanks!  STEPH Collado please return staff call calling verify which medication she is taking

## 2020-01-09 NOTE — TELEPHONE ENCOUNTER
Patient states that she taking Trulicity not Januvia and that her prescription is on auto[facility]fill through [facility].  [facility] contacted and prescription cancelled.

## 2020-01-09 NOTE — TELEPHONE ENCOUNTER
Please contact patient. Received a refill request for Januvia. She should no longer be taking the Januvia and should be taking Trulicity instead. Please confirm.    Thanks!  STEPH Collado

## 2020-01-16 ENCOUNTER — OFFICE VISIT (OUTPATIENT)
Dept: PSYCHIATRY | Facility: CLINIC | Age: 50
End: 2020-01-16
Payer: COMMERCIAL

## 2020-01-16 DIAGNOSIS — F41.1 GENERALIZED ANXIETY DISORDER: Primary | ICD-10-CM

## 2020-01-16 PROCEDURE — 90834 PSYTX W PT 45 MINUTES: CPT | Mod: S$GLB,,, | Performed by: SOCIAL WORKER

## 2020-01-16 PROCEDURE — 90834 PR PSYCHOTHERAPY W/PATIENT, 45 MIN: ICD-10-PCS | Mod: S$GLB,,, | Performed by: SOCIAL WORKER

## 2020-01-16 NOTE — PROGRESS NOTES
"Individual Psychotherapy (PhD/LCSW)    1/16/2020    Site:  [facility]         Therapeutic Intervention: Met with patient.  Outpatient - Insight oriented psychotherapy 45 min - CPT code 89137 and Outpatient - Supportive psychotherapy 45 min - CPT Code 10846    Chief complaint/reason for encounter: anxiety     Interval history and content of current session: Patient returned to clinic for follow up psychotherapy. Patient reports that she is doing "okay". Just got off of work and is heading home to go to sleep. Discussed relationship with . States that they took the 5 love language test and learned their love languages. Patient and  have complimentary love languages. Discussed that daughter set a wedding date and that is exciting. Discussed other daughter and her issues with working. States that she has a masters degree but unable to find work. Patient told daughter to look into going back to school since she gets tuition assistance from work. Patient also thought about going back to school herself.     Treatment plan:  · Target symptoms: anxiety   · Why chosen therapy is appropriate versus another modality: relevant to diagnosis, patient responds to this modality, evidence based practice  · Outcome monitoring methods: self-report, lab data, observation  · Therapeutic intervention type: insight oriented psychotherapy, behavior modifying psychotherapy, supportive psychotherapy    Risk parameters:  Patient reports no suicidal ideation  Patient reports no homicidal ideation  Patient reports no self-injurious behavior  Patient reports no violent behavior    Verbal deficits: None    Patient's response to intervention:  The patient's response to intervention is accepting.    Progress toward goals and other mental status changes:  The patient's progress toward goals is fair .    Diagnosis:     ICD-10-CM ICD-9-CM   1. Generalized anxiety disorder F41.1 300.02       Plan:  individual psychotherapy    Return to " clinic: 2 weeks    Length of Service (minutes): 45     Cassandra Rockweiler, E[facility]

## 2020-01-27 ENCOUNTER — PATIENT OUTREACH (OUTPATIENT)
Dept: ADMINISTRATIVE | Facility: HOSPITAL | Age: 50
End: 2020-01-27

## 2020-01-27 RX ORDER — FLUCONAZOLE 150 MG/1
TABLET ORAL
Refills: 0 | COMMUNITY
Start: 2019-12-04 | End: 2020-02-10

## 2020-01-28 DIAGNOSIS — K21.9 GASTROESOPHAGEAL REFLUX: ICD-10-CM

## 2020-01-28 RX ORDER — OMEPRAZOLE 20 MG/1
CAPSULE, DELAYED RELEASE ORAL
Qty: 90 CAPSULE | Refills: 3 | Status: SHIPPED | OUTPATIENT
Start: 2020-01-28 | End: 2021-01-24

## 2020-01-30 ENCOUNTER — PATIENT MESSAGE (OUTPATIENT)
Dept: ADMINISTRATIVE | Facility: HOSPITAL | Age: 50
End: 2020-01-30

## 2020-01-31 ENCOUNTER — OFFICE VISIT (OUTPATIENT)
Dept: PSYCHIATRY | Facility: CLINIC | Age: 50
End: 2020-01-31
Payer: COMMERCIAL

## 2020-01-31 DIAGNOSIS — F41.1 GENERALIZED ANXIETY DISORDER: Primary | ICD-10-CM

## 2020-01-31 PROCEDURE — 90834 PR PSYCHOTHERAPY W/PATIENT, 45 MIN: ICD-10-PCS | Mod: S$GLB,,, | Performed by: SOCIAL WORKER

## 2020-01-31 PROCEDURE — 90834 PSYTX W PT 45 MINUTES: CPT | Mod: S$GLB,,, | Performed by: SOCIAL WORKER
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
## 2020-02-06 ENCOUNTER — LAB VISIT (OUTPATIENT)
Dept: LAB | Facility: HOSPITAL | Age: 50
End: 2020-02-06
Attending: INTERNAL MEDICINE
Payer: COMMERCIAL

## 2020-02-06 DIAGNOSIS — E11.9 TYPE 2 DIABETES MELLITUS WITHOUT COMPLICATION, WITHOUT LONG-TERM CURRENT USE OF INSULIN: Chronic | ICD-10-CM

## 2020-02-06 LAB
ALBUMIN SERPL BCP-MCNC: 3.5 G/DL (ref 3.5–5.2)
ALP SERPL-CCNC: 52 U/L (ref 55–135)
ALT SERPL W/O P-5'-P-CCNC: 48 U/L (ref 10–44)
ANION GAP SERPL CALC-SCNC: 12 MMOL/L (ref 8–16)
AST SERPL-CCNC: 56 U/L (ref 10–40)
BILIRUB SERPL-MCNC: 0.7 MG/DL (ref 0.1–1)
BUN SERPL-MCNC: 15 MG/DL (ref 6–20)
CALCIUM SERPL-MCNC: 9.8 MG/DL (ref 8.7–10.5)
CHLORIDE SERPL-SCNC: 100 MMOL/L (ref 95–110)
CO2 SERPL-SCNC: 27 MMOL/L (ref 23–29)
CREAT SERPL-MCNC: 0.8 MG/DL (ref 0.5–1.4)
EST. GFR  (AFRICAN AMERICAN): >60 ML/MIN/1.73 M^2
EST. GFR  (NON AFRICAN AMERICAN): >60 ML/MIN/1.73 M^2
ESTIMATED AVG GLUCOSE: 177 MG/DL (ref 68–131)
GLUCOSE SERPL-MCNC: 127 MG/DL (ref 70–110)
HBA1C MFR BLD HPLC: 7.8 % (ref 4–5.6)
POTASSIUM SERPL-SCNC: 4.1 MMOL/L (ref 3.5–5.1)
PROT SERPL-MCNC: 7.4 G/DL (ref 6–8.4)
SODIUM SERPL-SCNC: 139 MMOL/L (ref 136–145)

## 2020-02-06 PROCEDURE — 83036 HEMOGLOBIN GLYCOSYLATED A1C: CPT

## 2020-02-06 PROCEDURE — 36415 COLL VENOUS BLD VENIPUNCTURE: CPT | Mod: PO

## 2020-02-06 PROCEDURE — 80053 COMPREHEN METABOLIC PANEL: CPT

## 2020-02-07 ENCOUNTER — LAB VISIT (OUTPATIENT)
Dept: LAB | Facility: HOSPITAL | Age: 50
End: 2020-02-07
Attending: INTERNAL MEDICINE
Payer: COMMERCIAL

## 2020-02-07 DIAGNOSIS — E11.9 TYPE 2 DIABETES MELLITUS WITHOUT COMPLICATION, WITHOUT LONG-TERM CURRENT USE OF INSULIN: ICD-10-CM

## 2020-02-07 DIAGNOSIS — E11.9 TYPE 2 DIABETES MELLITUS WITHOUT COMPLICATION, WITHOUT LONG-TERM CURRENT USE OF INSULIN: Primary | ICD-10-CM

## 2020-02-07 LAB
ALBUMIN/CREAT UR: 8.2 UG/MG (ref 0–30)
CREAT UR-MCNC: 232 MG/DL (ref 15–325)
MICROALBUMIN UR DL<=1MG/L-MCNC: 19 UG/ML

## 2020-02-07 PROCEDURE — 82043 UR ALBUMIN QUANTITATIVE: CPT

## 2020-02-10 ENCOUNTER — OFFICE VISIT (OUTPATIENT)
Dept: FAMILY MEDICINE | Facility: CLINIC | Age: 50
End: 2020-02-10
Payer: COMMERCIAL

## 2020-02-10 VITALS
TEMPERATURE: 99 F | HEIGHT: 65 IN | HEART RATE: 105 BPM | OXYGEN SATURATION: 98 % | BODY MASS INDEX: 46.02 KG/M2 | DIASTOLIC BLOOD PRESSURE: 80 MMHG | SYSTOLIC BLOOD PRESSURE: 110 MMHG | WEIGHT: 276.25 LBS

## 2020-02-10 DIAGNOSIS — E55.9 VITAMIN D INSUFFICIENCY: Chronic | ICD-10-CM

## 2020-02-10 DIAGNOSIS — E11.9 TYPE 2 DIABETES MELLITUS WITHOUT COMPLICATION, WITHOUT LONG-TERM CURRENT USE OF INSULIN: Chronic | ICD-10-CM

## 2020-02-10 DIAGNOSIS — K76.0 NAFLD (NONALCOHOLIC FATTY LIVER DISEASE): Chronic | ICD-10-CM

## 2020-02-10 DIAGNOSIS — I10 ESSENTIAL HYPERTENSION: Primary | Chronic | ICD-10-CM

## 2020-02-10 DIAGNOSIS — E66.01 MORBID OBESITY WITH BMI OF 45.0-49.9, ADULT: Chronic | ICD-10-CM

## 2020-02-10 PROCEDURE — 3079F DIAST BP 80-89 MM HG: CPT | Mod: CPTII,S$GLB,, | Performed by: INTERNAL MEDICINE

## 2020-02-10 PROCEDURE — 3008F BODY MASS INDEX DOCD: CPT | Mod: CPTII,S$GLB,, | Performed by: INTERNAL MEDICINE

## 2020-02-10 PROCEDURE — 3079F PR MOST RECENT DIASTOLIC BLOOD PRESSURE 80-89 MM HG: ICD-10-PCS | Mod: CPTII,S$GLB,, | Performed by: INTERNAL MEDICINE

## 2020-02-10 PROCEDURE — 99214 PR OFFICE/OUTPT VISIT, EST, LEVL IV, 30-39 MIN: ICD-10-PCS | Mod: S$GLB,,, | Performed by: INTERNAL MEDICINE

## 2020-02-10 PROCEDURE — 3051F PR MOST RECENT HEMOGLOBIN A1C LEVEL 7.0 - < 8.0%: ICD-10-PCS | Mod: CPTII,S$GLB,, | Performed by: INTERNAL MEDICINE

## 2020-02-10 PROCEDURE — 99999 PR PBB SHADOW E&M-EST. PATIENT-LVL III: ICD-10-PCS | Mod: PBBFAC,,, | Performed by: INTERNAL MEDICINE

## 2020-02-10 PROCEDURE — 99214 OFFICE O/P EST MOD 30 MIN: CPT | Mod: S$GLB,,, | Performed by: INTERNAL MEDICINE

## 2020-02-10 PROCEDURE — 3074F PR MOST RECENT SYSTOLIC BLOOD PRESSURE < 130 MM HG: ICD-10-PCS | Mod: CPTII,S$GLB,, | Performed by: INTERNAL MEDICINE

## 2020-02-10 PROCEDURE — 3074F SYST BP LT 130 MM HG: CPT | Mod: CPTII,S$GLB,, | Performed by: INTERNAL MEDICINE

## 2020-02-10 PROCEDURE — 3008F PR BODY MASS INDEX (BMI) DOCUMENTED: ICD-10-PCS | Mod: CPTII,S$GLB,, | Performed by: INTERNAL MEDICINE

## 2020-02-10 PROCEDURE — 3051F HG A1C>EQUAL 7.0%<8.0%: CPT | Mod: CPTII,S$GLB,, | Performed by: INTERNAL MEDICINE

## 2020-02-10 PROCEDURE — 99999 PR PBB SHADOW E&M-EST. PATIENT-LVL III: CPT | Mod: PBBFAC,,, | Performed by: INTERNAL MEDICINE

## 2020-02-10 NOTE — PROGRESS NOTES
Assessment & Plan  Problem List Items Addressed This Visit        Cardiac/Vascular    Essential hypertension - Primary (Chronic)    Current Assessment & Plan     The current medical regimen is effective;  continue present plan and medications.             Endocrine    Vitamin D insufficiency (Chronic)    Current Assessment & Plan     Recheck labs in 3 months.          Relevant Orders    Vitamin D    Type 2 diabetes mellitus without complication (Chronic)    Current Assessment & Plan     Doing very well.  Increase trulicity to full dose.  Recheck labs in 3 months.          Relevant Medications    dulaglutide (TRULICITY) 1.5 mg/0.5 mL PnIj    Other Relevant Orders    Hemoglobin A1c    Comprehensive metabolic panel    Morbid obesity with BMI of 45.0-49.9, adult (Chronic)    Current Assessment & Plan     Doing well.  Keep up the great work!             GI    NAFLD (nonalcoholic fatty liver disease) (Chronic)    Current Assessment & Plan     Will monitor labs again in 3 months.  If another increase would consider adjusting meds.                  Health Maintenance reviewed, at follow up.    Follow-up: Follow up in about 3 months (around 5/10/2020) for follow up for chronic conditions.  ______________________________________________________________________    Chief Complaint  Chief Complaint   Patient presents with    Diabetes    Hypertension    Follow-up       HPI  Iqra Duque is a 49 y.o. female with medical diagnoses as listed in the medical history and problem list that presents for DM and HTN follow up.  Pt is known to me with her last appointment 12/4/2019.  She had labs prior to this OV that showed improvement.  Transaminitis up slightly.     Taking and tolerating medications as prescribed without perceived side effects.  No CP, SOB, palpitations, hypoglycemic symptoms.  She is down 5# since the LOV      PAST MEDICAL HISTORY:  Past Medical History:   Diagnosis Date    Allergy     Diabetes mellitus type II      Diverticulitis 09/2014    Fever blister     Hypertension     Migraine headache        PAST SURGICAL HISTORY:  Past Surgical History:   Procedure Laterality Date    COLONOSCOPY N/A 10/21/2019    Procedure: COLONOSCOPY;  Surgeon: Gomez Stark MD;  Location: [facility] END[facility];  Service: Endoscopy;  Laterality: N/A;    HYSTERECTOMY  06/2009    KJB---DLH/BSO ATYPICAL HYPERPLASIA    OOPHORECTOMY      TUBAL LIGATION         SOCIAL HISTORY:  Social History     Socioeconomic History    Marital status:      Spouse name: Not on file    Number of children: Not on file    Years of education: Not on file    Highest education level: Not on file   Occupational History    Occupation: Dispatcher     Employer: [employer]   Social Needs    Financial resource strain: Not very hard    Food insecurity:     Worry: Never true     Inability: Never true    Transportation needs:     Medical: No     Non-medical: No   Tobacco Use    Smoking status: Never Smoker    Smokeless tobacco: Never Used   Substance and Sexual Activity    Alcohol use: No     Frequency: Monthly or less     Drinks per session: 1 or 2     Binge frequency: Never    Drug use: No    Sexual activity: Yes     Partners: Male     Birth control/protection: None   Lifestyle    Physical activity:     Days per week: 0 days     Minutes per session: Not on file    Stress: Rather much   Relationships    Social connections:     Talks on phone: More than three times a week     Gets together: Once a week     Attends Anabaptist service: Not on file     Active member of club or organization: No     Attends meetings of clubs or organizations: Not on file     Relationship status:    Other Topics Concern    Are you pregnant or think you may be? Not Asked    Breast-feeding Not Asked   Social History Narrative    Not on file       FAMILY HISTORY:  Family History   Problem Relation Age of Onset    Ovarian cancer Mother 43    Cancer Mother          ovarian    Diabetes Mother     Heart disease Father     Melanoma Neg Hx     Psoriasis Neg Hx     Lupus Neg Hx        ALLERGIES AND MEDICATIONS: updated and reviewed.  Review of patient's allergies indicates:   Allergen Reactions    Bactrim [sulfamethoxazole-trimethoprim] Hives     Also Septra    Keflex [cephalexin] Shortness Of Breath    Neosporin (neomycin-polymyx) Swelling    Amoxicillin Rash    Neosporin  [benzalkonium chloride] Rash     Current Outpatient Medications   Medication Sig Dispense Refill    cetirizine (ZYRTEC) 10 MG tablet Take 10 mg by mouth once daily.      ergocalciferol (ERGOCALCIFEROL) 50,000 unit Cap Take 1 capsule (50,000 Units total) by mouth every Sunday. 12 capsule 1    lisinopril-hydrochlorothiazide (PRINZIDE,ZESTORETIC) 20-12.5 mg per tablet TAKE 1 TABLET BY MOUTH EVERY DAY 90 tablet 3    metFORMIN (GLUCOPHAGE-XR) 500 MG 24 hr tablet Take 2 tablets (1,000 mg total) by mouth 2 (two) times daily with meals. 360 tablet 1    montelukast (SINGULAIR) 10 mg tablet Take 1 tablet (10 mg total) by mouth once daily. 90 tablet 3    omeprazole (PRILOSEC) 20 MG capsule TAKE 1 CAPSULE (20 MG TOTAL) BY MOUTH EVERY MORNING. 90 capsule 3    albuterol 90 mcg/actuation inhaler Inhale 1-2 puffs into the lungs every 4 to 6 hours as needed for Wheezing or Shortness of Breath (chest tightness). (Patient not taking: Reported on 12/4/2019) 1 Inhaler 6    CONTOUR TEST STRIPS Strp TEST ONCE DAILY (Patient not taking: Reported on 12/4/2019) 100 strip 1    dulaglutide (TRULICITY) 1.5 mg/0.5 mL PnIj Inject 1.5 mg into the skin every 7 days. 1 Syringe 3    MICROLET LANCET Misc TEST ONCE DAILY (Patient not taking: Reported on 12/4/2019) 100 each 3     No current facility-administered medications for this visit.          ROS  Review of Systems   Constitutional: Negative for chills, fatigue, fever and unexpected weight change.   HENT: Negative for congestion, ear pain, hearing loss, rhinorrhea, sore  "throat and trouble swallowing.    Eyes: Negative for discharge, redness and visual disturbance.   Respiratory: Negative for cough, chest tightness, shortness of breath and wheezing.    Cardiovascular: Negative for chest pain, palpitations and leg swelling.   Gastrointestinal: Negative for abdominal pain, constipation, diarrhea, nausea and vomiting.   Endocrine: Negative for polydipsia, polyphagia and polyuria.   Genitourinary: Negative for decreased urine volume, dysuria and hematuria.   Musculoskeletal: Negative for arthralgias, joint swelling and myalgias.   Skin: Negative for color change, pallor and rash.   Neurological: Negative for dizziness, tremors, seizures, speech difficulty, weakness, light-headedness and headaches.   Psychiatric/Behavioral: Negative for confusion, decreased concentration, dysphoric mood, sleep disturbance and suicidal ideas. The patient is nervous/anxious (potential job change).            Physical Exam  Vitals:    02/10/20 1346   BP: 110/80   Pulse: 105   Temp: 98.6 °F (37 °C)   SpO2: 98%   Weight: 125.3 kg (276 lb 3.8 oz)   Height: 5' 5" (1.651 m)    Body mass index is 45.97 kg/m².  Weight: 125.3 kg (276 lb 3.8 oz)   Height: 5' 5" (165.1 cm)   Physical Exam   Constitutional: She is oriented to person, place, and time. She appears well-developed and well-nourished. No distress.   HENT:   Head: Normocephalic and atraumatic.   Eyes: Pupils are equal, round, and reactive to light. Conjunctivae, EOM and lids are normal. No scleral icterus.   Neck: Full passive range of motion without pain. Neck supple. No JVD present. Carotid bruit is not present. No thyromegaly present.   Cardiovascular: Normal rate, regular rhythm, normal heart sounds, intact distal pulses and normal pulses. Exam reveals no S3, no S4 and no friction rub.   No murmur heard.  Pulmonary/Chest: Effort normal and breath sounds normal. She has no wheezes. She has no rhonchi. She has no rales.   Abdominal: Soft. Bowel sounds are " normal. There is no tenderness.   Musculoskeletal: She exhibits no edema or tenderness.   Lymphadenopathy:        Head (right side): No submental and no submandibular adenopathy present.        Head (left side): No submental and no submandibular adenopathy present.     She has no cervical adenopathy.   Neurological: She is alert and oriented to person, place, and time.   Motor grossly intact.  Sensory grossly intact.  Symmetric facial movements palate elevated symmetrically tongue midline   Skin: Skin is warm and dry. No rash noted.   Psychiatric: She has a normal mood and affect. Her speech is normal and behavior is normal. Thought content normal.           Health Maintenance       Date Due Completion Date    Low Dose Statin 07/16/1990 ---    Shingles Vaccine (1 of 2) 07/16/2019 ---    Eye Exam 03/14/2020 3/14/2019    Hemoglobin A1c 05/06/2020 2/6/2020    Foot Exam 07/24/2020 7/24/2019 (Done)    Override on 7/24/2019: Done (Dr. Heather Hicks ( podiatry ))    Override on 7/17/2018: Done    Override on 7/3/2017: Done    Lipid Panel 11/04/2020 11/4/2019    Urine Microalbumin 02/07/2021 2/7/2020    Mammogram 12/10/2021 12/10/2019    Colonoscopy 10/21/2024 10/21/2019    TETANUS VACCINE 08/29/2026 8/29/2016

## 2020-02-11 ENCOUNTER — PATIENT OUTREACH (OUTPATIENT)
Dept: OTHER | Facility: OTHER | Age: 50
End: 2020-02-11

## 2020-02-17 DIAGNOSIS — E11.9 TYPE 2 DIABETES MELLITUS WITHOUT COMPLICATION, WITHOUT LONG-TERM CURRENT USE OF INSULIN: Chronic | ICD-10-CM

## 2020-02-17 RX ORDER — METFORMIN HYDROCHLORIDE 500 MG/1
1000 TABLET, EXTENDED RELEASE ORAL 2 TIMES DAILY WITH MEALS
Qty: 360 TABLET | Refills: 1 | Status: SHIPPED | OUTPATIENT
Start: 2020-02-17 | End: 2020-05-19 | Stop reason: SDUPTHER

## 2020-03-12 ENCOUNTER — PATIENT MESSAGE (OUTPATIENT)
Dept: ADMINISTRATIVE | Facility: OTHER | Age: 50
End: 2020-03-12

## 2020-03-18 ENCOUNTER — PATIENT MESSAGE (OUTPATIENT)
Dept: PSYCHIATRY | Facility: CLINIC | Age: 50
End: 2020-03-18

## 2020-03-19 ENCOUNTER — PATIENT MESSAGE (OUTPATIENT)
Dept: FAMILY MEDICINE | Facility: CLINIC | Age: 50
End: 2020-03-19

## 2020-03-20 ENCOUNTER — PATIENT MESSAGE (OUTPATIENT)
Dept: ADMINISTRATIVE | Facility: OTHER | Age: 50
End: 2020-03-20

## 2020-03-21 LAB
LEFT EYE DM RETINOPATHY: POSITIVE
RIGHT EYE DM RETINOPATHY: POSITIVE

## 2020-03-24 NOTE — PROGRESS NOTES
Digital Medicine: Health  Follow-Up    Patient reports that she is doing pretty well. Patient attributed low BP reading from 3/19/20 due to having a migraine and hardly eating or drinking enough that day.  Patient stated that she remember our conversation about dehydration and how it affects BP and so she has been working to make sure she drinks enough water daily.  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 Patient states that she has also worked to remain active. Will f/u in 4-6 weeks.    The history is provided by the patient. No  was used.     Follow Up  Follow-up reason(s): reading review and routine education      Readings are trending down due to lifestyle change.    Routine Education Topics: eating patterns and physical activity        INTERVENTION(S)  recommended diet modifications, recommend physical activity and encouragement/support          Topic    Eye Exam        Last 5 Patient Entered Readings                                      Current 30 Day Average: 113/73     Recent Readings 3/24/2020 3/22/2020 3/19/2020 3/16/2020 3/1/2020    SBP (mmHg) 113 105 98 111 136    DBP (mmHg) 82 70 69 78 68    Pulse 74 87 97 91 100                      Diet Screening   She has the following dietary restrictions: low sodium diet and diabetic    Barriers to a Healthy Diet: no barriers to healthy eating    Patient reports that for the most part her diet is unchanged. Patient stated that her family ordered a pizza last night but she kept it to one slice. Patient states that they are on track as far as the diet is concerned otherwise. Reminded patient that we have to make due with what we have during this time and eating out on occasion is not a bad thing just not to make a habit of it.    Physical Activity Screening   When asked if exercising, patient  responded: yes    Patient participates in the following activities: walking and yard/housework    She identified the following barriers to physical activity: no barriers to being active    Patient reports that she used to get all of her activity while she was at work but with the corona virus she is having to look for other opportunities to find activity. Encouraged patient to go for walks or stay active around the house with chores or projects.     Medication Adherence Screening   She did not miss a dose this month.    Patient identified the following reasons for non-compliance: None    Patient reports no s/s or issues taking BP medication as prescribed.       SDOH

## 2020-04-15 ENCOUNTER — PATIENT OUTREACH (OUTPATIENT)
Dept: OTHER | Facility: OTHER | Age: 50
End: 2020-04-15

## 2020-04-15 NOTE — PROGRESS NOTES
04/15/2020 2:28 PM   Sent mychart msg introducing myself as temporary clinician. Will continue to monitor BP and reach out in 3-4 months, sooner if BP trends upward or downward.    Last 5 Patient Entered Readings                                      Current 30 Day Average: 112/77     Recent Readings 4/8/2020 4/6/2020 4/4/2020 4/1/2020 3/24/2020    SBP (mmHg) 117 118 118 119 113    DBP (mmHg) 73 78 85 77 82    Pulse 94 76 77 88 74

## 2020-05-01 ENCOUNTER — PATIENT MESSAGE (OUTPATIENT)
Dept: FAMILY MEDICINE | Facility: CLINIC | Age: 50
End: 2020-05-01

## 2020-05-04 NOTE — TELEPHONE ENCOUNTER
Please sched labs and virtual follow up. Me or Ofelia.     See message about time that she would like for labs    Thank you,  David

## 2020-05-04 NOTE — TELEPHONE ENCOUNTER
Pt phoned by this nurse, lab appointment scheduled as ordered. Pt states she will check her schedule and call clinic back to schedule a virtual visit with Dr Kothari or FARZANEH

## 2020-05-05 ENCOUNTER — LAB VISIT (OUTPATIENT)
Dept: LAB | Facility: HOSPITAL | Age: 50
End: 2020-05-05
Attending: INTERNAL MEDICINE
Payer: COMMERCIAL

## 2020-05-05 DIAGNOSIS — E11.9 TYPE 2 DIABETES MELLITUS WITHOUT COMPLICATION, WITHOUT LONG-TERM CURRENT USE OF INSULIN: Chronic | ICD-10-CM

## 2020-05-05 DIAGNOSIS — E55.9 VITAMIN D INSUFFICIENCY: Chronic | ICD-10-CM

## 2020-05-05 LAB
25(OH)D3+25(OH)D2 SERPL-MCNC: 30 NG/ML (ref 30–96)
ALBUMIN SERPL BCP-MCNC: 3.2 G/DL (ref 3.5–5.2)
ALP SERPL-CCNC: 51 U/L (ref 55–135)
ALT SERPL W/O P-5'-P-CCNC: 31 U/L (ref 10–44)
ANION GAP SERPL CALC-SCNC: 11 MMOL/L (ref 8–16)
AST SERPL-CCNC: 29 U/L (ref 10–40)
BILIRUB SERPL-MCNC: 0.5 MG/DL (ref 0.1–1)
BUN SERPL-MCNC: 17 MG/DL (ref 6–20)
CALCIUM SERPL-MCNC: 9.3 MG/DL (ref 8.7–10.5)
CHLORIDE SERPL-SCNC: 101 MMOL/L (ref 95–110)
CO2 SERPL-SCNC: 24 MMOL/L (ref 23–29)
CREAT SERPL-MCNC: 0.6 MG/DL (ref 0.5–1.4)
EST. GFR  (AFRICAN AMERICAN): >60 ML/MIN/1.73 M^2
EST. GFR  (NON AFRICAN AMERICAN): >60 ML/MIN/1.73 M^2
ESTIMATED AVG GLUCOSE: 151 MG/DL (ref 68–131)
GLUCOSE SERPL-MCNC: 145 MG/DL (ref 70–110)
HBA1C MFR BLD HPLC: 6.9 % (ref 4–5.6)
POTASSIUM SERPL-SCNC: 4.3 MMOL/L (ref 3.5–5.1)
PROT SERPL-MCNC: 6.7 G/DL (ref 6–8.4)
SODIUM SERPL-SCNC: 136 MMOL/L (ref 136–145)

## 2020-05-05 PROCEDURE — 83036 HEMOGLOBIN GLYCOSYLATED A1C: CPT

## 2020-05-05 PROCEDURE — 80053 COMPREHEN METABOLIC PANEL: CPT

## 2020-05-05 PROCEDURE — 36415 COLL VENOUS BLD VENIPUNCTURE: CPT | Mod: PO

## 2020-05-05 PROCEDURE — 82306 VITAMIN D 25 HYDROXY: CPT

## 2020-05-09 DIAGNOSIS — E55.9 VITAMIN D INSUFFICIENCY: Chronic | ICD-10-CM

## 2020-05-11 RX ORDER — ERGOCALCIFEROL 1.25 MG/1
50000 CAPSULE ORAL
Qty: 12 CAPSULE | Refills: 0 | Status: SHIPPED | OUTPATIENT
Start: 2020-05-17 | End: 2021-04-22

## 2020-05-16 ENCOUNTER — PATIENT OUTREACH (OUTPATIENT)
Dept: ADMINISTRATIVE | Facility: HOSPITAL | Age: 50
End: 2020-05-16

## 2020-05-19 ENCOUNTER — OFFICE VISIT (OUTPATIENT)
Dept: FAMILY MEDICINE | Facility: CLINIC | Age: 50
End: 2020-05-19
Payer: COMMERCIAL

## 2020-05-19 VITALS
OXYGEN SATURATION: 96 % | TEMPERATURE: 98 F | HEART RATE: 101 BPM | DIASTOLIC BLOOD PRESSURE: 70 MMHG | BODY MASS INDEX: 45.6 KG/M2 | WEIGHT: 273.69 LBS | SYSTOLIC BLOOD PRESSURE: 132 MMHG | HEIGHT: 65 IN

## 2020-05-19 DIAGNOSIS — I10 ESSENTIAL HYPERTENSION: Chronic | ICD-10-CM

## 2020-05-19 DIAGNOSIS — M75.51 ACUTE BURSITIS OF RIGHT SHOULDER: ICD-10-CM

## 2020-05-19 DIAGNOSIS — E66.01 MORBID OBESITY WITH BMI OF 45.0-49.9, ADULT: Chronic | ICD-10-CM

## 2020-05-19 DIAGNOSIS — E55.9 VITAMIN D INSUFFICIENCY: Chronic | ICD-10-CM

## 2020-05-19 DIAGNOSIS — E11.3293 TYPE 2 DIABETES MELLITUS WITH BOTH EYES AFFECTED BY MILD NONPROLIFERATIVE RETINOPATHY WITHOUT MACULAR EDEMA, WITHOUT LONG-TERM CURRENT USE OF INSULIN: ICD-10-CM

## 2020-05-19 DIAGNOSIS — E11.9 TYPE 2 DIABETES MELLITUS WITHOUT COMPLICATION, WITHOUT LONG-TERM CURRENT USE OF INSULIN: Primary | Chronic | ICD-10-CM

## 2020-05-19 PROCEDURE — 3075F PR MOST RECENT SYSTOLIC BLOOD PRESS GE 130-139MM HG: ICD-10-PCS | Mod: CPTII,S$GLB,, | Performed by: INTERNAL MEDICINE

## 2020-05-19 PROCEDURE — 3075F SYST BP GE 130 - 139MM HG: CPT | Mod: CPTII,S$GLB,, | Performed by: INTERNAL MEDICINE

## 2020-05-19 PROCEDURE — 3044F HG A1C LEVEL LT 7.0%: CPT | Mod: CPTII,S$GLB,, | Performed by: INTERNAL MEDICINE

## 2020-05-19 PROCEDURE — 3044F PR MOST RECENT HEMOGLOBIN A1C LEVEL <7.0%: ICD-10-PCS | Mod: CPTII,S$GLB,, | Performed by: INTERNAL MEDICINE

## 2020-05-19 PROCEDURE — 3008F BODY MASS INDEX DOCD: CPT | Mod: CPTII,S$GLB,, | Performed by: INTERNAL MEDICINE

## 2020-05-19 PROCEDURE — 99999 PR PBB SHADOW E&M-EST. PATIENT-LVL III: CPT | Mod: PBBFAC,,, | Performed by: INTERNAL MEDICINE

## 2020-05-19 PROCEDURE — 99214 PR OFFICE/OUTPT VISIT, EST, LEVL IV, 30-39 MIN: ICD-10-PCS | Mod: S$GLB,,, | Performed by: INTERNAL MEDICINE

## 2020-05-19 PROCEDURE — 3008F PR BODY MASS INDEX (BMI) DOCUMENTED: ICD-10-PCS | Mod: CPTII,S$GLB,, | Performed by: INTERNAL MEDICINE

## 2020-05-19 PROCEDURE — 99214 OFFICE O/P EST MOD 30 MIN: CPT | Mod: S$GLB,,, | Performed by: INTERNAL MEDICINE

## 2020-05-19 PROCEDURE — 3078F PR MOST RECENT DIASTOLIC BLOOD PRESSURE < 80 MM HG: ICD-10-PCS | Mod: CPTII,S$GLB,, | Performed by: INTERNAL MEDICINE

## 2020-05-19 PROCEDURE — 99999 PR PBB SHADOW E&M-EST. PATIENT-LVL III: ICD-10-PCS | Mod: PBBFAC,,, | Performed by: INTERNAL MEDICINE

## 2020-05-19 PROCEDURE — 3078F DIAST BP <80 MM HG: CPT | Mod: CPTII,S$GLB,, | Performed by: INTERNAL MEDICINE

## 2020-05-19 RX ORDER — METFORMIN HYDROCHLORIDE 500 MG/1
1000 TABLET, EXTENDED RELEASE ORAL 2 TIMES DAILY WITH MEALS
Qty: 360 TABLET | Refills: 1 | Status: SHIPPED | OUTPATIENT
Start: 2020-05-19 | End: 2021-02-15

## 2020-05-19 NOTE — PROGRESS NOTES
__________________________________________________________________________________________________________________________________________________  I attest that I have reviewed the student note and that the components of the history of present illness, the physical exam, and the assessment and plan documented were performed by me or were performed in my presence by the student. I have verified (and addended if appropriate) the documentation and performed (or re-performed) the exam and medical decision making.     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          Relevant Medications    metFORMIN (GLUCOPHAGE-XR) 500 MG XR 24hr tablet       Cardiac/Vascular    Essential hypertension (Chronic)    Current Assessment & Plan     The current medical regimen is effective;  continue present plan and medications.          Relevant Orders    CBC auto differential    Comprehensive metabolic panel    Lipid Panel    Hemoglobin A1C       Endocrine    Vitamin D insufficiency (Chronic)    Current Assessment & Plan     Complete 3 more months of high dose replacement then transition to daily dosing.          Morbid obesity with BMI of 45.0-49.9, adult (Chronic)    Current Assessment & Plan     Keep up the great work!           Other Visit Diagnoses     Acute bursitis of right shoulder    -  Counseled on use of ICE, rest, OTC topical and PO analgesics.           Follow up in about 6 months (around 11/19/2020) for Routine Physical.    Jaylon Kothari    __________________________________________________________________________________________________________________________________________________  Chief Complaint  Chief Complaint   Patient presents with    Diabetes       HPI  Wanetta M  Neto is a 49 y.o. female with multiple medical diagnoses as listed in the medical history and problem list that presents for DM F/U.  Their last appointment with primary care was 2/10/2020.      Pt presents for DM F/U. Overall labs have shown great improvement. Patient was very happy to hear their results. She reports 10lb weight loss. Currently at her lowest weight on record. She does report stable right shoulder pain since early April. Patient was helping family move at the time and was exerting herself more than usual. Pain is sharp, triggered by overhead motions, and relieved by ice. Patient reports not taking analgesics for the shoulder pain due to concern of tolerance related to her chronic back pain.  Pain is mostly on the outside of the shoulder. She denies any numbness and weakness.     Answers for HPI/ROS submitted by the patient on 5/14/2020   Diabetes problem  Diabetes type: type 2  MedicAlert ID: No  Disease duration: 10 years  blurred vision: No  foot paresthesias: No  foot ulcerations: No  visual change: No  weight loss: No  Symptom course: improving  hunger: No  mood changes: No  sleepiness: No  sweats: No  blackouts: No  hospitalization: No  nocturnal hypoglycemia: No  required assistance: No  required glucagon: No  CVA: No  heart disease: No  impotence: No  nephropathy: No  peripheral neuropathy: No  PVD: No  retinopathy: No  autonomic neuropathy: No  CAD risks: family history, hypertension, obesity, post-menopausal, sedentary lifestyle, diabetes mellitus  Current treatments: diet, oral agent (dual therapy)  Treatment compliance: most of the time  Home blood tests: 1-2 x per day  Home urines: <1 x per month  Monitoring compliance: adequate  Blood glucose trend: fluctuating minimally  Weight trend: fluctuating minimally  Current diet: low fat/cholesterol, low salt  Meal planning: none  Exercise: intermittently  Dietitian visit: No  Eye exam current: Yes  Sees podiatrist: Yes      PAST MEDICAL  HISTORY:  Past Medical History:   Diagnosis Date    Allergy     Diabetes mellitus type II     Diverticulitis 09/2014    Fever blister     Hypertension     Migraine headache        PAST SURGICAL HISTORY:  Past Surgical History:   Procedure Laterality Date    COLONOSCOPY N/A 10/21/2019    Procedure: COLONOSCOPY;  Surgeon: Gomez Stark MD;  Location: [facility];  Service: Endoscopy;  Laterality: N/A;    HYSTERECTOMY  06/2009    [facility]---DLH/BSO ATYPICAL HYPERPLASIA    OOPHORECTOMY      TUBAL LIGATION         SOCIAL HISTORY:  Social History     Socioeconomic History    Marital status:      Spouse name: Not on file    Number of children: Not on file    Years of education: Not on file    Highest education level: Not on file   Occupational History    Occupation: Dispatcher     Employer: [employer]   Social Needs    Financial resource strain: Not very hard    Food insecurity:     Worry: Never true     Inability: Never true    Transportation needs:     Medical: No     Non-medical: No   Tobacco Use    Smoking status: Never Smoker    Smokeless tobacco: Never Used   Substance and Sexual Activity    Alcohol use: No     Frequency: Monthly or less     Drinks per session: 1 or 2     Binge frequency: Never    Drug use: No    Sexual activity: Yes     Partners: Male     Birth control/protection: None   Lifestyle    Physical activity:     Days per week: 0 days     Minutes per session: Not on file    Stress: Rather much   Relationships    Social connections:     Talks on phone: More than three times a week     Gets together: Once a week     Attends [employer] service: Not on file     Active member of club or organization: No     Attends meetings of clubs or organizations: Not on file     Relationship status:    Other Topics Concern    Are you pregnant or think you may be? Not Asked    Breast-feeding Not Asked   Social History Narrative    Not on file       FAMILY HISTORY:  Family  History   Problem Relation Age of Onset    Ovarian cancer Mother 43    Cancer Mother         ovarian    Diabetes Mother     Heart disease Father     Melanoma Neg Hx     Psoriasis Neg Hx     Lupus Neg Hx        ALLERGIES AND MEDICATIONS: updated and reviewed.  Review of patient's allergies indicates:   Allergen Reactions    Bactrim [sulfamethoxazole-trimethoprim] Hives     Also Septra    Keflex [cephalexin] Shortness Of Breath    Neosporin (neomycin-polymyx) Swelling    Amoxicillin Rash    Neosporin  [benzalkonium chloride] Rash     Current Outpatient Medications   Medication Sig Dispense Refill    cetirizine (ZYRTEC) 10 MG tablet Take 10 mg by mouth once daily.      dulaglutide (TRULICITY) 1.5 mg/0.5 mL PnIj Inject 1.5 mg into the skin every 7 days. 1 Syringe 3    ergocalciferol (ERGOCALCIFEROL) 50,000 unit Cap TAKE 1 CAPSULE (50,000 UNITS TOTAL) BY MOUTH EVERY SUNDAY. 12 capsule 0    lisinopril-hydrochlorothiazide (PRINZIDE,ZESTORETIC) 20-12.5 mg per tablet TAKE 1 TABLET BY MOUTH EVERY DAY 90 tablet 3    metFORMIN (GLUCOPHAGE-XR) 500 MG XR 24hr tablet Take 2 tablets (1,000 mg total) by mouth 2 (two) times daily with meals. 360 tablet 1    montelukast (SINGULAIR) 10 mg tablet Take 1 tablet (10 mg total) by mouth once daily. 90 tablet 3    omeprazole (PRILOSEC) 20 MG capsule TAKE 1 CAPSULE (20 MG TOTAL) BY MOUTH EVERY MORNING. 90 capsule 3    CONTOUR TEST STRIPS Strp TEST ONCE DAILY (Patient not taking: Reported on 12/4/2019) 100 strip 1    MICROLET LANCET Misc TEST ONCE DAILY (Patient not taking: Reported on 12/4/2019) 100 each 3     No current facility-administered medications for this visit.          ROS  Review of Systems   Constitutional: Negative for appetite change, fatigue and unexpected weight change.   HENT: Negative for hearing loss and sore throat.    Eyes: Negative for visual disturbance.   Respiratory: Negative for cough and shortness of breath.    Cardiovascular: Negative for chest  "pain.   Gastrointestinal: Negative for abdominal pain, blood in stool and constipation.   Endocrine: Negative for polydipsia, polyphagia and polyuria.   Genitourinary: Negative for decreased urine volume, dysuria, enuresis, frequency, hematuria and urgency.   Musculoskeletal: Positive for arthralgias.   Skin: Negative for pallor.   Neurological: Positive for headaches. Negative for dizziness, tremors, seizures, speech difficulty and weakness.   Psychiatric/Behavioral: Positive for sleep disturbance (improved). Negative for confusion. The patient is nervous/anxious (improving).            Physical Exam  Vitals:    05/19/20 1011   BP: 132/70   Pulse: 101   Temp: 97.9 °F (36.6 °C)   SpO2: 96%   Weight: 124.2 kg (273 lb 11.2 oz)   Height: 5' 5" (1.651 m)    Body mass index is 45.55 kg/m².  Weight: 124.2 kg (273 lb 11.2 oz)   Height: 5' 5" (165.1 cm)   Physical Exam   Constitutional: She is oriented to person, place, and time. She appears well-developed and well-nourished. No distress.   HENT:   Head: Normocephalic and atraumatic.   Eyes: Pupils are equal, round, and reactive to light. Conjunctivae, EOM and lids are normal. No scleral icterus.   Neck: Normal range of motion and full passive range of motion without pain. Neck supple. No JVD present. Carotid bruit is not present. No thyromegaly present.   Cardiovascular: Normal rate, regular rhythm, normal heart sounds, intact distal pulses and normal pulses. Exam reveals no S3, no S4 and no friction rub.   No murmur heard.  Pulmonary/Chest: Effort normal and breath sounds normal. She has no wheezes. She has no rhonchi. She has no rales.   Abdominal: Soft. Bowel sounds are normal. There is no tenderness.   Musculoskeletal: Normal range of motion. She exhibits tenderness. She exhibits no edema.   Right shoulder. Pain on active extension and internal rotation and TTP of the subacromial bursa   Neurological: She is alert and oriented to person, place, and time.   Motor " grossly intact.  Sensory grossly intact.  Symmetric facial movements palate elevated symmetrically tongue midline   Skin: Skin is warm and dry. No rash noted.   Psychiatric: She has a normal mood and affect. Her speech is normal and behavior is normal. Thought content normal.         Health Maintenance       Date Due Completion Date    Low Dose Statin 07/16/1990 ---    Shingles Vaccine (1 of 2) 07/16/2019 ---    Foot Exam 07/24/2020 7/24/2019 (Done)    Override on 7/24/2019: Done (Dr. Heather Hicks ( podiatry ))    Override on 7/17/2018: Done    Override on 7/3/2017: Done    Hemoglobin A1c 08/05/2020 5/5/2020    Lipid Panel 11/04/2020 11/4/2019    Urine Microalbumin 02/07/2021 2/7/2020    Eye Exam 03/21/2021 3/21/2020    Mammogram 12/10/2021 12/10/2019    Colonoscopy 10/21/2024 10/21/2019    TETANUS VACCINE 08/29/2026 8/29/2016      48 yo F presents for DM f/u. Patients labs look great with steady decline. She also has weight loss and likes her Trulicity. Her right shoulder pain, based on the inciting event, mild symptoms, and negative neurological symptoms, the patient most likely has bursitis of the shoulder.       Assessment and Plan:  Type 2 diabetes mellitus without complication, without long-term current use of insulin  -     metFORMIN (GLUCOPHAGE-XR) 500 MG XR 24hr tablet; Take 2 tablets (1,000 mg total) by mouth 2 (two) times daily with meals.  Dispense: 360 tablet; Refill: 1    Essential hypertension  -     CBC auto differential; Future; Expected date: 11/19/2020  -     Comprehensive metabolic panel; Future; Expected date: 11/19/2020  -     Lipid Panel; Future; Expected date: 11/19/2020  -     Hemoglobin A1C; Future; Expected date: 11/19/2020    Vitamin D insufficiency    Acute bursitis of right shoulder      Ice shoulder, limit exacerbating movements. NSAIDs permissible.

## 2020-05-19 NOTE — PATIENT INSTRUCTIONS
Please contact your insurance company to find out about the cost of the Shingrix vaccine and if it is cheaper at the pharmacy or in the clinic.

## 2020-05-24 ENCOUNTER — PATIENT MESSAGE (OUTPATIENT)
Dept: FAMILY MEDICINE | Facility: CLINIC | Age: 50
End: 2020-05-24

## 2020-06-19 ENCOUNTER — TELEPHONE (OUTPATIENT)
Dept: FAMILY MEDICINE | Facility: CLINIC | Age: 50
End: 2020-06-19

## 2020-06-19 ENCOUNTER — LAB VISIT (OUTPATIENT)
Dept: FAMILY MEDICINE | Facility: CLINIC | Age: 50
End: 2020-06-19
Payer: COMMERCIAL

## 2020-06-19 ENCOUNTER — OFFICE VISIT (OUTPATIENT)
Dept: FAMILY MEDICINE | Facility: CLINIC | Age: 50
End: 2020-06-19
Payer: COMMERCIAL

## 2020-06-19 DIAGNOSIS — J06.9 VIRAL URI: ICD-10-CM

## 2020-06-19 DIAGNOSIS — J06.9 VIRAL URI: Primary | ICD-10-CM

## 2020-06-19 PROCEDURE — 99214 PR OFFICE/OUTPT VISIT, EST, LEVL IV, 30-39 MIN: ICD-10-PCS | Mod: 95,,, | Performed by: INTERNAL MEDICINE

## 2020-06-19 PROCEDURE — 99214 OFFICE O/P EST MOD 30 MIN: CPT | Mod: 95,,, | Performed by: INTERNAL MEDICINE

## 2020-06-19 PROCEDURE — U0003 INFECTIOUS AGENT DETECTION BY NUCLEIC ACID (DNA OR RNA); SEVERE ACUTE RESPIRATORY SYNDROME CORONAVIRUS 2 (SARS-COV-2) (CORONAVIRUS DISEASE [COVID-19]), AMPLIFIED PROBE TECHNIQUE, MAKING USE OF HIGH THROUGHPUT TECHNOLOGIES AS DESCRIBED BY CMS-2020-01-R: HCPCS

## 2020-06-19 NOTE — TELEPHONE ENCOUNTER
----- Message from Mandeep Lamb sent at 6/19/2020  9:31 AM CDT -----   Name of Who is Calling:     What is the request in detail:  patient request call back in reference to  sinus infection Please contact to further discuss and advise      Can the clinic reply by [facility]: no     What Number to Call Back if not in [facility]:  [phone]

## 2020-06-19 NOTE — PROGRESS NOTES
Assessment & Plan  Problem List Items Addressed This Visit     None      Visit Diagnoses     Viral URI    -  Primary  -    Symptoms not consistent with bacterial sinusitis.  Check COVID testing.  Recommended self quarantine until her results are back.  I counseled the patient on fluids, rest, OTC medications that can safely be used, hand/cough hygiene, expected course of illness, and when further medical attention would be warranted.        Relevant Orders    COVID-19 Routine Screening            Health Maintenance reviewed, deferred due to acute infection.    The patient location is:  Patient Home   The chief complaint leading to consultation is: noted below  Visit type: Virtual visit with synchronous audio and video  Total time spent with patient: 11  Each patient to whom he or she provides medical services by telemedicine is:  (1) informed of the relationship between the physician and patient and the respective role of any other health care provider with respect to management of the patient; and (2) notified that he or she may decline to receive medical services by telemedicine and may withdraw from such care at any time.    Follow-up: Follow up if symptoms worsen or fail to improve.    ______________________________________________________________________    Chief Complaint  Chief Complaint   Patient presents with    Sinus Problem     x 4 days       HPI  Iqra Duque is a 49 y.o. female with multiple medical diagnoses as listed in the medical history and problem list that presents for sinus congestion 4 days ago via virtual visit.  Pt is known to me with his last appointment 5/19/2020.      Felt feverish when she came home 4 days ago but didn't have thermometer.  Then progressed to diarrhea.  MCCULLOUGH started the next day around her frontal sinus and bridge of the nose.   Got hold of a thermometer and had temp of 100F.  Started bland diet for the diarrhea.  Sinus congestion is not productive of mucus.  No sore  throat.  Mild cough yesterday.  No chest congestion, wheezing, SOB.  No change in taste or smell.  Took acetaminophen without much relief.        PAST MEDICAL HISTORY:  Past Medical History:   Diagnosis Date    Allergy     Diabetes mellitus type II     Diverticulitis 09/2014    Fever blister     Hypertension     Migraine headache        PAST SURGICAL HISTORY:  Past Surgical History:   Procedure Laterality Date    COLONOSCOPY N/A 10/21/2019    Procedure: COLONOSCOPY;  Surgeon: Gomez Stark MD;  Location: [facility];  Service: Endoscopy;  Laterality: N/A;    HYSTERECTOMY  06/2009    [facility]---DLH/BSO ATYPICAL HYPERPLASIA    OOPHORECTOMY      TUBAL LIGATION         SOCIAL HISTORY:  Social History     Socioeconomic History    Marital status:      Spouse name: Not on file    Number of children: Not on file    Years of education: Not on file    Highest education level: Not on file   Occupational History    Occupation: Dispatcher     Employer: [employer]   Social Needs    Financial resource strain: Not very hard    Food insecurity     Worry: Never true     Inability: Never true    Transportation needs     Medical: No     Non-medical: No   Tobacco Use    Smoking status: Never Smoker    Smokeless tobacco: Never Used   Substance and Sexual Activity    Alcohol use: No     Frequency: Monthly or less     Drinks per session: 1 or 2     Binge frequency: Never    Drug use: No    Sexual activity: Yes     Partners: Male     Birth control/protection: None   Lifestyle    Physical activity     Days per week: 0 days     Minutes per session: Not on file    Stress: Rather much   Relationships    Social connections     Talks on phone: More than three times a week     Gets together: Once a week     Attends [employer]ism service: Not on file     Active member of club or organization: No     Attends meetings of clubs or organizations: Not on file     Relationship status:    Other Topics Concern     Are you pregnant or think you may be? Not Asked    Breast-feeding Not Asked   Social History Narrative    Not on file       FAMILY HISTORY:  Family History   Problem Relation Age of Onset    Ovarian cancer Mother 43    Cancer Mother         ovarian    Diabetes Mother     Heart disease Father     Melanoma Neg Hx     Psoriasis Neg Hx     Lupus Neg Hx        ALLERGIES AND MEDICATIONS: updated and reviewed.  Review of patient's allergies indicates:   Allergen Reactions    Bactrim [sulfamethoxazole-trimethoprim] Hives     Also Septra    Keflex [cephalexin] Shortness Of Breath    Neosporin (neomycin-polymyx) Swelling    Amoxicillin Rash    Neosporin  [benzalkonium chloride] Rash     Current Outpatient Medications   Medication Sig Dispense Refill    cetirizine (ZYRTEC) 10 MG tablet Take 10 mg by mouth once daily.      CONTOUR TEST STRIPS Strp TEST ONCE DAILY (Patient not taking: Reported on 12/4/2019) 100 strip 1    ergocalciferol (ERGOCALCIFEROL) 50,000 unit Cap TAKE 1 CAPSULE (50,000 UNITS TOTAL) BY MOUTH EVERY SUNDAY. 12 capsule 0    lisinopril-hydrochlorothiazide (PRINZIDE,ZESTORETIC) 20-12.5 mg per tablet TAKE 1 TABLET BY MOUTH EVERY DAY 90 tablet 3    metFORMIN (GLUCOPHAGE-XR) 500 MG XR 24hr tablet Take 2 tablets (1,000 mg total) by mouth 2 (two) times daily with meals. 360 tablet 1    MICROLET LANCET Misc TEST ONCE DAILY (Patient not taking: Reported on 12/4/2019) 100 each 3    montelukast (SINGULAIR) 10 mg tablet Take 1 tablet (10 mg total) by mouth once daily. 90 tablet 3    omeprazole (PRILOSEC) 20 MG capsule TAKE 1 CAPSULE (20 MG TOTAL) BY MOUTH EVERY MORNING. 90 capsule 3    TRULICITY 1.5 mg/0.5 mL PnIj INJECT 1.5 MG INTO THE SKIN EVERY 7 DAYS. 2 Syringe 3     No current facility-administered medications for this visit.          ROS  Review of Systems   Constitutional: Positive for fever. Negative for activity change, chills and unexpected weight change.   HENT: Positive for congestion.  Negative for hearing loss, rhinorrhea and trouble swallowing.    Eyes: Negative for discharge and visual disturbance.   Respiratory: Negative for chest tightness and wheezing.    Cardiovascular: Negative for chest pain and palpitations.   Gastrointestinal: Positive for diarrhea. Negative for blood in stool, constipation and vomiting.   Endocrine: Negative for polydipsia and polyuria.   Genitourinary: Negative for difficulty urinating, dysuria, hematuria and menstrual problem.   Musculoskeletal: Negative for arthralgias, joint swelling and neck pain.   Neurological: Positive for headaches. Negative for weakness.   Psychiatric/Behavioral: Positive for dysphoric mood. Negative for confusion.           Physical Exam  Physical Exam  Constitutional:       General: She is not in acute distress.     Appearance: She is well-developed.   HENT:      Head: Normocephalic and atraumatic.   Eyes:      Conjunctiva/sclera: Conjunctivae normal.   Pulmonary:      Effort: Pulmonary effort is normal. No respiratory distress.   Neurological:      Mental Status: She is alert and oriented to person, place, and time.      Comments: Symmetric facial movements   Psychiatric:         Behavior: Behavior normal.         Thought Content: Thought content normal.           Health Maintenance       Date Due Completion Date    Low Dose Statin 07/16/1990 ---    Shingles Vaccine (1 of 2) 07/16/2019 ---    Foot Exam 07/24/2020 7/24/2019 (Done)    Override on 7/24/2019: Done (Dr. Heather Hicks ( podiatry ))    Override on 7/17/2018: Done    Override on 7/3/2017: Done    Hemoglobin A1c 08/05/2020 5/5/2020    Lipid Panel 11/04/2020 11/4/2019    Urine Microalbumin 02/07/2021 2/7/2020    Eye Exam 03/21/2021 3/21/2020    Mammogram 12/10/2021 12/10/2019    Colorectal Cancer Screening 10/21/2024 10/21/2019    TETANUS VACCINE 08/29/2026 8/29/2016

## 2020-06-19 NOTE — TELEPHONE ENCOUNTER
Pt. Requesting something for possible sinus infection.sysptoms include, headache for 3 days, facial pressure, nasal congestion. Pt. Also reports a temp. 100.8 yesterday, but none today , and on Wednesday had diarrhea. Pt. Was tested on 5/26/2020 for corona virus at [facility] and was negative.

## 2020-06-19 NOTE — TELEPHONE ENCOUNTER
Needs OV.  Virtual ok.  May need repeat COVID testing as we are having an increase in cases in the state again.     Can see me, Of[facility], or [facility] Anywhere for this.     Thank you,  David

## 2020-06-20 LAB — SARS-COV-2 RNA RESP QL NAA+PROBE: NOT DETECTED

## 2020-06-21 ENCOUNTER — PATIENT MESSAGE (OUTPATIENT)
Dept: FAMILY MEDICINE | Facility: CLINIC | Age: 50
End: 2020-06-21

## 2020-06-22 ENCOUNTER — PATIENT OUTREACH (OUTPATIENT)
Dept: OTHER | Facility: OTHER | Age: 50
End: 2020-06-22

## 2020-06-22 NOTE — PROGRESS NOTES
Digital Medicine: Clinician Follow-Up    Returned patient's call.    The history is provided by the patient.   Follow Up  Follow-up reason(s): reading review    Patient's blood pressure remains at goal.   Patient feels great despite lower readings and continues her daily medications.  She called on accident and reported no issues with her blood pressure.       INTERVENTION(S)  reviewed appropriate dose schedule and encouragement/support    PLAN  patient verbalizes understanding and continue monitoring    Patient will maintain her current hypertension medication regimen.   Patient is aware to contact us with any changes or problems prior to our next outreach.       There are no preventive care reminders to display for this patient.    Last 5 Patient Entered Readings                                      Current 30 Day Average: 108/68     Recent Readings 6/18/2020 6/12/2020 6/4/2020 5/31/2020 5/24/2020    SBP (mmHg) 103 102 110 110 115    DBP (mmHg) 67 69 68 67 71    Pulse 79 89 99 106 82             Hypertension Medications             lisinopril-hydrochlorothiazide (PRINZIDE,ZESTORETIC) 20-12.5 mg per tablet TAKE 1 TABLET BY MOUTH EVERY DAY                 Screenings

## 2020-07-02 ENCOUNTER — PATIENT OUTREACH (OUTPATIENT)
Dept: OTHER | Facility: OTHER | Age: 50
End: 2020-07-02

## 2020-08-03 ENCOUNTER — OFFICE VISIT (OUTPATIENT)
Dept: PODIATRY | Facility: CLINIC | Age: 50
End: 2020-08-03
Payer: COMMERCIAL

## 2020-08-03 VITALS
DIASTOLIC BLOOD PRESSURE: 74 MMHG | SYSTOLIC BLOOD PRESSURE: 126 MMHG | WEIGHT: 273 LBS | HEIGHT: 65 IN | BODY MASS INDEX: 45.48 KG/M2

## 2020-08-03 DIAGNOSIS — L60.8 PINCER NAIL DEFORMITY: ICD-10-CM

## 2020-08-03 DIAGNOSIS — M20.5X1 HALLUX LIMITUS, ACQUIRED, RIGHT: ICD-10-CM

## 2020-08-03 DIAGNOSIS — E11.9 COMPREHENSIVE DIABETIC FOOT EXAMINATION, TYPE 2 DM, ENCOUNTER FOR: Primary | ICD-10-CM

## 2020-08-03 DIAGNOSIS — M20.5X2 HALLUX LIMITUS, ACQUIRED, LEFT: ICD-10-CM

## 2020-08-03 DIAGNOSIS — M20.42 HAMMER TOES OF BOTH FEET: ICD-10-CM

## 2020-08-03 DIAGNOSIS — M20.41 HAMMER TOES OF BOTH FEET: ICD-10-CM

## 2020-08-03 PROCEDURE — 3044F PR MOST RECENT HEMOGLOBIN A1C LEVEL <7.0%: ICD-10-PCS | Mod: CPTII,S$GLB,, | Performed by: PODIATRIST

## 2020-08-03 PROCEDURE — 99214 OFFICE O/P EST MOD 30 MIN: CPT | Mod: S$GLB,,, | Performed by: PODIATRIST

## 2020-08-03 PROCEDURE — 99999 PR PBB SHADOW E&M-EST. PATIENT-LVL III: ICD-10-PCS | Mod: PBBFAC,,, | Performed by: PODIATRIST

## 2020-08-03 PROCEDURE — 3008F PR BODY MASS INDEX (BMI) DOCUMENTED: ICD-10-PCS | Mod: CPTII,S$GLB,, | Performed by: PODIATRIST

## 2020-08-03 PROCEDURE — 99999 PR PBB SHADOW E&M-EST. PATIENT-LVL III: CPT | Mod: PBBFAC,,, | Performed by: PODIATRIST

## 2020-08-03 PROCEDURE — 3078F PR MOST RECENT DIASTOLIC BLOOD PRESSURE < 80 MM HG: ICD-10-PCS | Mod: CPTII,S$GLB,, | Performed by: PODIATRIST

## 2020-08-03 PROCEDURE — 3074F PR MOST RECENT SYSTOLIC BLOOD PRESSURE < 130 MM HG: ICD-10-PCS | Mod: CPTII,S$GLB,, | Performed by: PODIATRIST

## 2020-08-03 PROCEDURE — 3078F DIAST BP <80 MM HG: CPT | Mod: CPTII,S$GLB,, | Performed by: PODIATRIST

## 2020-08-03 PROCEDURE — 3074F SYST BP LT 130 MM HG: CPT | Mod: CPTII,S$GLB,, | Performed by: PODIATRIST

## 2020-08-03 PROCEDURE — 99214 PR OFFICE/OUTPT VISIT, EST, LEVL IV, 30-39 MIN: ICD-10-PCS | Mod: S$GLB,,, | Performed by: PODIATRIST

## 2020-08-03 PROCEDURE — 3044F HG A1C LEVEL LT 7.0%: CPT | Mod: CPTII,S$GLB,, | Performed by: PODIATRIST

## 2020-08-03 PROCEDURE — 3008F BODY MASS INDEX DOCD: CPT | Mod: CPTII,S$GLB,, | Performed by: PODIATRIST

## 2020-08-03 NOTE — PROGRESS NOTES
Subjective:      Patient ID: Iqra Duque is a 50 y.o. female.    Chief Complaint: Diabetes Mellitus (PCP  06/19/2020) and Diabetic Foot Exam    Iqra is a 50 y.o. female who presents to the clinic for evaluation and treatment of high risk feet. Iqra has a past medical history of Allergy, Diabetes mellitus type II, Diverticulitis (09/2014), Fever blister, Hypertension, and Migraine headache.  The patient has no major complaints with feet. Chief concern is how to care for feet as a diabetic.    This patient has documented high risk feet requiring routine maintenance secondary to diabetes mellitis and those secondary complications of diabetes, as mentioned..    PCP: Jaylon Kothari MD    Date Last Seen by PCP:   Chief Complaint   Patient presents with    Diabetes Mellitus     PCP  06/19/2020    Diabetic Foot Exam       Current shoe gear:   tennis shoes       Hemoglobin A1C   Date Value Ref Range Status   05/05/2020 6.9 (H) 4.0 - 5.6 % Final     Comment:     ADA Screening Guidelines:  5.7-6.4%  Consistent with prediabetes  >or=6.5%  Consistent with diabetes  High levels of fetal hemoglobin interfere with the HbA1C  assay. Heterozygous hemoglobin variants (HbS, HgC, etc)do  not significantly interfere with this assay.   However, presence of multiple variants may affect accuracy.     02/06/2020 7.8 (H) 4.0 - 5.6 % Final     Comment:     ADA Screening Guidelines:  5.7-6.4%  Consistent with prediabetes  >or=6.5%  Consistent with diabetes  High levels of fetal hemoglobin interfere with the HbA1C  assay. Heterozygous hemoglobin variants (HbS, HgC, etc)do  not significantly interfere with this assay.   However, presence of multiple variants may affect accuracy.     11/04/2019 8.7 (H) 4.0 - 5.6 % Final     Comment:     ADA Screening Guidelines:  5.7-6.4%  Consistent with prediabetes  >or=6.5%  Consistent with diabetes  High levels of fetal hemoglobin interfere with the HbA1C  assay.  Heterozygous hemoglobin variants (HbS, HgC, etc)do  not significantly interfere with this assay.   However, presence of multiple variants may affect accuracy.       Patient Active Problem List   Diagnosis    Dermatitis    Essential hypertension    Type 2 diabetes mellitus with mild nonproliferative retinopathy of both eyes without macular edema    Allergic rhinitis    Ganglion cyst    Gastroesophageal reflux    Chronic diarrhea of unknown origin    Morbid obesity with BMI of 45.0-49.9, adult    Muscle cramps at night    Vitamin D insufficiency    Migraine with aura and without status migrainosus, not intractable    ALIZA (obstructive sleep apnea)    Chronic bilateral low back pain without sciatica    NAFLD (nonalcoholic fatty liver disease)     Current Outpatient Medications on File Prior to Visit   Medication Sig Dispense Refill    cetirizine (ZYRTEC) 10 MG tablet Take 10 mg by mouth once daily.      CONTOUR TEST STRIPS Strp TEST ONCE DAILY (Patient not taking: Reported on 12/4/2019) 100 strip 1    ergocalciferol (ERGOCALCIFEROL) 50,000 unit Cap TAKE 1 CAPSULE (50,000 UNITS TOTAL) BY MOUTH EVERY SUNDAY. 12 capsule 0    lisinopril-hydrochlorothiazide (PRINZIDE,ZESTORETIC) 20-12.5 mg per tablet TAKE 1 TABLET BY MOUTH EVERY DAY 90 tablet 3    metFORMIN (GLUCOPHAGE-XR) 500 MG XR 24hr tablet Take 2 tablets (1,000 mg total) by mouth 2 (two) times daily with meals. 360 tablet 1    MICROLET LANCET Misc TEST ONCE DAILY (Patient not taking: Reported on 12/4/2019) 100 each 3    montelukast (SINGULAIR) 10 mg tablet Take 1 tablet (10 mg total) by mouth once daily. 90 tablet 3    omeprazole (PRILOSEC) 20 MG capsule TAKE 1 CAPSULE (20 MG TOTAL) BY MOUTH EVERY MORNING. 90 capsule 3    TRULICITY 1.5 mg/0.5 mL PnIj INJECT 1.5 MG INTO THE SKIN EVERY 7 DAYS. 2 Syringe 3     No current facility-administered medications on file prior to visit.      Allergies   Allergen Reactions    Bactrim  [Sulfamethoxazole-Trimethoprim] Hives     Also Septra    Keflex [Cephalexin] Shortness Of Breath    Neosporin (Neomycin-Polymyx) Swelling    Amoxicillin Rash     Past Surgical History:   Procedure Laterality Date    COLONOSCOPY N/A 10/21/2019    Procedure: COLONOSCOPY;  Surgeon: Gomez Stark MD;  Location: [facility] END[facility];  Service: Endoscopy;  Laterality: N/A;    HYSTERECTOMY  06/2009    KJB---DLH/BSO ATYPICAL HYPERPLASIA    OOPHORECTOMY      TUBAL LIGATION       Family History   Problem Relation Age of Onset    Ovarian cancer Mother 43    Cancer Mother         ovarian    Diabetes Mother     Heart disease Father     Melanoma Neg Hx     Psoriasis Neg Hx     Lupus Neg Hx      Social History     Socioeconomic History    Marital status:      Spouse name: Not on file    Number of children: Not on file    Years of education: Not on file    Highest education level: Not on file   Occupational History    Occupation: Dispatcher     Employer: [employer]   Social Needs    Financial resource strain: Not very hard    Food insecurity     Worry: Never true     Inability: Never true    Transportation needs     Medical: No     Non-medical: No   Tobacco Use    Smoking status: Never Smoker    Smokeless tobacco: Never Used   Substance and Sexual Activity    Alcohol use: No     Frequency: Monthly or less     Drinks per session: 1 or 2     Binge frequency: Never    Drug use: No    Sexual activity: Yes     Partners: Male     Birth control/protection: None   Lifestyle    Physical activity     Days per week: 0 days     Minutes per session: Not on file    Stress: Rather much   Relationships    Social connections     Talks on phone: More than three times a week     Gets together: Once a week     Attends Shinto service: Not on file     Active member of club or organization: No     Attends meetings of clubs or organizations: Not on file     Relationship status:    Other Topics Concern     "Are you pregnant or think you may be? Not Asked    Breast-feeding Not Asked   Social History Narrative    Not on file       Review of Systems   Constitution: Negative for chills and fever.   Cardiovascular: Positive for leg swelling. Negative for chest pain and claudication.   Respiratory: Negative for cough and shortness of breath.    Skin: Positive for dry skin and nail changes. Negative for itching.   Musculoskeletal: Positive for back pain and myalgias. Negative for arthritis, falls, joint pain, joint swelling and muscle weakness.   Gastrointestinal: Negative for diarrhea, nausea and vomiting.   Neurological: Negative for numbness, paresthesias, tremors and weakness.        History of sciatica   Psychiatric/Behavioral: Negative for altered mental status and hallucinations.           Objective:       Vitals:    08/03/20 0818   BP: 126/74   Weight: 123.8 kg (273 lb)   Height: 5' 5" (1.651 m)   PainSc: 0-No pain       Physical Exam  Vitals signs and nursing note reviewed.   Constitutional:       General: She is not in acute distress.     Appearance: She is well-developed. She is not diaphoretic.      Comments: Alert and oriented x 3. No evidence of depression, anxiety, or agitation. Calm, cooperative, and communicative. Appropriate interactions and affect.       Cardiovascular:      Pulses:           Dorsalis pedis pulses are 2+ on the right side and 2+ on the left side.        Posterior tibial pulses are 2+ on the right side and 2+ on the left side.      Comments: dorsalis pedis and posterior tibial pulses are palpable bilaterally. Digits are warm to the touch 1-5 bilaterally. Capillary refill time is within normal limits 1-5 bilaterally.        Musculoskeletal:      Right ankle: She exhibits no swelling and no ecchymosis. No tenderness. Achilles tendon exhibits no pain.      Left ankle: She exhibits no swelling and no ecchymosis. No tenderness. Achilles tendon exhibits no pain.      Right foot: Normal capillary " refill. Crepitus (dorsal medial midfoot with palpable spurring in the area) present. No swelling or deformity.      Left foot: Normal capillary refill. No swelling.      Comments: Muscle strength is 5/5 in all groups bilaterally.    Decreased arch height noted b/l. Negative too many toes sign.      Lymphadenopathy:      Comments: No lymphatic streaking    Negative lymphadenopathy bilateral popliteal fossa and tarsal tunnel.     Skin:     General: Skin is warm and dry.      Coloration: Skin is not pale.      Findings: No abrasion, ecchymosis, lesion or rash.      Nails: There is no clubbing.        Comments: Nails 1-5 bilaterally with incurvated borders.     Neurological:      Comments: Pitman-Alphonso 5.07 monofilament is intact bilateral feet. Sharp/dull sensation is also intact Bilateral feet.       Psychiatric:         Speech: Speech normal.         Behavior: Behavior normal.               Assessment:       Encounter Diagnoses   Name Primary?    Comprehensive diabetic foot examination, type 2 DM, encounter for Yes    Pincer nail deformity     Hammer toes of both feet     Hallux limitus, acquired, left     Hallux limitus, acquired, right          Plan:       Iqra was seen today for diabetes mellitus and diabetic foot exam.    Diagnoses and all orders for this visit:    Comprehensive diabetic foot examination, type 2 DM, encounter for    Pincer nail deformity    Hammer toes of both feet    Hallux limitus, acquired, left    Hallux limitus, acquired, right      I counseled the patient on her conditions, their implications and medical management.            Greater than 50% of this visit spent on counseling and coordination of care.    Education about the diabetic foot, neuropathy, and prevention of limb loss.    Shoe inspection. Diabetic Foot Education. Patient reminded of the importance of good nutrition/healthy diet/weight management and blood sugar control to help prevent podiatric complications of  diabetes. Patient instructed on proper foot hygeine. Wear comfortable, proper fitting shoes. Wash feet daily. Dry well. After drying, apply moisturizer to feet (no lotion to webspaces). Inspect feet daily for skin breaks, blisters, swelling, or redness. Wear cotton socks (preferably white)  Change socks every day. Do NOT walk barefoot. Do NOT use heating pads or hot water soaks. We discussed wearing proper shoe gear, daily foot inspections, never walking without protective shoe gear.     Discussed edema control and the importance of daily moisturizer to the feet such as Gold bonds diabetic foot cream    Recommend applying vicks vaporub to thick abnormal toenails daily x 6 months to treat fungal nail infection.    She will continue to monitor the areas daily, inspect her feet, wear protective shoe gear when ambulatory, moisturizer to maintain skin integrity and follow in this office in approximately 12 months, sooner p.r.n.

## 2020-08-03 NOTE — PATIENT INSTRUCTIONS
Recommend lotions: eucerin, eucerin for diabetics, aquaphor, A&D ointment, gold bond for diabetics, sween, Mineral's Bees all purpose baby ointment,  urea 40 with aloe (found on amazon.com)    Shoe recommendations: (try 6pm.com, zappos.com , nordstromrack.[email or web address], or shoes.Bizak for discounted prices) you can visit [employer]SW shoes in [place]  or [employer] in the [facility] (there are also several shoe brand outlets in the [facility])    Asics (GT 2000 or gel foundations), new balance stability type shoes, saucony (stabil c3),  Seymour (GTS or Beast or transcend), propet (tennis shoe)    Sofft Brand or axxiom (women) Ovidio&Kris (men), clarks, crocs, aerosoles, naturalizers, SAS, ecco, born, messi gibbs, rockports (dress shoes)    Vionic, burkenstocks, fitflops, propet (sandals)  Nike comfort thong sandals, crocs, propet (house shoes)    Nail Home remedy:  Vicks Vapor rub to nails for easier manageability    Diabetes: Inspecting Your Feet  Diabetes increases your chances of developing foot problems. So inspect your feet every day. This helps you find small skin irritations before they become serious infections. If you have trouble seeing the bottoms of your feet, use a mirror or ask a family member or friend to help.     Pressure spots on the bottom of the foot are common areas where problems develop.   How to check your feet  Below are tips to help you look for foot problems. Try to check your feet at the same time each day, such as when you get out of bed in the morning:  · Check the top of each foot. The tops of toes, back of the heel, and outer edge of the foot can get a lot of rubbing from poor-fitting shoes.  · Check the bottom of each foot. Daily wear and tear often leads to problems at pressure spots.  · Check the toes and nails. Fungal infections often occur between toes. Toenail problems can also be a sign of fungal infections or lead to breaks in the skin.  · Check your shoes, too. Loose objects inside a shoe can  injure the foot. Use your hand to feel inside your shoes for things like vicente, loose stitching, or rough areas that could irritate your skin.  Warning signs  Look for any color changes in the foot. Redness with streaks can signal a severe infection, which needs immediate medical attention. Tell your doctor right away if you have any of these problems:  · Swelling, sometimes with color changes, may be a sign of poor blood flow or infection. Symptoms include tenderness and an increase in the size of your foot.  · Warm or hot areas on your feet may be signs of infection. A foot that is cold may not be getting enough blood.  · Sensations such as burning, tingling, or pins and needles can be signs of a problem. Also check for areas that may be numb.  · Hot spots are caused by friction or pressure. Look for hot spots in areas that get a lot of rubbing. Hot spots can turn into blisters, calluses, or sores.  · Cracks and sores are caused by dry or irritated skin. They are a sign that the skin is breaking down, which can lead to infection.  · Toenail problems to watch for include nails growing into the skin (ingrown toenail) and causing redness or pain. Thick, yellow, or discolored nails can signal a fungal infection.  · Drainage and odor can develop from untreated sores and ulcers. Call your doctor right away if you notice white or yellow drainage, bleeding, or unpleasant odor.   © 4427-8732 [employer]. [address] [place] [address]. All rights reserved. This information is not intended as a substitute for professional medical care. Always follow your healthcare professional's instructions.        Step-by-Step:  Inspecting Your Feet (Diabetes)    Date Last Reviewed: 10/1/2016  © 1633-3049 [employer]. [address] [place] [address]. All rights reserved. This information is not intended as a substitute for professional medical care. Always follow your healthcare  professional's instructions.

## 2020-09-28 LAB
LEFT EYE DM RETINOPATHY: POSITIVE
RIGHT EYE DM RETINOPATHY: POSITIVE

## 2020-10-06 ENCOUNTER — PATIENT MESSAGE (OUTPATIENT)
Dept: FAMILY MEDICINE | Facility: CLINIC | Age: 50
End: 2020-10-06

## 2020-10-06 ENCOUNTER — TELEPHONE (OUTPATIENT)
Dept: FAMILY MEDICINE | Facility: CLINIC | Age: 50
End: 2020-10-06

## 2020-10-06 NOTE — TELEPHONE ENCOUNTER
----- Message from Belle Moy sent at 10/6/2020  2:34 PM CDT -----  Regarding: sooner appt.  Contact: [phone]  Type:  Sooner Apoointment Request    Caller is requesting a sooner appointment.    Name of Caller: Iqra Neto  When is the first available appointment? Her appt is 11/20/2020  Would the patient rather a call back or a response via [facility]? Call back  Best Call Back Number:[phone]  Additional Information:

## 2020-10-08 ENCOUNTER — PATIENT OUTREACH (OUTPATIENT)
Dept: ADMINISTRATIVE | Facility: HOSPITAL | Age: 50
End: 2020-10-08

## 2020-10-08 DIAGNOSIS — Z11.59 NEED FOR HEPATITIS C SCREENING TEST: Primary | ICD-10-CM

## 2020-10-09 ENCOUNTER — PATIENT OUTREACH (OUTPATIENT)
Dept: OTHER | Facility: OTHER | Age: 50
End: 2020-10-09

## 2020-10-09 NOTE — PROGRESS NOTES
Digital Medicine: Clinician Follow-Up    The history is provided by the patient.   Follow-up reason(s): routine follow up.     Hypertension    Patient's blood pressure is stable.   Patient is not experiencing signs/symptoms of hypotension.  Patient is not experiencing signs/symptoms of hypertension.            Last 5 Patient Entered Readings                                      Current 30 Day Average: 112/71     Recent Readings 10/7/2020 9/27/2020 9/26/2020 9/20/2020 9/14/2020    SBP (mmHg) 129 109 110 108 107    DBP (mmHg) 84 67 71 67 69    Pulse 91 77 95 95 89                    ASSESSMENT(S)  Patients BP average is 112/71 mmHg, which is at goal. Patient's BP goal is less than or equal to 130/80.    Hypertension Plan  Continue current therapy.  Provided patient education. Patient is aware to contact us with any changes or problems prior to my next outreach.        Addressed patient questions and patient has my contact information if needed prior to next outreach. Patient verbalizes understanding.                 Topic    Hemoglobin A1C      There are no preventive care reminders to display for this patient.      Hypertension Medications             lisinopril-hydrochlorothiazide (PRINZIDE,ZESTORETIC) 20-12.5 mg per tablet TAKE 1 TABLET BY MOUTH EVERY DAY

## 2020-10-11 DIAGNOSIS — E11.9 TYPE 2 DIABETES MELLITUS WITHOUT COMPLICATION, WITHOUT LONG-TERM CURRENT USE OF INSULIN: Chronic | ICD-10-CM

## 2020-10-11 RX ORDER — DULAGLUTIDE 1.5 MG/.5ML
INJECTION, SOLUTION SUBCUTANEOUS
Qty: 2 ML | Refills: 1 | Status: SHIPPED | OUTPATIENT
Start: 2020-10-11 | End: 2020-12-10

## 2020-10-15 ENCOUNTER — LAB VISIT (OUTPATIENT)
Dept: LAB | Facility: HOSPITAL | Age: 50
End: 2020-10-15
Attending: INTERNAL MEDICINE
Payer: COMMERCIAL

## 2020-10-15 DIAGNOSIS — I10 ESSENTIAL HYPERTENSION: Chronic | ICD-10-CM

## 2020-10-15 DIAGNOSIS — Z11.59 NEED FOR HEPATITIS C SCREENING TEST: ICD-10-CM

## 2020-10-15 LAB
ALBUMIN SERPL BCP-MCNC: 3.4 G/DL (ref 3.5–5.2)
ALP SERPL-CCNC: 44 U/L (ref 55–135)
ALT SERPL W/O P-5'-P-CCNC: 37 U/L (ref 10–44)
ANION GAP SERPL CALC-SCNC: 8 MMOL/L (ref 8–16)
AST SERPL-CCNC: 48 U/L (ref 10–40)
BASOPHILS # BLD AUTO: 0.04 K/UL (ref 0–0.2)
BASOPHILS NFR BLD: 0.6 % (ref 0–1.9)
BILIRUB SERPL-MCNC: 0.7 MG/DL (ref 0.1–1)
BUN SERPL-MCNC: 15 MG/DL (ref 6–20)
CALCIUM SERPL-MCNC: 9.5 MG/DL (ref 8.7–10.5)
CHLORIDE SERPL-SCNC: 100 MMOL/L (ref 95–110)
CHOLEST SERPL-MCNC: 148 MG/DL (ref 120–199)
CHOLEST/HDLC SERPL: 3.3 {RATIO} (ref 2–5)
CO2 SERPL-SCNC: 30 MMOL/L (ref 23–29)
CREAT SERPL-MCNC: 0.8 MG/DL (ref 0.5–1.4)
DIFFERENTIAL METHOD: ABNORMAL
EOSINOPHIL # BLD AUTO: 0.3 K/UL (ref 0–0.5)
EOSINOPHIL NFR BLD: 4.8 % (ref 0–8)
ERYTHROCYTE [DISTWIDTH] IN BLOOD BY AUTOMATED COUNT: 17.2 % (ref 11.5–14.5)
EST. GFR  (AFRICAN AMERICAN): >60 ML/MIN/1.73 M^2
EST. GFR  (NON AFRICAN AMERICAN): >60 ML/MIN/1.73 M^2
ESTIMATED AVG GLUCOSE: 151 MG/DL (ref 68–131)
GLUCOSE SERPL-MCNC: 134 MG/DL (ref 70–110)
HBA1C MFR BLD HPLC: 6.9 % (ref 4–5.6)
HCT VFR BLD AUTO: 37.1 % (ref 37–48.5)
HDLC SERPL-MCNC: 45 MG/DL (ref 40–75)
HDLC SERPL: 30.4 % (ref 20–50)
HGB BLD-MCNC: 11.1 G/DL (ref 12–16)
IMM GRANULOCYTES # BLD AUTO: 0.01 K/UL (ref 0–0.04)
IMM GRANULOCYTES NFR BLD AUTO: 0.2 % (ref 0–0.5)
LDLC SERPL CALC-MCNC: 81.8 MG/DL (ref 63–159)
LYMPHOCYTES # BLD AUTO: 2 K/UL (ref 1–4.8)
LYMPHOCYTES NFR BLD: 29.7 % (ref 18–48)
MCH RBC QN AUTO: 22.7 PG (ref 27–31)
MCHC RBC AUTO-ENTMCNC: 29.9 G/DL (ref 32–36)
MCV RBC AUTO: 76 FL (ref 82–98)
MONOCYTES # BLD AUTO: 0.4 K/UL (ref 0.3–1)
MONOCYTES NFR BLD: 6.6 % (ref 4–15)
NEUTROPHILS # BLD AUTO: 3.9 K/UL (ref 1.8–7.7)
NEUTROPHILS NFR BLD: 58.1 % (ref 38–73)
NONHDLC SERPL-MCNC: 103 MG/DL
NRBC BLD-RTO: 0 /100 WBC
PLATELET # BLD AUTO: 297 K/UL (ref 150–350)
PMV BLD AUTO: 10.8 FL (ref 9.2–12.9)
POTASSIUM SERPL-SCNC: 4.6 MMOL/L (ref 3.5–5.1)
PROT SERPL-MCNC: 6.9 G/DL (ref 6–8.4)
RBC # BLD AUTO: 4.89 M/UL (ref 4–5.4)
SODIUM SERPL-SCNC: 138 MMOL/L (ref 136–145)
TRIGL SERPL-MCNC: 106 MG/DL (ref 30–150)
WBC # BLD AUTO: 6.64 K/UL (ref 3.9–12.7)

## 2020-10-15 PROCEDURE — 36415 COLL VENOUS BLD VENIPUNCTURE: CPT | Mod: PO

## 2020-10-15 PROCEDURE — 83036 HEMOGLOBIN GLYCOSYLATED A1C: CPT

## 2020-10-15 PROCEDURE — 86803 HEPATITIS C AB TEST: CPT

## 2020-10-15 PROCEDURE — 85025 COMPLETE CBC W/AUTO DIFF WBC: CPT

## 2020-10-15 PROCEDURE — 80053 COMPREHEN METABOLIC PANEL: CPT

## 2020-10-15 PROCEDURE — 80061 LIPID PANEL: CPT

## 2020-10-16 LAB — HCV AB SERPL QL IA: NEGATIVE

## 2020-10-21 ENCOUNTER — OFFICE VISIT (OUTPATIENT)
Dept: FAMILY MEDICINE | Facility: CLINIC | Age: 50
End: 2020-10-21
Payer: COMMERCIAL

## 2020-10-21 VITALS
DIASTOLIC BLOOD PRESSURE: 72 MMHG | TEMPERATURE: 99 F | SYSTOLIC BLOOD PRESSURE: 130 MMHG | OXYGEN SATURATION: 97 % | WEIGHT: 277.25 LBS | HEART RATE: 77 BPM | HEIGHT: 65 IN | BODY MASS INDEX: 46.19 KG/M2

## 2020-10-21 DIAGNOSIS — E11.3293 TYPE 2 DIABETES MELLITUS WITH BOTH EYES AFFECTED BY MILD NONPROLIFERATIVE RETINOPATHY WITHOUT MACULAR EDEMA, WITHOUT LONG-TERM CURRENT USE OF INSULIN: Chronic | ICD-10-CM

## 2020-10-21 DIAGNOSIS — M54.50 CHRONIC BILATERAL LOW BACK PAIN WITHOUT SCIATICA: Chronic | ICD-10-CM

## 2020-10-21 DIAGNOSIS — I10 ESSENTIAL HYPERTENSION: Chronic | ICD-10-CM

## 2020-10-21 DIAGNOSIS — Z23 NEEDS FLU SHOT: ICD-10-CM

## 2020-10-21 DIAGNOSIS — G89.29 CHRONIC BILATERAL LOW BACK PAIN WITHOUT SCIATICA: Chronic | ICD-10-CM

## 2020-10-21 DIAGNOSIS — E55.9 VITAMIN D INSUFFICIENCY: Chronic | ICD-10-CM

## 2020-10-21 DIAGNOSIS — E66.01 MORBID OBESITY WITH BMI OF 45.0-49.9, ADULT: Chronic | ICD-10-CM

## 2020-10-21 DIAGNOSIS — D50.9 MICROCYTIC ANEMIA: ICD-10-CM

## 2020-10-21 DIAGNOSIS — G47.33 OSA (OBSTRUCTIVE SLEEP APNEA): ICD-10-CM

## 2020-10-21 DIAGNOSIS — G43.109 MIGRAINE WITH AURA AND WITHOUT STATUS MIGRAINOSUS, NOT INTRACTABLE: ICD-10-CM

## 2020-10-21 DIAGNOSIS — Z00.00 ROUTINE MEDICAL EXAM: Primary | ICD-10-CM

## 2020-10-21 PROCEDURE — 3078F PR MOST RECENT DIASTOLIC BLOOD PRESSURE < 80 MM HG: ICD-10-PCS | Mod: CPTII,S$GLB,, | Performed by: NURSE PRACTITIONER

## 2020-10-21 PROCEDURE — 3044F PR MOST RECENT HEMOGLOBIN A1C LEVEL <7.0%: ICD-10-PCS | Mod: CPTII,S$GLB,, | Performed by: NURSE PRACTITIONER

## 2020-10-21 PROCEDURE — 3044F HG A1C LEVEL LT 7.0%: CPT | Mod: CPTII,S$GLB,, | Performed by: NURSE PRACTITIONER

## 2020-10-21 PROCEDURE — 90686 IIV4 VACC NO PRSV 0.5 ML IM: CPT | Mod: S$GLB,,, | Performed by: NURSE PRACTITIONER

## 2020-10-21 PROCEDURE — 90471 FLU VACCINE (QUAD) GREATER THAN OR EQUAL TO 3YO PRESERVATIVE FREE IM: ICD-10-PCS | Mod: S$GLB,,, | Performed by: NURSE PRACTITIONER

## 2020-10-21 PROCEDURE — 90686 FLU VACCINE (QUAD) GREATER THAN OR EQUAL TO 3YO PRESERVATIVE FREE IM: ICD-10-PCS | Mod: S$GLB,,, | Performed by: NURSE PRACTITIONER

## 2020-10-21 PROCEDURE — 99999 PR PBB SHADOW E&M-EST. PATIENT-LVL IV: ICD-10-PCS | Mod: PBBFAC,,, | Performed by: NURSE PRACTITIONER

## 2020-10-21 PROCEDURE — 99396 PR PREVENTIVE VISIT,EST,40-64: ICD-10-PCS | Mod: 25,S$GLB,, | Performed by: NURSE PRACTITIONER

## 2020-10-21 PROCEDURE — 3008F PR BODY MASS INDEX (BMI) DOCUMENTED: ICD-10-PCS | Mod: CPTII,S$GLB,, | Performed by: NURSE PRACTITIONER

## 2020-10-21 PROCEDURE — 3075F SYST BP GE 130 - 139MM HG: CPT | Mod: CPTII,S$GLB,, | Performed by: NURSE PRACTITIONER

## 2020-10-21 PROCEDURE — 3078F DIAST BP <80 MM HG: CPT | Mod: CPTII,S$GLB,, | Performed by: NURSE PRACTITIONER

## 2020-10-21 PROCEDURE — 99396 PREV VISIT EST AGE 40-64: CPT | Mod: 25,S$GLB,, | Performed by: NURSE PRACTITIONER

## 2020-10-21 PROCEDURE — 3075F PR MOST RECENT SYSTOLIC BLOOD PRESS GE 130-139MM HG: ICD-10-PCS | Mod: CPTII,S$GLB,, | Performed by: NURSE PRACTITIONER

## 2020-10-21 PROCEDURE — 3008F BODY MASS INDEX DOCD: CPT | Mod: CPTII,S$GLB,, | Performed by: NURSE PRACTITIONER

## 2020-10-21 PROCEDURE — 90471 IMMUNIZATION ADMIN: CPT | Mod: S$GLB,,, | Performed by: NURSE PRACTITIONER

## 2020-10-21 PROCEDURE — 99999 PR PBB SHADOW E&M-EST. PATIENT-LVL IV: CPT | Mod: PBBFAC,,, | Performed by: NURSE PRACTITIONER

## 2020-10-21 NOTE — MEDICAL/APP STUDENT
Subjective:       Patient ID: Iqra Duque is a 50 y.o. female with the medical history listed below who presents today for her annual wellness exam. Today we will discuss labs and health maintenance. Her A1C has remained stable at 6.9 over the past 5 months.  Hemoglobin has decreased from 12.5 to 11., MCV 82 to 76, and MCH from 24.5 to 22.7. Otherwise her labs were unremarkable. Daily medication regime is effective and patient is not experiencing any side effects. Patient states that her family is all pitching in to cook at night and they have really cut back on fast food. She states that she has cut back on red meat since she has trouble digesting it. She is enrolled in [facility] diabetes program. Her blood sugars run between 150-170 in the morning.  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 Patient is taking a college course and has been feel more stress than usual. She sleeps with a CPAP machine and two pillows at night. She has no other complaints and is otherwise healthy on today's visit.     Chief Complaint: Annual Exam    HPI  Review of Systems   Constitutional: Negative for chills, diaphoresis, fatigue and fever.   HENT: Positive for postnasal drip and sinus pressure/congestion. Negative for sneezing and trouble swallowing.    Respiratory: Positive for apnea. Negative for cough, chest tightness, shortness of breath and wheezing.    Cardiovascular: Negative for chest pain, palpitations and leg swelling.   Gastrointestinal: Positive for reflux. Negative for change in bowel habit, constipation, diarrhea, nausea, vomiting and change in bowel habit.   Musculoskeletal: Positive for leg pain. Negative for back pain and myalgias.        Nathanael horses at night, once a week   Integumentary:  Negative for rash.   Neurological: Positive for headaches. Negative for dizziness, weakness, light-headedness and numbness.   Psychiatric/Behavioral: Negative for  confusion and sleep disturbance. The patient is not nervous/anxious.          Objective:      Physical Exam  Vitals signs reviewed.   Constitutional:       General: She is awake.      Appearance: Normal appearance. She is obese.   HENT:      Head: Normocephalic.      Salivary Glands: Right salivary gland is not diffusely enlarged. Left salivary gland is not diffusely enlarged.      Right Ear: Hearing, tympanic membrane, ear canal and external ear normal.      Left Ear: Hearing, tympanic membrane, ear canal and external ear normal.      Nose: Nose normal.      Right Sinus: No maxillary sinus tenderness or frontal sinus tenderness.      Left Sinus: No maxillary sinus tenderness or frontal sinus tenderness.      Mouth/Throat:      Lips: Pink.      Mouth: Mucous membranes are moist.      Pharynx: Oropharynx is clear. Uvula midline.   Eyes:      General: Lids are normal.      Pupils: Pupils are equal, round, and reactive to light.   Neck:      Musculoskeletal: Normal range of motion and neck supple.      Thyroid: No thyroid mass, thyromegaly or thyroid tenderness.      Trachea: Trachea normal.   Cardiovascular:      Rate and Rhythm: Normal rate and regular rhythm.      Pulses: Normal pulses.      Heart sounds: Normal heart sounds, S1 normal and S2 normal. No murmur. No friction rub. No gallop.    Pulmonary:      Effort: Pulmonary effort is normal.      Breath sounds: Normal breath sounds and air entry.   Abdominal:      General: Abdomen is flat. Bowel sounds are normal. There is no distension.      Palpations: Abdomen is soft.      Tenderness: There is no abdominal tenderness. There is no right CVA tenderness or left CVA tenderness.   Musculoskeletal:      Right lower leg: No edema.      Left lower leg: No edema.   Lymphadenopathy:      Cervical: No cervical adenopathy.   Skin:     General: Skin is warm and dry.      Capillary Refill: Capillary refill takes less than 2 seconds.      Findings: No rash or wound.    Neurological:      General: No focal deficit present.      Mental Status: She is alert and oriented to person, place, and time.   Psychiatric:         Attention and Perception: Attention and perception normal.         Mood and Affect: Mood normal.         Speech: Speech normal.         Behavior: Behavior normal. Behavior is cooperative.         Thought Content: Thought content normal.         Judgment: Judgment normal.         Assessment:       1. Type 2 diabetes mellitus with both eyes affected by mild nonproliferative retinopathy without macular edema, without long-term current use of insulin    2. Essential hypertension    3. ALIZA (obstructive sleep apnea)    4. Vitamin D insufficiency    5. Morbid obesity with BMI of 45.0-49.9, adult    6. Chronic bilateral low back pain without sciatica    7. Migraine with aura and without status migrainosus, not intractable    8. Needs flu shot    9. Microcytic anemia        Plan:       Routine medical exam  Labs reviewed.   Age appropriate wellness check  Discussed importance of diet and exercise.  Educate on healthy eating habits, no soda and fast food  Monitor blood sugars at home and keep log    Type 2 diabetes mellitus with both eyes affected by mild nonproliferative retinopathy without macular edema, without long-term current use of insulin  The current medical regimen is effective;  continue present plan and medications.  -     Hemoglobin A1C; Future; Expected date: 04/21/2021  -     Basic Metabolic Panel; Future; Expected date: 04/21/2021    Essential hypertension  The current medical regimen is effective;  continue present plan and medications.    ALIZA (obstructive sleep apnea)  The current medical regimen is effective;  continue present plan and medications.    Microcytic anemia  Routine labs  -     CBC auto differential; Future; Expected date: 10/21/2020  -     Ferritin; Future; Expected date: 10/21/2020  -     Iron and TIBC; Future; Expected date:  10/21/2020    Morbid obesity with BMI of 45.0-49.9, adult  See above      Vitamin D insufficiency  The current medical regimen is effective;  continue present plan and medications.    Chronic bilateral low back pain without sciatica  The current medical regimen is effective;  continue present plan and medications.    Migraine with aura and without status migrainosus, not intractable  The current medical regimen is effective;  continue present plan and medications.      Needs flu shot  Admin today in office  -     Influenza - Quadrivalent (PF)    Patient verbalizes understanding and is in agreement with the plan of care    Follow up in about 6 months (around 4/21/2021) for routine follow-up PCP.

## 2020-10-21 NOTE — PATIENT INSTRUCTIONS
Call your insurance and ask where you should get the shingles shot: here or at the pharmacy.    Keep up the great work! Now let's focus on cutting back on the starches since we are almost only cooking at home now! Also, work on cutting back on the coke again!    We will see you in 6 months with labs before that time!

## 2020-10-21 NOTE — PROGRESS NOTES
History of Present Illness   Iqra Duque is a 50 y.o. woman with medical history as listed below who presents today for routine medical exam. It has been about 6 months since her last routine visit. She had labs prior to our visit significant for decrease in hemoglobin with decreased MVC and MCH, hemoglobin A1c 6.9, and stable CMP, lipid panel with negative hepatitis C screen. She reports she is taking medications as prescribed without perceived SE. She does monitor her glucose at home with range 130-170, without hypoglycemic episodes. She has stopped eating out and has focused on cutting back on sodas and cooking at home. She has also cut red meat out of her diet. She is still getting a fair amount of starches but is focusing on whole grains and plans to cut back on this as her next step. She has no complaints and is otherwise healthy on today's visit.    Past Medical History:   Diagnosis Date    Allergy     Diabetes mellitus type II     Diverticulitis 09/2014    Fever blister     Hypertension     Migraine headache        Past Surgical History:   Procedure Laterality Date    COLONOSCOPY N/A 10/21/2019    Procedure: COLONOSCOPY;  Surgeon: Gomez Stark MD;  Location: [facility];  Service: Endoscopy;  Laterality: N/A;    HYSTERECTOMY  06/2009    KJB---DLH/BSO ATYPICAL HYPERPLASIA    OOPHORECTOMY      TUBAL LIGATION         Social History     Socioeconomic History    Marital status:      Spouse name: Not on file    Number of children: Not on file    Years of education: Not on file    Highest education level: Not on file   Occupational History    Occupation: Dispatcher     Employer: [employer]   Social Needs    Financial resource strain: Not very hard    Food insecurity     Worry: Never true     Inability: Never true    Transportation needs     Medical: No     Non-medical: No   Tobacco Use    Smoking status: Never Smoker    Smokeless tobacco: Never Used   Substance and Sexual  Activity    Alcohol use: No     Frequency: Monthly or less     Drinks per session: 1 or 2     Binge frequency: Never    Drug use: No    Sexual activity: Yes     Partners: Male     Birth control/protection: None   Lifestyle    Physical activity     Days per week: 0 days     Minutes per session: Not on file    Stress: Rather much   Relationships    Social connections     Talks on phone: More than three times a week     Gets together: Once a week     Attends [employer] service: Not on file     Active member of club or organization: No     Attends meetings of clubs or organizations: Not on file     Relationship status:    Other Topics Concern    Are you pregnant or think you may be? Not Asked    Breast-feeding Not Asked   Social History Narrative    Not on file       Family History   Problem Relation Age of Onset    Ovarian cancer Mother 43    Cancer Mother         ovarian    Diabetes Mother     Heart disease Father     Melanoma Neg Hx     Psoriasis Neg Hx     Lupus Neg Hx        Review of Systems  Review of Systems   Constitutional: Negative for malaise/fatigue and weight loss.   HENT: Negative for hearing loss and tinnitus.    Eyes: Negative for blurred vision and double vision.   Respiratory: Negative for shortness of breath and wheezing.    Cardiovascular: Negative for chest pain and palpitations.   Gastrointestinal: Negative for blood in stool and melena.   Genitourinary: Negative for flank pain and hematuria.   Musculoskeletal: Negative for back pain and joint pain.   Skin: Negative for rash.   Neurological: Positive for headaches. Negative for dizziness and loss of consciousness.   Endo/Heme/Allergies: Positive for environmental allergies. Negative for polydipsia.   Psychiatric/Behavioral: Negative for depression. The patient is not nervous/anxious and does not have insomnia.      A complete review of systems was otherwise negative.    Physical Exam  /72   Pulse 77   Temp 98.6 °F (37  "°C) (Oral)   Ht 5' 5" (1.651 m)   Wt 125.8 kg (277 lb 3.7 oz)   SpO2 97%   BMI 46.13 kg/m²   General appearance: alert, appears stated age, cooperative and no distress  Head: Normocephalic, without obvious abnormality, atraumatic  Eyes: negative findings: lids and lashes normal, conjunctivae and sclerae normal and pupils equal, round, reactive to light and accomodation  Ears: normal TM's and external ear canals both ears  Nose: Nares normal. Septum midline. Mucosa normal. No drainage or sinus tenderness.  Throat: lips, mucosa, and tongue normal; teeth and gums normal  Neck: no carotid bruit, no JVD, supple, symmetrical, trachea midline and thyroid not enlarged, symmetric, no tenderness/mass/nodules  Back: symmetric, no curvature. ROM normal. No CVA tenderness.  Lungs: clear to auscultation bilaterally  Heart: regular rate and rhythm, S1, S2 normal, no murmur, click, rub or gallop  Abdomen: soft, non-tender; bowel sounds normal; no masses,  no organomegaly  Extremities: extremities normal, atraumatic, no cyanosis or edema  Pulses: 2+ and symmetric  Skin: Skin color, texture, turgor normal. No rashes or lesions  Lymph nodes: Cervical, supraclavicular, and axillary nodes normal.  Neurologic: Grossly normal    Assessment/Plan  Routine medical exam  Age appropriate screening recommendations and HM were discussed and updated.  Reviewed recent labs with patient.  Discussed importance of heart healthy diet and exercise.  Return in 6 months for routine follow-up.    Type 2 diabetes mellitus with both eyes affected by mild nonproliferative retinopathy without macular edema, without long-term current use of insulin  The current medical regimen is effective;  continue present plan and medications.  Goal of focusing on completely cutting out sodas and limiting starches with whole grains and continuing her current lifestyle changes.  Resume regular exercise.  Repeat labs in 6 months with follow-up with PCP.  -     Hemoglobin " A1C; Future; Expected date: 04/21/2021  -     Basic Metabolic Panel; Future; Expected date: 04/21/2021    Essential hypertension  The current medical regimen is effective;  continue present plan and medications.  BP at goal today.    ALIZA (obstructive sleep apnea)  The current medical regimen is effective;  continue present plan and medications.  Wearing her CPAP nightly > 4 hours and feeling well rested.    Microcytic anemia  Presumed iron deficiency given that she has completed cut out red meat in her diet.  Repeat CBC with iron studies.  Consider iron supplementation or increase in iron rich foods in her diet pending results.  -     CBC auto differential; Future; Expected date: 10/21/2020  -     Ferritin; Future; Expected date: 10/21/2020  -     Iron and TIBC; Future; Expected date: 10/21/2020    Morbid obesity with BMI of 45.0-49.9, adult  The patient is asked to make an attempt to improve diet and exercise patterns to aid in medical management of this problem. We specifically discussed cutting calorie intake by 500-1000 calories per day for a goal of a 1-2 pound weight loss per week and recommendations for a mostly plant based diet with limited red meats/refined grains/processed foods/added sugars.    Vitamin D insufficiency  The current medical regimen is effective;  continue present plan and medications.    Chronic bilateral low back pain without sciatica  The current medical regimen is effective;  continue present plan and medications.    Migraine with aura and without status migrainosus, not intractable  The current medical regimen is effective;  continue present plan and medications.    Needs flu shot  Given today.  -     Influenza - Quadrivalent (PF)      Call insurance for cost of shingrix in clinic vs at pharmacy.    Discussed statin therapy with patient. She declined at this time. ASCVD as below.    The 10-year ASCVD risk score (Clinton TAMRA Jr., et al., 2013) is: 3.1%    Values used to calculate the  score:      Age: 50 years      Sex: Female      Is Non- : No      Diabetic: Yes      Tobacco smoker: No      Systolic Blood Pressure: 130 mmHg      Is BP treated: Yes      HDL Cholesterol: 45 mg/dL      Total Cholesterol: 148 mg/dL    Patient has verbalized understanding and is in agreement with plan of care.    Follow up in about 6 months (around 4/21/2021) for routine follow-up PCP.

## 2020-10-24 ENCOUNTER — LAB VISIT (OUTPATIENT)
Dept: LAB | Facility: HOSPITAL | Age: 50
End: 2020-10-24
Attending: NURSE PRACTITIONER
Payer: COMMERCIAL

## 2020-10-24 DIAGNOSIS — D50.9 MICROCYTIC ANEMIA: ICD-10-CM

## 2020-10-24 LAB
BASOPHILS # BLD AUTO: 0.06 K/UL (ref 0–0.2)
BASOPHILS NFR BLD: 0.8 % (ref 0–1.9)
DIFFERENTIAL METHOD: ABNORMAL
EOSINOPHIL # BLD AUTO: 0.6 K/UL (ref 0–0.5)
EOSINOPHIL NFR BLD: 7.6 % (ref 0–8)
ERYTHROCYTE [DISTWIDTH] IN BLOOD BY AUTOMATED COUNT: 17.3 % (ref 11.5–14.5)
FERRITIN SERPL-MCNC: 9 NG/ML (ref 20–300)
HCT VFR BLD AUTO: 39.8 % (ref 37–48.5)
HGB BLD-MCNC: 11.8 G/DL (ref 12–16)
IMM GRANULOCYTES # BLD AUTO: 0.02 K/UL (ref 0–0.04)
IMM GRANULOCYTES NFR BLD AUTO: 0.3 % (ref 0–0.5)
IRON SERPL-MCNC: 25 UG/DL (ref 30–160)
LYMPHOCYTES # BLD AUTO: 2.6 K/UL (ref 1–4.8)
LYMPHOCYTES NFR BLD: 34.8 % (ref 18–48)
MCH RBC QN AUTO: 22.5 PG (ref 27–31)
MCHC RBC AUTO-ENTMCNC: 29.6 G/DL (ref 32–36)
MCV RBC AUTO: 76 FL (ref 82–98)
MONOCYTES # BLD AUTO: 0.5 K/UL (ref 0.3–1)
MONOCYTES NFR BLD: 6.9 % (ref 4–15)
NEUTROPHILS # BLD AUTO: 3.7 K/UL (ref 1.8–7.7)
NEUTROPHILS NFR BLD: 49.6 % (ref 38–73)
NRBC BLD-RTO: 0 /100 WBC
PLATELET # BLD AUTO: 345 K/UL (ref 150–350)
PMV BLD AUTO: 10.4 FL (ref 9.2–12.9)
RBC # BLD AUTO: 5.24 M/UL (ref 4–5.4)
SATURATED IRON: 5 % (ref 20–50)
TOTAL IRON BINDING CAPACITY: 539 UG/DL (ref 250–450)
TRANSFERRIN SERPL-MCNC: 364 MG/DL (ref 200–375)
WBC # BLD AUTO: 7.52 K/UL (ref 3.9–12.7)

## 2020-10-24 PROCEDURE — 36415 COLL VENOUS BLD VENIPUNCTURE: CPT | Mod: PO

## 2020-10-24 PROCEDURE — 83540 ASSAY OF IRON: CPT

## 2020-10-24 PROCEDURE — 82728 ASSAY OF FERRITIN: CPT

## 2020-10-24 PROCEDURE — 85025 COMPLETE CBC W/AUTO DIFF WBC: CPT

## 2020-10-26 NOTE — PROGRESS NOTES
Digital Medicine: Health  Follow-Up    The history is provided by the patient.             Reason for review: Blood pressure at goal        Topics Covered on Call: meal planning, physical activity, Diet and migraine headaches    Additional Follow-up details: Patient reports that she isn't doing too bad. Patient reports that she has recently started having migraine headaches again. Usually it occurs every 7-10 days and she's only had it be bad enough once that she had to miss a day of work. Patient reports no major changes to her diet or activity levels that she can recall. Patient reports that she knows her food triggers and that she avoids though still. Patient reports less activity at work than before because of COVID. Patient also states that she has bloodwork to be done to see if she is possibly anemic. Will notify Keiko LEARY of headache situation. Will f/u in 4 weeks.             Diet-Change  No 24 hour dietary recall  Patient reports eating or drinking the following: Patient reports that she has made no major changes to the types of food that they are eating and preparing at home. However, patient reports that they had increased how often that they eat out to approx. 3x per week. Discussed with patient that this sodium increase may or may not be related to the migraine headaches. Patient also reports that her water intake has been anywhere form 34 to 52 ounces of water and that she has a large cup that she uses as a reminder to drink water.   Dietary Indiscretions:Has increased how often they are eating out to 3x per week.     Intervention(s): meal planning      Physical Activity-no change to routine  No change to exercise routine.       Additional physical activity details: Patient reports that she still continues to do laps at her office but they are regulated in how often they can get up and go walking. Patient will sometimes get up at her desk and walk by her cubicle or she will stretch.  Encouraged patient to continue to look for ways to be active not only at the office but after she gets off of work. Patient stated that she would try      Medication Adherence-Medication adherence was assessed.      Substance, Sleep, Stress-Not assessed         Addressed patient questions and patient has my contact information if needed prior to next outreach. Patient verbalizes understanding.      Explained the importance of self-monitoring and medication adherence. Encouraged the patient to communicate with their health  for lifestyle modifications to help improve or maintain a healthy lifestyle.               There are no preventive care reminders to display for this patient.      Last 5 Patient Entered Readings                                      Current 30 Day Average: 117/74     Recent Readings 10/26/2020 10/20/2020 10/7/2020 9/27/2020 9/26/2020    SBP (mmHg) 117 122 129 109 110    DBP (mmHg) 72 77 84 67 71    Pulse 74 100 91 77 95

## 2020-10-27 ENCOUNTER — PATIENT MESSAGE (OUTPATIENT)
Dept: FAMILY MEDICINE | Facility: CLINIC | Age: 50
End: 2020-10-27

## 2020-12-10 DIAGNOSIS — E11.9 TYPE 2 DIABETES MELLITUS WITHOUT COMPLICATION, WITHOUT LONG-TERM CURRENT USE OF INSULIN: Chronic | ICD-10-CM

## 2020-12-10 RX ORDER — DULAGLUTIDE 1.5 MG/.5ML
INJECTION, SOLUTION SUBCUTANEOUS
Qty: 2 ML | Refills: 3 | Status: SHIPPED | OUTPATIENT
Start: 2020-12-10 | End: 2021-03-26

## 2020-12-15 ENCOUNTER — PATIENT OUTREACH (OUTPATIENT)
Dept: OTHER | Facility: OTHER | Age: 50
End: 2020-12-15

## 2020-12-15 NOTE — PROGRESS NOTES
Digital Medicine: Health  Follow-Up    The history is provided by the patient.             Reason for review: Blood pressure at goal        Topics Covered on Call: Diet and headaches, iron levels     Additional Follow-up details: Patient reports that she is not doing too bad. Patient AVG BP is within goal. Patient reports that she is still having migraine headaches but not as often as she had been. Patient's blood work came back with low iron levels and patient has been supplementing with iron rich foods. Patient also reports that she has been working to increase activity levels. Encouraged patient to keep up the great work. Will f/u in 4-6 weeks to check in.             Diet-Change  No 24 hour dietary recall  Patient reports eating or drinking the following: Patient reports that they are no longer eating out to help cut back on sodium intake. Patient states that her and her  rotate cooking duties to ensure that they dont eat out. Patient also has list of iron rich foods that she is trying to incorporate into diet as to not have to take an iron supplement. Patient has worked to increase water intake. Patient uses large 32 oz cup as a reminder and patient is drinking close to 2 of these daily.  Dietary Improvements:Patient has cut back out on how often they're eating out. Patient states that they have been eating out maybe once a month.               Physical Activity-Change      She exercises for 30 minutes per day 3 day(s) a week.     She added playing with her dog in her yard to Her physical activity routine.        She identified the following barriers to physical activity: weather        Additional physical activity details: Patient reports that she has been working to increase activity when she is at home. Patient reports that she has been going out with her dog to play in the yard about 3x per day (10 minutes at a time a few times per week. Patient states that she knows that she could be more  active but at least she is doing something and sticking with it because doesn't view it as exercise. Patient and I talked at length about finding activities that she enjoys to do as to not have that negative connotation with it. Encouraged patient to keep up the good work.      Medication Adherence-Medication adherence was assessed.      Substance, Sleep, Stress-Not assessed      Continue current diet/physical activity routine.       Addressed patient questions and patient has my contact information if needed prior to next outreach. Patient verbalizes understanding.      Explained the importance of self-monitoring and medication adherence. Encouraged the patient to communicate with their health  for lifestyle modifications to help improve or maintain a healthy lifestyle.                   Topic    Urine Protein Check          Last 5 Patient Entered Readings                                      Current 30 Day Average: 112/72     Recent Readings 12/7/2020 11/29/2020 11/20/2020 11/17/2020 11/10/2020    SBP (mmHg) 111 123 108 107 119    DBP (mmHg) 71 78 68 72 66    Pulse 90 82 103 94 87

## 2021-01-06 ENCOUNTER — PATIENT MESSAGE (OUTPATIENT)
Dept: FAMILY MEDICINE | Facility: CLINIC | Age: 51
End: 2021-01-06

## 2021-01-06 DIAGNOSIS — Z12.31 BREAST CANCER SCREENING BY MAMMOGRAM: Primary | ICD-10-CM

## 2021-01-13 ENCOUNTER — HOSPITAL ENCOUNTER (OUTPATIENT)
Dept: RADIOLOGY | Facility: HOSPITAL | Age: 51
Discharge: HOME | End: 2021-01-13
Attending: INTERNAL MEDICINE
Payer: COMMERCIAL

## 2021-01-13 DIAGNOSIS — Z12.31 BREAST CANCER SCREENING BY MAMMOGRAM: ICD-10-CM

## 2021-01-13 PROCEDURE — 77063 BREAST TOMOSYNTHESIS BI: CPT | Mod: 26,,, | Performed by: RADIOLOGY

## 2021-01-13 PROCEDURE — 77063 MAMMO DIGITAL SCREENING BILAT WITH TOMO: ICD-10-PCS | Mod: 26,,, | Performed by: RADIOLOGY

## 2021-01-13 PROCEDURE — 77067 MAMMO DIGITAL SCREENING BILAT WITH TOMO: ICD-10-PCS | Mod: 26,,, | Performed by: RADIOLOGY

## 2021-01-13 PROCEDURE — 77067 SCR MAMMO BI INCL CAD: CPT | Mod: 26,,, | Performed by: RADIOLOGY

## 2021-01-13 PROCEDURE — 77067 SCR MAMMO BI INCL CAD: CPT | Mod: TC,PO

## 2021-01-15 ENCOUNTER — PATIENT MESSAGE (OUTPATIENT)
Dept: FAMILY MEDICINE | Facility: CLINIC | Age: 51
End: 2021-01-15

## 2021-01-24 DIAGNOSIS — K21.9 GASTROESOPHAGEAL REFLUX: ICD-10-CM

## 2021-01-24 RX ORDER — OMEPRAZOLE 20 MG/1
CAPSULE, DELAYED RELEASE ORAL
Qty: 90 CAPSULE | Refills: 0 | Status: SHIPPED | OUTPATIENT
Start: 2021-01-24 | End: 2021-05-10

## 2021-01-25 DIAGNOSIS — J30.9 ALLERGIC RHINITIS: ICD-10-CM

## 2021-01-25 RX ORDER — MONTELUKAST SODIUM 10 MG/1
TABLET ORAL
Qty: 90 TABLET | Refills: 1 | Status: SHIPPED | OUTPATIENT
Start: 2021-01-25 | End: 2021-07-15

## 2021-03-08 ENCOUNTER — PATIENT MESSAGE (OUTPATIENT)
Dept: ADMINISTRATIVE | Facility: OTHER | Age: 51
End: 2021-03-08

## 2021-03-12 ENCOUNTER — PATIENT MESSAGE (OUTPATIENT)
Dept: FAMILY MEDICINE | Facility: CLINIC | Age: 51
End: 2021-03-12

## 2021-03-26 DIAGNOSIS — E11.9 TYPE 2 DIABETES MELLITUS WITHOUT COMPLICATION, WITHOUT LONG-TERM CURRENT USE OF INSULIN: Chronic | ICD-10-CM

## 2021-03-26 RX ORDER — DULAGLUTIDE 1.5 MG/.5ML
INJECTION, SOLUTION SUBCUTANEOUS
Qty: 2 ML | Refills: 1 | Status: SHIPPED | OUTPATIENT
Start: 2021-03-26 | End: 2021-04-22 | Stop reason: DRUGHIGH

## 2021-03-29 LAB
LEFT EYE DM RETINOPATHY: NEGATIVE
RIGHT EYE DM RETINOPATHY: NEGATIVE

## 2021-04-14 ENCOUNTER — LAB VISIT (OUTPATIENT)
Dept: LAB | Facility: HOSPITAL | Age: 51
End: 2021-04-14
Attending: NURSE PRACTITIONER
Payer: COMMERCIAL

## 2021-04-14 DIAGNOSIS — E11.3293 TYPE 2 DIABETES MELLITUS WITH BOTH EYES AFFECTED BY MILD NONPROLIFERATIVE RETINOPATHY WITHOUT MACULAR EDEMA, WITHOUT LONG-TERM CURRENT USE OF INSULIN: Chronic | ICD-10-CM

## 2021-04-14 LAB
ANION GAP SERPL CALC-SCNC: 13 MMOL/L (ref 8–16)
BUN SERPL-MCNC: 15 MG/DL (ref 6–20)
CALCIUM SERPL-MCNC: 9.1 MG/DL (ref 8.7–10.5)
CHLORIDE SERPL-SCNC: 98 MMOL/L (ref 95–110)
CO2 SERPL-SCNC: 27 MMOL/L (ref 23–29)
CREAT SERPL-MCNC: 0.8 MG/DL (ref 0.5–1.4)
EST. GFR  (AFRICAN AMERICAN): >60 ML/MIN/1.73 M^2
EST. GFR  (NON AFRICAN AMERICAN): >60 ML/MIN/1.73 M^2
ESTIMATED AVG GLUCOSE: 154 MG/DL (ref 68–131)
GLUCOSE SERPL-MCNC: 133 MG/DL (ref 70–110)
HBA1C MFR BLD: 7 % (ref 4–5.6)
POTASSIUM SERPL-SCNC: 4 MMOL/L (ref 3.5–5.1)
SODIUM SERPL-SCNC: 138 MMOL/L (ref 136–145)

## 2021-04-14 PROCEDURE — 80048 BASIC METABOLIC PNL TOTAL CA: CPT | Performed by: NURSE PRACTITIONER

## 2021-04-14 PROCEDURE — 83036 HEMOGLOBIN GLYCOSYLATED A1C: CPT | Performed by: NURSE PRACTITIONER

## 2021-04-14 PROCEDURE — 36415 COLL VENOUS BLD VENIPUNCTURE: CPT | Mod: PO | Performed by: NURSE PRACTITIONER

## 2021-04-22 ENCOUNTER — HOSPITAL ENCOUNTER (OUTPATIENT)
Dept: RADIOLOGY | Facility: HOSPITAL | Age: 51
Discharge: HOME | End: 2021-04-22
Attending: INTERNAL MEDICINE
Payer: COMMERCIAL

## 2021-04-22 ENCOUNTER — OFFICE VISIT (OUTPATIENT)
Dept: FAMILY MEDICINE | Facility: CLINIC | Age: 51
End: 2021-04-22
Payer: COMMERCIAL

## 2021-04-22 ENCOUNTER — PATIENT OUTREACH (OUTPATIENT)
Dept: ADMINISTRATIVE | Facility: HOSPITAL | Age: 51
End: 2021-04-22

## 2021-04-22 VITALS
BODY MASS INDEX: 46.22 KG/M2 | HEIGHT: 65 IN | WEIGHT: 277.44 LBS | HEART RATE: 105 BPM | DIASTOLIC BLOOD PRESSURE: 70 MMHG | TEMPERATURE: 97 F | OXYGEN SATURATION: 98 % | SYSTOLIC BLOOD PRESSURE: 130 MMHG

## 2021-04-22 DIAGNOSIS — E11.9 DIABETES MELLITUS WITHOUT COMPLICATION: Primary | ICD-10-CM

## 2021-04-22 DIAGNOSIS — G89.29 CHRONIC RIGHT SHOULDER PAIN: ICD-10-CM

## 2021-04-22 DIAGNOSIS — E11.3293 TYPE 2 DIABETES MELLITUS WITH BOTH EYES AFFECTED BY MILD NONPROLIFERATIVE RETINOPATHY WITHOUT MACULAR EDEMA, WITHOUT LONG-TERM CURRENT USE OF INSULIN: ICD-10-CM

## 2021-04-22 DIAGNOSIS — M25.521 RIGHT ELBOW PAIN: ICD-10-CM

## 2021-04-22 DIAGNOSIS — L60.3 BRITTLE NAILS: ICD-10-CM

## 2021-04-22 DIAGNOSIS — I10 ESSENTIAL HYPERTENSION: Primary | ICD-10-CM

## 2021-04-22 DIAGNOSIS — E66.01 MORBID OBESITY WITH BMI OF 45.0-49.9, ADULT: Chronic | ICD-10-CM

## 2021-04-22 DIAGNOSIS — Z23 NEED FOR ZOSTER VACCINATION: ICD-10-CM

## 2021-04-22 DIAGNOSIS — M25.511 CHRONIC RIGHT SHOULDER PAIN: ICD-10-CM

## 2021-04-22 DIAGNOSIS — Z00.00 ROUTINE MEDICAL EXAM: ICD-10-CM

## 2021-04-22 PROCEDURE — 99999 PR PBB SHADOW E&M-EST. PATIENT-LVL IV: CPT | Mod: PBBFAC,,, | Performed by: INTERNAL MEDICINE

## 2021-04-22 PROCEDURE — 73080 XR ELBOW COMPLETE 3 VIEW RIGHT: ICD-10-PCS | Mod: 26,RT,, | Performed by: RADIOLOGY

## 2021-04-22 PROCEDURE — 1125F PR PAIN SEVERITY QUANTIFIED, PAIN PRESENT: ICD-10-PCS | Mod: S$GLB,,, | Performed by: INTERNAL MEDICINE

## 2021-04-22 PROCEDURE — 73080 X-RAY EXAM OF ELBOW: CPT | Mod: 26,RT,, | Performed by: RADIOLOGY

## 2021-04-22 PROCEDURE — 99214 PR OFFICE/OUTPT VISIT, EST, LEVL IV, 30-39 MIN: ICD-10-PCS | Mod: 25,S$GLB,, | Performed by: INTERNAL MEDICINE

## 2021-04-22 PROCEDURE — 3078F DIAST BP <80 MM HG: CPT | Mod: CPTII,S$GLB,, | Performed by: INTERNAL MEDICINE

## 2021-04-22 PROCEDURE — 3078F PR MOST RECENT DIASTOLIC BLOOD PRESSURE < 80 MM HG: ICD-10-PCS | Mod: CPTII,S$GLB,, | Performed by: INTERNAL MEDICINE

## 2021-04-22 PROCEDURE — 3008F BODY MASS INDEX DOCD: CPT | Mod: CPTII,S$GLB,, | Performed by: INTERNAL MEDICINE

## 2021-04-22 PROCEDURE — 73030 X-RAY EXAM OF SHOULDER: CPT | Mod: TC,FY,PO,RT

## 2021-04-22 PROCEDURE — 99214 OFFICE O/P EST MOD 30 MIN: CPT | Mod: 25,S$GLB,, | Performed by: INTERNAL MEDICINE

## 2021-04-22 PROCEDURE — 99999 PR PBB SHADOW E&M-EST. PATIENT-LVL IV: ICD-10-PCS | Mod: PBBFAC,,, | Performed by: INTERNAL MEDICINE

## 2021-04-22 PROCEDURE — 90471 ZOSTER RECOMBINANT VACCINE: ICD-10-PCS | Mod: S$GLB,,, | Performed by: INTERNAL MEDICINE

## 2021-04-22 PROCEDURE — 1125F AMNT PAIN NOTED PAIN PRSNT: CPT | Mod: S$GLB,,, | Performed by: INTERNAL MEDICINE

## 2021-04-22 PROCEDURE — 3075F PR MOST RECENT SYSTOLIC BLOOD PRESS GE 130-139MM HG: ICD-10-PCS | Mod: CPTII,S$GLB,, | Performed by: INTERNAL MEDICINE

## 2021-04-22 PROCEDURE — 3075F SYST BP GE 130 - 139MM HG: CPT | Mod: CPTII,S$GLB,, | Performed by: INTERNAL MEDICINE

## 2021-04-22 PROCEDURE — 73030 XR SHOULDER COMPLETE 2 OR MORE VIEWS RIGHT: ICD-10-PCS | Mod: 26,RT,, | Performed by: RADIOLOGY

## 2021-04-22 PROCEDURE — 73080 X-RAY EXAM OF ELBOW: CPT | Mod: TC,FY,PO,RT

## 2021-04-22 PROCEDURE — 73030 X-RAY EXAM OF SHOULDER: CPT | Mod: 26,RT,, | Performed by: RADIOLOGY

## 2021-04-22 PROCEDURE — 90750 ZOSTER RECOMBINANT VACCINE: ICD-10-PCS | Mod: S$GLB,,, | Performed by: INTERNAL MEDICINE

## 2021-04-22 PROCEDURE — 3051F PR MOST RECENT HEMOGLOBIN A1C LEVEL 7.0 - < 8.0%: ICD-10-PCS | Mod: CPTII,S$GLB,, | Performed by: INTERNAL MEDICINE

## 2021-04-22 PROCEDURE — 90471 IMMUNIZATION ADMIN: CPT | Mod: S$GLB,,, | Performed by: INTERNAL MEDICINE

## 2021-04-22 PROCEDURE — 3051F HG A1C>EQUAL 7.0%<8.0%: CPT | Mod: CPTII,S$GLB,, | Performed by: INTERNAL MEDICINE

## 2021-04-22 PROCEDURE — 3008F PR BODY MASS INDEX (BMI) DOCUMENTED: ICD-10-PCS | Mod: CPTII,S$GLB,, | Performed by: INTERNAL MEDICINE

## 2021-04-22 PROCEDURE — 90750 HZV VACC RECOMBINANT IM: CPT | Mod: S$GLB,,, | Performed by: INTERNAL MEDICINE

## 2021-06-24 ENCOUNTER — CLINICAL SUPPORT (OUTPATIENT)
Dept: FAMILY MEDICINE | Facility: CLINIC | Age: 51
End: 2021-06-24
Payer: COMMERCIAL

## 2021-06-24 DIAGNOSIS — Z23 NEED FOR ZOSTER VACCINATION: Primary | ICD-10-CM

## 2021-06-24 PROCEDURE — 99499 NO LOS: ICD-10-PCS | Mod: S$GLB,,, | Performed by: INTERNAL MEDICINE

## 2021-06-24 PROCEDURE — 90750 HZV VACC RECOMBINANT IM: CPT | Mod: S$GLB,,, | Performed by: INTERNAL MEDICINE

## 2021-06-24 PROCEDURE — 90471 ZOSTER RECOMBINANT VACCINE: ICD-10-PCS | Mod: S$GLB,,, | Performed by: INTERNAL MEDICINE

## 2021-06-24 PROCEDURE — 90471 IMMUNIZATION ADMIN: CPT | Mod: S$GLB,,, | Performed by: INTERNAL MEDICINE

## 2021-06-24 PROCEDURE — 99499 UNLISTED E&M SERVICE: CPT | Mod: S$GLB,,, | Performed by: INTERNAL MEDICINE

## 2021-06-24 PROCEDURE — 90750 ZOSTER RECOMBINANT VACCINE: ICD-10-PCS | Mod: S$GLB,,, | Performed by: INTERNAL MEDICINE

## 2021-07-12 ENCOUNTER — PATIENT MESSAGE (OUTPATIENT)
Dept: FAMILY MEDICINE | Facility: CLINIC | Age: 51
End: 2021-07-12

## 2021-07-15 DIAGNOSIS — J30.9 ALLERGIC RHINITIS: ICD-10-CM

## 2021-07-15 RX ORDER — MONTELUKAST SODIUM 10 MG/1
TABLET ORAL
Qty: 90 TABLET | Refills: 1 | Status: SHIPPED | OUTPATIENT
Start: 2021-07-15 | End: 2022-02-03 | Stop reason: SDUPTHER

## 2021-07-23 ENCOUNTER — OFFICE VISIT (OUTPATIENT)
Dept: FAMILY MEDICINE | Facility: CLINIC | Age: 51
End: 2021-07-23
Payer: COMMERCIAL

## 2021-07-23 VITALS
BODY MASS INDEX: 45.81 KG/M2 | HEIGHT: 65 IN | HEART RATE: 98 BPM | DIASTOLIC BLOOD PRESSURE: 82 MMHG | WEIGHT: 274.94 LBS | TEMPERATURE: 99 F | OXYGEN SATURATION: 96 % | SYSTOLIC BLOOD PRESSURE: 122 MMHG

## 2021-07-23 DIAGNOSIS — J30.9 ALLERGIC RHINITIS, UNSPECIFIED SEASONALITY, UNSPECIFIED TRIGGER: Chronic | ICD-10-CM

## 2021-07-23 DIAGNOSIS — E11.3293 TYPE 2 DIABETES MELLITUS WITH BOTH EYES AFFECTED BY MILD NONPROLIFERATIVE RETINOPATHY WITHOUT MACULAR EDEMA, WITHOUT LONG-TERM CURRENT USE OF INSULIN: Chronic | ICD-10-CM

## 2021-07-23 DIAGNOSIS — H69.91 DYSFUNCTION OF RIGHT EUSTACHIAN TUBE: ICD-10-CM

## 2021-07-23 DIAGNOSIS — J32.0 CHRONIC MAXILLARY SINUSITIS: Primary | ICD-10-CM

## 2021-07-23 DIAGNOSIS — I10 ESSENTIAL HYPERTENSION: Chronic | ICD-10-CM

## 2021-07-23 DIAGNOSIS — K76.0 NAFLD (NONALCOHOLIC FATTY LIVER DISEASE): Chronic | ICD-10-CM

## 2021-07-23 DIAGNOSIS — E66.01 MORBID OBESITY WITH BMI OF 45.0-49.9, ADULT: Chronic | ICD-10-CM

## 2021-07-23 PROCEDURE — 99999 PR PBB SHADOW E&M-EST. PATIENT-LVL IV: CPT | Mod: PBBFAC,,, | Performed by: NURSE PRACTITIONER

## 2021-07-23 PROCEDURE — 99999 PR PBB SHADOW E&M-EST. PATIENT-LVL IV: ICD-10-PCS | Mod: PBBFAC,,, | Performed by: NURSE PRACTITIONER

## 2021-07-23 PROCEDURE — 3051F HG A1C>EQUAL 7.0%<8.0%: CPT | Mod: CPTII,S$GLB,, | Performed by: NURSE PRACTITIONER

## 2021-07-23 PROCEDURE — 99213 OFFICE O/P EST LOW 20 MIN: CPT | Mod: S$GLB,,, | Performed by: NURSE PRACTITIONER

## 2021-07-23 PROCEDURE — 1125F AMNT PAIN NOTED PAIN PRSNT: CPT | Mod: CPTII,S$GLB,, | Performed by: NURSE PRACTITIONER

## 2021-07-23 PROCEDURE — 1125F PR PAIN SEVERITY QUANTIFIED, PAIN PRESENT: ICD-10-PCS | Mod: CPTII,S$GLB,, | Performed by: NURSE PRACTITIONER

## 2021-07-23 PROCEDURE — 3079F DIAST BP 80-89 MM HG: CPT | Mod: CPTII,S$GLB,, | Performed by: NURSE PRACTITIONER

## 2021-07-23 PROCEDURE — 3074F PR MOST RECENT SYSTOLIC BLOOD PRESSURE < 130 MM HG: ICD-10-PCS | Mod: CPTII,S$GLB,, | Performed by: NURSE PRACTITIONER

## 2021-07-23 PROCEDURE — 3079F PR MOST RECENT DIASTOLIC BLOOD PRESSURE 80-89 MM HG: ICD-10-PCS | Mod: CPTII,S$GLB,, | Performed by: NURSE PRACTITIONER

## 2021-07-23 PROCEDURE — 3051F PR MOST RECENT HEMOGLOBIN A1C LEVEL 7.0 - < 8.0%: ICD-10-PCS | Mod: CPTII,S$GLB,, | Performed by: NURSE PRACTITIONER

## 2021-07-23 PROCEDURE — 3008F BODY MASS INDEX DOCD: CPT | Mod: CPTII,S$GLB,, | Performed by: NURSE PRACTITIONER

## 2021-07-23 PROCEDURE — 99213 PR OFFICE/OUTPT VISIT, EST, LEVL III, 20-29 MIN: ICD-10-PCS | Mod: S$GLB,,, | Performed by: NURSE PRACTITIONER

## 2021-07-23 PROCEDURE — 3074F SYST BP LT 130 MM HG: CPT | Mod: CPTII,S$GLB,, | Performed by: NURSE PRACTITIONER

## 2021-07-23 PROCEDURE — 3008F PR BODY MASS INDEX (BMI) DOCUMENTED: ICD-10-PCS | Mod: CPTII,S$GLB,, | Performed by: NURSE PRACTITIONER

## 2021-08-04 ENCOUNTER — PATIENT MESSAGE (OUTPATIENT)
Dept: ADMINISTRATIVE | Facility: HOSPITAL | Age: 51
End: 2021-08-04

## 2021-08-15 DIAGNOSIS — E11.9 TYPE 2 DIABETES MELLITUS WITHOUT COMPLICATION, WITHOUT LONG-TERM CURRENT USE OF INSULIN: Chronic | ICD-10-CM

## 2021-08-16 RX ORDER — METFORMIN HYDROCHLORIDE 500 MG/1
TABLET, EXTENDED RELEASE ORAL
Qty: 360 TABLET | Refills: 1 | Status: SHIPPED | OUTPATIENT
Start: 2021-08-16 | End: 2022-02-18

## 2021-09-05 ENCOUNTER — PATIENT MESSAGE (OUTPATIENT)
Dept: ADMINISTRATIVE | Facility: OTHER | Age: 51
End: 2021-09-05

## 2021-09-09 ENCOUNTER — PATIENT MESSAGE (OUTPATIENT)
Dept: FAMILY MEDICINE | Facility: CLINIC | Age: 51
End: 2021-09-09

## 2021-09-09 DIAGNOSIS — E11.3293 TYPE 2 DIABETES MELLITUS WITH BOTH EYES AFFECTED BY MILD NONPROLIFERATIVE RETINOPATHY WITHOUT MACULAR EDEMA, WITHOUT LONG-TERM CURRENT USE OF INSULIN: ICD-10-CM

## 2021-10-07 ENCOUNTER — PATIENT MESSAGE (OUTPATIENT)
Dept: FAMILY MEDICINE | Facility: CLINIC | Age: 51
End: 2021-10-07

## 2021-10-13 DIAGNOSIS — I10 ESSENTIAL HYPERTENSION: Chronic | ICD-10-CM

## 2021-10-13 RX ORDER — LISINOPRIL AND HYDROCHLOROTHIAZIDE 12.5; 2 MG/1; MG/1
TABLET ORAL
Qty: 90 TABLET | Refills: 0 | Status: SHIPPED | OUTPATIENT
Start: 2021-10-13 | End: 2022-01-12

## 2021-10-18 ENCOUNTER — PATIENT MESSAGE (OUTPATIENT)
Dept: ADMINISTRATIVE | Facility: HOSPITAL | Age: 51
End: 2021-10-18
Payer: COMMERCIAL

## 2021-10-20 ENCOUNTER — OFFICE VISIT (OUTPATIENT)
Dept: PODIATRY | Facility: CLINIC | Age: 51
End: 2021-10-20
Payer: COMMERCIAL

## 2021-10-20 ENCOUNTER — LAB VISIT (OUTPATIENT)
Dept: LAB | Facility: HOSPITAL | Age: 51
End: 2021-10-20
Attending: INTERNAL MEDICINE
Payer: COMMERCIAL

## 2021-10-20 VITALS — BODY MASS INDEX: 45.65 KG/M2 | HEIGHT: 65 IN | WEIGHT: 274 LBS

## 2021-10-20 DIAGNOSIS — M20.42 HAMMER TOES OF BOTH FEET: ICD-10-CM

## 2021-10-20 DIAGNOSIS — L60.8 PINCER NAIL DEFORMITY: ICD-10-CM

## 2021-10-20 DIAGNOSIS — M20.41 HAMMER TOES OF BOTH FEET: ICD-10-CM

## 2021-10-20 DIAGNOSIS — E11.9 COMPREHENSIVE DIABETIC FOOT EXAMINATION, TYPE 2 DM, ENCOUNTER FOR: Primary | ICD-10-CM

## 2021-10-20 DIAGNOSIS — M20.5X2 HALLUX LIMITUS, ACQUIRED, LEFT: ICD-10-CM

## 2021-10-20 DIAGNOSIS — M20.5X1 HALLUX LIMITUS, ACQUIRED, RIGHT: ICD-10-CM

## 2021-10-20 DIAGNOSIS — E11.3293 TYPE 2 DIABETES MELLITUS WITH BOTH EYES AFFECTED BY MILD NONPROLIFERATIVE RETINOPATHY WITHOUT MACULAR EDEMA, WITHOUT LONG-TERM CURRENT USE OF INSULIN: ICD-10-CM

## 2021-10-20 DIAGNOSIS — Z00.00 ROUTINE MEDICAL EXAM: ICD-10-CM

## 2021-10-20 DIAGNOSIS — L60.3 BRITTLE NAILS: ICD-10-CM

## 2021-10-20 LAB
ALBUMIN SERPL BCP-MCNC: 3.3 G/DL (ref 3.5–5.2)
ALP SERPL-CCNC: 42 U/L (ref 55–135)
ALT SERPL W/O P-5'-P-CCNC: 20 U/L (ref 10–44)
ANION GAP SERPL CALC-SCNC: 17 MMOL/L (ref 8–16)
AST SERPL-CCNC: 29 U/L (ref 10–40)
BASOPHILS # BLD AUTO: 0.07 K/UL (ref 0–0.2)
BASOPHILS NFR BLD: 0.9 % (ref 0–1.9)
BILIRUB SERPL-MCNC: 0.5 MG/DL (ref 0.1–1)
BUN SERPL-MCNC: 14 MG/DL (ref 6–20)
CALCIUM SERPL-MCNC: 10.2 MG/DL (ref 8.7–10.5)
CHLORIDE SERPL-SCNC: 103 MMOL/L (ref 95–110)
CHOLEST SERPL-MCNC: 151 MG/DL (ref 120–199)
CHOLEST/HDLC SERPL: 3.3 {RATIO} (ref 2–5)
CO2 SERPL-SCNC: 21 MMOL/L (ref 23–29)
CREAT SERPL-MCNC: 0.7 MG/DL (ref 0.5–1.4)
DIFFERENTIAL METHOD: ABNORMAL
EOSINOPHIL # BLD AUTO: 0.3 K/UL (ref 0–0.5)
EOSINOPHIL NFR BLD: 4 % (ref 0–8)
ERYTHROCYTE [DISTWIDTH] IN BLOOD BY AUTOMATED COUNT: 17.3 % (ref 11.5–14.5)
EST. GFR  (AFRICAN AMERICAN): >60 ML/MIN/1.73 M^2
EST. GFR  (NON AFRICAN AMERICAN): >60 ML/MIN/1.73 M^2
ESTIMATED AVG GLUCOSE: 148 MG/DL (ref 68–131)
GLUCOSE SERPL-MCNC: 123 MG/DL (ref 70–110)
HBA1C MFR BLD: 6.8 % (ref 4–5.6)
HCT VFR BLD AUTO: 38.5 % (ref 37–48.5)
HDLC SERPL-MCNC: 46 MG/DL (ref 40–75)
HDLC SERPL: 30.5 % (ref 20–50)
HGB BLD-MCNC: 11.3 G/DL (ref 12–16)
IMM GRANULOCYTES # BLD AUTO: 0.02 K/UL (ref 0–0.04)
IMM GRANULOCYTES NFR BLD AUTO: 0.3 % (ref 0–0.5)
LDLC SERPL CALC-MCNC: 83 MG/DL (ref 63–159)
LYMPHOCYTES # BLD AUTO: 2.6 K/UL (ref 1–4.8)
LYMPHOCYTES NFR BLD: 35.2 % (ref 18–48)
MCH RBC QN AUTO: 22.6 PG (ref 27–31)
MCHC RBC AUTO-ENTMCNC: 29.4 G/DL (ref 32–36)
MCV RBC AUTO: 77 FL (ref 82–98)
MONOCYTES # BLD AUTO: 0.6 K/UL (ref 0.3–1)
MONOCYTES NFR BLD: 7.5 % (ref 4–15)
NEUTROPHILS # BLD AUTO: 3.9 K/UL (ref 1.8–7.7)
NEUTROPHILS NFR BLD: 52.1 % (ref 38–73)
NONHDLC SERPL-MCNC: 105 MG/DL
NRBC BLD-RTO: 0 /100 WBC
PLATELET # BLD AUTO: 330 K/UL (ref 150–450)
PMV BLD AUTO: 10.6 FL (ref 9.2–12.9)
POTASSIUM SERPL-SCNC: 4.9 MMOL/L (ref 3.5–5.1)
PROT SERPL-MCNC: 6.7 G/DL (ref 6–8.4)
RBC # BLD AUTO: 5 M/UL (ref 4–5.4)
SODIUM SERPL-SCNC: 141 MMOL/L (ref 136–145)
TRIGL SERPL-MCNC: 110 MG/DL (ref 30–150)
TSH SERPL DL<=0.005 MIU/L-ACNC: 1.05 UIU/ML (ref 0.4–4)
WBC # BLD AUTO: 7.47 K/UL (ref 3.9–12.7)

## 2021-10-20 PROCEDURE — 99213 OFFICE O/P EST LOW 20 MIN: CPT | Mod: S$GLB,,, | Performed by: PODIATRIST

## 2021-10-20 PROCEDURE — 80053 COMPREHEN METABOLIC PANEL: CPT | Performed by: INTERNAL MEDICINE

## 2021-10-20 PROCEDURE — 80061 LIPID PANEL: CPT | Performed by: INTERNAL MEDICINE

## 2021-10-20 PROCEDURE — 99213 PR OFFICE/OUTPT VISIT, EST, LEVL III, 20-29 MIN: ICD-10-PCS | Mod: S$GLB,,, | Performed by: PODIATRIST

## 2021-10-20 PROCEDURE — 3008F BODY MASS INDEX DOCD: CPT | Mod: CPTII,S$GLB,, | Performed by: PODIATRIST

## 2021-10-20 PROCEDURE — 1159F PR MEDICATION LIST DOCUMENTED IN MEDICAL RECORD: ICD-10-PCS | Mod: CPTII,S$GLB,, | Performed by: PODIATRIST

## 2021-10-20 PROCEDURE — 3008F PR BODY MASS INDEX (BMI) DOCUMENTED: ICD-10-PCS | Mod: CPTII,S$GLB,, | Performed by: PODIATRIST

## 2021-10-20 PROCEDURE — 36415 COLL VENOUS BLD VENIPUNCTURE: CPT | Mod: PO | Performed by: INTERNAL MEDICINE

## 2021-10-20 PROCEDURE — 99999 PR PBB SHADOW E&M-EST. PATIENT-LVL III: CPT | Mod: PBBFAC,,, | Performed by: PODIATRIST

## 2021-10-20 PROCEDURE — 4010F ACE/ARB THERAPY RXD/TAKEN: CPT | Mod: CPTII,S$GLB,, | Performed by: PODIATRIST

## 2021-10-20 PROCEDURE — 3051F PR MOST RECENT HEMOGLOBIN A1C LEVEL 7.0 - < 8.0%: ICD-10-PCS | Mod: CPTII,S$GLB,, | Performed by: PODIATRIST

## 2021-10-20 PROCEDURE — 99999 PR PBB SHADOW E&M-EST. PATIENT-LVL III: ICD-10-PCS | Mod: PBBFAC,,, | Performed by: PODIATRIST

## 2021-10-20 PROCEDURE — 4010F PR ACE/ARB THEARPY RXD/TAKEN: ICD-10-PCS | Mod: CPTII,S$GLB,, | Performed by: PODIATRIST

## 2021-10-20 PROCEDURE — 1160F RVW MEDS BY RX/DR IN RCRD: CPT | Mod: CPTII,S$GLB,, | Performed by: PODIATRIST

## 2021-10-20 PROCEDURE — 1159F MED LIST DOCD IN RCRD: CPT | Mod: CPTII,S$GLB,, | Performed by: PODIATRIST

## 2021-10-20 PROCEDURE — 84443 ASSAY THYROID STIM HORMONE: CPT | Performed by: INTERNAL MEDICINE

## 2021-10-20 PROCEDURE — 85025 COMPLETE CBC W/AUTO DIFF WBC: CPT | Performed by: INTERNAL MEDICINE

## 2021-10-20 PROCEDURE — 1160F PR REVIEW ALL MEDS BY PRESCRIBER/CLIN PHARMACIST DOCUMENTED: ICD-10-PCS | Mod: CPTII,S$GLB,, | Performed by: PODIATRIST

## 2021-10-20 PROCEDURE — 3051F HG A1C>EQUAL 7.0%<8.0%: CPT | Mod: CPTII,S$GLB,, | Performed by: PODIATRIST

## 2021-10-20 PROCEDURE — 83036 HEMOGLOBIN GLYCOSYLATED A1C: CPT | Performed by: INTERNAL MEDICINE

## 2021-10-28 DIAGNOSIS — K21.9 GASTROESOPHAGEAL REFLUX: ICD-10-CM

## 2021-10-28 RX ORDER — OMEPRAZOLE 20 MG/1
CAPSULE, DELAYED RELEASE ORAL
Qty: 90 CAPSULE | Refills: 0 | Status: SHIPPED | OUTPATIENT
Start: 2021-10-28 | End: 2022-01-21

## 2021-11-16 ENCOUNTER — TELEPHONE (OUTPATIENT)
Dept: FAMILY MEDICINE | Facility: CLINIC | Age: 51
End: 2021-11-16
Payer: COMMERCIAL

## 2021-11-17 ENCOUNTER — OFFICE VISIT (OUTPATIENT)
Dept: FAMILY MEDICINE | Facility: CLINIC | Age: 51
End: 2021-11-17
Payer: COMMERCIAL

## 2021-11-17 VITALS
BODY MASS INDEX: 45.67 KG/M2 | OXYGEN SATURATION: 97 % | HEART RATE: 80 BPM | RESPIRATION RATE: 20 BRPM | HEIGHT: 65 IN | DIASTOLIC BLOOD PRESSURE: 78 MMHG | TEMPERATURE: 99 F | SYSTOLIC BLOOD PRESSURE: 120 MMHG | WEIGHT: 274.13 LBS

## 2021-11-17 DIAGNOSIS — M79.642 BILATERAL HAND PAIN: ICD-10-CM

## 2021-11-17 DIAGNOSIS — I10 ESSENTIAL HYPERTENSION: Chronic | ICD-10-CM

## 2021-11-17 DIAGNOSIS — Z23 INFLUENZA VACCINE NEEDED: ICD-10-CM

## 2021-11-17 DIAGNOSIS — M54.50 CHRONIC BILATERAL LOW BACK PAIN WITHOUT SCIATICA: Chronic | ICD-10-CM

## 2021-11-17 DIAGNOSIS — E55.9 VITAMIN D INSUFFICIENCY: Chronic | ICD-10-CM

## 2021-11-17 DIAGNOSIS — K21.9 GASTROESOPHAGEAL REFLUX DISEASE WITHOUT ESOPHAGITIS: Chronic | ICD-10-CM

## 2021-11-17 DIAGNOSIS — G43.109 MIGRAINE WITH AURA AND WITHOUT STATUS MIGRAINOSUS, NOT INTRACTABLE: ICD-10-CM

## 2021-11-17 DIAGNOSIS — M79.641 BILATERAL HAND PAIN: ICD-10-CM

## 2021-11-17 DIAGNOSIS — E11.3293 TYPE 2 DIABETES MELLITUS WITH BOTH EYES AFFECTED BY MILD NONPROLIFERATIVE RETINOPATHY WITHOUT MACULAR EDEMA, WITHOUT LONG-TERM CURRENT USE OF INSULIN: Primary | Chronic | ICD-10-CM

## 2021-11-17 DIAGNOSIS — E66.01 MORBID OBESITY WITH BMI OF 45.0-49.9, ADULT: Chronic | ICD-10-CM

## 2021-11-17 DIAGNOSIS — K76.0 NAFLD (NONALCOHOLIC FATTY LIVER DISEASE): Chronic | ICD-10-CM

## 2021-11-17 DIAGNOSIS — G89.29 CHRONIC BILATERAL LOW BACK PAIN WITHOUT SCIATICA: Chronic | ICD-10-CM

## 2021-11-17 DIAGNOSIS — D50.9 IRON DEFICIENCY ANEMIA, UNSPECIFIED IRON DEFICIENCY ANEMIA TYPE: ICD-10-CM

## 2021-11-17 PROCEDURE — 3078F DIAST BP <80 MM HG: CPT | Mod: CPTII,S$GLB,, | Performed by: NURSE PRACTITIONER

## 2021-11-17 PROCEDURE — 1159F PR MEDICATION LIST DOCUMENTED IN MEDICAL RECORD: ICD-10-PCS | Mod: CPTII,S$GLB,, | Performed by: NURSE PRACTITIONER

## 2021-11-17 PROCEDURE — 1159F MED LIST DOCD IN RCRD: CPT | Mod: CPTII,S$GLB,, | Performed by: NURSE PRACTITIONER

## 2021-11-17 PROCEDURE — 99999 PR PBB SHADOW E&M-EST. PATIENT-LVL V: CPT | Mod: PBBFAC,,, | Performed by: NURSE PRACTITIONER

## 2021-11-17 PROCEDURE — 99214 PR OFFICE/OUTPT VISIT, EST, LEVL IV, 30-39 MIN: ICD-10-PCS | Mod: 25,S$GLB,, | Performed by: NURSE PRACTITIONER

## 2021-11-17 PROCEDURE — 90686 FLU VACCINE (QUAD) GREATER THAN OR EQUAL TO 3YO PRESERVATIVE FREE IM: ICD-10-PCS | Mod: S$GLB,,, | Performed by: NURSE PRACTITIONER

## 2021-11-17 PROCEDURE — 90471 IMMUNIZATION ADMIN: CPT | Mod: S$GLB,,, | Performed by: NURSE PRACTITIONER

## 2021-11-17 PROCEDURE — 3008F PR BODY MASS INDEX (BMI) DOCUMENTED: ICD-10-PCS | Mod: CPTII,S$GLB,, | Performed by: NURSE PRACTITIONER

## 2021-11-17 PROCEDURE — 4010F ACE/ARB THERAPY RXD/TAKEN: CPT | Mod: CPTII,S$GLB,, | Performed by: NURSE PRACTITIONER

## 2021-11-17 PROCEDURE — 1160F RVW MEDS BY RX/DR IN RCRD: CPT | Mod: CPTII,S$GLB,, | Performed by: NURSE PRACTITIONER

## 2021-11-17 PROCEDURE — 1160F PR REVIEW ALL MEDS BY PRESCRIBER/CLIN PHARMACIST DOCUMENTED: ICD-10-PCS | Mod: CPTII,S$GLB,, | Performed by: NURSE PRACTITIONER

## 2021-11-17 PROCEDURE — 99999 PR PBB SHADOW E&M-EST. PATIENT-LVL V: ICD-10-PCS | Mod: PBBFAC,,, | Performed by: NURSE PRACTITIONER

## 2021-11-17 PROCEDURE — 90686 IIV4 VACC NO PRSV 0.5 ML IM: CPT | Mod: S$GLB,,, | Performed by: NURSE PRACTITIONER

## 2021-11-17 PROCEDURE — 3044F PR MOST RECENT HEMOGLOBIN A1C LEVEL <7.0%: ICD-10-PCS | Mod: CPTII,S$GLB,, | Performed by: NURSE PRACTITIONER

## 2021-11-17 PROCEDURE — 3044F HG A1C LEVEL LT 7.0%: CPT | Mod: CPTII,S$GLB,, | Performed by: NURSE PRACTITIONER

## 2021-11-17 PROCEDURE — 90471 FLU VACCINE (QUAD) GREATER THAN OR EQUAL TO 3YO PRESERVATIVE FREE IM: ICD-10-PCS | Mod: S$GLB,,, | Performed by: NURSE PRACTITIONER

## 2021-11-17 PROCEDURE — 99214 OFFICE O/P EST MOD 30 MIN: CPT | Mod: 25,S$GLB,, | Performed by: NURSE PRACTITIONER

## 2021-11-17 PROCEDURE — 4010F PR ACE/ARB THEARPY RXD/TAKEN: ICD-10-PCS | Mod: CPTII,S$GLB,, | Performed by: NURSE PRACTITIONER

## 2021-11-17 PROCEDURE — 3074F PR MOST RECENT SYSTOLIC BLOOD PRESSURE < 130 MM HG: ICD-10-PCS | Mod: CPTII,S$GLB,, | Performed by: NURSE PRACTITIONER

## 2021-11-17 PROCEDURE — 3008F BODY MASS INDEX DOCD: CPT | Mod: CPTII,S$GLB,, | Performed by: NURSE PRACTITIONER

## 2021-11-17 PROCEDURE — 3074F SYST BP LT 130 MM HG: CPT | Mod: CPTII,S$GLB,, | Performed by: NURSE PRACTITIONER

## 2021-11-17 PROCEDURE — 3078F PR MOST RECENT DIASTOLIC BLOOD PRESSURE < 80 MM HG: ICD-10-PCS | Mod: CPTII,S$GLB,, | Performed by: NURSE PRACTITIONER

## 2021-12-14 DIAGNOSIS — E11.3293 TYPE 2 DIABETES MELLITUS WITH BOTH EYES AFFECTED BY MILD NONPROLIFERATIVE RETINOPATHY WITHOUT MACULAR EDEMA, WITHOUT LONG-TERM CURRENT USE OF INSULIN: ICD-10-CM

## 2021-12-17 DIAGNOSIS — E11.3293 TYPE 2 DIABETES MELLITUS WITH BOTH EYES AFFECTED BY MILD NONPROLIFERATIVE RETINOPATHY WITHOUT MACULAR EDEMA, WITHOUT LONG-TERM CURRENT USE OF INSULIN: ICD-10-CM

## 2021-12-17 RX ORDER — DULAGLUTIDE 3 MG/.5ML
INJECTION, SOLUTION SUBCUTANEOUS
Qty: 12 PEN | Refills: 1 | Status: SHIPPED | OUTPATIENT
Start: 2021-12-17 | End: 2022-05-23 | Stop reason: DRUGHIGH

## 2021-12-28 ENCOUNTER — OFFICE VISIT (OUTPATIENT)
Dept: ORTHOPEDICS | Facility: CLINIC | Age: 51
End: 2021-12-28
Payer: COMMERCIAL

## 2021-12-28 ENCOUNTER — HOSPITAL ENCOUNTER (OUTPATIENT)
Dept: RADIOLOGY | Facility: HOSPITAL | Age: 51
Discharge: HOME | End: 2021-12-28
Attending: PHYSICIAN ASSISTANT
Payer: COMMERCIAL

## 2021-12-28 VITALS — BODY MASS INDEX: 45.65 KG/M2 | WEIGHT: 274 LBS | HEIGHT: 65 IN

## 2021-12-28 DIAGNOSIS — M65.351 TRIGGER FINGER, RIGHT LITTLE FINGER: ICD-10-CM

## 2021-12-28 DIAGNOSIS — M79.642 BILATERAL HAND PAIN: ICD-10-CM

## 2021-12-28 DIAGNOSIS — M79.641 BILATERAL HAND PAIN: ICD-10-CM

## 2021-12-28 DIAGNOSIS — M15.2 OSTEOARTHRITIS OF PROXIMAL INTERPHALANGEAL (PIP) JOINT OF LEFT INDEX FINGER: ICD-10-CM

## 2021-12-28 DIAGNOSIS — M79.641 BILATERAL HAND PAIN: Primary | ICD-10-CM

## 2021-12-28 DIAGNOSIS — M79.642 BILATERAL HAND PAIN: Primary | ICD-10-CM

## 2021-12-28 DIAGNOSIS — M65.4 DE QUERVAIN'S TENOSYNOVITIS, RIGHT: Primary | ICD-10-CM

## 2021-12-28 PROCEDURE — 99203 OFFICE O/P NEW LOW 30 MIN: CPT | Mod: S$GLB,,, | Performed by: PHYSICIAN ASSISTANT

## 2021-12-28 PROCEDURE — 4010F PR ACE/ARB THEARPY RXD/TAKEN: ICD-10-PCS | Mod: CPTII,S$GLB,, | Performed by: PHYSICIAN ASSISTANT

## 2021-12-28 PROCEDURE — 3044F HG A1C LEVEL LT 7.0%: CPT | Mod: CPTII,S$GLB,, | Performed by: PHYSICIAN ASSISTANT

## 2021-12-28 PROCEDURE — 3008F BODY MASS INDEX DOCD: CPT | Mod: CPTII,S$GLB,, | Performed by: PHYSICIAN ASSISTANT

## 2021-12-28 PROCEDURE — 99999 PR PBB SHADOW E&M-EST. PATIENT-LVL III: CPT | Mod: PBBFAC,,, | Performed by: PHYSICIAN ASSISTANT

## 2021-12-28 PROCEDURE — 73130 X-RAY EXAM OF HAND: CPT | Mod: 26,,, | Performed by: RADIOLOGY

## 2021-12-28 PROCEDURE — 73130 X-RAY EXAM OF HAND: CPT | Mod: TC,50

## 2021-12-28 PROCEDURE — 99999 PR PBB SHADOW E&M-EST. PATIENT-LVL III: ICD-10-PCS | Mod: PBBFAC,,, | Performed by: PHYSICIAN ASSISTANT

## 2021-12-28 PROCEDURE — 3008F PR BODY MASS INDEX (BMI) DOCUMENTED: ICD-10-PCS | Mod: CPTII,S$GLB,, | Performed by: PHYSICIAN ASSISTANT

## 2021-12-28 PROCEDURE — 1159F MED LIST DOCD IN RCRD: CPT | Mod: CPTII,S$GLB,, | Performed by: PHYSICIAN ASSISTANT

## 2021-12-28 PROCEDURE — 4010F ACE/ARB THERAPY RXD/TAKEN: CPT | Mod: CPTII,S$GLB,, | Performed by: PHYSICIAN ASSISTANT

## 2021-12-28 PROCEDURE — 1159F PR MEDICATION LIST DOCUMENTED IN MEDICAL RECORD: ICD-10-PCS | Mod: CPTII,S$GLB,, | Performed by: PHYSICIAN ASSISTANT

## 2021-12-28 PROCEDURE — 3044F PR MOST RECENT HEMOGLOBIN A1C LEVEL <7.0%: ICD-10-PCS | Mod: CPTII,S$GLB,, | Performed by: PHYSICIAN ASSISTANT

## 2021-12-28 PROCEDURE — 99203 PR OFFICE/OUTPT VISIT, NEW, LEVL III, 30-44 MIN: ICD-10-PCS | Mod: S$GLB,,, | Performed by: PHYSICIAN ASSISTANT

## 2021-12-28 PROCEDURE — 73130 XR HAND COMPLETE 3 VIEWS BILATERAL: ICD-10-PCS | Mod: 26,,, | Performed by: RADIOLOGY

## 2022-01-08 DIAGNOSIS — I10 ESSENTIAL HYPERTENSION: Chronic | ICD-10-CM

## 2022-01-08 NOTE — TELEPHONE ENCOUNTER
No new care gaps identified.  Powered by [employer]. Reference number: [id number].   1/08/2022 7:31:54 AM CST

## 2022-01-12 RX ORDER — LISINOPRIL AND HYDROCHLOROTHIAZIDE 12.5; 2 MG/1; MG/1
TABLET ORAL
Qty: 90 TABLET | Refills: 3 | Status: SHIPPED | OUTPATIENT
Start: 2022-01-12 | End: 2022-10-31

## 2022-01-12 NOTE — TELEPHONE ENCOUNTER
Refill Authorization Note   Iqra Duque  is requesting a refill authorization.  Brief Assessment and Rationale for Refill:  Approve     Medication Therapy Plan:       Medication Reconciliation Completed: No   Comments:   --->Care Gap information included below if applicable.   Orders Placed This Encounter    lisinopriL-hydrochlorothiazide (PRINZIDE,ZESTORETIC) 20-12.5 mg per tablet      Requested Prescriptions   Signed Prescriptions Disp Refills    lisinopriL-hydrochlorothiazide (PRINZIDE,ZESTORETIC) 20-12.5 mg per tablet 90 tablet 3     Sig: TAKE 1 TABLET BY MOUTH EVERY DAY       Cardiovascular:  ACEI + Diuretic Combos Passed - 1/8/2022  7:31 AM        Passed - Patient is at least 18 years old        Passed - Last BP in normal range within 360 days     BP Readings from Last 1 Encounters:   11/17/21 120/78               Passed - Valid encounter within last 15 months     Recent Visits  Date Type Provider Dept   04/22/21 Office Visit MD Sue [facility]   06/19/20 Office Visit MD Anila [facility]   05/19/20 Office Visit MD Sue [facility]   02/10/20 Office Visit Jaylon Kothari MD [facility]/ Peds   Showing recent visits within past 720 days and meeting all other requirements  Future Appointments  No visits were found meeting these conditions.  Showing future appointments within next 150 days and meeting all other requirements      Future Appointments              In 1 month [facility] [place]    In 4 months [facility] [place]    In 4 months [facility]    [facility]                Passed - Na is between 130 and 148 and within 360 days     Sodium   Date Value Ref Range Status   10/20/2021 141 136 - 145 mmol/L Final   04/14/2021 138 136 - 145 mmol/L Final   10/15/2020  138 136 - 145 mmol/L Final              Passed - K in normal range and within 360 days     Potassium   Date Value Ref Range Status   10/20/2021 4.9 3.5 - 5.1 mmol/L Final   04/14/2021 4.0 3.5 - 5.1 mmol/L Final   10/15/2020 4.6 3.5 - 5.1 mmol/L Final              Passed - Cr is 1.39 or below and within 360 days     Lab Results   Component Value Date    CREATININE 0.7 10/20/2021    CREATININE 0.8 04/14/2021    CREATININE 0.8 10/15/2020              Passed - eGFR within 360 days     Lab Results   Component Value Date    EGFRNONAA >60.0 10/20/2021    EGFRNONAA >60.0 04/14/2021    EGFRNONAA >60.0 10/15/2020                    Appointments  past 12m or future 3m with PCP    Date Provider   Last Visit   4/22/2021 Jaylon Kothari MD   Next Visit   5/23/2022 Jaylon Kothari MD   ED visits in past 90 days: 0     Note composed:1:04 PM 01/12/2022

## 2022-01-14 ENCOUNTER — TELEPHONE (OUTPATIENT)
Dept: FAMILY MEDICINE | Facility: CLINIC | Age: 52
End: 2022-01-14
Payer: COMMERCIAL

## 2022-01-14 DIAGNOSIS — Z12.31 BREAST CANCER SCREENING BY MAMMOGRAM: Primary | ICD-10-CM

## 2022-01-14 NOTE — TELEPHONE ENCOUNTER
----- Message from Joshua Villatoro MA sent at 1/14/2022 10:24 AM CST -----  Type: Patient Call Back    Who called:Self    What is the request in detail:pt. Would like mammogram orders placed so she can schedule ..     Can the clinic reply by [facility]?no    Would the patient rather a call back or a response via [facility]? yes    Best call back number:[phone] (home)

## 2022-01-30 DIAGNOSIS — J30.9 ALLERGIC RHINITIS: ICD-10-CM

## 2022-01-30 NOTE — TELEPHONE ENCOUNTER
Care Due:                  Date            Visit Type   Department     Provider  --------------------------------------------------------------------------------                                             [facility]                                           MED/ INTERNAL  Last Visit: 04-      None         MED/ PEDS      Jaylon Kothari                                           [facility]                                           MED/ INTERNAL  Next Visit: 05-      None         MED/ PEDS      Jaylon S RADHA  Mollmiriam                                                            Last  Test          Frequency    Reason                     Performed    Due Date  --------------------------------------------------------------------------------    Office Visit  12 months..  TRULICITY,                 04- 04-                             lisinopriL-hydrochlorothi                             azide, metFORMIN,                             montelukast..............    HBA1C.......  6 months...  Jarad PÉREZ.....  10-   04-    Powered by [email or web address]. Reference number: [id number].   1/30/2022 7:40:34 AM CST

## 2022-02-03 DIAGNOSIS — J30.9 ALLERGIC RHINITIS: ICD-10-CM

## 2022-02-03 RX ORDER — MONTELUKAST SODIUM 10 MG/1
10 TABLET ORAL DAILY
Qty: 90 TABLET | Refills: 1 | Status: SHIPPED | OUTPATIENT
Start: 2022-02-03 | End: 2022-08-10

## 2022-02-03 NOTE — TELEPHONE ENCOUNTER
No new care gaps identified.  Powered by [employer]. Reference number: [id number].   2/03/2022 10:55:38 AM CST

## 2022-02-07 RX ORDER — MONTELUKAST SODIUM 10 MG/1
TABLET ORAL
Qty: 90 TABLET | Refills: 1 | OUTPATIENT
Start: 2022-02-07

## 2022-02-09 ENCOUNTER — HOSPITAL ENCOUNTER (OUTPATIENT)
Dept: RADIOLOGY | Facility: HOSPITAL | Age: 52
Discharge: HOME | End: 2022-02-09
Attending: INTERNAL MEDICINE
Payer: COMMERCIAL

## 2022-02-09 DIAGNOSIS — Z12.31 BREAST CANCER SCREENING BY MAMMOGRAM: ICD-10-CM

## 2022-02-09 PROCEDURE — 77067 SCR MAMMO BI INCL CAD: CPT | Mod: 26,,, | Performed by: RADIOLOGY

## 2022-02-09 PROCEDURE — 77063 BREAST TOMOSYNTHESIS BI: CPT | Mod: 26,,, | Performed by: RADIOLOGY

## 2022-02-09 PROCEDURE — 77067 MAMMO DIGITAL SCREENING BILAT WITH TOMO: ICD-10-PCS | Mod: 26,,, | Performed by: RADIOLOGY

## 2022-02-09 PROCEDURE — 77063 BREAST TOMOSYNTHESIS BI: CPT | Mod: TC,PO

## 2022-02-09 PROCEDURE — 77063 MAMMO DIGITAL SCREENING BILAT WITH TOMO: ICD-10-PCS | Mod: 26,,, | Performed by: RADIOLOGY

## 2022-02-17 ENCOUNTER — LAB VISIT (OUTPATIENT)
Dept: LAB | Facility: HOSPITAL | Age: 52
End: 2022-02-17
Attending: NURSE PRACTITIONER
Payer: COMMERCIAL

## 2022-02-17 DIAGNOSIS — D50.9 IRON DEFICIENCY ANEMIA, UNSPECIFIED IRON DEFICIENCY ANEMIA TYPE: ICD-10-CM

## 2022-02-17 LAB
BASOPHILS # BLD AUTO: 0.05 K/UL (ref 0–0.2)
BASOPHILS NFR BLD: 0.7 % (ref 0–1.9)
DIFFERENTIAL METHOD: ABNORMAL
EOSINOPHIL # BLD AUTO: 0.3 K/UL (ref 0–0.5)
EOSINOPHIL NFR BLD: 4.3 % (ref 0–8)
ERYTHROCYTE [DISTWIDTH] IN BLOOD BY AUTOMATED COUNT: 16.4 % (ref 11.5–14.5)
FERRITIN SERPL-MCNC: 11 NG/ML (ref 20–300)
HCT VFR BLD AUTO: 40.8 % (ref 37–48.5)
HGB BLD-MCNC: 12.4 G/DL (ref 12–16)
IMM GRANULOCYTES # BLD AUTO: 0.02 K/UL (ref 0–0.04)
IMM GRANULOCYTES NFR BLD AUTO: 0.3 % (ref 0–0.5)
IRON SERPL-MCNC: 31 UG/DL (ref 30–160)
LYMPHOCYTES # BLD AUTO: 2.3 K/UL (ref 1–4.8)
LYMPHOCYTES NFR BLD: 32.2 % (ref 18–48)
MCH RBC QN AUTO: 23.7 PG (ref 27–31)
MCHC RBC AUTO-ENTMCNC: 30.4 G/DL (ref 32–36)
MCV RBC AUTO: 78 FL (ref 82–98)
MONOCYTES # BLD AUTO: 0.6 K/UL (ref 0.3–1)
MONOCYTES NFR BLD: 7.9 % (ref 4–15)
NEUTROPHILS # BLD AUTO: 4 K/UL (ref 1.8–7.7)
NEUTROPHILS NFR BLD: 54.6 % (ref 38–73)
NRBC BLD-RTO: 0 /100 WBC
PLATELET # BLD AUTO: 318 K/UL (ref 150–450)
PMV BLD AUTO: 10.4 FL (ref 9.2–12.9)
RBC # BLD AUTO: 5.24 M/UL (ref 4–5.4)
SATURATED IRON: 6 % (ref 20–50)
TOTAL IRON BINDING CAPACITY: 524 UG/DL (ref 250–450)
TRANSFERRIN SERPL-MCNC: 354 MG/DL (ref 200–375)
WBC # BLD AUTO: 7.26 K/UL (ref 3.9–12.7)

## 2022-02-17 PROCEDURE — 85025 COMPLETE CBC W/AUTO DIFF WBC: CPT | Performed by: NURSE PRACTITIONER

## 2022-02-17 PROCEDURE — 84466 ASSAY OF TRANSFERRIN: CPT | Performed by: NURSE PRACTITIONER

## 2022-02-17 PROCEDURE — 36415 COLL VENOUS BLD VENIPUNCTURE: CPT | Mod: PO | Performed by: NURSE PRACTITIONER

## 2022-02-17 PROCEDURE — 82728 ASSAY OF FERRITIN: CPT | Performed by: NURSE PRACTITIONER

## 2022-02-18 DIAGNOSIS — E11.9 TYPE 2 DIABETES MELLITUS WITHOUT COMPLICATION, WITHOUT LONG-TERM CURRENT USE OF INSULIN: Chronic | ICD-10-CM

## 2022-02-18 RX ORDER — METFORMIN HYDROCHLORIDE 500 MG/1
1000 TABLET, EXTENDED RELEASE ORAL 2 TIMES DAILY WITH MEALS
Qty: 360 TABLET | Refills: 0 | Status: SHIPPED | OUTPATIENT
Start: 2022-02-18 | End: 2022-05-19

## 2022-02-18 NOTE — TELEPHONE ENCOUNTER
Refill Routing Note   Medication(s) are not appropriate for processing by [facility] for the following reason(s):      - Drug-Disease Interaction (metFORMIN and Chronic diarrhea of unknown origin AND NAFLD)    [facility] action(s):  Defer Medication-related problems identified: Drug-disease interaction        --->Care Gap information included in message below if applicable.   Medication reconciliation completed: No   Automatic Epic Generated Protocol Data:        Requested Prescriptions   Pending Prescriptions Disp Refills    metFORMIN (GLUCOPHAGE-XR) 500 MG ER 24hr tablet [Pharmacy Med Name: METFORMIN HCL  MG TABLET] 360 tablet 0     Sig: Take 2 tablets (1,000 mg total) by mouth 2 (two) times daily with meals.       Endocrinology:  Diabetes - Biguanides Passed - 2/18/2022 12:23 AM        Passed - Patient is at least 18 years old        Passed - Valid encounter within last 15 months     Recent Visits  Date Type Provider Dept   04/22/21 Office Visit MD [facility]   06/19/20 Office Visit MD Anila [facility]   05/19/20 Office Visit Jaylon Kothari MD [facility] Med/ Peds   Showing recent visits within past 720 days and meeting all other requirements  Future Appointments  No visits were found meeting these conditions.  Showing future appointments within next 150 days and meeting all other requirements      Future Appointments              In 2 months [facility] [place]    In 2 months [facility] [place]    [facility] 3 months [facility]                Passed - Cr is 1.39 or below and within 360 days     Lab Results   Component Value Date    CREATININE 0.7 10/20/2021    CREATININE 0.8 04/14/2021    CREATININE 0.8 10/15/2020              Passed - HBA1C within 180 days     Lab Results   Component Value Date    HGBA1C 6.8 (H) 10/20/2021     HGBA1C 7.0 (H) 04/14/2021    HGBA1C 6.9 (H) 10/15/2020              Passed - eGFR is 45 or above and within 360 days     Lab Results   Component Value Date    EGFRNONAA >60.0 10/20/2021    EGFRNONAA >60.0 04/14/2021    EGFRNONAA >60.0 10/15/2020                      Appointments  past 12m or future 3m with PCP    Date Provider   Last Visit   4/22/2021 Jaylon Kothari MD   Next Visit   5/23/2022 Jaylon Kothari MD   ED visits in past 90 days: 0        Note composed:12:06 PM 02/18/2022

## 2022-02-18 NOTE — TELEPHONE ENCOUNTER
No new care gaps identified.  Powered by [employer]. Reference number: [id number].   2/18/2022 12:23:43 AM CST

## 2022-02-21 ENCOUNTER — TELEPHONE (OUTPATIENT)
Dept: FAMILY MEDICINE | Facility: CLINIC | Age: 52
End: 2022-02-21
Payer: COMMERCIAL

## 2022-02-21 NOTE — TELEPHONE ENCOUNTER
Please let the patient know her iron levels and blood counts are trending in the right direction. Still low normal range, but improved over the last 3 months. Continue taking iron daily. Due for diabetes follow-up in 3 months with labs prior.    Thanks!  Ofelia CHOI

## 2022-03-31 ENCOUNTER — OFFICE VISIT (OUTPATIENT)
Dept: FAMILY MEDICINE | Facility: CLINIC | Age: 52
End: 2022-03-31
Payer: COMMERCIAL

## 2022-03-31 VITALS
HEIGHT: 65 IN | DIASTOLIC BLOOD PRESSURE: 76 MMHG | SYSTOLIC BLOOD PRESSURE: 136 MMHG | WEIGHT: 274.5 LBS | OXYGEN SATURATION: 95 % | TEMPERATURE: 98 F | HEART RATE: 85 BPM | BODY MASS INDEX: 45.73 KG/M2

## 2022-03-31 DIAGNOSIS — G43.119 INTRACTABLE MIGRAINE WITH AURA WITHOUT STATUS MIGRAINOSUS: Primary | ICD-10-CM

## 2022-03-31 DIAGNOSIS — E11.3293 TYPE 2 DIABETES MELLITUS WITH BOTH EYES AFFECTED BY MILD NONPROLIFERATIVE RETINOPATHY WITHOUT MACULAR EDEMA, WITHOUT LONG-TERM CURRENT USE OF INSULIN: Chronic | ICD-10-CM

## 2022-03-31 DIAGNOSIS — E66.01 MORBID OBESITY WITH BMI OF 45.0-49.9, ADULT: Chronic | ICD-10-CM

## 2022-03-31 DIAGNOSIS — I10 ESSENTIAL HYPERTENSION: Chronic | ICD-10-CM

## 2022-03-31 PROCEDURE — 99999 PR PBB SHADOW E&M-EST. PATIENT-LVL V: CPT | Mod: PBBFAC,,, | Performed by: NURSE PRACTITIONER

## 2022-03-31 PROCEDURE — 99214 OFFICE O/P EST MOD 30 MIN: CPT | Mod: S$GLB,,, | Performed by: NURSE PRACTITIONER

## 2022-03-31 PROCEDURE — 1159F MED LIST DOCD IN RCRD: CPT | Mod: CPTII,S$GLB,, | Performed by: NURSE PRACTITIONER

## 2022-03-31 PROCEDURE — 99999 PR PBB SHADOW E&M-EST. PATIENT-LVL V: ICD-10-PCS | Mod: PBBFAC,,, | Performed by: NURSE PRACTITIONER

## 2022-03-31 PROCEDURE — 3008F PR BODY MASS INDEX (BMI) DOCUMENTED: ICD-10-PCS | Mod: CPTII,S$GLB,, | Performed by: NURSE PRACTITIONER

## 2022-03-31 PROCEDURE — 3008F BODY MASS INDEX DOCD: CPT | Mod: CPTII,S$GLB,, | Performed by: NURSE PRACTITIONER

## 2022-03-31 PROCEDURE — 1160F RVW MEDS BY RX/DR IN RCRD: CPT | Mod: CPTII,S$GLB,, | Performed by: NURSE PRACTITIONER

## 2022-03-31 PROCEDURE — 3075F SYST BP GE 130 - 139MM HG: CPT | Mod: CPTII,S$GLB,, | Performed by: NURSE PRACTITIONER

## 2022-03-31 PROCEDURE — 99214 PR OFFICE/OUTPT VISIT, EST, LEVL IV, 30-39 MIN: ICD-10-PCS | Mod: S$GLB,,, | Performed by: NURSE PRACTITIONER

## 2022-03-31 PROCEDURE — 1159F PR MEDICATION LIST DOCUMENTED IN MEDICAL RECORD: ICD-10-PCS | Mod: CPTII,S$GLB,, | Performed by: NURSE PRACTITIONER

## 2022-03-31 PROCEDURE — 3075F PR MOST RECENT SYSTOLIC BLOOD PRESS GE 130-139MM HG: ICD-10-PCS | Mod: CPTII,S$GLB,, | Performed by: NURSE PRACTITIONER

## 2022-03-31 PROCEDURE — 4010F PR ACE/ARB THEARPY RXD/TAKEN: ICD-10-PCS | Mod: CPTII,S$GLB,, | Performed by: NURSE PRACTITIONER

## 2022-03-31 PROCEDURE — 3078F PR MOST RECENT DIASTOLIC BLOOD PRESSURE < 80 MM HG: ICD-10-PCS | Mod: CPTII,S$GLB,, | Performed by: NURSE PRACTITIONER

## 2022-03-31 PROCEDURE — 4010F ACE/ARB THERAPY RXD/TAKEN: CPT | Mod: CPTII,S$GLB,, | Performed by: NURSE PRACTITIONER

## 2022-03-31 PROCEDURE — 1160F PR REVIEW ALL MEDS BY PRESCRIBER/CLIN PHARMACIST DOCUMENTED: ICD-10-PCS | Mod: CPTII,S$GLB,, | Performed by: NURSE PRACTITIONER

## 2022-03-31 PROCEDURE — 3078F DIAST BP <80 MM HG: CPT | Mod: CPTII,S$GLB,, | Performed by: NURSE PRACTITIONER

## 2022-03-31 RX ORDER — SUMATRIPTAN SUCCINATE 100 MG/1
TABLET ORAL
Qty: 15 TABLET | Refills: 5 | Status: SHIPPED | OUTPATIENT
Start: 2022-03-31 | End: 2022-06-09 | Stop reason: SINTOL

## 2022-03-31 NOTE — PROGRESS NOTES
History of Present Illness   Iqra Duque is a 51 y.o. woman with medical history as listed below who presents today for evaluation of migraine headaches. She reports chronic migraine that had been well controlled until the last 3 months. She reports she is not having at least two HAs per month. HAs have also changed in character. She is now having aura (seeing spots with eyes closed) followed by pain behind one eye with associated nausea, vomiting, dizziness, blurred vision, and light sensitivity. The pain also seems to be occurring more quickly from time of onset of aura. She has tried Excedrin migraine which usually works for her without relief. Her headaches are lasting 2-3 days. She used to be aware of triggers, but has been avoiding all known triggers with no improvement. She does report her sleep patterns have been disrupted secondary to stress and recall on her CPAP machine which does seem to be a trigger for her. She has no additional complaints and is otherwise healthy on today's visit.    Past Medical History:   Diagnosis Date    Allergy     Diabetes mellitus type II     Diverticulitis 09/2014    Fever blister     Hypertension     Migraine headache        Past Surgical History:   Procedure Laterality Date    COLONOSCOPY N/A 10/21/2019    Procedure: COLONOSCOPY;  Surgeon: Gomez Stark MD;  Location: [facility];  Service: Endoscopy;  Laterality: N/A;    HYSTERECTOMY  06/2009    [facility]---DLH/BSO ATYPICAL HYPERPLASIA    OOPHORECTOMY      TUBAL LIGATION         Social History     Socioeconomic History    Marital status:    Occupational History    Occupation: Dispatcher     Employer: [employer]   Tobacco Use    Smoking status: Never Smoker    Smokeless tobacco: Never Used   Substance and Sexual Activity    Alcohol use: No    Drug use: No    Sexual activity: Yes     Partners: Male     Birth control/protection: None     Social Determinants of Health     Financial Resource  Strain: Low Risk     Difficulty of Paying Living Expenses: Not hard at all   Food Insecurity: No Food Insecurity    Worried About Running Out of Food in the Last Year: Never true    Ran Out of Food in the Last Year: Never true   Transportation Needs: No Transportation Needs    Lack of Transportation (Medical): No    Lack of Transportation (Non-Medical): No   Physical Activity: Insufficiently Active    Days of Exercise per Week: 1 day    Minutes of Exercise per Session: 20 min   Stress: No Stress Concern Present    Feeling of Stress : Only a little   Social Connections: Unknown    Frequency of Communication with Friends and Family: Three times a week    Frequency of Social Gatherings with Friends and Family: Once a week    Active Member of Clubs or Organizations: No    Attends Club or Organization Meetings: Patient refused    Marital Status:    Housing Stability: Low Risk     Unable to Pay for Housing in the Last Year: No    Number of Places Lived in the Last Year: 2    Unstable Housing in the Last Year: No       Family History   Problem Relation Age of Onset    Ovarian cancer Mother 43    Cancer Mother         ovarian    Diabetes Mother     Heart disease Father     Melanoma Neg Hx     Psoriasis Neg Hx     Lupus Neg Hx        Review of Systems  Review of Systems   Constitutional: Negative for chills and fever.   Eyes: Positive for blurred vision and photophobia (with HA). Negative for double vision (with HA) and discharge.   Cardiovascular: Negative for chest pain and palpitations.   Gastrointestinal: Positive for diarrhea (chronic), nausea (with HA) and vomiting (with HA). Negative for blood in stool and constipation.   Musculoskeletal: Negative for neck pain.   Neurological: Positive for dizziness (with HA) and headaches. Negative for tingling, tremors, sensory change, speech change, focal weakness, seizures, loss of consciousness and weakness.   Endo/Heme/Allergies: Negative for  "polydipsia.     A complete review of systems was otherwise negative.    Physical Exam  /76   Pulse 85   Temp 98.2 °F (36.8 °C) (Oral)   Ht 5' 5" (1.651 m)   Wt 124.5 kg (274 lb 7.6 oz)   SpO2 95%   BMI 45.67 kg/m²   General appearance: alert, appears stated age, cooperative and no distress  Eyes: conjunctivae/corneas clear. PERRL, EOM's intact. Fundi benign.  Neck: no carotid bruit, no JVD, supple, symmetrical, trachea midline and thyroid not enlarged, symmetric, no tenderness/mass/nodules  Lungs: clear to auscultation bilaterally  Heart: regular rate and rhythm, S1, S2 normal, no murmur, click, rub or gallop  Neurologic: Mental status: Alert, oriented, thought content appropriate  Cranial nerves: normal  Sensory: normal  Motor:grossly normal  Reflexes: 2+ and symmetric  Coordination: normal  Gait: Normal    Assessment/Plan  Intractable migraine with aura without status migrainosus  Trial of Imitrex.  Handout provided on tips to manage at home.  Referral to neurology given change in characteristics and frequency of migraine.  ER precautions discussed, no red flags on exam.  RTC PRN.  -     sumatriptan (IMITREX) 100 MG tablet; Take one half tablet at start of headache followed by half tablet two hours after first dose. Not to exceed one tablet in 24 hours.  Dispense: 15 tablet; Refill: 5  -     Ambulatory referral/consult to Neurology; Future; Expected date: 04/07/2022    Type 2 diabetes mellitus with both eyes affected by mild nonproliferative retinopathy without macular edema, without long-term current use of insulin  The current medical regimen is effective;  continue present plan and medications.    Essential hypertension  The current medical regimen is effective;  continue present plan and medications.    Morbid obesity with BMI of 45.0-49.9, adult  The patient is asked to make an attempt to improve diet and exercise patterns to aid in medical management of this problem.    Patient has verbalized " understanding and is in agreement with plan of care.    Follow up if symptoms worsen or fail to improve.    Answers for HPI/ROS submitted by the patient on 3/29/2022  activity change: No  unexpected weight change: No  rhinorrhea: No  trouble swallowing: No  visual disturbance: Yes  chest tightness: No  polyuria: No  difficulty urinating: No  menstrual problem: No  joint swelling: No  arthralgias: Yes  confusion: No  dysphoric mood: Yes

## 2022-04-06 ENCOUNTER — PATIENT MESSAGE (OUTPATIENT)
Dept: ADMINISTRATIVE | Facility: OTHER | Age: 52
End: 2022-04-06
Payer: COMMERCIAL

## 2022-04-14 ENCOUNTER — PATIENT MESSAGE (OUTPATIENT)
Dept: FAMILY MEDICINE | Facility: CLINIC | Age: 52
End: 2022-04-14
Payer: COMMERCIAL

## 2022-04-25 DIAGNOSIS — K21.9 GASTROESOPHAGEAL REFLUX: ICD-10-CM

## 2022-04-25 RX ORDER — OMEPRAZOLE 20 MG/1
CAPSULE, DELAYED RELEASE ORAL
Qty: 90 CAPSULE | Refills: 1 | Status: SHIPPED | OUTPATIENT
Start: 2022-04-25 | End: 2022-10-19

## 2022-05-14 ENCOUNTER — PATIENT MESSAGE (OUTPATIENT)
Dept: FAMILY MEDICINE | Facility: CLINIC | Age: 52
End: 2022-05-14
Payer: COMMERCIAL

## 2022-05-17 ENCOUNTER — LAB VISIT (OUTPATIENT)
Dept: LAB | Facility: HOSPITAL | Age: 52
End: 2022-05-17
Attending: NURSE PRACTITIONER
Payer: COMMERCIAL

## 2022-05-17 DIAGNOSIS — E11.3293 TYPE 2 DIABETES MELLITUS WITH BOTH EYES AFFECTED BY MILD NONPROLIFERATIVE RETINOPATHY WITHOUT MACULAR EDEMA, WITHOUT LONG-TERM CURRENT USE OF INSULIN: Chronic | ICD-10-CM

## 2022-05-17 LAB
ANION GAP SERPL CALC-SCNC: 12 MMOL/L (ref 8–16)
BUN SERPL-MCNC: 18 MG/DL (ref 6–20)
CALCIUM SERPL-MCNC: 9.8 MG/DL (ref 8.7–10.5)
CHLORIDE SERPL-SCNC: 99 MMOL/L (ref 95–110)
CHOLEST SERPL-MCNC: 154 MG/DL (ref 120–199)
CHOLEST/HDLC SERPL: 3.1 {RATIO} (ref 2–5)
CO2 SERPL-SCNC: 26 MMOL/L (ref 23–29)
CREAT SERPL-MCNC: 0.7 MG/DL (ref 0.5–1.4)
EST. GFR  (AFRICAN AMERICAN): >60 ML/MIN/1.73 M^2
EST. GFR  (NON AFRICAN AMERICAN): >60 ML/MIN/1.73 M^2
ESTIMATED AVG GLUCOSE: 123 MG/DL (ref 68–131)
GLUCOSE SERPL-MCNC: 131 MG/DL (ref 70–110)
HBA1C MFR BLD: 5.9 % (ref 4–5.6)
HDLC SERPL-MCNC: 50 MG/DL (ref 40–75)
HDLC SERPL: 32.5 % (ref 20–50)
LDLC SERPL CALC-MCNC: 76.8 MG/DL (ref 63–159)
NONHDLC SERPL-MCNC: 104 MG/DL
POTASSIUM SERPL-SCNC: 4.8 MMOL/L (ref 3.5–5.1)
SODIUM SERPL-SCNC: 137 MMOL/L (ref 136–145)
TRIGL SERPL-MCNC: 136 MG/DL (ref 30–150)

## 2022-05-17 PROCEDURE — 80061 LIPID PANEL: CPT | Performed by: NURSE PRACTITIONER

## 2022-05-17 PROCEDURE — 80048 BASIC METABOLIC PNL TOTAL CA: CPT | Performed by: NURSE PRACTITIONER

## 2022-05-17 PROCEDURE — 36415 COLL VENOUS BLD VENIPUNCTURE: CPT | Mod: PO | Performed by: NURSE PRACTITIONER

## 2022-05-17 PROCEDURE — 83036 HEMOGLOBIN GLYCOSYLATED A1C: CPT | Performed by: NURSE PRACTITIONER

## 2022-05-19 DIAGNOSIS — E11.9 TYPE 2 DIABETES MELLITUS WITHOUT COMPLICATION, WITHOUT LONG-TERM CURRENT USE OF INSULIN: Chronic | ICD-10-CM

## 2022-05-19 RX ORDER — METFORMIN HYDROCHLORIDE 500 MG/1
TABLET, EXTENDED RELEASE ORAL
Qty: 360 TABLET | Refills: 0 | Status: SHIPPED | OUTPATIENT
Start: 2022-05-19 | End: 2022-06-13 | Stop reason: SDUPTHER

## 2022-05-19 NOTE — TELEPHONE ENCOUNTER
No new care gaps identified.  [facility] Embedded Care Gaps. Reference number: [id number]. 5/19/2022   12:37:20 AM CDT

## 2022-05-19 NOTE — TELEPHONE ENCOUNTER
Refill Routing Note   Medication(s) are not appropriate for processing by [facility] for the following reason(s):      - Drug-Disease Interaction (metFORMIN and NAFLD (nonalcoholic fatty liver disease))    [facility] action(s):  Defer Medication-related problems identified: Drug-disease interaction        Medication reconciliation completed: No     Appointments  past 12m or future 3m with PCP    Date Provider   Last Visit   4/22/2021 Jaylon Kothari MD   Next Visit   5/23/2022 Jaylon Kothari MD   ED visits in past 90 days: 0        Note composed:12:44 AM 05/19/2022

## 2022-05-23 ENCOUNTER — OFFICE VISIT (OUTPATIENT)
Dept: FAMILY MEDICINE | Facility: CLINIC | Age: 52
End: 2022-05-23
Payer: COMMERCIAL

## 2022-05-23 VITALS
HEART RATE: 97 BPM | DIASTOLIC BLOOD PRESSURE: 70 MMHG | OXYGEN SATURATION: 97 % | BODY MASS INDEX: 46.34 KG/M2 | HEIGHT: 65 IN | TEMPERATURE: 98 F | WEIGHT: 278.13 LBS | SYSTOLIC BLOOD PRESSURE: 120 MMHG

## 2022-05-23 DIAGNOSIS — E66.01 MORBID OBESITY WITH BMI OF 45.0-49.9, ADULT: Chronic | ICD-10-CM

## 2022-05-23 DIAGNOSIS — E11.3293 TYPE 2 DIABETES MELLITUS WITH BOTH EYES AFFECTED BY MILD NONPROLIFERATIVE RETINOPATHY WITHOUT MACULAR EDEMA, WITHOUT LONG-TERM CURRENT USE OF INSULIN: Chronic | ICD-10-CM

## 2022-05-23 DIAGNOSIS — I10 ESSENTIAL HYPERTENSION: Chronic | ICD-10-CM

## 2022-05-23 DIAGNOSIS — G56.22 ULNAR NEUROPATHY OF LEFT UPPER EXTREMITY: ICD-10-CM

## 2022-05-23 DIAGNOSIS — Z00.00 ROUTINE MEDICAL EXAM: Primary | ICD-10-CM

## 2022-05-23 PROCEDURE — 3044F PR MOST RECENT HEMOGLOBIN A1C LEVEL <7.0%: ICD-10-PCS | Mod: CPTII,S$GLB,, | Performed by: INTERNAL MEDICINE

## 2022-05-23 PROCEDURE — 99999 PR PBB SHADOW E&M-EST. PATIENT-LVL IV: CPT | Mod: PBBFAC,,, | Performed by: INTERNAL MEDICINE

## 2022-05-23 PROCEDURE — 3078F DIAST BP <80 MM HG: CPT | Mod: CPTII,S$GLB,, | Performed by: INTERNAL MEDICINE

## 2022-05-23 PROCEDURE — 99396 PREV VISIT EST AGE 40-64: CPT | Mod: S$GLB,,, | Performed by: INTERNAL MEDICINE

## 2022-05-23 PROCEDURE — 3078F PR MOST RECENT DIASTOLIC BLOOD PRESSURE < 80 MM HG: ICD-10-PCS | Mod: CPTII,S$GLB,, | Performed by: INTERNAL MEDICINE

## 2022-05-23 PROCEDURE — 3074F SYST BP LT 130 MM HG: CPT | Mod: CPTII,S$GLB,, | Performed by: INTERNAL MEDICINE

## 2022-05-23 PROCEDURE — 3044F HG A1C LEVEL LT 7.0%: CPT | Mod: CPTII,S$GLB,, | Performed by: INTERNAL MEDICINE

## 2022-05-23 PROCEDURE — 99999 PR PBB SHADOW E&M-EST. PATIENT-LVL IV: ICD-10-PCS | Mod: PBBFAC,,, | Performed by: INTERNAL MEDICINE

## 2022-05-23 PROCEDURE — 1159F MED LIST DOCD IN RCRD: CPT | Mod: CPTII,S$GLB,, | Performed by: INTERNAL MEDICINE

## 2022-05-23 PROCEDURE — 1160F PR REVIEW ALL MEDS BY PRESCRIBER/CLIN PHARMACIST DOCUMENTED: ICD-10-PCS | Mod: CPTII,S$GLB,, | Performed by: INTERNAL MEDICINE

## 2022-05-23 PROCEDURE — 99396 PR PREVENTIVE VISIT,EST,40-64: ICD-10-PCS | Mod: S$GLB,,, | Performed by: INTERNAL MEDICINE

## 2022-05-23 PROCEDURE — 3008F PR BODY MASS INDEX (BMI) DOCUMENTED: ICD-10-PCS | Mod: CPTII,S$GLB,, | Performed by: INTERNAL MEDICINE

## 2022-05-23 PROCEDURE — 4010F ACE/ARB THERAPY RXD/TAKEN: CPT | Mod: CPTII,S$GLB,, | Performed by: INTERNAL MEDICINE

## 2022-05-23 PROCEDURE — 4010F PR ACE/ARB THEARPY RXD/TAKEN: ICD-10-PCS | Mod: CPTII,S$GLB,, | Performed by: INTERNAL MEDICINE

## 2022-05-23 PROCEDURE — 1159F PR MEDICATION LIST DOCUMENTED IN MEDICAL RECORD: ICD-10-PCS | Mod: CPTII,S$GLB,, | Performed by: INTERNAL MEDICINE

## 2022-05-23 PROCEDURE — 1160F RVW MEDS BY RX/DR IN RCRD: CPT | Mod: CPTII,S$GLB,, | Performed by: INTERNAL MEDICINE

## 2022-05-23 PROCEDURE — 3074F PR MOST RECENT SYSTOLIC BLOOD PRESSURE < 130 MM HG: ICD-10-PCS | Mod: CPTII,S$GLB,, | Performed by: INTERNAL MEDICINE

## 2022-05-23 PROCEDURE — 3008F BODY MASS INDEX DOCD: CPT | Mod: CPTII,S$GLB,, | Performed by: INTERNAL MEDICINE

## 2022-05-23 NOTE — ASSESSMENT & PLAN NOTE
Increase Trulicity to 4.5mg targeting weight loss more than glycemic control.  Will wean metformin as needed

## 2022-05-23 NOTE — PROGRESS NOTES
Assessment & Plan  Problem List Items Addressed This Visit        Cardiac/Vascular    Essential hypertension (Chronic)    Current Assessment & Plan     The current medical regimen is effective;  continue present plan and medications.               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  Will wean metformin as needed           Relevant Medications    dulaglutide (TRULICITY) 4.5 mg/0.5 mL pen injector    Other Relevant Orders    Hemoglobin A1C    Comprehensive Metabolic Panel    Microalbumin/Creatinine Ratio, Urine    Morbid obesity with BMI of 45.0-49.9, adult (Chronic)    Current Assessment & Plan     The patient's BMI has been recorded in the chart. The patient has been provided educational materials regarding the benefits of attaining and maintaining a normal weight. We will continue to address and follow this issue during follow up visits.               Other Visit Diagnoses     Routine medical exam    -  Primary  -    Discussed healthy diet, regular exercise, necessary labs, age appropriate cancer screening, and routine vaccinations.       Ulnar neuropathy of left upper extremity   -  Improving.  Monitor              Health Maintenance reviewed, as above.    Follow-up: No follow-ups on file.  ______________________________________________________________________    Chief Complaint  Chief Complaint   Patient presents with    Annual Exam       HPI  Iqra Duque is a 51 y.o. female with medical diagnoses as listed in the medical history and problem list that presents for routine physical.  Pt is known to me with their last appointment 3/31/2022.  She had labs prior to this OV that showed reassuring BMP, lipids, A1c.      Had an ulnar neuropathy since about a month.  It is improving.      No other cute complaints. Taking  and tolerating medications as prescribed without perceived side effects.  No CP, SOB, palpitations, hypoglycemic symptoms.        PAST MEDICAL HISTORY:  Past Medical History:   Diagnosis Date    Allergy     Diabetes mellitus type II     Diverticulitis 09/2014    Fever blister     Hypertension     Migraine headache        PAST SURGICAL HISTORY:  Past Surgical History:   Procedure Laterality Date    COLONOSCOPY N/A 10/21/2019    Procedure: COLONOSCOPY;  Surgeon: Gomez Stark MD;  Location: [facility];  Service: Endoscopy;  Laterality: N/A;    EYE SURGERY      HYSTERECTOMY  06/2009    [facility]---DLH/BSO ATYPICAL HYPERPLASIA    OOPHORECTOMY      TUBAL LIGATION         SOCIAL HISTORY:  Social History     Socioeconomic History    Marital status:    Occupational History    Occupation: Dispatcher     Employer: [employer]   Tobacco Use    Smoking status: Never Smoker    Smokeless tobacco: Never Used   Substance and Sexual Activity    Alcohol use: No    Drug use: No    Sexual activity: Yes     Partners: Male     Birth control/protection: None     Social Determinants of Health     Financial Resource Strain: Low Risk     Difficulty of Paying Living Expenses: Not very hard   Food Insecurity: No Food Insecurity    Worried About Running Out of Food in the Last Year: Never true    Ran Out of Food in the Last Year: Never true   Transportation Needs: No Transportation Needs    Lack of Transportation (Medical): No    Lack of Transportation (Non-Medical): No   Physical Activity: Insufficiently Active    Days of Exercise per Week: 1 day    Minutes of Exercise per Session: 20 min   Stress: Stress Concern Present    Feeling of Stress : To some extent   Social Connections: Unknown    Frequency of Communication with Friends and Family: More than three times a week    Frequency of Social Gatherings with Friends and Family: Once a week    Active Member of Clubs or Organizations: No    Attends Club  or Organization Meetings: Patient refused    Marital Status:    Housing Stability: Low Risk     Unable to Pay for Housing in the Last Year: No    Number of Places Lived in the Last Year: 1    Unstable Housing in the Last Year: No       FAMILY HISTORY:  Family History   Problem Relation Age of Onset    Ovarian cancer Mother 43    Cancer Mother         ovarian    Diabetes Mother     Heart disease Father     Melanoma Neg Hx     Psoriasis Neg Hx     Lupus Neg Hx        ALLERGIES AND MEDICATIONS: updated and reviewed.  Review of patient's allergies indicates:   Allergen Reactions    Bactrim [sulfamethoxazole-trimethoprim] Hives     Also Septra    Keflex [cephalexin] Shortness Of Breath    Neosporin (neomycin-polymyx) Swelling    Amoxicillin Rash    Neosporin  [benzalkonium chloride] Rash    Sulfa (sulfonamide antibiotics) Rash     Current Outpatient Medications   Medication Sig Dispense Refill    cetirizine (ZYRTEC) 10 MG tablet Take 10 mg by mouth once daily.      lisinopriL-hydrochlorothiazide (PRINZIDE,ZESTORETIC) 20-12.5 mg per tablet TAKE 1 TABLET BY MOUTH EVERY DAY 90 tablet 3    metFORMIN (GLUCOPHAGE-XR) 500 MG ER 24hr tablet TAKE 2 TABLETS BY MOUTH TWICE A DAY WITH MEALS 360 tablet 0    montelukast (SINGULAIR) 10 mg tablet Take 1 tablet (10 mg total) by mouth once daily. 90 tablet 1    omeprazole (PRILOSEC) 20 MG capsule TAKE 1 CAPSULE BY MOUTH EVERY DAY IN THE MORNING 90 capsule 1    sumatriptan (IMITREX) 100 MG tablet Take one half tablet at start of headache followed by half tablet two hours after first dose. Not to exceed one tablet in 24 hours. 15 tablet 5    CONTOUR TEST STRIPS Strp TEST ONCE DAILY 100 strip 1    dulaglutide (TRULICITY) 4.5 mg/0.5 mL pen injector Inject 4.5 mg into the skin every 7 days. 12 pen 1    MICROLET LANCET Misc TEST ONCE DAILY (Patient not taking: Reported on 5/23/2022) 100 each 3     No current facility-administered medications for this visit.  "        ROS  Review of Systems   Constitutional: Negative for chills, fever and unexpected weight change.   HENT: Negative for congestion, ear pain, hearing loss, rhinorrhea, sore throat and trouble swallowing.    Eyes: Negative for discharge, redness and visual disturbance.   Respiratory: Negative for cough, chest tightness, shortness of breath and wheezing.    Cardiovascular: Negative for chest pain, palpitations and leg swelling.   Gastrointestinal: Negative for abdominal pain, constipation, diarrhea, nausea and vomiting.   Endocrine: Negative for polydipsia, polyphagia and polyuria.   Genitourinary: Negative for decreased urine volume, dysuria and hematuria.   Musculoskeletal: Negative for arthralgias, joint swelling and myalgias.   Skin: Negative for color change and rash.   Neurological: Negative for dizziness, weakness, light-headedness and headaches.   Psychiatric/Behavioral: Negative for decreased concentration, dysphoric mood, sleep disturbance and suicidal ideas.           Physical Exam  Vitals:    05/23/22 1334   BP: 120/70   Pulse: 97   Temp: 98.1 °F (36.7 °C)   SpO2: 97%   Weight: 126.1 kg (278 lb 1.8 oz)   Height: 5' 5" (1.651 m)    Body mass index is 46.28 kg/m².  Weight: 126.1 kg (278 lb 1.8 oz)   Height: 5' 5" (165.1 cm)   Physical Exam  Constitutional:       General: She is not in acute distress.     Appearance: She is well-developed.   HENT:      Head: Normocephalic and atraumatic.   Eyes:      General: Lids are normal. No scleral icterus.     Conjunctiva/sclera: Conjunctivae normal.      Pupils: Pupils are equal, round, and reactive to light.   Neck:      Thyroid: No thyromegaly.      Vascular: No carotid bruit or JVD.   Cardiovascular:      Rate and Rhythm: Normal rate and regular rhythm.      Pulses: Normal pulses.      Heart sounds: Normal heart sounds. No murmur heard.    No friction rub. No S3 or S4 sounds.   Pulmonary:      Effort: Pulmonary effort is normal.      Breath sounds: Normal " breath sounds. No wheezing, rhonchi or rales.   Abdominal:      General: Bowel sounds are normal.      Palpations: Abdomen is soft.      Tenderness: There is no abdominal tenderness.   Musculoskeletal:         General: No tenderness.      Cervical back: Full passive range of motion without pain and neck supple.      Right lower leg: No edema.      Left lower leg: No edema.   Skin:     General: Skin is warm and dry.      Findings: No rash.   Neurological:      Mental Status: She is alert and oriented to person, place, and time.   Psychiatric:         Speech: Speech normal.         Behavior: Behavior normal.         Thought Content: Thought content normal.             Health Maintenance       Date Due Completion Date    Low Dose Statin Never done ---    Diabetes Urine Screening 02/07/2021 2/7/2020    COVID-19 Vaccine (4 - Booster for Pfizer series) 05/11/2022 1/11/2022    Eye Exam 10/06/2022 10/6/2021    Foot Exam 10/20/2022 10/20/2021    Override on 10/20/2021: Done    Override on 8/3/2020: Done    Override on 7/24/2019: Done (Dr. Heather Hicks ( podiatry ))    Override on 7/17/2018: Done    Override on 7/3/2017: Done    Hemoglobin A1c 11/17/2022 5/17/2022    Lipid Panel 05/17/2023 5/17/2022    Mammogram 02/09/2024 2/9/2022    Colorectal Cancer Screening 10/21/2024 10/21/2019    TETANUS VACCINE 08/29/2026 8/29/2016

## 2022-06-03 ENCOUNTER — PATIENT MESSAGE (OUTPATIENT)
Dept: FAMILY MEDICINE | Facility: CLINIC | Age: 52
End: 2022-06-03
Payer: COMMERCIAL

## 2022-06-09 ENCOUNTER — OFFICE VISIT (OUTPATIENT)
Dept: FAMILY MEDICINE | Facility: CLINIC | Age: 52
End: 2022-06-09
Payer: COMMERCIAL

## 2022-06-09 DIAGNOSIS — I10 ESSENTIAL HYPERTENSION: Chronic | ICD-10-CM

## 2022-06-09 DIAGNOSIS — G43.109 MIGRAINE WITH AURA AND WITHOUT STATUS MIGRAINOSUS, NOT INTRACTABLE: Chronic | ICD-10-CM

## 2022-06-09 DIAGNOSIS — G47.33 OSA (OBSTRUCTIVE SLEEP APNEA): ICD-10-CM

## 2022-06-09 PROCEDURE — 4010F ACE/ARB THERAPY RXD/TAKEN: CPT | Mod: CPTII,95,, | Performed by: INTERNAL MEDICINE

## 2022-06-09 PROCEDURE — 1160F PR REVIEW ALL MEDS BY PRESCRIBER/CLIN PHARMACIST DOCUMENTED: ICD-10-PCS | Mod: CPTII,95,, | Performed by: INTERNAL MEDICINE

## 2022-06-09 PROCEDURE — 3044F PR MOST RECENT HEMOGLOBIN A1C LEVEL <7.0%: ICD-10-PCS | Mod: CPTII,95,, | Performed by: INTERNAL MEDICINE

## 2022-06-09 PROCEDURE — 1159F PR MEDICATION LIST DOCUMENTED IN MEDICAL RECORD: ICD-10-PCS | Mod: CPTII,95,, | Performed by: INTERNAL MEDICINE

## 2022-06-09 PROCEDURE — 99214 OFFICE O/P EST MOD 30 MIN: CPT | Mod: 95,,, | Performed by: INTERNAL MEDICINE

## 2022-06-09 PROCEDURE — 3044F HG A1C LEVEL LT 7.0%: CPT | Mod: CPTII,95,, | Performed by: INTERNAL MEDICINE

## 2022-06-09 PROCEDURE — 1159F MED LIST DOCD IN RCRD: CPT | Mod: CPTII,95,, | Performed by: INTERNAL MEDICINE

## 2022-06-09 PROCEDURE — 99214 PR OFFICE/OUTPT VISIT, EST, LEVL IV, 30-39 MIN: ICD-10-PCS | Mod: 95,,, | Performed by: INTERNAL MEDICINE

## 2022-06-09 PROCEDURE — 4010F PR ACE/ARB THEARPY RXD/TAKEN: ICD-10-PCS | Mod: CPTII,95,, | Performed by: INTERNAL MEDICINE

## 2022-06-09 PROCEDURE — 1160F RVW MEDS BY RX/DR IN RCRD: CPT | Mod: CPTII,95,, | Performed by: INTERNAL MEDICINE

## 2022-06-09 NOTE — LETTER
June 9, 2022      [facility]  [address]  [place] [address]  Phone: [phone]  Fax: [phone]       Patient: Iqra Duque   YOB: 1970  Date of Visit: 06/09/2022    To Whom It May Concern:    Gertrude Duque  was at [facility] on 06/09/2022. She may return to work/school on 6/11/2022 with no restrictions. We are continuing to work on getting better control of her migraines, and she will need a referral to a specialist which has been scheduled for August.     If you have any questions or concerns, or if I can be of further assistance, please do not hesitate to contact me.    Sincerely,    Jaylon Kothari MD

## 2022-06-09 NOTE — PROGRESS NOTES
Assessment & Plan  Problem List Items Addressed This Visit        Neuro    Migraine with aura and without status migrainosus, not intractable (Chronic)    Current Assessment & Plan     increase in migraine frequency since being off of CPAP due to recall.  Needs to restart CPAP.  New orders placed.  Will see if Nurtec or Ubrelvy is covered.  Discussed beta blocker if needed for Ppx           Relevant Medications    rimegepant 75 mg odt       Cardiac/Vascular    Essential hypertension (Chronic)    Current Assessment & Plan     Low normal.  May need to drop HCTZ if we need beta blocker for migraine Ppx              Other    ALIZA (obstructive sleep apnea)    Current Assessment & Plan     Needs new CPAP as she has had increase in migraines off of her CPAP.             Relevant Orders    CPAP FOR HOME USE            Health Maintenance reviewed, COVID booster discussed.    The patient location is:  Patient Home   The chief complaint leading to consultation is: noted below  Visit type: Virtual visit with synchronous audio and video  Total time spent with patient: 17  Each patient to whom he or she provides medical services by telemedicine is:  (1) informed of the relationship between the physician and patient and the respective role of any other health care provider with respect to management of the patient; and (2) notified that he or she may decline to receive medical services by telemedicine and may withdraw from such care at any time.    Follow-up: Follow up if symptoms worsen or fail to improve.    ______________________________________________________________________    Chief Complaint  Chief Complaint   Patient presents with    Migraine       HPI  Iqra Duque is a 51 y.o. female with multiple medical diagnoses as listed in the medical history and problem list that presents for migraine follow up via virtual visit.  Pt is known to me with their last appointment 5/23/2022.      Has been having more migraines.   Tried imitrex and she noted that it caused her to have an uncomfortable sensation throughout her body.  She can take 50mg and not have any symptoms but it also doesn't control the migraine.  At 100mg it will imprvoe the HA but causes side effects as above.      She is having a migraine at least once a month. She has a neurology appointment but it isn't for 2 months.     Migraine frequency since seems to have correlated with being off of her CPAP for some time.  Previously had a decrease in her migraines when she 1st started CPAP.       PAST MEDICAL HISTORY:  Past Medical History:   Diagnosis Date    Allergy     Diabetes mellitus type II     Diverticulitis 09/2014    Fever blister     Hypertension     Migraine headache        PAST SURGICAL HISTORY:  Past Surgical History:   Procedure Laterality Date    COLONOSCOPY N/A 10/21/2019    Procedure: COLONOSCOPY;  Surgeon: Gomez Stark MD;  Location[facility];  Service: Endoscopy;  Laterality: N/A;    EYE SURGERY      HYSTERECTOMY  06/2009    [facility]---DLH/BSO ATYPICAL HYPERPLASIA    OOPHORECTOMY      TUBAL LIGATION         SOCIAL HISTORY:  Social History     Socioeconomic History    Marital status:    Occupational History    Occupation: Dispatcher     Employer: [employer]   Tobacco Use    Smoking status: Never Smoker    Smokeless tobacco: Never Used   Substance and Sexual Activity    Alcohol use: No    Drug use: No    Sexual activity: Yes     Partners: Male     Birth control/protection: None     Social Determinants of Health     Financial Resource Strain: Low Risk     Difficulty of Paying Living Expenses: Not very hard   Food Insecurity: No Food Insecurity    Worried About Running Out of Food in the Last Year: Never true    Ran Out of Food in the Last Year: Never true   Transportation Needs: No Transportation Needs    Lack of Transportation (Medical): No    Lack of Transportation (Non-Medical): No   Physical Activity: Insufficiently  Active    Days of Exercise per Week: 2 days    Minutes of Exercise per Session: 20 min   Stress: Stress Concern Present    Feeling of Stress : To some extent   Social Connections: Unknown    Frequency of Communication with Friends and Family: More than three times a week    Frequency of Social Gatherings with Friends and Family: Once a week    Active Member of Clubs or Organizations: No    Attends Club or Organization Meetings: Patient refused    Marital Status:    Housing Stability: Low Risk     Unable to Pay for Housing in the Last Year: No    Number of Places Lived in the Last Year: 1    Unstable Housing in the Last Year: No       FAMILY HISTORY:  Family History   Problem Relation Age of Onset    Ovarian cancer Mother 43    Cancer Mother         ovarian    Diabetes Mother     Heart disease Father     Melanoma Neg Hx     Psoriasis Neg Hx     Lupus Neg Hx        ALLERGIES AND MEDICATIONS: updated and reviewed.  Review of patient's allergies indicates:   Allergen Reactions    Bactrim [sulfamethoxazole-trimethoprim] Hives     Also Septra    Keflex [cephalexin] Shortness Of Breath    Neosporin (neomycin-polymyx) Swelling    Sumatriptan Other (See Comments)     Pain sensation throughout the body like migraine spreading    Amoxicillin Rash    Neosporin  [benzalkonium chloride] Rash    Sulfa (sulfonamide antibiotics) Rash     Current Outpatient Medications   Medication Sig Dispense Refill    cetirizine (ZYRTEC) 10 MG tablet Take 10 mg by mouth once daily.      CONTOUR TEST STRIPS Strp TEST ONCE DAILY 100 strip 1    dulaglutide (TRULICITY) 4.5 mg/0.5 mL pen injector Inject 4.5 mg into the skin every 7 days. 12 pen 1    lisinopriL-hydrochlorothiazide (PRINZIDE,ZESTORETIC) 20-12.5 mg per tablet TAKE 1 TABLET BY MOUTH EVERY DAY 90 tablet 3    metFORMIN (GLUCOPHAGE-XR) 500 MG ER 24hr tablet TAKE 2 TABLETS BY MOUTH TWICE A DAY WITH MEALS 360 tablet 0    MICROLET LANCET Misc TEST ONCE DAILY  (Patient not taking: Reported on 5/23/2022) 100 each 3    montelukast (SINGULAIR) 10 mg tablet Take 1 tablet (10 mg total) by mouth once daily. 90 tablet 1    omeprazole (PRILOSEC) 20 MG capsule TAKE 1 CAPSULE BY MOUTH EVERY DAY IN THE MORNING 90 capsule 1    rimegepant 75 mg odt Take 1 tablet (75 mg total) by mouth daily as needed for Migraine. Place ODT tablet on the tongue; alternatively the ODT tablet may be placed under the tongue 30 tablet 0     No current facility-administered medications for this visit.         ROS  Review of Systems   Constitutional: Negative for activity change and unexpected weight change.   HENT: Negative for hearing loss, rhinorrhea and trouble swallowing.    Eyes: Negative for discharge and visual disturbance.   Respiratory: Negative for chest tightness and wheezing.    Cardiovascular: Negative for chest pain and palpitations.   Gastrointestinal: Negative for blood in stool, constipation, diarrhea and vomiting.   Endocrine: Negative for polydipsia and polyuria.   Genitourinary: Negative for difficulty urinating, dysuria, hematuria and menstrual problem.   Musculoskeletal: Negative for arthralgias, joint swelling and neck pain.   Neurological: Positive for headaches. Negative for weakness.   Psychiatric/Behavioral: Negative for confusion and dysphoric mood.           Physical Exam  Physical Exam  Constitutional:       General: She is not in acute distress.     Appearance: She is well-developed.   HENT:      Head: Normocephalic and atraumatic.   Eyes:      Conjunctiva/sclera: Conjunctivae normal.   Pulmonary:      Effort: Pulmonary effort is normal. No respiratory distress.   Neurological:      Mental Status: She is alert and oriented to person, place, and time.      Comments: Symmetric facial movements   Psychiatric:         Behavior: Behavior normal.         Thought Content: Thought content normal.           Health Maintenance       Date Due Completion Date    Low Dose Statin Never  done ---    Diabetes Urine Screening 02/07/2021 2/7/2020    COVID-19 Vaccine (4 - Booster for Pfizer series) 05/11/2022 1/11/2022    Eye Exam 10/06/2022 10/6/2021    Foot Exam 10/20/2022 10/20/2021    Override on 10/20/2021: Done    Override on 8/3/2020: Done    Override on 7/24/2019: Done (Dr. Heather Hicks ( podiatry ))    Override on 7/17/2018: Done    Override on 7/3/2017: Done    Hemoglobin A1c 11/17/2022 5/17/2022    Lipid Panel 05/17/2023 5/17/2022    Mammogram 02/09/2024 2/9/2022    Colorectal Cancer Screening 10/21/2024 10/21/2019    TETANUS VACCINE 08/29/2026 8/29/2016

## 2022-06-09 NOTE — ASSESSMENT & PLAN NOTE
increase in migraine frequency since being off of CPAP due to recall.  Needs to restart CPAP.  New orders placed.  Will see if Nurtec or Ubrelvy is covered.  Discussed beta blocker if needed for Ppx

## 2022-06-10 ENCOUNTER — TELEPHONE (OUTPATIENT)
Dept: PHARMACY | Facility: CLINIC | Age: 52
End: 2022-06-10
Payer: COMMERCIAL

## 2022-06-10 DIAGNOSIS — G43.109 MIGRAINE WITH AURA AND WITHOUT STATUS MIGRAINOSUS, NOT INTRACTABLE: Chronic | ICD-10-CM

## 2022-06-10 RX ORDER — RIZATRIPTAN BENZOATE 5 MG/1
5-10 TABLET ORAL
Qty: 30 TABLET | Refills: 0 | Status: SHIPPED | OUTPATIENT
Start: 2022-06-10 | End: 2022-08-09

## 2022-06-13 ENCOUNTER — PATIENT MESSAGE (OUTPATIENT)
Dept: FAMILY MEDICINE | Facility: CLINIC | Age: 52
End: 2022-06-13
Payer: COMMERCIAL

## 2022-06-13 DIAGNOSIS — E11.9 TYPE 2 DIABETES MELLITUS WITHOUT COMPLICATION, WITHOUT LONG-TERM CURRENT USE OF INSULIN: Chronic | ICD-10-CM

## 2022-06-13 RX ORDER — METFORMIN HYDROCHLORIDE 500 MG/1
1000 TABLET, EXTENDED RELEASE ORAL 2 TIMES DAILY WITH MEALS
Qty: 360 TABLET | Refills: 1 | Status: SHIPPED | OUTPATIENT
Start: 2022-06-13 | End: 2023-03-20

## 2022-06-13 NOTE — TELEPHONE ENCOUNTER
No new care gaps identified.  [facility] Embedded Care Gaps. Reference number: [id number]. 6/13/2022   8:37:13 AM JOSSUET

## 2022-07-14 ENCOUNTER — PATIENT MESSAGE (OUTPATIENT)
Dept: FAMILY MEDICINE | Facility: CLINIC | Age: 52
End: 2022-07-14
Payer: COMMERCIAL

## 2022-07-19 ENCOUNTER — TELEPHONE (OUTPATIENT)
Dept: FAMILY MEDICINE | Facility: CLINIC | Age: 52
End: 2022-07-19
Payer: COMMERCIAL

## 2022-07-19 ENCOUNTER — PATIENT MESSAGE (OUTPATIENT)
Dept: FAMILY MEDICINE | Facility: CLINIC | Age: 52
End: 2022-07-19
Payer: COMMERCIAL

## 2022-07-19 NOTE — TELEPHONE ENCOUNTER
Spoke with Osiel she states that she will send the paperwork back to pt with the info that needs to be corrected

## 2022-07-19 NOTE — TELEPHONE ENCOUNTER
----- Message from Margie Michael sent at 7/19/2022  3:54 PM CDT -----  Regarding: [facility]  .Type: Patient Call Back    Who called: Osiel  with [employer]     What is the request in detail: has questions regarding fmla forms that were sent they need clarification if patient is allowed to work. Please call     Can the clinic reply by [facility]?    Would the patient rather a call back or a response via [facility]? Call     Best call back number: [phone]

## 2022-07-20 NOTE — TELEPHONE ENCOUNTER
Osiel states that she will contact the pt and let her know what needs to be corrected and have the pt to contact ofc.

## 2022-07-27 ENCOUNTER — PATIENT MESSAGE (OUTPATIENT)
Dept: FAMILY MEDICINE | Facility: CLINIC | Age: 52
End: 2022-07-27
Payer: COMMERCIAL

## 2022-07-30 ENCOUNTER — PATIENT MESSAGE (OUTPATIENT)
Dept: FAMILY MEDICINE | Facility: CLINIC | Age: 52
End: 2022-07-30
Payer: COMMERCIAL

## 2022-08-01 ENCOUNTER — E-VISIT (OUTPATIENT)
Dept: FAMILY MEDICINE | Facility: CLINIC | Age: 52
End: 2022-08-01
Payer: COMMERCIAL

## 2022-08-01 ENCOUNTER — PATIENT MESSAGE (OUTPATIENT)
Dept: FAMILY MEDICINE | Facility: CLINIC | Age: 52
End: 2022-08-01

## 2022-08-01 VITALS
BODY MASS INDEX: 46.32 KG/M2 | TEMPERATURE: 98 F | SYSTOLIC BLOOD PRESSURE: 123 MMHG | HEART RATE: 90 BPM | DIASTOLIC BLOOD PRESSURE: 71 MMHG | WEIGHT: 278 LBS | HEIGHT: 65 IN

## 2022-08-01 DIAGNOSIS — U07.1 COVID-19: Primary | ICD-10-CM

## 2022-08-01 PROCEDURE — 99421 PR E&M, ONLINE DIGIT, EST, < 7 DAYS, 5-10 MINS: ICD-10-PCS | Mod: S$GLB,,, | Performed by: INTERNAL MEDICINE

## 2022-08-01 PROCEDURE — 99421 OL DIG E/M SVC 5-10 MIN: CPT | Mod: S$GLB,,, | Performed by: INTERNAL MEDICINE

## 2022-08-01 RX ORDER — BENZONATATE 200 MG/1
200 CAPSULE ORAL 3 TIMES DAILY PRN
Qty: 30 CAPSULE | Refills: 1 | Status: SHIPPED | OUTPATIENT
Start: 2022-08-01 | End: 2022-08-09

## 2022-08-01 NOTE — PROGRESS NOTES
Patient ID: Iqra Duque is a 52 y.o. female.    Chief Complaint: COVID-19 Concerns    The patient initiated a request through [employer] on 8/1/2022 for evaluation and management with a chief complaint of COVID-19 Concerns     I evaluated the questionnaire submission on 8/1/2022.    3      [facility] Peq Evisit Upper Respitatory/Cough Questionnaire    8/1/2022 10:11 AM CDT - Filed by Patient   Do you agree to participate in an E-Visit? Yes   If you have any of the following symptoms, please present to your local ER or call 911:  I acknowledge   What is the main issue that you would like for your doctor to address today? COVID19 positive   Are you able to take your vital signs? Yes   Systolic Blood Pressure: 123   Diastolic Blood Pressure: 71   Weight: 278   Height: 65   Pulse: 90   Temp: 98.1   Resp:    Pulse Ox:    What symptoms do you currently have?  Cough;  Fatigue;  Headache;  Nasal Congestion;  Sore throat   Have you had a fever? Yes   What has been the range of your fever? Less than 100.4   When did your symptoms first appear? 7/28/2022   In the last two weeks, have you been in close contact with someone who has COVID-19? No   In the last two weeks, have you worked or volunteered in a healthcare facility or as a ? Healthcare facilities include a hospital, medical or dental clinic, long-term care facility, or nursing home No   Do you live in a long-term care facility, nursing home, or homeless shelter? No   List what you have done or taken to help your symptoms. Acetaminophen 1000mg every 6 hours, Mucinex DM (guaifensin 1200 mg, destromethorphan HBr 60mg)  every 12 hours, resting and lots of liquids   How severe are your symptoms? Moderate   Have you taken an at home Covid test? Yes   What were the results? Positive   Have you been fully vaccinated for COVID? (2 Pfizer, 2 Moderna or 1 Mendez & Mendez vaccine injections) Yes   Have you received a booster? Yes   Do you have transportation to get  tested for COVID if it is indicated and ordered for you at an [facility] location? Yes   Provide any information you feel is important to your history not asked above    Please attach any relevant images or files File not available.        Active Problem List with Overview Notes    Diagnosis Date Noted    NAFLD (nonalcoholic fatty liver disease) 11/08/2019    Chronic bilateral low back pain without sciatica 10/12/2018     Seeing outside chiropractor      ALIZA (obstructive sleep apnea)     Migraine with aura and without status migrainosus, not intractable 08/29/2016    Vitamin D insufficiency 03/29/2016    Muscle cramps at night 02/26/2016    Morbid obesity with BMI of 45.0-49.9, adult 06/11/2015    Gastroesophageal reflux 03/26/2015    Chronic diarrhea of unknown origin 03/26/2015    Allergic rhinitis 11/19/2014    Ganglion cyst 11/19/2014     Right dorsum of foot      Dermatitis 12/21/2012    Essential hypertension 12/21/2012    Type 2 diabetes mellitus with mild nonproliferative retinopathy of both eyes without macular edema 12/21/2012      Recent Labs Obtained:  No visits with results within 7 Day(s) from this visit.   Latest known visit with results is:   Lab Visit on 05/17/2022   Component Date Value Ref Range Status    Hemoglobin A1C 05/17/2022 5.9 (A) 4.0 - 5.6 % Final    Comment: ADA Screening Guidelines:  5.7-6.4%  Consistent with prediabetes  >or=6.5%  Consistent with diabetes    High levels of fetal hemoglobin interfere with the HbA1C  assay. Heterozygous hemoglobin variants (HbS, HgC, etc)do  not significantly interfere with this assay.   However, presence of multiple variants may affect accuracy.      Estimated Avg Glucose 05/17/2022 123  68 - 131 mg/dL Final    Sodium 05/17/2022 137  136 - 145 mmol/L Final    Potassium 05/17/2022 4.8  3.5 - 5.1 mmol/L Final    Chloride 05/17/2022 99  95 - 110 mmol/L Final    CO2 05/17/2022 26  23 - 29 mmol/L Final    Glucose 05/17/2022 131 (A) 70 - 110  mg/dL Final    BUN 05/17/2022 18  6 - 20 mg/dL Final    Creatinine 05/17/2022 0.7  0.5 - 1.4 mg/dL Final    Calcium 05/17/2022 9.8  8.7 - 10.5 mg/dL Final    Anion Gap 05/17/2022 12  8 - 16 mmol/L Final    eGFR if African American 05/17/2022 >60.0  >60 mL/min/1.73 m^2 Final    eGFR if non African American 05/17/2022 >60.0  >60 mL/min/1.73 m^2 Final    Comment: Calculation used to obtain the estimated glomerular filtration  rate (eGFR) is the CKD-EPI equation.       Cholesterol 05/17/2022 154  120 - 199 mg/dL Final    Comment: The National Cholesterol Education Program (NCEP) has set the  following guidelines (reference ranges) for Cholesterol:  Optimal.....................<200 mg/dL  Borderline High.............200-239 mg/dL  High........................> or = 240 mg/dL      Triglycerides 05/17/2022 136  30 - 150 mg/dL Final    Comment: The National Cholesterol Education Program (NCEP) has set the  following guidelines (reference values) for triglycerides:  Normal......................<150 mg/dL  Borderline High.............150-199 mg/dL  High........................200-499 mg/dL      HDL 05/17/2022 50  40 - 75 mg/dL Final    Comment: The National Cholesterol Education Program (NCEP) has set the  following guidelines (reference values) for HDL Cholesterol:  Low...............<40 mg/dL  Optimal...........>60 mg/dL      LDL Cholesterol 05/17/2022 76.8  63.0 - 159.0 mg/dL Final    Comment: The National Cholesterol Education Program (NCEP) has set the  following guidelines (reference values) for LDL Cholesterol:  Optimal.......................<130 mg/dL  Borderline High...............130-159 mg/dL  High..........................160-189 mg/dL  Very High.....................>190 mg/dL      HDL/Cholesterol Ratio 05/17/2022 32.5  20.0 - 50.0 % Final    Total Cholesterol/HDL Ratio 05/17/2022 3.1  2.0 - 5.0 Final    Non-HDL Cholesterol 05/17/2022 104  mg/dL Final    Comment: Risk category and Non-HDL cholesterol  goals:  Coronary heart disease (CHD)or equivalent (10-year risk of CHD >20%):  Non-HDL cholesterol goal     <130 mg/dL  Two or more CHD risk factors and 10-year risk of CHD <= 20%:  Non-HDL cholesterol goal     <160 mg/dL  0 to 1 CHD risk factor:  Non-HDL cholesterol goal     <190 mg/dL         Encounter Diagnosis   Name Primary?    COVID-19 Yes        No orders of the defined types were placed in this encounter.     Medications Ordered This Encounter   Medications    benzonatate (TESSALON) 200 MG capsule     Sig: Take 1 capsule (200 mg total) by mouth 3 (three) times daily as needed for Cough.     Dispense:  30 capsule     Refill:  1      Counseled on paxlovid.  Declined at this time.      E-Visit Time Tracking:    Day 1 Time (in minutes): 5     Total Time (in minutes): 5

## 2022-08-05 ENCOUNTER — TELEPHONE (OUTPATIENT)
Dept: NEUROLOGY | Facility: CLINIC | Age: 52
End: 2022-08-05
Payer: COMMERCIAL

## 2022-08-09 ENCOUNTER — OFFICE VISIT (OUTPATIENT)
Dept: NEUROLOGY | Facility: CLINIC | Age: 52
End: 2022-08-09
Payer: COMMERCIAL

## 2022-08-09 VITALS
BODY MASS INDEX: 46.32 KG/M2 | HEART RATE: 94 BPM | WEIGHT: 278 LBS | SYSTOLIC BLOOD PRESSURE: 123 MMHG | HEIGHT: 65 IN | DIASTOLIC BLOOD PRESSURE: 77 MMHG

## 2022-08-09 DIAGNOSIS — G43.019 INTRACTABLE MIGRAINE WITHOUT AURA AND WITHOUT STATUS MIGRAINOSUS: Primary | ICD-10-CM

## 2022-08-09 PROCEDURE — 3074F PR MOST RECENT SYSTOLIC BLOOD PRESSURE < 130 MM HG: ICD-10-PCS | Mod: CPTII,S$GLB,, | Performed by: PSYCHIATRY & NEUROLOGY

## 2022-08-09 PROCEDURE — 99999 PR PBB SHADOW E&M-EST. PATIENT-LVL IV: ICD-10-PCS | Mod: PBBFAC,,, | Performed by: PSYCHIATRY & NEUROLOGY

## 2022-08-09 PROCEDURE — 1159F PR MEDICATION LIST DOCUMENTED IN MEDICAL RECORD: ICD-10-PCS | Mod: CPTII,S$GLB,, | Performed by: PSYCHIATRY & NEUROLOGY

## 2022-08-09 PROCEDURE — 3078F PR MOST RECENT DIASTOLIC BLOOD PRESSURE < 80 MM HG: ICD-10-PCS | Mod: CPTII,S$GLB,, | Performed by: PSYCHIATRY & NEUROLOGY

## 2022-08-09 PROCEDURE — 3044F PR MOST RECENT HEMOGLOBIN A1C LEVEL <7.0%: ICD-10-PCS | Mod: CPTII,S$GLB,, | Performed by: PSYCHIATRY & NEUROLOGY

## 2022-08-09 PROCEDURE — 99999 PR PBB SHADOW E&M-EST. PATIENT-LVL IV: CPT | Mod: PBBFAC,,, | Performed by: PSYCHIATRY & NEUROLOGY

## 2022-08-09 PROCEDURE — 99205 PR OFFICE/OUTPT VISIT, NEW, LEVL V, 60-74 MIN: ICD-10-PCS | Mod: S$GLB,,, | Performed by: PSYCHIATRY & NEUROLOGY

## 2022-08-09 PROCEDURE — 4010F ACE/ARB THERAPY RXD/TAKEN: CPT | Mod: CPTII,S$GLB,, | Performed by: PSYCHIATRY & NEUROLOGY

## 2022-08-09 PROCEDURE — 3078F DIAST BP <80 MM HG: CPT | Mod: CPTII,S$GLB,, | Performed by: PSYCHIATRY & NEUROLOGY

## 2022-08-09 PROCEDURE — 1160F RVW MEDS BY RX/DR IN RCRD: CPT | Mod: CPTII,S$GLB,, | Performed by: PSYCHIATRY & NEUROLOGY

## 2022-08-09 PROCEDURE — 1159F MED LIST DOCD IN RCRD: CPT | Mod: CPTII,S$GLB,, | Performed by: PSYCHIATRY & NEUROLOGY

## 2022-08-09 PROCEDURE — 3008F BODY MASS INDEX DOCD: CPT | Mod: CPTII,S$GLB,, | Performed by: PSYCHIATRY & NEUROLOGY

## 2022-08-09 PROCEDURE — 4010F PR ACE/ARB THEARPY RXD/TAKEN: ICD-10-PCS | Mod: CPTII,S$GLB,, | Performed by: PSYCHIATRY & NEUROLOGY

## 2022-08-09 PROCEDURE — 1160F PR REVIEW ALL MEDS BY PRESCRIBER/CLIN PHARMACIST DOCUMENTED: ICD-10-PCS | Mod: CPTII,S$GLB,, | Performed by: PSYCHIATRY & NEUROLOGY

## 2022-08-09 PROCEDURE — 3074F SYST BP LT 130 MM HG: CPT | Mod: CPTII,S$GLB,, | Performed by: PSYCHIATRY & NEUROLOGY

## 2022-08-09 PROCEDURE — 3044F HG A1C LEVEL LT 7.0%: CPT | Mod: CPTII,S$GLB,, | Performed by: PSYCHIATRY & NEUROLOGY

## 2022-08-09 PROCEDURE — 3008F PR BODY MASS INDEX (BMI) DOCUMENTED: ICD-10-PCS | Mod: CPTII,S$GLB,, | Performed by: PSYCHIATRY & NEUROLOGY

## 2022-08-09 PROCEDURE — 99205 OFFICE O/P NEW HI 60 MIN: CPT | Mod: S$GLB,,, | Performed by: PSYCHIATRY & NEUROLOGY

## 2022-08-09 RX ORDER — RIZATRIPTAN BENZOATE 10 MG/1
10 TABLET, ORALLY DISINTEGRATING ORAL
Qty: 12 TABLET | Refills: 11 | Status: SHIPPED | OUTPATIENT
Start: 2022-08-09 | End: 2023-06-27

## 2022-08-09 NOTE — PROGRESS NOTES
NEUROLOGY HEADACHE NOTE?     CHIEF COMPLAINT?   Ms. Iqra Duque chief concern is uncontrolled headache.     Name of the referring physician Ofelia Avila, NP     Name of primary care provider Jaylon Kothari MD      Subjective:    HPI:  Ms. Iqra Duque presents today to discuss their ongoing uncontrolled headaches.  The patient shares that she has of long-time history of migraines but experienced a significant decrease in headache activity after a hysterectomy and starting CPAP therapy for obstructive sleep apnea.  She has been unable to use CPAP for a year due to supply chain issues related to COVID 19 pandemic.  Over the course of the last year, the patient has noted a slow increase in the number of headaches suffered per month.  She has frequent lower level morning time headaches that had previously resolved with CPAP use.  Estimates that average migraine frequency was 1 per month or 1 every other month a couple of years ago.  Not experiencing 3-4 migraines per month.  Overall headache qualities in features are unchanged for many years.    Patient describes short lasting (seconds) visual phenomenon related to migraine.  Can be glittering bilateral or a single hazy color added to bilateral visual fields for 1-2 seconds. VARS 0.    Age of onset: 10  When did they become more of a problem: about a year  # of Total headache days per month: 15  # of Migraine or severe days per month: 3-4  Intensity of average and most severe headaches: 5-6/10, 9/10  Duration of headache pain: >4hrs untreated   Quality of pain: Pulsating/Throbbing  Laterality: left side mostly, not side locked  Location: left eye and radiation back through temporal region  Photophobia and phonophobia: yes  Nausea and vomiting: yes  Causes avoidance of physical activity: yes  Worsened by physical activity: yes  Triggers: lack of sleep, skipping meals  Preventive Medications failed: none  Acute medications failed: sumatriptan (>3 months,  intolerable side effects), rizatriptan (new, thinks its working)  Supplements: none  OTC Medications: none  Hx of (Bolded items are positive): depression, anxiety, fibromyalgia, autoimmune disorder, head trauma, neurological infection, glaucoma, asthma, nephrolithiasis, MI, Stroke, Raynaud's phen.  Is medication overuse contributing to the patient's current migraine burden: no  Aura: no  Autonomic symptoms: no  Family Hx of migraines: mother, aunt    The VARS score is the weighted sum of the presence of five visual symptom characteristics:    duration 5-60 min (3 points) 3   develops gradually ?5 min (2 points) 2   scotoma (2 points) 2   zig?zag lines (2 points) 2   and unilateral (1 point) 1   Total points:  0  The maximum score is 10 points.    A VARS score of 5 or more diagnosed MA    sensitivity of 96% (95% CI 92-99%)   specificity of 98%(95% CI %)     Current HA Regimen:  Rizatriptan 5mg tab prn    LATEST IMAGING:  None    PAST MEDICAL HISTORY:  Past Medical History:   Diagnosis Date    Allergy     Diabetes mellitus type II     Diverticulitis 09/2014    Fever blister     Hypertension     Migraine headache        PAST SURGICAL HISTORY:  Past Surgical History:   Procedure Laterality Date    COLONOSCOPY N/A 10/21/2019    Procedure: COLONOSCOPY;  Surgeon: Gomez Stark MD;  Location: [facility];  Service: Endoscopy;  Laterality: N/A;    EYE SURGERY      HYSTERECTOMY  06/2009    [facility]---DLH/BSO ATYPICAL HYPERPLASIA    OOPHORECTOMY      TUBAL LIGATION         CURRENT MEDS:  Current Outpatient Medications   Medication Sig Dispense Refill    cetirizine (ZYRTEC) 10 MG tablet Take 10 mg by mouth once daily.      CONTOUR TEST STRIPS Strp TEST ONCE DAILY 100 strip 1    dulaglutide (TRULICITY) 4.5 mg/0.5 mL pen injector Inject 4.5 mg into the skin every 7 days. 12 pen 1    lisinopriL-hydrochlorothiazide (PRINZIDE,ZESTORETIC) 20-12.5 mg per tablet TAKE 1 TABLET BY MOUTH EVERY DAY 90 tablet 3     metFORMIN (GLUCOPHAGE-XR) 500 MG ER 24hr tablet Take 2 tablets (1,000 mg total) by mouth 2 (two) times daily with meals. 360 tablet 1    MICROLET LANCET Misc TEST ONCE DAILY 100 each 3    montelukast (SINGULAIR) 10 mg tablet Take 1 tablet (10 mg total) by mouth once daily. 90 tablet 1    omeprazole (PRILOSEC) 20 MG capsule TAKE 1 CAPSULE BY MOUTH EVERY DAY IN THE MORNING 90 capsule 1    rizatriptan (MAXALT) 5 MG tablet Take 1-2 tablets (5-10 mg total) by mouth as needed for Migraine. 30 tablet 0    benzonatate (TESSALON) 200 MG capsule Take 1 capsule (200 mg total) by mouth 3 (three) times daily as needed for Cough. (Patient not taking: Reported on 8/9/2022) 30 capsule 1     No current facility-administered medications for this visit.       ALLERGIES:  Review of patient's allergies indicates:   Allergen Reactions    Bactrim [sulfamethoxazole-trimethoprim] Hives     Also Septra    Keflex [cephalexin] Shortness Of Breath    Neosporin (neomycin-polymyx) Swelling    Sumatriptan Other (See Comments)     Pain sensation throughout the body like migraine spreading    Amoxicillin Rash    Neosporin  [benzalkonium chloride] Rash    Sulfa (sulfonamide antibiotics) Rash       FAMILY HISTORY:  Family History   Problem Relation Age of Onset    Ovarian cancer Mother 43    Cancer Mother         ovarian    Diabetes Mother     Heart disease Father     Melanoma Neg Hx     Psoriasis Neg Hx     Lupus Neg Hx        SOCIAL HISTORY:  Social History     Tobacco Use    Smoking status: Never Smoker    Smokeless tobacco: Never Used   Substance Use Topics    Alcohol use: No    Drug use: No       Review of Systems:  Gen: no fever, no chills, no generalized feeling of weakness   HEENT: no double vision, no blurred vision, no eye pain, no eye exudates.     Heart: no chest pain, no SOB    Lungs: no SOB, no cough    MSK: no weakness of legs, intact ROM    ABD: no abd pain, no N/V/D/C, no difficulty with defecation .   "  Extremities: No leg pain, no edema.    Objective:  PHYSICAL EXAMINATION??   Iqra Duque is a 52 y.o. female patient who has the following vital signs with   Vitals:    08/09/22 1340   BP: 123/77   BP Location: Left arm   Pulse: 94   Weight: 126.1 kg (278 lb)   Height: 5' 5" (1.651 m)   , body surface area is 2.4 meters squared.     General: female in NAD, alert and awake, Aox3, well groomed, obese. ?    ? ?    HEENT: Head is NC/AT EOMI, pupil size: 4 mm B/L, no nystagmus noted; hearing grossly intact b/l.    Neck: Supple. no nuchal rigidity.      Cardiovascular: well perfused, no cyanosis        Respiratory: Symmetric chest rise noted       Musculoskeletal: Muscle tone noted to be adequate for patient age, muscle mass is WNL. No spontaneous movements or fasciculations noted during this examination.       Extremities: No pedal edema or calf tenderness. No cogwheel rigidity noted on B/L UE extremities.       Neurological Examination.    Mental status: AA&O x3; Affect/mood is euthymic/congruent; no aphasia noted during examination. Patient answers simple questions appropriately & follows simple commands; no dysarthria or expressive aphasia; no aleida-neglect or extinction. Vocabulary/word finding: excellent.       Cranial Nerves: II-XII grossly intact bilaterally.     Muscle Function: Tone WNL and Muscle bulk WNL. Full and symmetric strength, 5/5, throughout.      Sensory: ?light touch is grossly intact in bilateral upper and lower extremities and face.     Reflexes: Left and Right biceps, triceps, brachioradialis are 2+/4. patellar 2+/4 on Left and 2+/4 on Right     Coordination: no dysmetria (finger to nose negative)     Gait: adequate casual gait with stride length and arm swing WNL. Tandem gait and walking on toes and heels are WNL.    REVIEW   We reviewed their current medical history, medications, allergies, past medical history, surgical history, social history, family history, and review of systems, and " updated all the information.     ASSESSMENT AND PLAN   Iqra? is a very pleasant, 52 y.o. with complicated diagnosis of     1. Intractable migraine without aura and without status migrainosus  Ambulatory referral/consult to Neurology    rizatriptan (MAXALT-MLT) 10 MG disintegrating tablet     Iqra has worsening headaches that meet International Headache Society Criteria for frequent episodic migraine.  Medication overuse is not likely contributing to this patient's headaches.   Patient feels not only severe disabling pain, but is also unable to function due to headaches when they are present.     My approach to every patient is multimodal.   I focused our discussion on addressing modifiable risk factors and trying to decrease them.  After discussion with the patient, I recommend the followin. Preventive medications:    No preventive medications at this time.      2. Acute (abortive) medications- these medications are to be taken only at the onset of severe headache, and please limit the total of any combination of these medications to less than 10 days per month on average because they can cause medication overuse headaches.   Failed:   sumatriptan    INCREASE: Rizatriptan to 10mg disintegrating tabs  RIZATRIPTAN (MAXALT) is used as migraine abortive used only when you feel a migraine coming. Use 10 mg at the onset of migraine; take another 10 mg in 1 hour if no pain relief. Rizatriptan is more effective if taken with 2 Aleve blue capsules (Naproxen 440mg).   Do NOT take Maxalt more than 6 days per month as this can cause rebound headache.   Do NOT take Maxalt within 24 hours of taking any other triptan or within 24 hours of taking any ergotamine medication such as DHE or Migranal.    Pregnancy and Breastfeeding: FDA pregnancy category C. This means that it is not known whether this drug will harm an unborn baby.    It is not known whether this medication passes into breast milk.    Potential side  effects:   Check with your doctor right away if any of the following side effects continue for more than 1 hour. Even if they go away in less than 1 hour, check with your doctor before using any more sumatriptan if any of the following side effects occur: chest pain (mild), heaviness, tightness, or pressure in chest and/or neck ,difficulty in swallowing ,pounding heartbeat , rash, hives, itching, or bumps on skin      3. Natural approaches to increasing brain energy and serotonin:    SAMe 200 mg a day    Vitamin B2 400 mg daily    Vitamin B12 1000 mcg daily    Getting early morning light to increase natural serotonin, melatonin. Could also consider light therapy box, 10,000 LUX.     4. Headache prevention and acute treatment over-the counter supplements    Boswellia 800 mg 2-3 times per day, example brand Dawson's, natural anti inflammatory herb    Sleep? modulation- melatonin at night 5 to 10 mg 30 minutes prior to sleep    Feverfew Tea 1-3 cups per day. Can get from [employer] or [email or web address]- A Natural anti inflammatory approach that does not cause further sensitization of the system.    Magnesium Citrate 250 mg daily, slowly increase up to 500-1000 mg daily. Side effect may include diarrhea, so we recommend stopping at whatever dose is comfortable for your body without causing this side effect. This supplement can be very helpful for preventing headache, preventing migraine aura, calming nerves, muscles and even mood. Recommend starting slowly, as it can also lead to increased bowel movements or diarrhea.?     5. DIET: I recommend a diet that heavily favors fruits and vegetables while reducing carbs. More information is available upon request.     6. EXERCISE: Gentle movement exercises, concentrate on combination of muscle building and aerobic. I also recommend adding gentle Yoga therapy. The goal is 150 minutes per week of moderate to rigorous exercise, but you should start at your own pace and  progress slowly and safely as discussed today.    7. ANXIETY/STRESS- Control stressors with yoga, meditation, counseling, or other methods of mindfulness.     8. SLEEP- aim for 7-8 hrs of restful sleep per night.     9. FUN- Increase fun time spend with friends and family     Benefits, side effects, and alternatives were discussed extensively with the patient.?     Iqra verbally endorsed understanding of all the recommendations.?     she is going to follow up with me in 2 months or sooner as needed. ?     I spent a total of 60 minutes on the day of the visit. This includes face to face time and non-face to face time preparing to see the patient (eg, review of tests), obtaining and/or reviewing separately obtained history, documenting clinical information in the electronic or other health record, independently interpreting results and communicating results to the patient/family/caregiver, or care coordinator.    A dictation device was used to produce this documentation which can sometimes result in grammatical errors or the replacement of similar sounding words.    Aniceto Anderson, DO

## 2022-08-10 DIAGNOSIS — J30.9 ALLERGIC RHINITIS: ICD-10-CM

## 2022-08-10 RX ORDER — MONTELUKAST SODIUM 10 MG/1
TABLET ORAL
Qty: 90 TABLET | Refills: 3 | Status: SHIPPED | OUTPATIENT
Start: 2022-08-10 | End: 2023-08-06

## 2022-08-10 NOTE — TELEPHONE ENCOUNTER
Refill Decision Note   Iqra Duque  is requesting a refill authorization.  Brief Assessment and Rationale for Refill:  Approve     Medication Therapy Plan:       Medication Reconciliation Completed: No   Comments:     No Care Gaps recommended.     Note composed:6:09 AM 08/10/2022

## 2022-08-10 NOTE — TELEPHONE ENCOUNTER
No new care gaps identified.  [facility] Embedded Care Gaps. Reference number: [id number]. 8/10/2022   1:02:59 AM JOSSUET

## 2022-08-30 ENCOUNTER — PATIENT MESSAGE (OUTPATIENT)
Dept: ADMINISTRATIVE | Facility: OTHER | Age: 52
End: 2022-08-30
Payer: COMMERCIAL

## 2022-09-02 ENCOUNTER — NURSE TRIAGE (OUTPATIENT)
Dept: ADMINISTRATIVE | Facility: CLINIC | Age: 52
End: 2022-09-02
Payer: COMMERCIAL

## 2022-09-03 NOTE — TELEPHONE ENCOUNTER
Reason for Disposition   [1] Dizziness (vertigo) present now AND [2] one or more STROKE RISK FACTORS (i.e., hypertension, diabetes, prior stroke/TIA, heart attack)  (Exception: prior physician evaluation for this AND no different/worse than usual)    Additional Information   Negative: [1] Weakness (i.e., paralysis, loss of muscle strength) of the face, arm or leg on one side of the body AND [2] sudden onset AND [3] present now   Negative: [1] Numbness (i.e., loss of sensation) of the face, arm or leg on one side of the body AND [2] sudden onset AND [3] present now   Negative: [1] Loss of speech or garbled speech AND [2] sudden onset AND [3] present now   Negative: Difficult to awaken or acting confused (e.g., disoriented, slurred speech)   Negative: Sounds like a life-threatening emergency to the triager   Negative: Followed a head injury   Negative: Followed an ear injury   Negative: Localized weakness or numbness is main symptom   Negative: Dizziness relates to riding in a car, going to an amusement park, etc.   Negative: [1] Dizziness is main symptom AND [2] NO spinning sensation (i.e., vertigo)   Negative: SEVERE dizziness (vertigo) (e.g., unable to walk without assistance)   Negative: Severe headache (e.g., excruciating)  (Exception: similar to previous migraines)    Protocols used: Dizziness - Vertigo-A-AH    Pt stated she has new onset severe dizziness. Pt advised to have someone bring to the ED for evaluation. Pt verbalized understanding.

## 2022-09-15 ENCOUNTER — OFFICE VISIT (OUTPATIENT)
Dept: FAMILY MEDICINE | Facility: CLINIC | Age: 52
End: 2022-09-15
Payer: COMMERCIAL

## 2022-09-15 DIAGNOSIS — G43.011 INTRACTABLE MIGRAINE WITHOUT AURA AND WITH STATUS MIGRAINOSUS: Primary | ICD-10-CM

## 2022-09-15 DIAGNOSIS — G47.33 OSA (OBSTRUCTIVE SLEEP APNEA): Chronic | ICD-10-CM

## 2022-09-15 PROCEDURE — 1159F MED LIST DOCD IN RCRD: CPT | Mod: CPTII,95,, | Performed by: INTERNAL MEDICINE

## 2022-09-15 PROCEDURE — 1160F PR REVIEW ALL MEDS BY PRESCRIBER/CLIN PHARMACIST DOCUMENTED: ICD-10-PCS | Mod: CPTII,95,, | Performed by: INTERNAL MEDICINE

## 2022-09-15 PROCEDURE — 99214 PR OFFICE/OUTPT VISIT, EST, LEVL IV, 30-39 MIN: ICD-10-PCS | Mod: 95,,, | Performed by: INTERNAL MEDICINE

## 2022-09-15 PROCEDURE — 4010F ACE/ARB THERAPY RXD/TAKEN: CPT | Mod: CPTII,95,, | Performed by: INTERNAL MEDICINE

## 2022-09-15 PROCEDURE — 4010F PR ACE/ARB THEARPY RXD/TAKEN: ICD-10-PCS | Mod: CPTII,95,, | Performed by: INTERNAL MEDICINE

## 2022-09-15 PROCEDURE — 3044F PR MOST RECENT HEMOGLOBIN A1C LEVEL <7.0%: ICD-10-PCS | Mod: CPTII,95,, | Performed by: INTERNAL MEDICINE

## 2022-09-15 PROCEDURE — 1159F PR MEDICATION LIST DOCUMENTED IN MEDICAL RECORD: ICD-10-PCS | Mod: CPTII,95,, | Performed by: INTERNAL MEDICINE

## 2022-09-15 PROCEDURE — 99214 OFFICE O/P EST MOD 30 MIN: CPT | Mod: 95,,, | Performed by: INTERNAL MEDICINE

## 2022-09-15 PROCEDURE — 3044F HG A1C LEVEL LT 7.0%: CPT | Mod: CPTII,95,, | Performed by: INTERNAL MEDICINE

## 2022-09-15 PROCEDURE — 1160F RVW MEDS BY RX/DR IN RCRD: CPT | Mod: CPTII,95,, | Performed by: INTERNAL MEDICINE

## 2022-09-15 RX ORDER — KETOROLAC TROMETHAMINE 10 MG/1
10 TABLET, FILM COATED ORAL EVERY 8 HOURS PRN
Qty: 10 TABLET | Refills: 0 | Status: SHIPPED | OUTPATIENT
Start: 2022-09-15 | End: 2022-12-12

## 2022-09-15 NOTE — ASSESSMENT & PLAN NOTE
Still waiting on CPAP.  Migraine pattern clearly increased in frequency after CPAP recall  She is looking in to options to purchase one on her own.  Discussed potential need to get her into the sleep clinic to have it set correctly if she is forced to go this route due to the shortages.

## 2022-09-15 NOTE — PROGRESS NOTES
Assessment & Plan  Problem List Items Addressed This Visit          Other    ALIZA (obstructive sleep apnea) (Chronic)    Current Assessment & Plan     Still waiting on CPAP.  Migraine pattern clearly increased in frequency after CPAP recall  She is looking in to options to purchase one on her own.  Discussed potential need to get her into the sleep clinic to have it set correctly if she is forced to go this route due to the shortages.           Other Visit Diagnoses       Intractable migraine without aura and with status migrainosus    -  Primary  -    Add toradol for abortive therapy.  I have discussed the common side effects of this(these) medication(s) and black box warnings (if applicable) with the patient and answered all of the questions they had at the time of this visit regarding this(these) medication(s).     Recommended she take toradol dose with maxalt and 50mg of benadryl to aid in taking a good nap and break the migraine.  Ok for caffeine upon wakening.  Discussed anti-emetics but not having nausea.  If this is not helpful, could consider adding promethazine dose.     Relevant Medications    ketorolac (TORADOL) 10 mg tablet              Health Maintenance reviewed, deferred due to acute migraine.    The patient location is:  Patient Home   The chief complaint leading to consultation is: noted below  Visit type: Virtual visit with synchronous audio and video  Total time spent with patient: 15  Each patient to whom he or she provides medical services by telemedicine is:  (1) informed of the relationship between the physician and patient and the respective role of any other health care provider with respect to management of the patient; and (2) notified that he or she may decline to receive medical services by telemedicine and may withdraw from such care at any time.    Follow-up: Follow up if symptoms worsen or fail to  improve.    ______________________________________________________________________    Chief Complaint  Chief Complaint   Patient presents with    Migraine       HPI  Iqra Duque is a 52 y.o. female with multiple medical diagnoses as listed in the medical history and problem list that presents for migraine x 5 days via virtual visit.  Pt is known to me with their last appointment 6/9/2022.      Saw neuro last month.      Had a severe migraine. Didn't respond well to her Maxalt.  Eventually had some coffee and this helped slightly in addition to her Maxalt.  Migraine returned the following day.  Again not responding to Maxalt well.  Following day had migraine again, Maxalt and caffeine helped slightly.  Not sleeping well.  Saw her chiropractor who felt that her left neck stiffness    She did eat some Cheetos which have MSG, known trigger.      PAST MEDICAL HISTORY:  Past Medical History:   Diagnosis Date    Allergy     Diabetes mellitus type II     Diverticulitis 09/2014    Fever blister     Hypertension     Migraine headache        PAST SURGICAL HISTORY:  Past Surgical History:   Procedure Laterality Date    COLONOSCOPY N/A 10/21/2019    Procedure: COLONOSCOPY;  Surgeon: Gomez Stark MD;  Location: [facility];  Service: Endoscopy;  Laterality: N/A;    EYE SURGERY      HYSTERECTOMY  06/2009    [facility]---DLH/BSO ATYPICAL HYPERPLASIA    OOPHORECTOMY      TUBAL LIGATION         SOCIAL HISTORY:  Social History     Socioeconomic History    Marital status:    Occupational History    Occupation: Dispatcher     Employer: [employer]   Tobacco Use    Smoking status: Never    Smokeless tobacco: Never   Substance and Sexual Activity    Alcohol use: No    Drug use: No    Sexual activity: Yes     Partners: Male     Birth control/protection: None     Social Determinants of Health     Financial Resource Strain: Low Risk     Difficulty of Paying Living Expenses: Not hard at all   Food Insecurity: No Food  Insecurity    Worried About Running Out of Food in the Last Year: Never true    Ran Out of Food in the Last Year: Never true   Transportation Needs: No Transportation Needs    Lack of Transportation (Medical): No    Lack of Transportation (Non-Medical): No   Physical Activity: Inactive    Days of Exercise per Week: 0 days    Minutes of Exercise per Session: 20 min   Stress: Stress Concern Present    Feeling of Stress : To some extent   Social Connections: Unknown    Frequency of Communication with Friends and Family: More than three times a week    Frequency of Social Gatherings with Friends and Family: Twice a week    Active Member of Clubs or Organizations: No    Attends Club or Organization Meetings: Patient refused    Marital Status:    Housing Stability: Low Risk     Unable to Pay for Housing in the Last Year: No    Number of Places Lived in the Last Year: 1    Unstable Housing in the Last Year: No       FAMILY HISTORY:  Family History   Problem Relation Age of Onset    Ovarian cancer Mother 43    Cancer Mother         ovarian    Diabetes Mother     Heart disease Father     Melanoma Neg Hx     Psoriasis Neg Hx     Lupus Neg Hx        ALLERGIES AND MEDICATIONS: updated and reviewed.  Review of patient's allergies indicates:   Allergen Reactions    Bactrim [sulfamethoxazole-trimethoprim] Hives     Also Septra    Keflex [cephalexin] Shortness Of Breath    Neosporin (neomycin-polymyx) Swelling    Sumatriptan Other (See Comments)     Pain sensation throughout the body like migraine spreading    Amoxicillin Rash    Neosporin  [benzalkonium chloride] Rash    Sulfa (sulfonamide antibiotics) Rash     Current Outpatient Medications   Medication Sig Dispense Refill    cetirizine (ZYRTEC) 10 MG tablet Take 10 mg by mouth once daily.      CONTOUR TEST STRIPS Strp TEST ONCE DAILY 100 strip 1    dulaglutide (TRULICITY) 4.5 mg/0.5 mL pen injector Inject 4.5 mg into the skin every 7 days. 12 pen 1    ketorolac (TORADOL)  10 mg tablet Take 1 tablet (10 mg total) by mouth every 8 (eight) hours as needed (migraine). 10 tablet 0    lisinopriL-hydrochlorothiazide (PRINZIDE,ZESTORETIC) 20-12.5 mg per tablet TAKE 1 TABLET BY MOUTH EVERY DAY 90 tablet 3    metFORMIN (GLUCOPHAGE-XR) 500 MG ER 24hr tablet Take 2 tablets (1,000 mg total) by mouth 2 (two) times daily with meals. 360 tablet 1    MICROLET LANCET Misc TEST ONCE DAILY 100 each 3    montelukast (SINGULAIR) 10 mg tablet TAKE 1 TABLET BY MOUTH EVERY DAY 90 tablet 3    omeprazole (PRILOSEC) 20 MG capsule TAKE 1 CAPSULE BY MOUTH EVERY DAY IN THE MORNING 90 capsule 1    rizatriptan (MAXALT-MLT) 10 MG disintegrating tablet Take 1 tablet (10 mg total) by mouth as needed for Migraine. May repeat in 2 hours if needed. Max 20mg/24hrs 12 tablet 11     No current facility-administered medications for this visit.         ROS  Review of Systems   Constitutional:  Positive for activity change. Negative for unexpected weight change.   HENT:  Positive for rhinorrhea. Negative for hearing loss and trouble swallowing.    Eyes:  Negative for discharge and visual disturbance.   Respiratory:  Negative for chest tightness and wheezing.    Cardiovascular:  Negative for chest pain and palpitations.   Gastrointestinal:  Negative for blood in stool, constipation, diarrhea and vomiting.   Endocrine: Negative for polydipsia and polyuria.   Genitourinary:  Negative for difficulty urinating, dysuria, hematuria and menstrual problem.   Musculoskeletal:  Negative for arthralgias, joint swelling and neck pain.   Neurological:  Positive for headaches. Negative for weakness.   Psychiatric/Behavioral:  Negative for confusion and dysphoric mood.          Physical Exam  Physical Exam  Constitutional:       General: She is not in acute distress.     Appearance: She is well-developed.   HENT:      Head: Normocephalic and atraumatic.   Eyes:      Conjunctiva/sclera: Conjunctivae normal.   Pulmonary:      Effort: Pulmonary  effort is normal. No respiratory distress.   Neurological:      Mental Status: She is alert and oriented to person, place, and time.      Comments: Symmetric facial movements   Psychiatric:         Behavior: Behavior normal.         Thought Content: Thought content normal.         Health Maintenance         Date Due Completion Date    Low Dose Statin Never done ---    Pneumococcal Vaccines (Age 0-64) (2 - PCV) 03/26/2016 3/26/2015    Diabetes Urine Screening 02/07/2021 2/7/2020    COVID-19 Vaccine (4 - Booster for Pfizer series) 05/11/2022 1/11/2022    Influenza Vaccine (1) 09/01/2022 11/17/2021    Override on 2/25/2016: Declined    Foot Exam 10/20/2022 10/20/2021    Override on 10/20/2021: Done    Override on 8/3/2020: Done    Override on 7/24/2019: Done (Dr. Heather Hicks ( podiatry ))    Override on 7/17/2018: Done    Override on 7/3/2017: Done    Hemoglobin A1c 11/17/2022 5/17/2022    Eye Exam 04/20/2023 4/20/2022    Lipid Panel 05/17/2023 5/17/2022    Mammogram 02/09/2024 2/9/2022    Colorectal Cancer Screening 10/21/2024 10/21/2019    TETANUS VACCINE 08/29/2026 8/29/2016

## 2022-09-16 ENCOUNTER — PATIENT MESSAGE (OUTPATIENT)
Dept: FAMILY MEDICINE | Facility: CLINIC | Age: 52
End: 2022-09-16
Payer: COMMERCIAL

## 2022-10-07 ENCOUNTER — TELEPHONE (OUTPATIENT)
Dept: NEUROLOGY | Facility: CLINIC | Age: 52
End: 2022-10-07
Payer: COMMERCIAL

## 2022-10-10 ENCOUNTER — OFFICE VISIT (OUTPATIENT)
Dept: NEUROLOGY | Facility: CLINIC | Age: 52
End: 2022-10-10
Payer: COMMERCIAL

## 2022-10-10 DIAGNOSIS — G43.009 MIGRAINE WITHOUT AURA AND WITHOUT STATUS MIGRAINOSUS, NOT INTRACTABLE: Primary | ICD-10-CM

## 2022-10-10 PROCEDURE — 99213 OFFICE O/P EST LOW 20 MIN: CPT | Mod: 95,,,

## 2022-10-10 PROCEDURE — 3044F PR MOST RECENT HEMOGLOBIN A1C LEVEL <7.0%: ICD-10-PCS | Mod: CPTII,95,,

## 2022-10-10 PROCEDURE — 3044F HG A1C LEVEL LT 7.0%: CPT | Mod: CPTII,95,,

## 2022-10-10 PROCEDURE — 4010F ACE/ARB THERAPY RXD/TAKEN: CPT | Mod: CPTII,95,,

## 2022-10-10 PROCEDURE — 1160F RVW MEDS BY RX/DR IN RCRD: CPT | Mod: CPTII,95,,

## 2022-10-10 PROCEDURE — 4010F PR ACE/ARB THEARPY RXD/TAKEN: ICD-10-PCS | Mod: CPTII,95,,

## 2022-10-10 PROCEDURE — 1159F PR MEDICATION LIST DOCUMENTED IN MEDICAL RECORD: ICD-10-PCS | Mod: CPTII,95,,

## 2022-10-10 PROCEDURE — 1159F MED LIST DOCD IN RCRD: CPT | Mod: CPTII,95,,

## 2022-10-10 PROCEDURE — 1160F PR REVIEW ALL MEDS BY PRESCRIBER/CLIN PHARMACIST DOCUMENTED: ICD-10-PCS | Mod: CPTII,95,,

## 2022-10-10 PROCEDURE — 99213 PR OFFICE/OUTPT VISIT, EST, LEVL III, 20-29 MIN: ICD-10-PCS | Mod: 95,,,

## 2022-10-10 NOTE — PROGRESS NOTES
[facility] NEUROLOGY  [facility], [place]    Date: 10/10/22  Patient Name: Iqra Duque   MRN: [id number]   PCP: Jaylon Kothari  Referring Provider: No ref. provider found    TELEMEDICINE ENCOUNTER   Patient verbally confirmed being in the [facility] at the time of the encounter   Provider was located in the Neurology office at [facility].       Chief Complaint: Follow up  Subjective:        HPI:   Ms. Iqra Duque is a 52 y.o. female presenting for follow up. Estimates that last headache occurred about one month ago. She received a new CPAP machine mid September after her PCP appointment and has been getting better quality sleep. Since then, she has had significant improvements in headaches. AS rizatriptan has been effective in the past, she would like to continue this medication as needed for acute headache.    No new neurological complaints or concerns.  ===========================================  Per documentation by Dr. Aniceto Anderson DO on 8/9/22:  Ms. Iqra Duque presents today to discuss their ongoing uncontrolled headaches.  The patient shares that she has of long-time history of migraines but experienced a significant decrease in headache activity after a hysterectomy and starting CPAP therapy for obstructive sleep apnea.  She has been unable to use CPAP for a year due to supply chain issues related to COVID 19 pandemic.  Over the course of the last year, the patient has noted a slow increase in the number of headaches suffered per month.  She has frequent lower level morning time headaches that had previously resolved with CPAP use.  Estimates that average migraine frequency was 1 per month or 1 every other month a couple of years ago.  Not experiencing 3-4 migraines per month.  Overall headache qualities in features are unchanged for many years.    Patient describes short lasting (seconds) visual phenomenon related to migraine.  Can be glittering bilateral or  a single hazy color added to bilateral visual fields for 1-2 seconds. VARS 0.    Age of onset: 10  When did they become more of a problem: about a year  # of Total headache days per month: 15  # of Migraine or severe days per month: 3-4  Intensity of average and most severe headaches: 5-6/10, 9/10  Duration of headache pain: >4hrs untreated   Quality of pain: Pulsating/Throbbing  Laterality: left side mostly, not side locked  Location: left eye and radiation back through temporal region  Photophobia and phonophobia: yes  Nausea and vomiting: yes  Causes avoidance of physical activity: yes  Worsened by physical activity: yes  Triggers: lack of sleep, skipping meals  Preventive Medications failed: none  Acute medications failed: sumatriptan (>3 months, intolerable side effects), rizatriptan (new, thinks its working)  Supplements: none  OTC Medications: none  Hx of (Bolded items are positive): depression, anxiety, fibromyalgia, autoimmune disorder, head trauma, neurological infection, glaucoma, asthma, nephrolithiasis, MI, Stroke, Raynaud's phen.  Is medication overuse contributing to the patient's current migraine burden: no  Aura: no  Autonomic symptoms: no  Family Hx of migraines: mother, aunt    The VARS score is the weighted sum of the presence of five visual symptom characteristics:    duration 5-60 min (3 points) 3   develops gradually ?5 min (2 points) 2   scotoma (2 points) 2   zig?zag lines (2 points) 2   and unilateral (1 point) 1   Total points:  0  The maximum score is 10 points.    A VARS score of 5 or more diagnosed MA    sensitivity of 96% (95% CI 92-99%)   specificity of 98%(95% CI %)     Current HA Regimen:  Rizatriptan 5mg tab prn    LATEST IMAGING:  None    PAST MEDICAL HISTORY:  Past Medical History:   Diagnosis Date    Allergy     Diabetes mellitus type II     Diverticulitis 09/2014    Fever blister     Hypertension     Migraine headache        PAST SURGICAL HISTORY:  Past Surgical History:    Procedure Laterality Date    COLONOSCOPY N/A 10/21/2019    Procedure: COLONOSCOPY;  Surgeon: Gomez Stark MD;  Location: [facility];  Service: Endoscopy;  Laterality: N/A;    EYE SURGERY      HYSTERECTOMY  06/2009    KJB---DLH/BSO ATYPICAL HYPERPLASIA    OOPHORECTOMY      TUBAL LIGATION         CURRENT MEDS:  Current Outpatient Medications   Medication Sig Dispense Refill    cetirizine (ZYRTEC) 10 MG tablet Take 10 mg by mouth once daily.      CONTOUR TEST STRIPS Strp TEST ONCE DAILY 100 strip 1    dulaglutide (TRULICITY) 4.5 mg/0.5 mL pen injector Inject 4.5 mg into the skin every 7 days. 12 pen 1    ketorolac (TORADOL) 10 mg tablet Take 1 tablet (10 mg total) by mouth every 8 (eight) hours as needed (migraine). 10 tablet 0    lisinopriL-hydrochlorothiazide (PRINZIDE,ZESTORETIC) 20-12.5 mg per tablet TAKE 1 TABLET BY MOUTH EVERY DAY 90 tablet 3    metFORMIN (GLUCOPHAGE-XR) 500 MG ER 24hr tablet Take 2 tablets (1,000 mg total) by mouth 2 (two) times daily with meals. 360 tablet 1    MICROLET LANCET Misc TEST ONCE DAILY 100 each 3    montelukast (SINGULAIR) 10 mg tablet TAKE 1 TABLET BY MOUTH EVERY DAY 90 tablet 3    omeprazole (PRILOSEC) 20 MG capsule TAKE 1 CAPSULE BY MOUTH EVERY DAY IN THE MORNING 90 capsule 1    rizatriptan (MAXALT-MLT) 10 MG disintegrating tablet Take 1 tablet (10 mg total) by mouth as needed for Migraine. May repeat in 2 hours if needed. Max 20mg/24hrs 12 tablet 11     No current facility-administered medications for this visit.       ALLERGIES:  Review of patient's allergies indicates:   Allergen Reactions    Bactrim [sulfamethoxazole-trimethoprim] Hives     Also Septra    Keflex [cephalexin] Shortness Of Breath    Neosporin (neomycin-polymyx) Swelling    Sumatriptan Other (See Comments)     Pain sensation throughout the body like migraine spreading    Amoxicillin Rash    Neosporin  [benzalkonium chloride] Rash    Sulfa (sulfonamide antibiotics) Rash       FAMILY HISTORY:  Family History    Problem Relation Age of Onset    Ovarian cancer Mother 43    Cancer Mother         ovarian    Diabetes Mother     Heart disease Father     Melanoma Neg Hx     Psoriasis Neg Hx     Lupus Neg Hx        SOCIAL HISTORY:  Social History     Tobacco Use    Smoking status: Never    Smokeless tobacco: Never   Substance Use Topics    Alcohol use: No    Drug use: No       Review of Systems:  Gen: no fever, no chills, no generalized feeling of weakness   HEENT: no double vision, no blurred vision, no eye pain, no eye exudates. no nasal congestion,   no traumatic injury of head, no neck pain, no neck stiffness. no photophobia or phonophobia at this time. ?    Heart: no chest pain, no SOB    Lungs: no SOB, no cough    MSK: no weakness of legs, intact ROM    ABD: no abd pain, no N/V/D/C, no difficulty with defecation.    Extremities: No leg pain, no edema.       Objective:   No vitals due to nature of telemedicine encounter.     GENERAL: Pleasant, well-appearing in no acute distress.     HEENT: Head normorcephalic, atraumatic.     PULMONARY: Breathing comfortably on room air.     NEURO:   Mental status: Awake, alert, and oriented to person, place, time, and situation. Speech clear, fluent.     Cranial nerves:     III/IV/VI: EOMI. No nystagmus.     VII: Facial expressions full and symmetric.     VIII: Hearing intact to voice.     XII: Tongue protrusion midline.     Motor exam: Moves all four extremities spontaneously. No pronator drift. No orbiting. Normal rapid tapping. No abnormal movements. No tremor.     Coordination: Finger to nose intact bilaterally.     Gait: Normal gait. Able to walk on heels, toes, and in tandem.       Assessment:   Iqra Duque is a 52 y.o. female presenting for follow up. Patient has significantly improved since last visit with correction of problem with CPAP machine. Endorse continued use of CPAP. We will continue rizatriptan as needed. Will follow up in 1 year, but patient was instructed to reach  out sooner if needed or if headaches worsen.    Plan:     Problem List Items Addressed This Visit    None    1. Preventive medications:    No preventive medications at this time.      2. Acute (abortive) medications- these medications are to be taken only at the onset of severe headache, and please limit the total of any combination of these medications to less than 10 days per month on average because they can cause medication overuse headaches.   Failed:   sumatriptan    Continue: Rizatriptan to 10mg disintegrating tabs  RIZATRIPTAN (MAXALT) is used as migraine abortive used only when you feel a migraine coming. Use 10 mg at the onset of migraine; take another 10 mg in 1 hour if no pain relief. Rizatriptan is more effective if taken with 2 Aleve blue capsules (Naproxen 440mg).   Do NOT take Maxalt more than 6 days per month as this can cause rebound headache.   Do NOT take Maxalt within 24 hours of taking any other triptan or within 24 hours of taking any ergotamine medication such as DHE or Migranal.    Pregnancy and Breastfeeding: FDA pregnancy category C. This means that it is not known whether this drug will harm an unborn baby.    It is not known whether this medication passes into breast milk.    Potential side effects:   Check with your doctor right away if any of the following side effects continue for more than 1 hour. Even if they go away in less than 1 hour, check with your doctor before using any more sumatriptan if any of the following side effects occur: chest pain (mild), heaviness, tightness, or pressure in chest and/or neck ,difficulty in swallowing ,pounding heartbeat , rash, hives, itching, or bumps on skin      3. Natural approaches to increasing brain energy and serotonin:    SAMe 200 mg a day    Vitamin B2 400 mg daily    Vitamin B12 1000 mcg daily    Getting early morning light to increase natural serotonin, melatonin. Could also consider light therapy box, 10,000 LUX.     4. Headache  prevention and acute treatment over-the counter supplements    Boswellia 800 mg 2-3 times per day, example brand Samir's, natural anti inflammatory herb    Sleep? modulation- melatonin at night 5 to 10 mg 30 minutes prior to sleep    Feverfew Tea 1-3 cups per day. Can get from [employer] or [employer]- A Natural anti inflammatory approach that does not cause further sensitization of the system.    Magnesium Citrate 250 mg daily, slowly increase up to 500-1000 mg daily. Side effect may include diarrhea, so we recommend stopping at whatever dose is comfortable for your body without causing this side effect. This supplement can be very helpful for preventing headache, preventing migraine aura, calming nerves, muscles and even mood. Recommend starting slowly, as it can also lead to increased bowel movements or diarrhea.?     Follow up in 1 year or sooner as needed.    Distant Site Telemedicine Encounter   I conducted this encounter from [facility] facilities via secure, live, face-to-face video conference with Iqra Duque who was at their home. Prior to the interview, the risks and benefits of telemedicine were discussed with the patient and verbal consent was obtained. All participants were identified and introduced.Patient was notified that they may decline to receive medical services by telemedicine and may withdraw from such care at any time. Patient's location within the [facility] was confirmed verbally.     I spent a total of 21 minutes preparing for the visit, documenting the visit, and direct face to face (telehealth) time. Aniceto Anderson DO was available in clinic throughout encounter.    Ellen Tafoya PA-C

## 2022-10-19 DIAGNOSIS — K21.9 GASTROESOPHAGEAL REFLUX: ICD-10-CM

## 2022-10-19 RX ORDER — OMEPRAZOLE 20 MG/1
CAPSULE, DELAYED RELEASE ORAL
Qty: 90 CAPSULE | Refills: 3 | Status: SHIPPED | OUTPATIENT
Start: 2022-10-19 | End: 2023-10-19

## 2022-10-19 NOTE — TELEPHONE ENCOUNTER
Refill Decision Note   Iqra Duque  is requesting a refill authorization.  Brief Assessment and Rationale for Refill:  Approve    -Medication-Related Problems Identified: Requires labs  Medication Therapy Plan:       Medication Reconciliation Completed: No   Comments:     Provider Staff:     Action is required for this patient.   Please see care gap opportunities below in Care Due Message.     Thanks!  [facility]     Appointments      Date Provider   Last Visit   9/15/2022 Jaylon Kothari MD   Next Visit   Visit date not found Jaylon Kothari MD     Note composed:8:09 AM 10/19/2022           Note composed:8:09 AM 10/19/2022

## 2022-10-27 ENCOUNTER — OFFICE VISIT (OUTPATIENT)
Dept: PODIATRY | Facility: CLINIC | Age: 52
End: 2022-10-27
Payer: COMMERCIAL

## 2022-10-27 VITALS — BODY MASS INDEX: 46.32 KG/M2 | WEIGHT: 278 LBS | HEIGHT: 65 IN

## 2022-10-27 DIAGNOSIS — E11.9 COMPREHENSIVE DIABETIC FOOT EXAMINATION, TYPE 2 DM, ENCOUNTER FOR: Primary | ICD-10-CM

## 2022-10-27 DIAGNOSIS — M20.5X2 HALLUX LIMITUS, ACQUIRED, LEFT: ICD-10-CM

## 2022-10-27 DIAGNOSIS — M20.41 HAMMER TOES OF BOTH FEET: ICD-10-CM

## 2022-10-27 DIAGNOSIS — L60.8 PINCER NAIL DEFORMITY: ICD-10-CM

## 2022-10-27 DIAGNOSIS — M20.42 HAMMER TOES OF BOTH FEET: ICD-10-CM

## 2022-10-27 DIAGNOSIS — M20.5X1 HALLUX LIMITUS, ACQUIRED, RIGHT: ICD-10-CM

## 2022-10-27 PROCEDURE — 1159F PR MEDICATION LIST DOCUMENTED IN MEDICAL RECORD: ICD-10-PCS | Mod: CPTII,S$GLB,, | Performed by: PODIATRIST

## 2022-10-27 PROCEDURE — 3044F PR MOST RECENT HEMOGLOBIN A1C LEVEL <7.0%: ICD-10-PCS | Mod: CPTII,S$GLB,, | Performed by: PODIATRIST

## 2022-10-27 PROCEDURE — 99999 PR PBB SHADOW E&M-EST. PATIENT-LVL IV: ICD-10-PCS | Mod: PBBFAC,,, | Performed by: PODIATRIST

## 2022-10-27 PROCEDURE — 1159F MED LIST DOCD IN RCRD: CPT | Mod: CPTII,S$GLB,, | Performed by: PODIATRIST

## 2022-10-27 PROCEDURE — 99999 PR PBB SHADOW E&M-EST. PATIENT-LVL IV: CPT | Mod: PBBFAC,,, | Performed by: PODIATRIST

## 2022-10-27 PROCEDURE — 99213 PR OFFICE/OUTPT VISIT, EST, LEVL III, 20-29 MIN: ICD-10-PCS | Mod: S$GLB,,, | Performed by: PODIATRIST

## 2022-10-27 PROCEDURE — 3008F PR BODY MASS INDEX (BMI) DOCUMENTED: ICD-10-PCS | Mod: CPTII,S$GLB,, | Performed by: PODIATRIST

## 2022-10-27 PROCEDURE — 3044F HG A1C LEVEL LT 7.0%: CPT | Mod: CPTII,S$GLB,, | Performed by: PODIATRIST

## 2022-10-27 PROCEDURE — 4010F PR ACE/ARB THEARPY RXD/TAKEN: ICD-10-PCS | Mod: CPTII,S$GLB,, | Performed by: PODIATRIST

## 2022-10-27 PROCEDURE — 3008F BODY MASS INDEX DOCD: CPT | Mod: CPTII,S$GLB,, | Performed by: PODIATRIST

## 2022-10-27 PROCEDURE — 4010F ACE/ARB THERAPY RXD/TAKEN: CPT | Mod: CPTII,S$GLB,, | Performed by: PODIATRIST

## 2022-10-27 PROCEDURE — 99213 OFFICE O/P EST LOW 20 MIN: CPT | Mod: S$GLB,,, | Performed by: PODIATRIST

## 2022-10-27 NOTE — PROGRESS NOTES
Subjective:      Patient ID: Iqra Duque is a 52 y.o. female.    Chief Complaint: Diabetes Mellitus    Iqra is a 52 y.o. female who presents to the clinic for evaluation and treatment of high risk feet. Iqra has a past medical history of Allergy, Cataract, Diabetes mellitus type II, Diverticulitis (09/2014), Fever blister, Hypertension, and Migraine headache.  The patient has no major complaints with feet. Chief concern is how to care for feet as a diabetic.  This patient has documented high risk feet requiring routine maintenance secondary to diabetes mellitis and those secondary complications of diabetes, as mentioned..    PCP: Jaylon Kothari MD    Date Last Seen by PCP:   Chief Complaint   Patient presents with    Diabetes Mellitus         Current shoe gear:  casual shoes       Hemoglobin A1C   Date Value Ref Range Status   05/17/2022 5.9 (H) 4.0 - 5.6 % Final     Comment:     ADA Screening Guidelines:  5.7-6.4%  Consistent with prediabetes  >or=6.5%  Consistent with diabetes    High levels of fetal hemoglobin interfere with the HbA1C  assay. Heterozygous hemoglobin variants (HbS, HgC, etc)do  not significantly interfere with this assay.   However, presence of multiple variants may affect accuracy.     10/20/2021 6.8 (H) 4.0 - 5.6 % Final     Comment:     ADA Screening Guidelines:  5.7-6.4%  Consistent with prediabetes  >or=6.5%  Consistent with diabetes    High levels of fetal hemoglobin interfere with the HbA1C  assay. Heterozygous hemoglobin variants (HbS, HgC, etc)do  not significantly interfere with this assay.   However, presence of multiple variants may affect accuracy.     04/14/2021 7.0 (H) 4.0 - 5.6 % Final     Comment:     ADA Screening Guidelines:  5.7-6.4%  Consistent with prediabetes  >or=6.5%  Consistent with diabetes    High levels of fetal hemoglobin interfere with the HbA1C  assay. Heterozygous hemoglobin variants (HbS, HgC, etc)do  not significantly interfere with this assay.    However, presence of multiple variants may affect accuracy.       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      CONTOUR TEST STRIPS Strp TEST ONCE DAILY 100 strip 1    dulaglutide (TRULICITY) 4.5 mg/0.5 mL pen injector Inject 4.5 mg into the skin every 7 days. 12 pen 1    ketorolac (TORADOL) 10 mg tablet Take 1 tablet (10 mg total) by mouth every 8 (eight) hours as needed (migraine). 10 tablet 0    metFORMIN (GLUCOPHAGE-XR) 500 MG ER 24hr tablet Take 2 tablets (1,000 mg total) by mouth 2 (two) times daily with meals. 360 tablet 1    MICROLET LANCET Misc TEST ONCE DAILY 100 each 3    montelukast (SINGULAIR) 10 mg tablet TAKE 1 TABLET BY MOUTH EVERY DAY 90 tablet 3    omeprazole (PRILOSEC) 20 MG capsule TAKE 1 CAPSULE BY MOUTH EVERY DAY IN THE MORNING 90 capsule 3    rizatriptan (MAXALT-MLT) 10 MG disintegrating tablet Take 1 tablet (10 mg total) by mouth as needed for Migraine. May repeat in 2 hours if needed. Max 20mg/24hrs 12 tablet 11     No current facility-administered medications on file prior to visit.     Allergies   Allergen Reactions    Bactrim [Sulfamethoxazole-Trimethoprim] Hives     Also Septra    Keflex [Cephalexin] Shortness Of Breath    Neosporin (Neomycin-Polymyx) Swelling    Amoxicillin Rash     Past Surgical  History:   Procedure Laterality Date    COLONOSCOPY N/A 10/21/2019    Procedure: COLONOSCOPY;  Surgeon: Gomez Stark MD;  Location: [facility] ENDO;  Service: Endoscopy;  Laterality: N/A;    EYE SURGERY Bilateral     cataract    HYSTERECTOMY  06/2009    KJB---DLH/BSO ATYPICAL HYPERPLASIA    OOPHORECTOMY      TUBAL LIGATION       Family History   Problem Relation Age of Onset    Ovarian cancer Mother 43    Cancer Mother         ovarian    Diabetes Mother     Heart disease Father     Melanoma Neg Hx     Psoriasis Neg Hx     Lupus Neg Hx      Social History     Socioeconomic History    Marital status:    Occupational History    Occupation: Dispatcher     Employer: [employer]   Tobacco Use    Smoking status: Never    Smokeless tobacco: Never   Substance and Sexual Activity    Alcohol use: No    Drug use: No    Sexual activity: Yes     Partners: Male     Birth control/protection: None     Social Determinants of Health     Financial Resource Strain: Low Risk     Difficulty of Paying Living Expenses: Not hard at all   Food Insecurity: No Food Insecurity    Worried About Running Out of Food in the Last Year: Never true    Ran Out of Food in the Last Year: Never true   Transportation Needs: No Transportation Needs    Lack of Transportation (Medical): No    Lack of Transportation (Non-Medical): No   Physical Activity: Inactive    Days of Exercise per Week: 0 days    Minutes of Exercise per Session: 20 min   Stress: Stress Concern Present    Feeling of Stress : To some extent   Social Connections: Unknown    Frequency of Communication with Friends and Family: More than three times a week    Frequency of Social Gatherings with Friends and Family: Twice a week    Active Member of Clubs or Organizations: No    Attends Club or Organization Meetings: Patient refused    Marital Status:    Housing Stability: Low Risk     Unable to Pay for Housing in the Last Year: No    Number of Places Lived in the Last Year:  "1    Unstable Housing in the Last Year: No       Review of Systems   Constitutional: Negative for chills and fever.   Cardiovascular:  Positive for leg swelling. Negative for chest pain and claudication.   Respiratory:  Negative for cough and shortness of breath.    Skin:  Positive for dry skin and nail changes. Negative for itching.   Musculoskeletal:  Positive for back pain and myalgias. Negative for arthritis, falls, joint pain, joint swelling and muscle weakness.   Gastrointestinal:  Negative for diarrhea, nausea and vomiting.   Neurological:  Negative for numbness, paresthesias, tremors and weakness.        History of sciatica   Psychiatric/Behavioral:  Negative for altered mental status and hallucinations.          Objective:       Vitals:    10/27/22 0951   Weight: 126.1 kg (278 lb)   Height: 5' 5" (1.651 m)   PainSc: 0-No pain         Physical Exam  Vitals and nursing note reviewed.   Constitutional:       General: She is not in acute distress.     Appearance: She is well-developed. She is not diaphoretic.      Comments: Alert and oriented x 3. No evidence of depression, anxiety, or agitation. Calm, cooperative, and communicative. Appropriate interactions and affect.       Cardiovascular:      Pulses:           Dorsalis pedis pulses are 2+ on the right side and 2+ on the left side.        Posterior tibial pulses are 2+ on the right side and 2+ on the left side.      Comments: dorsalis pedis and posterior tibial pulses are palpable bilaterally. Digits are warm to the touch 1-5 bilaterally. Capillary refill time is within normal limits 1-5 bilaterally.        Musculoskeletal:      Right ankle: No swelling or ecchymosis. No tenderness.      Left ankle: No swelling or ecchymosis. No tenderness.      Right foot: Normal capillary refill. Crepitus (dorsal medial midfoot with palpable spurring in the area) present. No swelling or deformity.      Left foot: Normal capillary refill. No swelling.      Comments: Muscle " strength is 5/5 in all groups bilaterally.    Decreased arch height noted b/l. Negative too many toes sign.      Lymphadenopathy:      Comments: No lymphatic streaking    Negative lymphadenopathy bilateral popliteal fossa and tarsal tunnel.     Skin:     General: Skin is warm and dry.      Coloration: Skin is not pale.      Findings: No abrasion, ecchymosis, lesion or rash.      Nails: There is no clubbing.      Comments: Nails 1-5 bilaterally with incurvated borders.     Neurological:      Comments: Opolis-Alphonso 5.07 monofilament is intact bilateral feet. Sharp/dull sensation is also intact Bilateral feet.       Psychiatric:         Speech: Speech normal.         Behavior: Behavior normal.             Assessment:       Encounter Diagnoses   Name Primary?    Comprehensive diabetic foot examination, type 2 DM, encounter for Yes    Hammer toes of both feet     Hallux limitus, acquired, left     Hallux limitus, acquired, right     Pincer nail deformity            Plan:       qIra was seen today for diabetes mellitus.    Diagnoses and all orders for this visit:    Comprehensive diabetic foot examination, type 2 DM, encounter for    Hammer toes of both feet    Hallux limitus, acquired, left    Hallux limitus, acquired, right    Pincer nail deformity      I counseled the patient on her conditions, their implications and medical management.          Education about the diabetic foot, neuropathy, and prevention of limb loss.    Shoe inspection. Diabetic Foot Education. Patient reminded of the importance of good nutrition/healthy diet/weight management and blood sugar control to help prevent podiatric complications of diabetes. Patient instructed on proper foot hygeine. Wear comfortable, proper fitting shoes. Wash feet daily. Dry well. After drying, apply moisturizer to feet (no lotion to webspaces). Inspect feet daily for skin breaks, blisters, swelling, or redness. Wear cotton socks (preferably white)  Change socks  every day. Do NOT walk barefoot. Do NOT use heating pads or hot water soaks. We discussed wearing proper shoe gear, daily foot inspections, never walking without protective shoe gear.     Discussed edema control and the importance of daily moisturizer to the feet such as Gold bonds diabetic foot cream    Recommend applying vicks vaporub to thick abnormal toenails daily x 6 months to treat fungal nail infection.    Stretching handout dispensed to patient. Instructions on adequate stretching reviewed in clinic     Recommend OTC compression socks; patient is to elevate legs. When sleeping, place a pillow under lower extremities. When sitting, support the legs so that they are level with the waist.    She will continue to monitor the areas daily, inspect her feet, wear protective shoe gear when ambulatory, moisturizer to maintain skin integrity and follow in this office in approximately 12 months, sooner p.r.n.

## 2022-10-27 NOTE — PATIENT INSTRUCTIONS
Recommend lotions: eucerin, eucerin for diabetics, aquaphor, A&D ointment, gold bond for diabetics, sween, Summit's Bees all purpose baby ointment,  urea 40 with aloe (found on amazon.com)    Shoe recommendations: (try 6pm.com, zappos.com , nordstromrack.Affinity Circles, or shoes.Affinity Circles for discounted prices) you can visit DSW shoes in [place]  or UTOPY in the [facility] (there are also several shoe brand outlets in the [facility])    Asics (GT 2000 or gel foundations), new balance stability type shoes, saucony (stabil c3),  Seymour (GTS or Beast or transcend), propet (tennis shoe)    Sofft Brand or axxiom (women) Ovidio&Kris (men), clarks, crocs, aerosoles, naturalizers, SAS, ecco, born, messi gibbs, rockports (dress shoes)    Vionic, burkenstocks, fitflops, propet (sandals)  Nike comfort thong sandals, crocs, propet (house shoes)    Nail Home remedy:  Vicks Vapor rub to nails for easier manageability    Diabetes: Inspecting Your Feet  Diabetes increases your chances of developing foot problems. So inspect your feet every day. This helps you find small skin irritations before they become serious infections. If you have trouble seeing the bottoms of your feet, use a mirror or ask a family member or friend to help.     Pressure spots on the bottom of the foot are common areas where problems develop.   How to check your feet  Below are tips to help you look for foot problems. Try to check your feet at the same time each day, such as when you get out of bed in the morning:  Check the top of each foot. The tops of toes, back of the heel, and outer edge of the foot can get a lot of rubbing from poor-fitting shoes.  Check the bottom of each foot. Daily wear and tear often leads to problems at pressure spots.  Check the toes and nails. Fungal infections often occur between toes. Toenail problems can also be a sign of fungal infections or lead to breaks in the skin.  Check your shoes, too. Loose objects inside a shoe can injure the  foot. Use your hand to feel inside your shoes for things like vicente, loose stitching, or rough areas that could irritate your skin.  Warning signs  Look for any color changes in the foot. Redness with streaks can signal a severe infection, which needs immediate medical attention. Tell your doctor right away if you have any of these problems:  Swelling, sometimes with color changes, may be a sign of poor blood flow or infection. Symptoms include tenderness and an increase in the size of your foot.  Warm or hot areas on your feet may be signs of infection. A foot that is cold may not be getting enough blood.  Sensations such as burning, tingling, or pins and needles can be signs of a problem. Also check for areas that may be numb.  Hot spots are caused by friction or pressure. Look for hot spots in areas that get a lot of rubbing. Hot spots can turn into blisters, calluses, or sores.  Cracks and sores are caused by dry or irritated skin. They are a sign that the skin is breaking down, which can lead to infection.  Toenail problems to watch for include nails growing into the skin (ingrown toenail) and causing redness or pain. Thick, yellow, or discolored nails can signal a fungal infection.  Drainage and odor can develop from untreated sores and ulcers. Call your doctor right away if you notice white or yellow drainage, bleeding, or unpleasant odor.   © 6491-8689 [employer]. [address] [place] [address]. All rights reserved. This information is not intended as a substitute for professional medical care. Always follow your healthcare professional's instructions.        Step-by-Step:  Inspecting Your Feet (Diabetes)    Date Last Reviewed: 10/1/2016  © 0010-3507 [employer]. [address] [place] [address]. All rights reserved. This information is not intended as a substitute for professional medical care. Always follow your healthcare professional's  instructions.

## 2022-12-12 ENCOUNTER — OFFICE VISIT (OUTPATIENT)
Dept: FAMILY MEDICINE | Facility: CLINIC | Age: 52
End: 2022-12-12
Payer: COMMERCIAL

## 2022-12-12 ENCOUNTER — PATIENT MESSAGE (OUTPATIENT)
Dept: FAMILY MEDICINE | Facility: CLINIC | Age: 52
End: 2022-12-12
Payer: COMMERCIAL

## 2022-12-12 DIAGNOSIS — E11.3293 TYPE 2 DIABETES MELLITUS WITH BOTH EYES AFFECTED BY MILD NONPROLIFERATIVE RETINOPATHY WITHOUT MACULAR EDEMA, WITHOUT LONG-TERM CURRENT USE OF INSULIN: Chronic | ICD-10-CM

## 2022-12-12 PROCEDURE — 4010F ACE/ARB THERAPY RXD/TAKEN: CPT | Mod: CPTII,95,, | Performed by: INTERNAL MEDICINE

## 2022-12-12 PROCEDURE — 99214 PR OFFICE/OUTPT VISIT, EST, LEVL IV, 30-39 MIN: ICD-10-PCS | Mod: 95,,, | Performed by: INTERNAL MEDICINE

## 2022-12-12 PROCEDURE — 1159F MED LIST DOCD IN RCRD: CPT | Mod: CPTII,95,, | Performed by: INTERNAL MEDICINE

## 2022-12-12 PROCEDURE — 1160F RVW MEDS BY RX/DR IN RCRD: CPT | Mod: CPTII,95,, | Performed by: INTERNAL MEDICINE

## 2022-12-12 PROCEDURE — 3044F PR MOST RECENT HEMOGLOBIN A1C LEVEL <7.0%: ICD-10-PCS | Mod: CPTII,95,, | Performed by: INTERNAL MEDICINE

## 2022-12-12 PROCEDURE — 3044F HG A1C LEVEL LT 7.0%: CPT | Mod: CPTII,95,, | Performed by: INTERNAL MEDICINE

## 2022-12-12 PROCEDURE — 1160F PR REVIEW ALL MEDS BY PRESCRIBER/CLIN PHARMACIST DOCUMENTED: ICD-10-PCS | Mod: CPTII,95,, | Performed by: INTERNAL MEDICINE

## 2022-12-12 PROCEDURE — 4010F PR ACE/ARB THEARPY RXD/TAKEN: ICD-10-PCS | Mod: CPTII,95,, | Performed by: INTERNAL MEDICINE

## 2022-12-12 PROCEDURE — 1159F PR MEDICATION LIST DOCUMENTED IN MEDICAL RECORD: ICD-10-PCS | Mod: CPTII,95,, | Performed by: INTERNAL MEDICINE

## 2022-12-12 PROCEDURE — 99214 OFFICE O/P EST MOD 30 MIN: CPT | Mod: 95,,, | Performed by: INTERNAL MEDICINE

## 2022-12-12 NOTE — PROGRESS NOTES
Assessment & Plan  Problem List Items Addressed This Visit          Endocrine    Type 2 diabetes mellitus with mild nonproliferative retinopathy of both eyes without macular edema (Chronic)    Current Assessment & Plan     Change to [facility] and reduce to 3mg dose due to availability. Due for labs.    Discussed plan to reduce further to 1.5mg x 4 weeks if she has SE to restarting at 3mg.  Titrate to 3mg after that         Relevant Medications    dulaglutide (TRULICITY) 3 mg/0.5 mL pen injector         Health Maintenance reviewed, counseled on vaccines.    The patient location is:  Patient Home   The chief complaint leading to consultation is: noted below  Visit type: Virtual visit with synchronous audio and video  Total time spent with patient: 17  Each patient to whom he or she provides medical services by telemedicine is:  (1) informed of the relationship between the physician and patient and the respective role of any other health care provider with respect to management of the patient; and (2) notified that he or she may decline to receive medical services by telemedicine and may withdraw from such care at any time.    Follow-up: No follow-ups on file.    ______________________________________________________________________    Chief Complaint  Chief Complaint   Patient presents with    Diabetes     Unable to fill GLP-1       HPI  Iqra Duque is a 52 y.o. female with multiple medical diagnoses as listed in the medical history and problem list that presents for DM follow up via virtual visit.  Pt is known to me with their last appointment 9/15/2022.      Answers submitted by the patient for this visit:  Diabetes Questionnaire (Submitted on 12/12/2022)  Chief Complaint: Diabetes problem  Diabetes type: type 2  MedicAlert ID: No  Disease duration: 10 Years  blurred vision: No  foot paresthesias: No  foot ulcerations: No  visual change: No  weight loss: No  Symptom course: worsening  hunger: Yes  mood  changes: Yes  sleepiness: No  sweats: No  blackouts: No  hospitalization: No  nocturnal hypoglycemia: No  required assistance: No  required glucagon: No  CVA: No  heart disease: No  nephropathy: No  peripheral neuropathy: No  PVD: No  retinopathy: No  autonomic neuropathy: No  CAD risks: hypertension, obesity, stress, diabetes mellitus  Current treatments: diet, oral agent (dual therapy)  Treatment compliance: most of the time  Home blood tests: 1-2 x per day  Monitoring compliance: good  Blood glucose trend: increasing steadily  Weight trend: stable  Current diet: generally healthy  Meal planning: none  Exercise: rarely  Dietitian visit: No  Eye exam current: Yes  Sees podiatrist: Yes    GLP-1 has been on back order.  Off of meds x 3 weeks.  Sugars up and having polyuria.        PAST MEDICAL HISTORY:  Past Medical History:   Diagnosis Date    Allergy     Cataract     Diabetes mellitus type II     Diverticulitis 09/2014    Fever blister     Hypertension     Migraine headache        PAST SURGICAL HISTORY:  Past Surgical History:   Procedure Laterality Date    COLONOSCOPY N/A 10/21/2019    Procedure: COLONOSCOPY;  Surgeon: Gomez Stark MD;  Location: [facility];  Service: Endoscopy;  Laterality: N/A;    EYE SURGERY Bilateral     cataract    HYSTERECTOMY  06/2009    KJB---DLH/BSO ATYPICAL HYPERPLASIA    OOPHORECTOMY      TUBAL LIGATION         SOCIAL HISTORY:  Social History     Socioeconomic History    Marital status:    Occupational History    Occupation: Dispatcher     Employer: [employer]   Tobacco Use    Smoking status: Never    Smokeless tobacco: Never   Substance and Sexual Activity    Alcohol use: No    Drug use: No    Sexual activity: Yes     Partners: Male     Birth control/protection: None     Social Determinants of Health     Financial Resource Strain: Low Risk     Difficulty of Paying Living Expenses: Not hard at all   Food Insecurity: No Food Insecurity    Worried About Running Out of  Food in the Last Year: Never true    Ran Out of Food in the Last Year: Never true   Transportation Needs: No Transportation Needs    Lack of Transportation (Medical): No    Lack of Transportation (Non-Medical): No   Physical Activity: Inactive    Days of Exercise per Week: 0 days    Minutes of Exercise per Session: 20 min   Stress: Stress Concern Present    Feeling of Stress : To some extent   Social Connections: Unknown    Frequency of Communication with Friends and Family: More than three times a week    Frequency of Social Gatherings with Friends and Family: Once a week    Active Member of Clubs or Organizations: No    Attends Club or Organization Meetings: Never    Marital Status:    Housing Stability: Low Risk     Unable to Pay for Housing in the Last Year: No    Number of Places Lived in the Last Year: 1    Unstable Housing in the Last Year: No       FAMILY HISTORY:  Family History   Problem Relation Age of Onset    Ovarian cancer Mother 43    Cancer Mother         ovarian    Diabetes Mother     Heart disease Father     Melanoma Neg Hx     Psoriasis Neg Hx     Lupus Neg Hx        ALLERGIES AND MEDICATIONS: updated and reviewed.  Review of patient's allergies indicates:   Allergen Reactions    Bactrim [sulfamethoxazole-trimethoprim] Hives     Also Septra    Keflex [cephalexin] Shortness Of Breath    Neosporin (neomycin-polymyx) Swelling    Sumatriptan Other (See Comments)     Pain sensation throughout the body like migraine spreading    Amoxicillin Rash    Neosporin  [benzalkonium chloride] Rash    Sulfa (sulfonamide antibiotics) Rash     Current Outpatient Medications   Medication Sig Dispense Refill    cetirizine (ZYRTEC) 10 MG tablet Take 10 mg by mouth once daily.      CONTOUR TEST STRIPS Strp TEST ONCE DAILY 100 strip 1    dulaglutide (TRULICITY) 3 mg/0.5 mL pen injector Inject 3 mg into the skin once a week. 4 pen 3    ketorolac (TORADOL) 10 mg tablet Take 1 tablet (10 mg total) by mouth every 8  (eight) hours as needed (migraine). 10 tablet 0    lisinopriL-hydrochlorothiazide (PRINZIDE,ZESTORETIC) 20-12.5 mg per tablet TAKE 1 TABLET BY MOUTH EVERY DAY 90 tablet 3    metFORMIN (GLUCOPHAGE-XR) 500 MG ER 24hr tablet Take 2 tablets (1,000 mg total) by mouth 2 (two) times daily with meals. 360 tablet 1    MICROLET LANCET Misc TEST ONCE DAILY 100 each 3    montelukast (SINGULAIR) 10 mg tablet TAKE 1 TABLET BY MOUTH EVERY DAY 90 tablet 3    omeprazole (PRILOSEC) 20 MG capsule TAKE 1 CAPSULE BY MOUTH EVERY DAY IN THE MORNING 90 capsule 3    rizatriptan (MAXALT-MLT) 10 MG disintegrating tablet Take 1 tablet (10 mg total) by mouth as needed for Migraine. May repeat in 2 hours if needed. Max 20mg/24hrs 12 tablet 11     No current facility-administered medications for this visit.         ROS  Review of Systems   Constitutional:  Positive for fatigue.   Cardiovascular:  Negative for chest pain.   Endocrine: Positive for polydipsia, polyphagia and polyuria.   Skin:  Positive for pallor.   Neurological:  Positive for headaches. Negative for dizziness, tremors, seizures, speech difficulty and weakness.   Psychiatric/Behavioral:  Negative for confusion. The patient is nervous/anxious.          Physical Exam  Physical Exam  Constitutional:       General: She is not in acute distress.     Appearance: She is well-developed.   HENT:      Head: Normocephalic and atraumatic.   Eyes:      Conjunctiva/sclera: Conjunctivae normal.   Pulmonary:      Effort: Pulmonary effort is normal. No respiratory distress.   Neurological:      Mental Status: She is alert and oriented to person, place, and time.      Comments: Symmetric facial movements   Psychiatric:         Behavior: Behavior normal.         Thought Content: Thought content normal.         Health Maintenance         Date Due Completion Date    Low Dose Statin Never done ---    Pneumococcal Vaccines (Age 0-64) (2 - PCV) 03/26/2016 3/26/2015    Diabetes Urine Screening 02/07/2021  2/7/2020    COVID-19 Vaccine (4 - Booster for Pfizer series) 03/08/2022 1/11/2022    Influenza Vaccine (1) 09/01/2022 11/17/2021    Override on 2/25/2016: Declined    Hemoglobin A1c 11/17/2022 5/17/2022    Eye Exam 04/20/2023 4/20/2022    Lipid Panel 05/17/2023 5/17/2022    Foot Exam 10/27/2023 10/27/2022    Override on 10/20/2021: Done    Override on 8/3/2020: Done    Override on 7/24/2019: Done (Dr. Heather Hicks ( podiatry ))    Override on 7/17/2018: Done    Override on 7/3/2017: Done    Mammogram 02/09/2024 2/9/2022    Colorectal Cancer Screening 10/21/2024 10/21/2019    TETANUS VACCINE 08/29/2026 8/29/2016

## 2022-12-12 NOTE — ASSESSMENT & PLAN NOTE
Change to [facility] and reduce to 3mg dose due to availability. Due for labs.    Discussed plan to reduce further to 1.5mg x 4 weeks if she has SE to restarting at 3mg.  Titrate to 3mg after that

## 2022-12-12 NOTE — LETTER
December 12, 2022      [facility]  [address] [facility]  [place] [address]  Phone: [phone]  Fax: [phone]       Patient: Iqra Duque   YOB: 1970  Date of Visit: 12/12/2022    To Whom It May Concern:    Gertrude Duque  was at [facility] on 12/12/2022. She may return to work/school on 12/15/2022 with no restrictions. If you have any questions or concerns, or if I can be of further assistance, please do not hesitate to contact me.    Sincerely,    Jaylon Kothari MD

## 2023-02-15 ENCOUNTER — PATIENT MESSAGE (OUTPATIENT)
Dept: FAMILY MEDICINE | Facility: CLINIC | Age: 53
End: 2023-02-15
Payer: COMMERCIAL

## 2023-02-15 ENCOUNTER — PATIENT MESSAGE (OUTPATIENT)
Dept: ADMINISTRATIVE | Facility: HOSPITAL | Age: 53
End: 2023-02-15
Payer: COMMERCIAL

## 2023-02-15 DIAGNOSIS — Z12.31 ENCOUNTER FOR SCREENING MAMMOGRAM FOR BREAST CANCER: Primary | ICD-10-CM

## 2023-03-07 ENCOUNTER — HOSPITAL ENCOUNTER (OUTPATIENT)
Dept: RADIOLOGY | Facility: HOSPITAL | Age: 53
Discharge: HOME | End: 2023-03-07
Attending: INTERNAL MEDICINE
Payer: COMMERCIAL

## 2023-03-07 DIAGNOSIS — Z12.31 ENCOUNTER FOR SCREENING MAMMOGRAM FOR BREAST CANCER: ICD-10-CM

## 2023-03-07 PROCEDURE — 77067 SCR MAMMO BI INCL CAD: CPT | Mod: TC,PO

## 2023-03-07 PROCEDURE — 77067 MAMMO DIGITAL SCREENING BILAT WITH TOMO: ICD-10-PCS | Mod: 26,,, | Performed by: RADIOLOGY

## 2023-03-07 PROCEDURE — 77067 SCR MAMMO BI INCL CAD: CPT | Mod: 26,,, | Performed by: RADIOLOGY

## 2023-03-07 PROCEDURE — 77063 BREAST TOMOSYNTHESIS BI: CPT | Mod: 26,,, | Performed by: RADIOLOGY

## 2023-03-07 PROCEDURE — 77063 MAMMO DIGITAL SCREENING BILAT WITH TOMO: ICD-10-PCS | Mod: 26,,, | Performed by: RADIOLOGY

## 2023-03-22 ENCOUNTER — PATIENT MESSAGE (OUTPATIENT)
Dept: ADMINISTRATIVE | Facility: OTHER | Age: 53
End: 2023-03-22
Payer: COMMERCIAL

## 2023-03-22 ENCOUNTER — PATIENT MESSAGE (OUTPATIENT)
Dept: FAMILY MEDICINE | Facility: CLINIC | Age: 53
End: 2023-03-22
Payer: COMMERCIAL

## 2023-03-28 DIAGNOSIS — E11.3293 TYPE 2 DIABETES MELLITUS WITH BOTH EYES AFFECTED BY MILD NONPROLIFERATIVE RETINOPATHY WITHOUT MACULAR EDEMA, WITHOUT LONG-TERM CURRENT USE OF INSULIN: Chronic | ICD-10-CM

## 2023-03-29 RX ORDER — DULAGLUTIDE 3 MG/.5ML
3 INJECTION, SOLUTION SUBCUTANEOUS WEEKLY
Qty: 4 PEN | Refills: 3 | Status: SHIPPED | OUTPATIENT
Start: 2023-03-29 | End: 2023-04-04 | Stop reason: DRUGHIGH

## 2023-03-29 NOTE — TELEPHONE ENCOUNTER
Refill Routing Note   Medication(s) are not appropriate for processing by [facility] for the following reason(s):      Required labs outdated    [facility] action(s):  Defer            Appointments  past 12m or future 3m with PCP    Date Provider   Last Visit   12/12/2022 Jaylon Kothari MD   Next Visit   Visit date not found Jaylon Kothari MD   ED visits in past 90 days: 0        Note composed:10:03 PM 03/28/2023

## 2023-03-29 NOTE — TELEPHONE ENCOUNTER
No new care gaps identified.  [facility] Embedded Care Gaps. Reference number: [id number]. 3/28/2023   8:13:52 PM CDT

## 2023-03-30 ENCOUNTER — LAB VISIT (OUTPATIENT)
Dept: LAB | Facility: HOSPITAL | Age: 53
End: 2023-03-30
Attending: INTERNAL MEDICINE
Payer: COMMERCIAL

## 2023-03-30 DIAGNOSIS — E11.3293 TYPE 2 DIABETES MELLITUS WITH BOTH EYES AFFECTED BY MILD NONPROLIFERATIVE RETINOPATHY WITHOUT MACULAR EDEMA, WITHOUT LONG-TERM CURRENT USE OF INSULIN: Chronic | ICD-10-CM

## 2023-03-30 LAB
ALBUMIN SERPL BCP-MCNC: 3.3 G/DL (ref 3.5–5.2)
ALP SERPL-CCNC: 48 U/L (ref 55–135)
ALT SERPL W/O P-5'-P-CCNC: 35 U/L (ref 10–44)
ANION GAP SERPL CALC-SCNC: 12 MMOL/L (ref 8–16)
AST SERPL-CCNC: 55 U/L (ref 10–40)
BILIRUB SERPL-MCNC: 0.6 MG/DL (ref 0.1–1)
BUN SERPL-MCNC: 12 MG/DL (ref 6–20)
CALCIUM SERPL-MCNC: 9.5 MG/DL (ref 8.7–10.5)
CHLORIDE SERPL-SCNC: 101 MMOL/L (ref 95–110)
CO2 SERPL-SCNC: 25 MMOL/L (ref 23–29)
CREAT SERPL-MCNC: 0.7 MG/DL (ref 0.5–1.4)
EST. GFR  (NO RACE VARIABLE): >60 ML/MIN/1.73 M^2
ESTIMATED AVG GLUCOSE: 174 MG/DL (ref 68–131)
GLUCOSE SERPL-MCNC: 141 MG/DL (ref 70–110)
HBA1C MFR BLD: 7.7 % (ref 4–5.6)
POTASSIUM SERPL-SCNC: 4.8 MMOL/L (ref 3.5–5.1)
PROT SERPL-MCNC: 6.7 G/DL (ref 6–8.4)
SODIUM SERPL-SCNC: 138 MMOL/L (ref 136–145)

## 2023-03-30 PROCEDURE — 80053 COMPREHEN METABOLIC PANEL: CPT | Performed by: INTERNAL MEDICINE

## 2023-03-30 PROCEDURE — 36415 COLL VENOUS BLD VENIPUNCTURE: CPT | Mod: PO | Performed by: INTERNAL MEDICINE

## 2023-03-30 PROCEDURE — 83036 HEMOGLOBIN GLYCOSYLATED A1C: CPT | Performed by: INTERNAL MEDICINE

## 2023-03-31 ENCOUNTER — TELEPHONE (OUTPATIENT)
Dept: FAMILY MEDICINE | Facility: CLINIC | Age: 53
End: 2023-03-31
Payer: COMMERCIAL

## 2023-03-31 NOTE — TELEPHONE ENCOUNTER
Please schedule diabetic follow-up with Dr. Kothari or myself to review labs.    Thanks!  Ofelia CHOI

## 2023-04-04 ENCOUNTER — OFFICE VISIT (OUTPATIENT)
Dept: FAMILY MEDICINE | Facility: CLINIC | Age: 53
End: 2023-04-04
Payer: COMMERCIAL

## 2023-04-04 VITALS
TEMPERATURE: 98 F | DIASTOLIC BLOOD PRESSURE: 70 MMHG | WEIGHT: 282.88 LBS | HEIGHT: 65 IN | OXYGEN SATURATION: 97 % | HEART RATE: 108 BPM | SYSTOLIC BLOOD PRESSURE: 120 MMHG | BODY MASS INDEX: 47.13 KG/M2

## 2023-04-04 DIAGNOSIS — E11.3293 TYPE 2 DIABETES MELLITUS WITH BOTH EYES AFFECTED BY MILD NONPROLIFERATIVE RETINOPATHY WITHOUT MACULAR EDEMA, WITHOUT LONG-TERM CURRENT USE OF INSULIN: Primary | Chronic | ICD-10-CM

## 2023-04-04 DIAGNOSIS — E55.9 VITAMIN D INSUFFICIENCY: Chronic | ICD-10-CM

## 2023-04-04 DIAGNOSIS — K76.0 NAFLD (NONALCOHOLIC FATTY LIVER DISEASE): Chronic | ICD-10-CM

## 2023-04-04 DIAGNOSIS — D50.9 IRON DEFICIENCY ANEMIA, UNSPECIFIED IRON DEFICIENCY ANEMIA TYPE: ICD-10-CM

## 2023-04-04 DIAGNOSIS — E66.01 MORBID OBESITY WITH BMI OF 45.0-49.9, ADULT: Chronic | ICD-10-CM

## 2023-04-04 DIAGNOSIS — I10 ESSENTIAL HYPERTENSION: Chronic | ICD-10-CM

## 2023-04-04 DIAGNOSIS — Z23 NEED FOR PNEUMOCOCCAL VACCINATION: ICD-10-CM

## 2023-04-04 PROCEDURE — 90677 PCV20 VACCINE IM: CPT | Mod: S$GLB,,, | Performed by: NURSE PRACTITIONER

## 2023-04-04 PROCEDURE — 90471 IMMUNIZATION ADMIN: CPT | Mod: S$GLB,,, | Performed by: NURSE PRACTITIONER

## 2023-04-04 PROCEDURE — 3074F PR MOST RECENT SYSTOLIC BLOOD PRESSURE < 130 MM HG: ICD-10-PCS | Mod: CPTII,S$GLB,, | Performed by: NURSE PRACTITIONER

## 2023-04-04 PROCEDURE — 3078F PR MOST RECENT DIASTOLIC BLOOD PRESSURE < 80 MM HG: ICD-10-PCS | Mod: CPTII,S$GLB,, | Performed by: NURSE PRACTITIONER

## 2023-04-04 PROCEDURE — 99999 PR PBB SHADOW E&M-EST. PATIENT-LVL IV: ICD-10-PCS | Mod: PBBFAC,,, | Performed by: NURSE PRACTITIONER

## 2023-04-04 PROCEDURE — 1160F RVW MEDS BY RX/DR IN RCRD: CPT | Mod: CPTII,S$GLB,, | Performed by: NURSE PRACTITIONER

## 2023-04-04 PROCEDURE — 90677 PNEUMOCOCCAL CONJUGATE VACCINE 20-VALENT: ICD-10-PCS | Mod: S$GLB,,, | Performed by: NURSE PRACTITIONER

## 2023-04-04 PROCEDURE — 1159F MED LIST DOCD IN RCRD: CPT | Mod: CPTII,S$GLB,, | Performed by: NURSE PRACTITIONER

## 2023-04-04 PROCEDURE — 3078F DIAST BP <80 MM HG: CPT | Mod: CPTII,S$GLB,, | Performed by: NURSE PRACTITIONER

## 2023-04-04 PROCEDURE — 99214 OFFICE O/P EST MOD 30 MIN: CPT | Mod: 25,S$GLB,, | Performed by: NURSE PRACTITIONER

## 2023-04-04 PROCEDURE — 3008F BODY MASS INDEX DOCD: CPT | Mod: CPTII,S$GLB,, | Performed by: NURSE PRACTITIONER

## 2023-04-04 PROCEDURE — 3051F PR MOST RECENT HEMOGLOBIN A1C LEVEL 7.0 - < 8.0%: ICD-10-PCS | Mod: CPTII,S$GLB,, | Performed by: NURSE PRACTITIONER

## 2023-04-04 PROCEDURE — 4010F ACE/ARB THERAPY RXD/TAKEN: CPT | Mod: CPTII,S$GLB,, | Performed by: NURSE PRACTITIONER

## 2023-04-04 PROCEDURE — 3008F PR BODY MASS INDEX (BMI) DOCUMENTED: ICD-10-PCS | Mod: CPTII,S$GLB,, | Performed by: NURSE PRACTITIONER

## 2023-04-04 PROCEDURE — 99214 PR OFFICE/OUTPT VISIT, EST, LEVL IV, 30-39 MIN: ICD-10-PCS | Mod: 25,S$GLB,, | Performed by: NURSE PRACTITIONER

## 2023-04-04 PROCEDURE — 1159F PR MEDICATION LIST DOCUMENTED IN MEDICAL RECORD: ICD-10-PCS | Mod: CPTII,S$GLB,, | Performed by: NURSE PRACTITIONER

## 2023-04-04 PROCEDURE — 3051F HG A1C>EQUAL 7.0%<8.0%: CPT | Mod: CPTII,S$GLB,, | Performed by: NURSE PRACTITIONER

## 2023-04-04 PROCEDURE — 4010F PR ACE/ARB THEARPY RXD/TAKEN: ICD-10-PCS | Mod: CPTII,S$GLB,, | Performed by: NURSE PRACTITIONER

## 2023-04-04 PROCEDURE — 1160F PR REVIEW ALL MEDS BY PRESCRIBER/CLIN PHARMACIST DOCUMENTED: ICD-10-PCS | Mod: CPTII,S$GLB,, | Performed by: NURSE PRACTITIONER

## 2023-04-04 PROCEDURE — 3074F SYST BP LT 130 MM HG: CPT | Mod: CPTII,S$GLB,, | Performed by: NURSE PRACTITIONER

## 2023-04-04 PROCEDURE — 99999 PR PBB SHADOW E&M-EST. PATIENT-LVL IV: CPT | Mod: PBBFAC,,, | Performed by: NURSE PRACTITIONER

## 2023-04-04 PROCEDURE — 90471 PNEUMOCOCCAL CONJUGATE VACCINE 20-VALENT: ICD-10-PCS | Mod: S$GLB,,, | Performed by: NURSE PRACTITIONER

## 2023-04-04 RX ORDER — ATORVASTATIN CALCIUM 10 MG/1
10 TABLET, FILM COATED ORAL DAILY
Qty: 90 TABLET | Refills: 3 | Status: SHIPPED | OUTPATIENT
Start: 2023-04-04 | End: 2024-04-01

## 2023-04-04 RX ORDER — DULAGLUTIDE 4.5 MG/.5ML
4.5 INJECTION, SOLUTION SUBCUTANEOUS
Qty: 12 PEN | Refills: 1 | Status: SHIPPED | OUTPATIENT
Start: 2023-04-04 | End: 2023-12-20 | Stop reason: SDUPTHER

## 2023-04-04 NOTE — PROGRESS NOTES
Pneumo.20 vaccine administered as ordered.  VIS given.  Tolerated well.  Told to wait in clinic for 15 mins.  Patient verbalized understanding.

## 2023-04-04 NOTE — PROGRESS NOTES
History of Present Illness   Iqra Duque is a 52 y.o. woman with medical history as listed below who presents today for routine follow-up, T2DM. She did have labs prior to our visit with worsening hemoglobin A1c now 7.7. She is taking medications as prescribed but admits she was out of the Trulicity for one month and was then unable to get the 4.5 mg dose. She reports no perceived SE. She does not monitor her glucose at home but denies symptoms of hypoglycemia. Complaints today: None. Pertinent negatives as listed in ROS. We will address HM today.    Past Medical History:   Diagnosis Date    Allergy     Cataract     Diabetes mellitus type II     Diverticulitis 09/2014    Fever blister     Hypertension     Migraine headache        Past Surgical History:   Procedure Laterality Date    COLONOSCOPY N/A 10/21/2019    Procedure: COLONOSCOPY;  Surgeon: Gomez Stark MD;  Location: [facility];  Service: Endoscopy;  Laterality: N/A;    EYE SURGERY Bilateral     cataract    HYSTERECTOMY  06/2009    KJB---DLH/BSO ATYPICAL HYPERPLASIA    OOPHORECTOMY      TUBAL LIGATION         Social History     Socioeconomic History    Marital status:    Occupational History    Occupation: Dispatcher     Employer: [employer]   Tobacco Use    Smoking status: Never     Passive exposure: Never    Smokeless tobacco: Never   Substance and Sexual Activity    Alcohol use: No    Drug use: No    Sexual activity: Yes     Partners: Male     Birth control/protection: None     Social Determinants of Health     Financial Resource Strain: Low Risk     Difficulty of Paying Living Expenses: Not hard at all   Food Insecurity: No Food Insecurity    Worried About Running Out of Food in the Last Year: Never true    Ran Out of Food in the Last Year: Never true   Transportation Needs: No Transportation Needs    Lack of Transportation (Medical): No    Lack of Transportation (Non-Medical): No   Physical Activity: Inactive    Days of Exercise  "per Week: 0 days    Minutes of Exercise per Session: 20 min   Stress: Stress Concern Present    Feeling of Stress : To some extent   Social Connections: Unknown    Frequency of Communication with Friends and Family: More than three times a week    Frequency of Social Gatherings with Friends and Family: Once a week    Active Member of Clubs or Organizations: No    Attends Club or Organization Meetings: Never    Marital Status:    Housing Stability: Low Risk     Unable to Pay for Housing in the Last Year: No    Number of Places Lived in the Last Year: 1    Unstable Housing in the Last Year: No       Family History   Problem Relation Age of Onset    Ovarian cancer Mother 43    Cancer Mother         ovarian    Diabetes Mother     Heart disease Father     Melanoma Neg Hx     Psoriasis Neg Hx     Lupus Neg Hx        Review of Systems  Review of Systems   Constitutional:  Negative for malaise/fatigue and weight loss.   Eyes:  Negative for blurred vision and double vision.   Respiratory:  Negative for shortness of breath.    Cardiovascular:  Negative for chest pain, palpitations, orthopnea, claudication, leg swelling and PND.   Gastrointestinal:  Negative for blood in stool and melena.   Genitourinary:  Negative for flank pain and hematuria.   Neurological:  Negative for dizziness, loss of consciousness and headaches.   Endo/Heme/Allergies:  Negative for polydipsia.     A complete review of systems was otherwise negative.    Physical Exam  /70   Pulse 108   Temp 97.8 °F (36.6 °C)   Ht 5' 5" (1.651 m)   Wt 128.3 kg (282 lb 13.6 oz)   SpO2 97%   BMI 47.07 kg/m²   General appearance: alert, appears stated age, cooperative, and no distress  Neck: no carotid bruit, no JVD, and supple, symmetrical, trachea midline  Lungs: clear to auscultation bilaterally  Heart: regular rate and rhythm, S1, S2 normal, no murmur, click, rub or gallop  Extremities: extremities normal, atraumatic, no cyanosis or edema  Pulses: 2+ " and symmetric  Skin: Skin color, texture, turgor normal. No rashes or lesions  Neurologic: Grossly normal    Assessment/Plan  Type 2 diabetes mellitus with both eyes affected by mild nonproliferative retinopathy without macular edema, without long-term current use of insulin  The current medical regimen is effective;  continue present plan and medications.  Increase Trulicity to 4.5 mg once out of the 3 mg dose.  Start low dose statin.  Return in 3 months for labs with VV follow-up.  -     Hemoglobin A1C; Future; Expected date: 07/04/2023  -     Basic Metabolic Panel; Future; Expected date: 07/04/2023  -     Lipid Panel; Future; Expected date: 07/04/2023  -     atorvastatin (LIPITOR) 10 MG tablet; Take 1 tablet (10 mg total) by mouth once daily.  Dispense: 90 tablet; Refill: 3  -     dulaglutide (TRULICITY) 4.5 mg/0.5 mL pen injector; Inject 4.5 mg into the skin every 7 days.  Dispense: 12 pen; Refill: 1    Essential hypertension  The current medical regimen is effective;  continue present plan and medications.    NAFLD (nonalcoholic fatty liver disease)  The current medical regimen is effective;  continue present plan and medications.  The patient is asked to make an attempt to improve diet and exercise patterns to aid in medical management of this problem.    Morbid obesity with BMI of 45.0-49.9, adult  The patient is asked to make an attempt to improve diet and exercise patterns to aid in medical management of this problem. We specifically discussed cutting calorie intake by 500-1000 calories per day for a goal of a 1-2 pound weight loss per week and recommendations for a mostly plant based diet with limited red meats/refined grains/processed foods/added sugars.    Vitamin D insufficiency  Not taking vitamin D supplementation, resume daily OTC dosing.  Repeat labs at 3 month follow-up.  -     Vitamin D; Future; Expected date: 07/04/2023    Iron deficiency anemia, unspecified iron deficiency anemia type  Resume  daily iron supplementation, okay to do skip day dosing to manage GI SE.  Repeat labs in 3 months.  -     Iron and TIBC; Future; Expected date: 07/04/2023  -     Ferritin; Future; Expected date: 07/04/2023  -     CBC Auto Differential; Future; Expected date: 07/04/2023    Need for pneumococcal vaccination  Given today.  -     (In Office Administered) Pneumococcal Conjugate Vaccine (20 Valent) (IM)    COVID-19 Omicron booster at pharmacy.    Patient has verbalized understanding and is in agreement with plan of care.    Follow up in about 3 months (around 7/4/2023) for labs and VV follow-up.

## 2023-04-26 ENCOUNTER — OFFICE VISIT (OUTPATIENT)
Dept: URGENT CARE | Facility: CLINIC | Age: 53
End: 2023-04-26
Payer: COMMERCIAL

## 2023-04-26 VITALS
TEMPERATURE: 98 F | SYSTOLIC BLOOD PRESSURE: 116 MMHG | HEIGHT: 65 IN | OXYGEN SATURATION: 97 % | DIASTOLIC BLOOD PRESSURE: 72 MMHG | BODY MASS INDEX: 46.98 KG/M2 | RESPIRATION RATE: 15 BRPM | HEART RATE: 73 BPM | WEIGHT: 282 LBS

## 2023-04-26 DIAGNOSIS — B34.9 VIRAL SYNDROME: Primary | ICD-10-CM

## 2023-04-26 DIAGNOSIS — R05.9 COUGH, UNSPECIFIED TYPE: ICD-10-CM

## 2023-04-26 LAB
CTP QC/QA: YES
CTP QC/QA: YES
POC MOLECULAR INFLUENZA A AGN: NEGATIVE
POC MOLECULAR INFLUENZA B AGN: NEGATIVE
SARS-COV-2 AG RESP QL IA.RAPID: NEGATIVE

## 2023-04-26 PROCEDURE — 99214 OFFICE O/P EST MOD 30 MIN: CPT | Mod: S$GLB,,,

## 2023-04-26 PROCEDURE — 87502 POCT INFLUENZA A/B MOLECULAR: ICD-10-PCS | Mod: QW,S$GLB,,

## 2023-04-26 PROCEDURE — 99214 PR OFFICE/OUTPT VISIT, EST, LEVL IV, 30-39 MIN: ICD-10-PCS | Mod: S$GLB,,,

## 2023-04-26 PROCEDURE — 87811 SARS-COV-2 COVID19 W/OPTIC: CPT | Mod: QW,S$GLB,,

## 2023-04-26 PROCEDURE — 87811 SARS CORONAVIRUS 2 ANTIGEN POCT, MANUAL READ: ICD-10-PCS | Mod: QW,S$GLB,,

## 2023-04-26 PROCEDURE — 87502 INFLUENZA DNA AMP PROBE: CPT | Mod: QW,S$GLB,,

## 2023-04-26 RX ORDER — BENZONATATE 200 MG/1
200 CAPSULE ORAL 3 TIMES DAILY PRN
Qty: 30 CAPSULE | Refills: 0 | Status: SHIPPED | OUTPATIENT
Start: 2023-04-26 | End: 2023-05-06

## 2023-04-26 RX ORDER — MECLIZINE HYDROCHLORIDE 25 MG/1
25 TABLET ORAL EVERY 6 HOURS PRN
Qty: 30 TABLET | Refills: 0 | Status: SHIPPED | OUTPATIENT
Start: 2023-04-26 | End: 2023-06-27 | Stop reason: SDUPTHER

## 2023-04-26 NOTE — LETTER
April 26, 2023      [facility]  [address], [address]  [place] [address]  Phone: [phone]  Fax: [phone]       Patient: Iqra Duque   YOB: 1970  Date of Visit: 04/26/2023    To Whom It May Concern:    Gertrude Duque  was at [facility] on 04/26/2023. The patient may return to work/school on 04/27/2023 with no restrictions. If you have any questions or concerns, or if I can be of further assistance, please do not hesitate to contact me.    Sincerely,    Rosamaria Whitman PA-C

## 2023-04-26 NOTE — PATIENT INSTRUCTIONS
Please drink plenty of fluids.  Please get plenty of rest.  Please utilize saltwater gargles for sore throat.  Please utilize warm tea, and lemon for sore throat relief.  Please utilize over-the-counter throat numbing spray and lozenges for sore throat relief.    Please return here or go to the Emergency Department for any concerns or worsening of condition.    Please take TESSALON for cough as needed.  Please continue to take your migraine medication as directed.  Please take MECLIZINE for vertigo.    If you do not have Hypertension or any history of palpitations, it is ok to take over the counter Sudafed or Mucinex D or Allegra-D or Claritin-D or Zyrtec-D.  If you do take one of the above, it is ok to combine that with plain over the counter Mucinex or Allegra or Claritin or Zyrtec.  If for example you are taking Zyrtec -D, you can combine that with Mucinex, but not Mucinex-D.  If you are taking Mucinex-D, you can combine that with plain Allegra or Claritin or Zyrtec.   If you do have Hypertension or palpitations, it is safe to take Coricidin HBP for relief of sinus symptoms.  We recommend you take over the counter Flonase (Fluticasone) or another nasally inhaled steroid unless you are already taking one.  Nasal irrigation with a saline spray or Netti Pot like device per their directions is also recommended.  If not allergic, please take over the counter Tylenol (Acetaminophen) and/or Motrin (Ibuprofen) as directed for control of pain and/or fever.  Please follow up with your primary care doctor or specialist as needed.    If you  smoke, please stop smoking.

## 2023-04-26 NOTE — PROGRESS NOTES
"Subjective:      Patient ID: Iqra Duque is a 52 y.o. female.    Vitals:  height is 5' 5" (1.651 m) and weight is 127.9 kg (282 lb). Her temperature is 98 °F (36.7 °C). Her blood pressure is 116/72 and her pulse is 73. Her respiration is 15 and oxygen saturation is 97%.     Chief Complaint: Headache (Migraine, vertigo, off balance, generally not feeling well since Friday. - Entered by patient)    Patient states that she is having a migraine since Friday. She states that she also had some episodes of diarrhea on Friday that have now resolved. Yesterday she developed a dry cough with some PND. She states that she has a history of vertigo that has worsened since symptoms started. She states that when she takes her migraine medication, her migraine improves but it comes back. Patient denies any injury/trauma to the head. She denies fever, chills, CP, SOB, abdominal pain, vomiting, changes in vision.    Headache   This is a recurrent problem. The current episode started in the past 7 days. The problem occurs intermittently. The problem has been unchanged. The pain is located in the Left unilateral (migraine headaches are typically on the left side) region. Radiates to: to the other side or entire head. The pain quality is similar to prior headaches. The quality of the pain is described as squeezing, band-like and dull. The pain is at a severity of 3/10. Associated symptoms include coughing, dizziness (room spinning sensation), a loss of balance, nausea, sinus pressure and tinnitus. Pertinent negatives include no abdominal pain, abnormal behavior, anorexia, back pain, blurred vision, drainage, ear pain, eye pain, eye redness, eye watering, facial sweating, fever, hearing loss, insomnia, muscle aches, neck pain, numbness, phonophobia, photophobia, rhinorrhea, scalp tenderness, seizures, sore throat, swollen glands, tingling, visual change, vomiting, weakness or weight loss. The symptoms are aggravated by bright light " and noise. She has tried Excedrin for the symptoms. The treatment provided mild relief. Her past medical history is significant for hypertension, migraine headaches and obesity. There is no history of cancer, cluster headaches, immunosuppression, pseudotumor cerebri, recent head traumas, sinus disease or TMJ.     Constitution: Negative for chills and fever.   HENT:  Positive for tinnitus and sinus pressure. Negative for ear pain, hearing loss and sore throat.    Neck: Negative for neck pain.   Cardiovascular:  Negative for chest pain and sob on exertion.   Eyes:  Negative for eye pain, eye redness, photophobia and blurred vision.   Respiratory:  Positive for cough. Negative for sputum production, bloody sputum, shortness of breath and wheezing.    Gastrointestinal:  Positive for nausea and diarrhea (resolved). Negative for abdominal pain and vomiting.   Musculoskeletal:  Negative for back pain.   Neurological:  Positive for dizziness (room spinning sensation), loss of balance, headaches and history of migraines. Negative for numbness and seizures.   Psychiatric/Behavioral:  The patient does not have insomnia.     Objective:     Physical Exam   Constitutional: She is oriented to person, place, and time.  Non-toxic appearance. She does not appear ill. No distress.      Comments:Patient is in no acute distress. Patient is sitting in a dimmed room.   obesity  HENT:   Head: Normocephalic.   Ears:   Right Ear: Tympanic membrane, external ear and ear canal normal. impacted cerumen  Left Ear: Tympanic membrane, external ear and ear canal normal. impacted cerumen  Nose: Congestion present. No rhinorrhea.   Mouth/Throat: No oropharyngeal exudate or posterior oropharyngeal erythema. Oropharynx is clear.   Eyes: Conjunctivae are normal. Pupils are equal, round, and reactive to light. Right eye exhibits no discharge. Left eye exhibits no discharge. No scleral icterus. Extraocular movement intact   Cardiovascular: Normal rate,  regular rhythm and normal heart sounds.   No murmur heard.Exam reveals no gallop and no friction rub.   Pulmonary/Chest: Effort normal and breath sounds normal. No stridor. No respiratory distress. She has no wheezes. She has no rhonchi. She has no rales. She exhibits no tenderness.         Comments: Patient is in no respiratory distress. Breath sounds even, unlabored, and clear to auscultation bilaterally. No accessory muscle usage. Patient able to speak in complete sentences with ease.    Abdominal: Normal appearance and bowel sounds are normal. She exhibits no distension and no mass. Soft. There is no abdominal tenderness. There is no rebound, no guarding, no left CVA tenderness and no right CVA tenderness. No hernia.      Comments: When laying down, patient states that she feels a room spinning sensation.   Lymphadenopathy:     She has cervical adenopathy.   Neurological: no focal deficit. She is alert, oriented to person, place, and time and at baseline. She has normal motor skills and normal sensation. She displays no weakness, facial symmetry and normal reflexes. No cranial nerve deficit or sensory deficit. She exhibits normal muscle tone. She has a normal Finger-Nose-Finger Test. Gait and coordination normal. Coordination and gait normal.   Reflex Scores:       Patellar reflexes are 2+ on the right side and 2+ on the left side.  Skin: Skin is not diaphoretic.   Nursing note and vitals reviewed.    Results for orders placed or performed in visit on 04/26/23   SARS Coronavirus 2 Antigen, POCT Manual Read   Result Value Ref Range    SARS Coronavirus 2 Antigen Negative Negative     Acceptable Yes    POCT Influenza A/B MOLECULAR   Result Value Ref Range    POC Molecular Influenza A Ag Negative Negative, Not Reported    POC Molecular Influenza B Ag Negative Negative, Not Reported     Acceptable Yes      Orthostatic vitals:   Laying: /77, HR 89   Standing: /80, HR  107   Standing: /84, HR 97    Assessment:     1. Viral syndrome    2. Cough, unspecified type      Plan:   Previous notes reviewed.  Vital signs reviewed.  Labs ordered. Labs reviewed.  Discussed Viral syndrome, home care, tx options, and given follow up precautions.  Patient was briefed on my thought process and diagnosis.   Patient involved with the treatment plan and agreed to the plan.  Patient informed on warning signs, patient understood warning signs and to go to urgent care or ER if warning signs appear.    Will treat as viral syndrome. Patient has a history of vertigo and migraine which are likely being exasperated due to viral etiology.  Will treat symptoms, have patient continue to monitor symptoms and given strict ER precautions.    Patient Instructions   Please drink plenty of fluids.  Please get plenty of rest.  Please utilize saltwater gargles for sore throat.  Please utilize warm tea, and lemon for sore throat relief.  Please utilize over-the-counter throat numbing spray and lozenges for sore throat relief.    Please return here or go to the Emergency Department for any concerns or worsening of condition.    Please take TESSALON for cough as needed.  Please continue to take your migraine medication as directed.  Please take MECLIZINE for vertigo.    If you do not have Hypertension or any history of palpitations, it is ok to take over the counter Sudafed or Mucinex D or Allegra-D or Claritin-D or Zyrtec-D.  If you do take one of the above, it is ok to combine that with plain over the counter Mucinex or Allegra or Claritin or Zyrtec.  If for example you are taking Zyrtec -D, you can combine that with Mucinex, but not Mucinex-D.  If you are taking Mucinex-D, you can combine that with plain Allegra or Claritin or Zyrtec.   If you do have Hypertension or palpitations, it is safe to take Coricidin HBP for relief of sinus symptoms.  We recommend you take over the counter Flonase (Fluticasone) or  another nasally inhaled steroid unless you are already taking one.  Nasal irrigation with a saline spray or Netti Pot like device per their directions is also recommended.  If not allergic, please take over the counter Tylenol (Acetaminophen) and/or Motrin (Ibuprofen) as directed for control of pain and/or fever.  Please follow up with your primary care doctor or specialist as needed.    If you  smoke, please stop smoking.    Viral syndrome  -     benzonatate (TESSALON) 200 MG capsule; Take 1 capsule (200 mg total) by mouth 3 (three) times daily as needed for Cough.  Dispense: 30 capsule; Refill: 0  -     meclizine (ANTIVERT) 25 mg tablet; Take 1 tablet (25 mg total) by mouth every 6 (six) hours as needed for Dizziness.  Dispense: 30 tablet; Refill: 0    Cough, unspecified type  -     SARS Coronavirus 2 Antigen, POCT Manual Read  -     POCT Influenza A/B MOLECULAR      LauraFelipa Whitman PA-C

## 2023-06-21 DIAGNOSIS — E11.9 TYPE 2 DIABETES MELLITUS WITHOUT COMPLICATION: ICD-10-CM

## 2023-06-26 ENCOUNTER — PATIENT MESSAGE (OUTPATIENT)
Dept: FAMILY MEDICINE | Facility: CLINIC | Age: 53
End: 2023-06-26
Payer: COMMERCIAL

## 2023-06-27 ENCOUNTER — OFFICE VISIT (OUTPATIENT)
Dept: NEUROLOGY | Facility: CLINIC | Age: 53
End: 2023-06-27
Payer: COMMERCIAL

## 2023-06-27 VITALS
WEIGHT: 281.94 LBS | SYSTOLIC BLOOD PRESSURE: 106 MMHG | DIASTOLIC BLOOD PRESSURE: 74 MMHG | HEART RATE: 103 BPM | HEIGHT: 65 IN | BODY MASS INDEX: 46.98 KG/M2

## 2023-06-27 DIAGNOSIS — R42 DIZZINESS: ICD-10-CM

## 2023-06-27 DIAGNOSIS — G43.009 MIGRAINE WITHOUT AURA AND WITHOUT STATUS MIGRAINOSUS, NOT INTRACTABLE: Primary | ICD-10-CM

## 2023-06-27 PROCEDURE — 4010F PR ACE/ARB THEARPY RXD/TAKEN: ICD-10-PCS | Mod: CPTII,S$GLB,,

## 2023-06-27 PROCEDURE — 1160F PR REVIEW ALL MEDS BY PRESCRIBER/CLIN PHARMACIST DOCUMENTED: ICD-10-PCS | Mod: CPTII,S$GLB,,

## 2023-06-27 PROCEDURE — 4010F ACE/ARB THERAPY RXD/TAKEN: CPT | Mod: CPTII,S$GLB,,

## 2023-06-27 PROCEDURE — 3074F SYST BP LT 130 MM HG: CPT | Mod: CPTII,S$GLB,,

## 2023-06-27 PROCEDURE — 3078F PR MOST RECENT DIASTOLIC BLOOD PRESSURE < 80 MM HG: ICD-10-PCS | Mod: CPTII,S$GLB,,

## 2023-06-27 PROCEDURE — 99999 PR PBB SHADOW E&M-EST. PATIENT-LVL IV: CPT | Mod: PBBFAC,,,

## 2023-06-27 PROCEDURE — 3008F PR BODY MASS INDEX (BMI) DOCUMENTED: ICD-10-PCS | Mod: CPTII,S$GLB,,

## 2023-06-27 PROCEDURE — 99214 OFFICE O/P EST MOD 30 MIN: CPT | Mod: S$GLB,,,

## 2023-06-27 PROCEDURE — 3078F DIAST BP <80 MM HG: CPT | Mod: CPTII,S$GLB,,

## 2023-06-27 PROCEDURE — 1159F PR MEDICATION LIST DOCUMENTED IN MEDICAL RECORD: ICD-10-PCS | Mod: CPTII,S$GLB,,

## 2023-06-27 PROCEDURE — 99214 PR OFFICE/OUTPT VISIT, EST, LEVL IV, 30-39 MIN: ICD-10-PCS | Mod: S$GLB,,,

## 2023-06-27 PROCEDURE — 1159F MED LIST DOCD IN RCRD: CPT | Mod: CPTII,S$GLB,,

## 2023-06-27 PROCEDURE — 1160F RVW MEDS BY RX/DR IN RCRD: CPT | Mod: CPTII,S$GLB,,

## 2023-06-27 PROCEDURE — 3008F BODY MASS INDEX DOCD: CPT | Mod: CPTII,S$GLB,,

## 2023-06-27 PROCEDURE — 99999 PR PBB SHADOW E&M-EST. PATIENT-LVL IV: ICD-10-PCS | Mod: PBBFAC,,,

## 2023-06-27 PROCEDURE — 3074F PR MOST RECENT SYSTOLIC BLOOD PRESSURE < 130 MM HG: ICD-10-PCS | Mod: CPTII,S$GLB,,

## 2023-06-27 PROCEDURE — 3051F PR MOST RECENT HEMOGLOBIN A1C LEVEL 7.0 - < 8.0%: ICD-10-PCS | Mod: CPTII,S$GLB,,

## 2023-06-27 PROCEDURE — 3051F HG A1C>EQUAL 7.0%<8.0%: CPT | Mod: CPTII,S$GLB,,

## 2023-06-27 RX ORDER — MECLIZINE HYDROCHLORIDE 25 MG/1
25 TABLET ORAL EVERY 6 HOURS PRN
Qty: 30 TABLET | Refills: 11 | Status: SHIPPED | OUTPATIENT
Start: 2023-06-27

## 2023-06-27 NOTE — PROGRESS NOTES
[facility] NEUROLOGY  [facility], [place]    Date: 6/27/23  Patient Name: Iqra Duque   MRN: [id number]   PCP: Jaylon Kothari  Referring Provider: No ref. provider found    Chief Complaint: Follow up  Subjective:        HPI:     6/27/23: Patient presents for follow up headaches and migraines. She reports about 2 migraines per month at least, but may have up to 3 or 4. Endorses frequent near daily low grade headaches that do not last all day.     She presents to discuss completion of [place] paperwork for her job as she has missed 7 days of work (2-3 per month) this year already due to debilitating migraines that impact her her ability to function at her job. Note that she does work 12 hour shifts.     Rizatriptan has not been effective for acute relief of migraine. She would like to discuss other acute medications. Would like to defer preventative at this time and focus on lifestyle approaches.  Acute medications failed: rizatriptan (ineffective), sumatriptan (ineffective)    Endorses frequent dizziness, and vertigo. Endorses that it occurs often after standing up. Meclizine has been effective for relief in the past.    ====================================  10/10/22:  Ms. Iqra Duque is a 52 y.o. female presenting for follow up. Estimates that last headache occurred about one month ago. She received a new CPAP machine mid September after her PCP appointment and has been getting better quality sleep. Since then, she has had significant improvements in headaches. As rizatriptan has been effective in the past, she would like to continue this medication as needed for acute headache.    No new neurological complaints or concerns.  ===========================================  Per documentation by Dr. Aniceto Anderson DO on 8/9/22:  Ms. Iqra Duque presents today to discuss their ongoing uncontrolled headaches.  The patient shares that she has of long-time history of migraines but experienced a  significant decrease in headache activity after a hysterectomy and starting CPAP therapy for obstructive sleep apnea.  She has been unable to use CPAP for a year due to supply chain issues related to COVID 19 pandemic.  Over the course of the last year, the patient has noted a slow increase in the number of headaches suffered per month.  She has frequent lower level morning time headaches that had previously resolved with CPAP use.  Estimates that average migraine frequency was 1 per month or 1 every other month a couple of years ago.  Not experiencing 3-4 migraines per month.  Overall headache qualities in features are unchanged for many years.    Patient describes short lasting (seconds) visual phenomenon related to migraine.  Can be glittering bilateral or a single hazy color added to bilateral visual fields for 1-2 seconds. VARS 0.    Age of onset: 10  When did they become more of a problem: about a year  # of Total headache days per month: 15  # of Migraine or severe days per month: 3-4  Intensity of average and most severe headaches: 5-6/10, 9/10  Duration of headache pain: >4hrs untreated   Quality of pain: Pulsating/Throbbing  Laterality: left side mostly, not side locked  Location: left eye and radiation back through temporal region  Photophobia and phonophobia: yes  Nausea and vomiting: yes  Causes avoidance of physical activity: yes  Worsened by physical activity: yes  Triggers: lack of sleep, skipping meals  Preventive Medications failed: none  Acute medications failed: sumatriptan (>3 months, intolerable side effects), rizatriptan (new, thinks its working)  Supplements: none  OTC Medications: none  Hx of (Bolded items are positive): depression, anxiety, fibromyalgia, autoimmune disorder, head trauma, neurological infection, glaucoma, asthma, nephrolithiasis, MI, Stroke, Raynaud's phen.  Is medication overuse contributing to the patient's current migraine burden: no  Aura: no  Autonomic symptoms:  no  Family Hx of migraines: mother, aunt    The VARS score is the weighted sum of the presence of five visual symptom characteristics:    duration 5-60 min (3 points) 3   develops gradually ?5 min (2 points) 2   scotoma (2 points) 2   zig?zag lines (2 points) 2   and unilateral (1 point) 1   Total points:  0  The maximum score is 10 points.    A VARS score of 5 or more diagnosed MA    sensitivity of 96% (95% CI 92-99%)   specificity of 98%(95% CI %)     Current HA Regimen:  Rizatriptan 5mg tab prn    LATEST IMAGING:  None    PAST MEDICAL HISTORY:  Past Medical History:   Diagnosis Date    Allergy     Cataract     Diabetes mellitus type II     Diverticulitis 09/2014    Fever blister     Hypertension     Migraine headache        PAST SURGICAL HISTORY:  Past Surgical History:   Procedure Laterality Date    COLONOSCOPY N/A 10/21/2019    Procedure: COLONOSCOPY;  Surgeon: Gomez Stark MD;  Location: [facility];  Service: Endoscopy;  Laterality: N/A;    EYE SURGERY Bilateral     cataract    HYSTERECTOMY  06/2009    KJB---DLH/BSO ATYPICAL HYPERPLASIA    OOPHORECTOMY      TUBAL LIGATION         CURRENT MEDS:  Current Outpatient Medications   Medication Sig Dispense Refill    atorvastatin (LIPITOR) 10 MG tablet Take 1 tablet (10 mg total) by mouth once daily. 90 tablet 3    cetirizine (ZYRTEC) 10 MG tablet Take 10 mg by mouth once daily.      dulaglutide (TRULICITY) 4.5 mg/0.5 mL pen injector Inject 4.5 mg into the skin every 7 days. 12 pen 1    lisinopriL-hydrochlorothiazide (PRINZIDE,ZESTORETIC) 20-12.5 mg per tablet TAKE 1 TABLET BY MOUTH EVERY DAY 90 tablet 3    metFORMIN (GLUCOPHAGE-XR) 500 MG ER 24hr tablet TAKE 2 TABLETS BY MOUTH TWICE A DAY WITH MEALS 360 tablet 1    montelukast (SINGULAIR) 10 mg tablet TAKE 1 TABLET BY MOUTH EVERY DAY 90 tablet 3    omeprazole (PRILOSEC) 20 MG capsule TAKE 1 CAPSULE BY MOUTH EVERY DAY IN THE MORNING 90 capsule 3    rizatriptan (MAXALT-MLT) 10 MG disintegrating tablet Take 1  tablet (10 mg total) by mouth as needed for Migraine. May repeat in 2 hours if needed. Max 20mg/24hrs 12 tablet 11    CONTOUR TEST STRIPS Strp TEST ONCE DAILY (Patient not taking: Reported on 4/4/2023) 100 strip 1    meclizine (ANTIVERT) 25 mg tablet Take 1 tablet (25 mg total) by mouth every 6 (six) hours as needed for Dizziness. (Patient not taking: Reported on 6/27/2023) 30 tablet 0    MICROLET LANCET Misc TEST ONCE DAILY (Patient not taking: Reported on 4/4/2023) 100 each 3     No current facility-administered medications for this visit.       ALLERGIES:  Review of patient's allergies indicates:   Allergen Reactions    Bactrim [sulfamethoxazole-trimethoprim] Hives     Also Septra    Keflex [cephalexin] Shortness Of Breath    Neosporin (neomycin-polymyx) Swelling    Sumatriptan Other (See Comments)     Pain sensation throughout the body like migraine spreading    Amoxicillin Rash    Neosporin  [benzalkonium chloride] Rash    Sulfa (sulfonamide antibiotics) Rash       FAMILY HISTORY:  Family History   Problem Relation Age of Onset    Ovarian cancer Mother 43    Cancer Mother         ovarian    Diabetes Mother     Heart disease Father     Melanoma Neg Hx     Psoriasis Neg Hx     Lupus Neg Hx        SOCIAL HISTORY:  Social History     Tobacco Use    Smoking status: Never     Passive exposure: Never    Smokeless tobacco: Never   Substance Use Topics    Alcohol use: No    Drug use: No       Review of Systems:  Gen: no fever, no chills, no generalized feeling of weakness   HEENT: no double vision, no blurred vision, no eye pain, no eye exudates. no nasal congestion,   no traumatic injury of head, no neck pain, no neck stiffness. no photophobia or phonophobia at this time. ?    Heart: no chest pain, no SOB    Lungs: no SOB, no cough    MSK: no weakness of legs, intact ROM    ABD: no abd pain, no N/V/D/C, no difficulty with defecation.    Extremities: No leg pain, no edema.       Objective:   No vitals due to nature of  telemedicine encounter.     GENERAL: Pleasant, well-appearing in no acute distress.     HEENT: Head normorcephalic, atraumatic.     PULMONARY: Breathing comfortably on room air.     NEURO:   Mental status: Awake, alert, and oriented to person, place, time, and situation. Speech clear, fluent.     Cranial nerves:     III/IV/VI: EOMI. No nystagmus.     VII: Facial expressions full and symmetric.     VIII: Hearing intact to voice.     XII: Tongue protrusion midline.     Motor exam: Moves all four extremities spontaneously. No pronator drift. No orbiting. Normal rapid tapping. No abnormal movements. No tremor.     Coordination: Finger to nose intact bilaterally.     Gait: Normal gait. Able to walk on heels, toes, and in tandem.       Assessment:   Iqra Duque is a 52 y.o. female presenting for follow up. Will initiate Nurtec for acute relief      Plan:     Problem List Items Addressed This Visit    None    1. Preventive medications:    No preventive medications at this time.      2. Acute (abortive) medications- these medications are to be taken only at the onset of severe headache, and please limit the total of any combination of these medications to less than 10 days per month on average because they can cause medication overuse headaches.   Failed:   Sumatriptan (ineffective)  Rizatriptan (ineffective)    Nurtec (rimegepant) is a new medication that belongs to the class of anti-CGRP drugs recommended for acute treatment or migraine.?   Doses available: 75mg ODT   Please take a first dose and if the headache is not significantly improved. Do not take more than one dose in 24hrs.   Even though very rare, side effects are possible and include nausea, somnolence, dry mouth. Do not take with severe liver or kidney disease. Do not take if you are pregnant or plan to become pregnant. This medication is safe for heart.?   Usually the insurance approves 10 tablets a month.?      3. Natural approaches to increasing brain  energy and serotonin:    SAMe 200 mg a day    Vitamin B2 400 mg daily    Vitamin B12 1000 mcg daily    Getting early morning light to increase natural serotonin, melatonin. Could also consider light therapy box, 10,000 LUX.     4. Headache prevention and acute treatment over-the counter supplements    Boswellia 800 mg 2-3 times per day, example brand Samir's, natural anti inflammatory herb    Sleep? modulation- melatonin at night 5 to 10 mg 30 minutes prior to sleep    Feverfew Tea 1-3 cups per day. Can get from [employer] or [email or web address]- A Natural anti inflammatory approach that does not cause further sensitization of the system.    Magnesium Citrate 250 mg daily, slowly increase up to 500-1000 mg daily. Side effect may include diarrhea, so we recommend stopping at whatever dose is comfortable for your body without causing this side effect. This supplement can be very helpful for preventing headache, preventing migraine aura, calming nerves, muscles and even mood. Recommend starting slowly, as it can also lead to increased bowel movements or diarrhea.?     5. Vitamin B12 lab level for dizziness and history of borderline levels    6. Meclizine and home exercises for vertigo    7. Will fill out paperwork for FMLA once delivered to our office    Follow up in 3 months or sooner as needed.    I spent a total of 30 minutes on the day of the visit. This includes face to face time and non-face to face time preparing to see the patient (eg, review of tests), obtaining and/or reviewing separately obtained history, documenting clinical information in the electronic or other health record, independently interpreting results and communicating results to the patient/family/caregiver, or care coordinator. Aniceto Anderson DO was available in clinic throughout this encounter.     Ellen Tafoya PA-C

## 2023-06-28 ENCOUNTER — TELEPHONE (OUTPATIENT)
Dept: PHARMACY | Facility: CLINIC | Age: 53
End: 2023-06-28
Payer: COMMERCIAL

## 2023-07-01 ENCOUNTER — PATIENT MESSAGE (OUTPATIENT)
Dept: NEUROLOGY | Facility: CLINIC | Age: 53
End: 2023-07-01
Payer: COMMERCIAL

## 2023-07-01 ENCOUNTER — LAB VISIT (OUTPATIENT)
Dept: LAB | Facility: HOSPITAL | Age: 53
End: 2023-07-01
Attending: NURSE PRACTITIONER
Payer: COMMERCIAL

## 2023-07-01 DIAGNOSIS — D50.9 IRON DEFICIENCY ANEMIA, UNSPECIFIED IRON DEFICIENCY ANEMIA TYPE: ICD-10-CM

## 2023-07-01 DIAGNOSIS — E11.3293 TYPE 2 DIABETES MELLITUS WITH BOTH EYES AFFECTED BY MILD NONPROLIFERATIVE RETINOPATHY WITHOUT MACULAR EDEMA, WITHOUT LONG-TERM CURRENT USE OF INSULIN: Chronic | ICD-10-CM

## 2023-07-01 DIAGNOSIS — E55.9 VITAMIN D INSUFFICIENCY: Chronic | ICD-10-CM

## 2023-07-01 LAB
25(OH)D3+25(OH)D2 SERPL-MCNC: 26 NG/ML (ref 30–96)
ANION GAP SERPL CALC-SCNC: 19 MMOL/L (ref 8–16)
BASOPHILS # BLD AUTO: 0.05 K/UL (ref 0–0.2)
BASOPHILS NFR BLD: 0.8 % (ref 0–1.9)
BUN SERPL-MCNC: 18 MG/DL (ref 6–20)
CALCIUM SERPL-MCNC: 9.6 MG/DL (ref 8.7–10.5)
CHLORIDE SERPL-SCNC: 102 MMOL/L (ref 95–110)
CHOLEST SERPL-MCNC: 127 MG/DL (ref 120–199)
CHOLEST/HDLC SERPL: 3 {RATIO} (ref 2–5)
CO2 SERPL-SCNC: 22 MMOL/L (ref 23–29)
CREAT SERPL-MCNC: 0.8 MG/DL (ref 0.5–1.4)
DIFFERENTIAL METHOD: ABNORMAL
EOSINOPHIL # BLD AUTO: 0.4 K/UL (ref 0–0.5)
EOSINOPHIL NFR BLD: 5.3 % (ref 0–8)
ERYTHROCYTE [DISTWIDTH] IN BLOOD BY AUTOMATED COUNT: 16.4 % (ref 11.5–14.5)
EST. GFR  (NO RACE VARIABLE): >60 ML/MIN/1.73 M^2
ESTIMATED AVG GLUCOSE: 183 MG/DL (ref 68–131)
FERRITIN SERPL-MCNC: 18 NG/ML (ref 20–300)
GLUCOSE SERPL-MCNC: 188 MG/DL (ref 70–110)
HBA1C MFR BLD: 8 % (ref 4–5.6)
HCT VFR BLD AUTO: 39.9 % (ref 37–48.5)
HDLC SERPL-MCNC: 43 MG/DL (ref 40–75)
HDLC SERPL: 33.9 % (ref 20–50)
HGB BLD-MCNC: 12.3 G/DL (ref 12–16)
IMM GRANULOCYTES # BLD AUTO: 0.01 K/UL (ref 0–0.04)
IMM GRANULOCYTES NFR BLD AUTO: 0.2 % (ref 0–0.5)
IRON SERPL-MCNC: 33 UG/DL (ref 30–160)
LDLC SERPL CALC-MCNC: 57.6 MG/DL (ref 63–159)
LYMPHOCYTES # BLD AUTO: 2.3 K/UL (ref 1–4.8)
LYMPHOCYTES NFR BLD: 34.8 % (ref 18–48)
MCH RBC QN AUTO: 24.3 PG (ref 27–31)
MCHC RBC AUTO-ENTMCNC: 30.8 G/DL (ref 32–36)
MCV RBC AUTO: 79 FL (ref 82–98)
MONOCYTES # BLD AUTO: 0.4 K/UL (ref 0.3–1)
MONOCYTES NFR BLD: 6 % (ref 4–15)
NEUTROPHILS # BLD AUTO: 3.5 K/UL (ref 1.8–7.7)
NEUTROPHILS NFR BLD: 52.9 % (ref 38–73)
NONHDLC SERPL-MCNC: 84 MG/DL
NRBC BLD-RTO: 0 /100 WBC
PLATELET # BLD AUTO: 294 K/UL (ref 150–450)
PMV BLD AUTO: 10.1 FL (ref 9.2–12.9)
POTASSIUM SERPL-SCNC: 4.7 MMOL/L (ref 3.5–5.1)
RBC # BLD AUTO: 5.06 M/UL (ref 4–5.4)
SATURATED IRON: 7 % (ref 20–50)
SODIUM SERPL-SCNC: 143 MMOL/L (ref 136–145)
TOTAL IRON BINDING CAPACITY: 468 UG/DL (ref 250–450)
TRANSFERRIN SERPL-MCNC: 316 MG/DL (ref 200–375)
TRIGL SERPL-MCNC: 132 MG/DL (ref 30–150)
WBC # BLD AUTO: 6.64 K/UL (ref 3.9–12.7)

## 2023-07-01 PROCEDURE — 85025 COMPLETE CBC W/AUTO DIFF WBC: CPT | Performed by: NURSE PRACTITIONER

## 2023-07-01 PROCEDURE — 82728 ASSAY OF FERRITIN: CPT | Performed by: NURSE PRACTITIONER

## 2023-07-01 PROCEDURE — 36415 COLL VENOUS BLD VENIPUNCTURE: CPT | Mod: PO | Performed by: NURSE PRACTITIONER

## 2023-07-01 PROCEDURE — 83036 HEMOGLOBIN GLYCOSYLATED A1C: CPT | Performed by: NURSE PRACTITIONER

## 2023-07-01 PROCEDURE — 82306 VITAMIN D 25 HYDROXY: CPT | Performed by: NURSE PRACTITIONER

## 2023-07-01 PROCEDURE — 80061 LIPID PANEL: CPT | Performed by: NURSE PRACTITIONER

## 2023-07-01 PROCEDURE — 80048 BASIC METABOLIC PNL TOTAL CA: CPT | Performed by: NURSE PRACTITIONER

## 2023-07-01 PROCEDURE — 84466 ASSAY OF TRANSFERRIN: CPT | Performed by: NURSE PRACTITIONER

## 2023-07-03 ENCOUNTER — PATIENT MESSAGE (OUTPATIENT)
Dept: NEUROLOGY | Facility: CLINIC | Age: 53
End: 2023-07-03
Payer: COMMERCIAL

## 2023-07-21 ENCOUNTER — OFFICE VISIT (OUTPATIENT)
Dept: FAMILY MEDICINE | Facility: CLINIC | Age: 53
End: 2023-07-21
Payer: COMMERCIAL

## 2023-07-21 DIAGNOSIS — E11.3293 TYPE 2 DIABETES MELLITUS WITH BOTH EYES AFFECTED BY MILD NONPROLIFERATIVE RETINOPATHY WITHOUT MACULAR EDEMA, WITHOUT LONG-TERM CURRENT USE OF INSULIN: Primary | Chronic | ICD-10-CM

## 2023-07-21 DIAGNOSIS — E66.01 MORBID OBESITY WITH BMI OF 45.0-49.9, ADULT: Chronic | ICD-10-CM

## 2023-07-21 DIAGNOSIS — K76.0 NAFLD (NONALCOHOLIC FATTY LIVER DISEASE): Chronic | ICD-10-CM

## 2023-07-21 DIAGNOSIS — I10 ESSENTIAL HYPERTENSION: Chronic | ICD-10-CM

## 2023-07-21 DIAGNOSIS — E55.9 VITAMIN D INSUFFICIENCY: Chronic | ICD-10-CM

## 2023-07-21 PROCEDURE — 1160F PR REVIEW ALL MEDS BY PRESCRIBER/CLIN PHARMACIST DOCUMENTED: ICD-10-PCS | Mod: CPTII,95,, | Performed by: NURSE PRACTITIONER

## 2023-07-21 PROCEDURE — 4010F PR ACE/ARB THEARPY RXD/TAKEN: ICD-10-PCS | Mod: CPTII,95,, | Performed by: NURSE PRACTITIONER

## 2023-07-21 PROCEDURE — 1160F RVW MEDS BY RX/DR IN RCRD: CPT | Mod: CPTII,95,, | Performed by: NURSE PRACTITIONER

## 2023-07-21 PROCEDURE — 3052F HG A1C>EQUAL 8.0%<EQUAL 9.0%: CPT | Mod: CPTII,95,, | Performed by: NURSE PRACTITIONER

## 2023-07-21 PROCEDURE — 99214 OFFICE O/P EST MOD 30 MIN: CPT | Mod: 95,,, | Performed by: NURSE PRACTITIONER

## 2023-07-21 PROCEDURE — 99214 PR OFFICE/OUTPT VISIT, EST, LEVL IV, 30-39 MIN: ICD-10-PCS | Mod: 95,,, | Performed by: NURSE PRACTITIONER

## 2023-07-21 PROCEDURE — 4010F ACE/ARB THERAPY RXD/TAKEN: CPT | Mod: CPTII,95,, | Performed by: NURSE PRACTITIONER

## 2023-07-21 PROCEDURE — 1159F PR MEDICATION LIST DOCUMENTED IN MEDICAL RECORD: ICD-10-PCS | Mod: CPTII,95,, | Performed by: NURSE PRACTITIONER

## 2023-07-21 PROCEDURE — 1159F MED LIST DOCD IN RCRD: CPT | Mod: CPTII,95,, | Performed by: NURSE PRACTITIONER

## 2023-07-21 PROCEDURE — 3052F PR MOST RECENT HEMOGLOBIN A1C LEVEL 8.0 - < 9.0%: ICD-10-PCS | Mod: CPTII,95,, | Performed by: NURSE PRACTITIONER

## 2023-07-21 RX ORDER — ERGOCALCIFEROL 1.25 MG/1
50000 CAPSULE ORAL
Qty: 12 CAPSULE | Refills: 1 | Status: SHIPPED | OUTPATIENT
Start: 2023-07-21 | End: 2024-01-08

## 2023-07-21 NOTE — PROGRESS NOTES
The patient location is: [facility]  The chief complaint leading to consultation is: T2DM    Visit type: audiovisual    Face to Face time with patient: 15 minutes  20 minutes of total time spent on the encounter, which includes face to face time and non-face to face time preparing to see the patient (eg, review of tests), Obtaining and/or reviewing separately obtained history, Documenting clinical information in the electronic or other health record, Independently interpreting results (not separately reported) and communicating results to the patient/family/caregiver, or Care coordination (not separately reported).         Each patient to whom he or she provides medical services by telemedicine is:  (1) informed of the relationship between the physician and patient and the respective role of any other health care provider with respect to management of the patient; and (2) notified that he or she may decline to receive medical services by telemedicine and may withdraw from such care at any time.    Notes:   History of Present Illness   Iqra Duque is a 53 y.o. woman with medical history as listed below who presents today for routine follow-up, T2DM, HTN, and DEV. She did have labs prior to our visit significant for A1c 8.0, vitamin D 26, ferritin 18, iron 33, otherwise unremarkable. She is taking medications as prescribed without perceived SE. Reports only taking once dose of 4.5 mg trulicity at time of labs. She does monitor her glucose at home with range 110->200 without symptoms of hypoglycemia. Diet recall: reports she has been under increased stress and has been eating more on the go and not making great food choices. Complaints today: none. Pertinent negatives as listed in ROS. We will address HM today.      Past Medical History:   Diagnosis Date    Allergy     Cataract     Diabetes mellitus type II     Diverticulitis 09/2014    Fever blister     Hypertension     Migraine headache        Past  Surgical History:   Procedure Laterality Date    COLONOSCOPY N/A 10/21/2019    Procedure: COLONOSCOPY;  Surgeon: Gomez Stark MD;  Location: [facility];  Service: Endoscopy;  Laterality: N/A;    EYE SURGERY Bilateral     cataract    HYSTERECTOMY  06/2009    KJB---DLH/BSO ATYPICAL HYPERPLASIA    OOPHORECTOMY      TUBAL LIGATION         Social History     Socioeconomic History    Marital status:    Occupational History    Occupation: Dispatcher     Employer: [employer]   Tobacco Use    Smoking status: Never     Passive exposure: Never    Smokeless tobacco: Never   Substance and Sexual Activity    Alcohol use: No    Drug use: No    Sexual activity: Yes     Partners: Male     Birth control/protection: None     Social Determinants of Health     Financial Resource Strain: Low Risk     Difficulty of Paying Living Expenses: Not hard at all   Food Insecurity: No Food Insecurity    Worried About Running Out of Food in the Last Year: Never true    Ran Out of Food in the Last Year: Never true   Transportation Needs: No Transportation Needs    Lack of Transportation (Medical): No    Lack of Transportation (Non-Medical): No   Physical Activity: Unknown    Days of Exercise per Week: 0 days   Stress: Stress Concern Present    Feeling of Stress : To some extent   Social Connections: Unknown    Frequency of Communication with Friends and Family: More than three times a week    Frequency of Social Gatherings with Friends and Family: Once a week    Active Member of Clubs or Organizations: No    Attends Club or Organization Meetings: Never    Marital Status:    Housing Stability: Low Risk     Unable to Pay for Housing in the Last Year: No    Number of Places Lived in the Last Year: 1    Unstable Housing in the Last Year: No       Family History   Problem Relation Age of Onset    Ovarian cancer Mother 43    Cancer Mother         ovarian    Diabetes Mother     Heart disease Father     Melanoma Neg Hx      Psoriasis Neg Hx     Lupus Neg Hx        Review of Systems  Review of Systems   Constitutional:  Negative for malaise/fatigue and weight loss.   Eyes:  Negative for blurred vision and double vision.   Respiratory:  Negative for shortness of breath.    Cardiovascular:  Negative for chest pain, palpitations, orthopnea, claudication, leg swelling and PND.   Gastrointestinal:  Negative for blood in stool and melena.   Genitourinary:  Negative for flank pain and hematuria.   Neurological:  Positive for dizziness and headaches. Negative for tremors, seizures, loss of consciousness and weakness.   Endo/Heme/Allergies:  Positive for polydipsia.   Psychiatric/Behavioral:  The patient is nervous/anxious.      A complete review of systems was otherwise negative.    Physical Exam  General appearance: alert, appears stated age, cooperative, and no distress  Lungs:  respirations are even and unlabored  Skin:  no visible rash or lesions  Neurologic: Grossly normal    Assessment/Plan  Type 2 diabetes mellitus with both eyes affected by mild nonproliferative retinopathy without macular edema, without long-term current use of insulin  Only one dose of 4.5 mg trulicity at time of labs, continue on this dose with Metformin and repeat labs in 3 months. Resume a focus on healthy diet with reduces sugars and starches. Consider adding sulfonylurea at follow-up if A1c remains elevated. Labs and follow-up, in office, in 3 months.  -     Hemoglobin A1C; Future; Expected date: 10/21/2023  -     Basic Metabolic Panel; Future; Expected date: 10/21/2023  -     Microalbumin/Creatinine Ratio, Urine; Future; Expected date: 10/21/2023    Essential hypertension  The current medical regimen is effective;  continue present plan and medications.    Morbid obesity with BMI of 45.0-49.9, adult  The patient is asked to make an attempt to improve diet and exercise patterns to aid in medical management of this problem.    Vitamin D insufficiency  The current  medical regimen is effective;  continue present plan and medications.  -     ergocalciferol (ERGOCALCIFEROL) 50,000 unit Cap; Take 1 capsule (50,000 Units total) by mouth every 7 days.  Dispense: 12 capsule; Refill: 1  -     Vitamin D; Future; Expected date: 10/21/2023    NAFLD (nonalcoholic fatty liver disease)  The patient is asked to make an attempt to improve diet and exercise patterns to aid in medical management of this problem.    Patient has verbalized understanding and is in agreement with plan of care.    Follow up in about 3 months (around 10/21/2023) for labs and follow-up, in office.      Answers submitted by the patient for this visit:  Diabetes Questionnaire (Submitted on 7/21/2023)  Chief Complaint: Diabetes problem  Diabetes type: type 2  MedicAlert ID: No  Disease duration: 15 Years  fatigue: Yes  foot paresthesias: No  foot ulcerations: No  polyphagia: Yes  polyuria: No  visual change: No  Symptom course: worsening  confusion: No  hunger: No  mood changes: No  pallor: No  sleepiness: Yes  speech difficulty: Yes  sweats: No  blackouts: No  hospitalization: No  nocturnal hypoglycemia: No  required assistance: No  required glucagon: No  CVA: No  heart disease: No  nephropathy: No  peripheral neuropathy: No  PVD: No  retinopathy: No  autonomic neuropathy: No  CAD risks: hypertension, obesity, post-menopausal, diabetes mellitus  Current treatments: oral agent (dual therapy)  Treatment compliance: most of the time  Home blood tests: 3-4 x per week  Home urines: <1 x per month  Monitoring compliance: fair  Blood glucose trend: increasing steadily  Weight trend: stable  Current diet: low salt  Meal planning: none  Exercise: intermittently  Dietitian visit: No  Eye exam current: Yes  Sees podiatrist: Yes

## 2023-08-06 DIAGNOSIS — J30.9 ALLERGIC RHINITIS: ICD-10-CM

## 2023-08-06 RX ORDER — MONTELUKAST SODIUM 10 MG/1
TABLET ORAL
Qty: 90 TABLET | Refills: 1 | Status: SHIPPED | OUTPATIENT
Start: 2023-08-06 | End: 2024-02-04

## 2023-08-06 NOTE — TELEPHONE ENCOUNTER
Refill Decision Note   Iqra Duque  is requesting a refill authorization.  Brief Assessment and Rationale for Refill:  Approve     Medication Therapy Plan:         Comments:     Note composed:8:05 AM 08/06/2023             Appointments     Last Visit   12/12/2022 Jaylon Kothari MD   Next Visit   Visit date not found Jaylon Kothari MD

## 2023-08-06 NOTE — TELEPHONE ENCOUNTER
No care due was identified.  [facility] Embedded Care Due Messages. Reference number: [id number].   8/06/2023 7:45:52 AM CDT

## 2023-09-16 DIAGNOSIS — E11.9 TYPE 2 DIABETES MELLITUS WITHOUT COMPLICATION, WITHOUT LONG-TERM CURRENT USE OF INSULIN: Chronic | ICD-10-CM

## 2023-09-16 NOTE — TELEPHONE ENCOUNTER
Care Due:                  Date            Visit Type   Department     Provider  --------------------------------------------------------------------------------                                ESTABLISHED   [facility]                              PATIENT -    MED/ INTERNAL  Last Visit: 12-      VIRTUAL      MED/ PEDS      Jaylon Kothari  Next Visit: None Scheduled  None         None Found                                                            Last  Test          Frequency    Reason                     Performed    Due Date  --------------------------------------------------------------------------------    Office Visit  12 months..  lisinopriL-hydrochlorothi  12- 12-                             azide, metFORMIN,                             montelukast, omeprazole..    [employer] Embedded Care Due Messages. Reference number: [id number].   9/16/2023 11:47:40 AM CDT

## 2023-09-18 RX ORDER — METFORMIN HYDROCHLORIDE 500 MG/1
TABLET, EXTENDED RELEASE ORAL
Qty: 360 TABLET | Refills: 0 | Status: SHIPPED | OUTPATIENT
Start: 2023-09-18 | End: 2023-12-15

## 2023-09-18 NOTE — TELEPHONE ENCOUNTER
Provider Staff:  Action required for this patient     Please see care gap opportunities below in Care Due Message.    Thanks!  [facility]     Appointments      Date Provider   Last Visit   12/12/2022 Jaylon Kothari MD   Next Visit   Visit date not found Jaylon Kothari MD      Refill Decision Note   Iqra Duque  is requesting a refill authorization.  Brief Assessment and Rationale for Refill:  Approve     Medication Therapy Plan:         Comments:     Note composed:3:51 AM 09/18/2023

## 2023-11-06 ENCOUNTER — PATIENT MESSAGE (OUTPATIENT)
Dept: ADMINISTRATIVE | Facility: HOSPITAL | Age: 53
End: 2023-11-06
Payer: COMMERCIAL

## 2023-12-13 ENCOUNTER — OFFICE VISIT (OUTPATIENT)
Dept: PODIATRY | Facility: CLINIC | Age: 53
End: 2023-12-13
Payer: COMMERCIAL

## 2023-12-13 VITALS
WEIGHT: 281.94 LBS | SYSTOLIC BLOOD PRESSURE: 131 MMHG | BODY MASS INDEX: 46.98 KG/M2 | DIASTOLIC BLOOD PRESSURE: 81 MMHG | HEIGHT: 65 IN | HEART RATE: 77 BPM

## 2023-12-13 DIAGNOSIS — M20.5X1 HALLUX LIMITUS, ACQUIRED, RIGHT: ICD-10-CM

## 2023-12-13 DIAGNOSIS — M20.5X2 HALLUX LIMITUS, ACQUIRED, LEFT: ICD-10-CM

## 2023-12-13 DIAGNOSIS — L60.8 PINCER NAIL DEFORMITY: ICD-10-CM

## 2023-12-13 DIAGNOSIS — M77.41 METATARSALGIA, RIGHT FOOT: ICD-10-CM

## 2023-12-13 DIAGNOSIS — M20.42 HAMMER TOES OF BOTH FEET: ICD-10-CM

## 2023-12-13 DIAGNOSIS — E11.9 COMPREHENSIVE DIABETIC FOOT EXAMINATION, TYPE 2 DM, ENCOUNTER FOR: Primary | ICD-10-CM

## 2023-12-13 DIAGNOSIS — M77.42 METATARSALGIA, LEFT FOOT: ICD-10-CM

## 2023-12-13 DIAGNOSIS — M20.41 HAMMER TOES OF BOTH FEET: ICD-10-CM

## 2023-12-13 PROCEDURE — 1159F PR MEDICATION LIST DOCUMENTED IN MEDICAL RECORD: ICD-10-PCS | Mod: CPTII,S$GLB,, | Performed by: PODIATRIST

## 2023-12-13 PROCEDURE — 3052F PR MOST RECENT HEMOGLOBIN A1C LEVEL 8.0 - < 9.0%: ICD-10-PCS | Mod: CPTII,S$GLB,, | Performed by: PODIATRIST

## 2023-12-13 PROCEDURE — 3052F HG A1C>EQUAL 8.0%<EQUAL 9.0%: CPT | Mod: CPTII,S$GLB,, | Performed by: PODIATRIST

## 2023-12-13 PROCEDURE — 3079F DIAST BP 80-89 MM HG: CPT | Mod: CPTII,S$GLB,, | Performed by: PODIATRIST

## 2023-12-13 PROCEDURE — 3008F BODY MASS INDEX DOCD: CPT | Mod: CPTII,S$GLB,, | Performed by: PODIATRIST

## 2023-12-13 PROCEDURE — 99999 PR PBB SHADOW E&M-EST. PATIENT-LVL IV: CPT | Mod: PBBFAC,,, | Performed by: PODIATRIST

## 2023-12-13 PROCEDURE — 3075F SYST BP GE 130 - 139MM HG: CPT | Mod: CPTII,S$GLB,, | Performed by: PODIATRIST

## 2023-12-13 PROCEDURE — 1159F MED LIST DOCD IN RCRD: CPT | Mod: CPTII,S$GLB,, | Performed by: PODIATRIST

## 2023-12-13 PROCEDURE — 1160F PR REVIEW ALL MEDS BY PRESCRIBER/CLIN PHARMACIST DOCUMENTED: ICD-10-PCS | Mod: CPTII,S$GLB,, | Performed by: PODIATRIST

## 2023-12-13 PROCEDURE — 3008F PR BODY MASS INDEX (BMI) DOCUMENTED: ICD-10-PCS | Mod: CPTII,S$GLB,, | Performed by: PODIATRIST

## 2023-12-13 PROCEDURE — 1160F RVW MEDS BY RX/DR IN RCRD: CPT | Mod: CPTII,S$GLB,, | Performed by: PODIATRIST

## 2023-12-13 PROCEDURE — 99214 PR OFFICE/OUTPT VISIT, EST, LEVL IV, 30-39 MIN: ICD-10-PCS | Mod: S$GLB,,, | Performed by: PODIATRIST

## 2023-12-13 PROCEDURE — 4010F ACE/ARB THERAPY RXD/TAKEN: CPT | Mod: CPTII,S$GLB,, | Performed by: PODIATRIST

## 2023-12-13 PROCEDURE — 99999 PR PBB SHADOW E&M-EST. PATIENT-LVL IV: ICD-10-PCS | Mod: PBBFAC,,, | Performed by: PODIATRIST

## 2023-12-13 PROCEDURE — 4010F PR ACE/ARB THEARPY RXD/TAKEN: ICD-10-PCS | Mod: CPTII,S$GLB,, | Performed by: PODIATRIST

## 2023-12-13 PROCEDURE — 99214 OFFICE O/P EST MOD 30 MIN: CPT | Mod: S$GLB,,, | Performed by: PODIATRIST

## 2023-12-13 PROCEDURE — 3075F PR MOST RECENT SYSTOLIC BLOOD PRESS GE 130-139MM HG: ICD-10-PCS | Mod: CPTII,S$GLB,, | Performed by: PODIATRIST

## 2023-12-13 PROCEDURE — 3079F PR MOST RECENT DIASTOLIC BLOOD PRESSURE 80-89 MM HG: ICD-10-PCS | Mod: CPTII,S$GLB,, | Performed by: PODIATRIST

## 2023-12-13 NOTE — PROGRESS NOTES
Subjective:      Patient ID: Iqra Duque is a 53 y.o. female.    Chief Complaint: Diabetes Mellitus (7/21/2023 Dr. Avila) and Diabetic Foot Exam      Iqra is a 53 y.o. female who presents to the clinic for evaluation and treatment of high risk feet. Iqra has a past medical history of Allergy, Cataract, Diabetes mellitus type II, Diverticulitis (09/2014), Fever blister, Hypertension, and Migraine headache.  Chief complaint is abnormal sensation to the plantar forefoot bilaterally for several months, describes it as the feeling of swelling even when the foot is not swollen.  Also has concern on how to care for feet as a diabetic abs hopes that sensations to feet are not neuropathy.  This patient has documented high risk feet requiring routine maintenance secondary to diabetes mellitis and those secondary complications of diabetes, as mentioned..    PCP: Jaylon Kothari MD    Date Last Seen by PCP:   Chief Complaint   Patient presents with    Diabetes Mellitus     7/21/2023 Dr. Avila    Diabetic Foot Exam         Current shoe gear:  casual shoes       Hemoglobin A1C   Date Value Ref Range Status   07/01/2023 8.0 (H) 4.0 - 5.6 % Final     Comment:     ADA Screening Guidelines:  5.7-6.4%  Consistent with prediabetes  >or=6.5%  Consistent with diabetes    High levels of fetal hemoglobin interfere with the HbA1C  assay. Heterozygous hemoglobin variants (HbS, HgC, etc)do  not significantly interfere with this assay.   However, presence of multiple variants may affect accuracy.     03/30/2023 7.7 (H) 4.0 - 5.6 % Final     Comment:     ADA Screening Guidelines:  5.7-6.4%  Consistent with prediabetes  >or=6.5%  Consistent with diabetes    High levels of fetal hemoglobin interfere with the HbA1C  assay. Heterozygous hemoglobin variants (HbS, HgC, etc)do  not significantly interfere with this assay.   However, presence of multiple variants may affect accuracy.     05/17/2022 5.9 (H) 4.0 - 5.6 % Final     Comment:      ADA Screening Guidelines:  5.7-6.4%  Consistent with prediabetes  >or=6.5%  Consistent with diabetes    High levels of fetal hemoglobin interfere with the HbA1C  assay. Heterozygous hemoglobin variants (HbS, HgC, etc)do  not significantly interfere with this assay.   However, presence of multiple variants may affect accuracy.       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 90 tablet 3    cetirizine (ZYRTEC) 10 MG tablet Take 10 mg by mouth once daily.      CONTOUR TEST STRIPS Strp TEST ONCE DAILY 100 strip 1    dulaglutide (TRULICITY) 4.5 mg/0.5 mL pen injector Inject 4.5 mg into the skin every 7 days. 12 pen 1    ergocalciferol (ERGOCALCIFEROL) 50,000 unit Cap Take 1 capsule (50,000 Units total) by mouth every 7 days. 12 capsule 1    lisinopriL-hydrochlorothiazide (PRINZIDE,ZESTORETIC) 20-12.5 mg per tablet TAKE 1 TABLET BY MOUTH EVERY DAY 90 tablet 3    meclizine (ANTIVERT) 25 mg tablet Take 1 tablet (25 mg total) by mouth every 6 (six) hours as needed for Dizziness. 30 tablet 11    metFORMIN (GLUCOPHAGE-XR) 500 MG ER 24hr tablet TAKE 2 TABLETS BY MOUTH TWICE A DAY WITH MEALS 360 tablet 0    MICROLET LANCET Misc TEST ONCE DAILY 100 each 3    montelukast (SINGULAIR) 10 mg tablet TAKE 1 TABLET BY MOUTH EVERY  DAY 90 tablet 1    omeprazole (PRILOSEC) 20 MG capsule TAKE 1 CAPSULE BY MOUTH EVERY DAY IN THE MORNING 90 capsule 0     No current facility-administered medications on file prior to visit.     Allergies   Allergen Reactions    Bactrim [Sulfamethoxazole-Trimethoprim] Hives     Also Septra    Keflex [Cephalexin] Shortness Of Breath    Neosporin (Neomycin-Polymyx) Swelling    Amoxicillin Rash     Past Surgical History:   Procedure Laterality Date    COLONOSCOPY N/A 10/21/2019    Procedure: COLONOSCOPY;  Surgeon: Gomez Stark MD;  Location: [facility];  Service: Endoscopy;  Laterality: N/A;    EYE SURGERY Bilateral     cataract    HYSTERECTOMY  06/2009    KJB---DLH/BSO ATYPICAL HYPERPLASIA    OOPHORECTOMY      TUBAL LIGATION       Family History   Problem Relation Age of Onset    Ovarian cancer Mother 43    Cancer Mother         ovarian    Diabetes Mother     Heart disease Father     Melanoma Neg Hx     Psoriasis Neg Hx     Lupus Neg Hx      Social History     Socioeconomic History    Marital status:    Occupational History    Occupation: Dispatcher     Employer: [employer]   Tobacco Use    Smoking status: Never     Passive exposure: Never    Smokeless tobacco: Never   Substance and Sexual Activity    Alcohol use: No    Drug use: No    Sexual activity: Yes     Partners: Male     Birth control/protection: None     Social Determinants of Health     Financial Resource Strain: Low Risk  (7/21/2023)    Overall Financial Resource Strain (CARDIA)     Difficulty of Paying Living Expenses: Not very hard   Food Insecurity: No Food Insecurity (7/21/2023)    Hunger Vital Sign     Worried About Running Out of Food in the Last Year: Never true     Ran Out of Food in the Last Year: Never true   Transportation Needs: No Transportation Needs (7/21/2023)    PRAPARE - Transportation     Lack of Transportation (Medical): No     Lack of Transportation (Non-Medical): No   Physical Activity: Unknown (7/21/2023)     "Exercise Vital Sign     Days of Exercise per Week: 0 days   Stress: Stress Concern Present (7/21/2023)    [employer] [place] - Occupational Stress Questionnaire     Feeling of Stress : To some extent   Social Connections: Unknown (7/21/2023)    Social Connection and Isolation Panel [NHANES]     Frequency of Communication with Friends and Family: More than three times a week     Frequency of Social Gatherings with Friends and Family: Once a week     Active Member of Clubs or Organizations: No     Marital Status:    Housing Stability: Low Risk  (7/21/2023)    Housing Stability Vital Sign     Unable to Pay for Housing in the Last Year: No     Number of Places Lived in the Last Year: 1     Unstable Housing in the Last Year: No       Review of Systems   Constitutional: Negative for chills and fever.   Cardiovascular:  Positive for leg swelling. Negative for chest pain and claudication.   Respiratory:  Negative for cough and shortness of breath.    Skin:  Positive for dry skin and nail changes. Negative for itching.   Musculoskeletal:  Positive for back pain and myalgias. Negative for arthritis, falls, joint pain, joint swelling and muscle weakness.   Gastrointestinal:  Negative for diarrhea, nausea and vomiting.   Neurological:  Negative for numbness, paresthesias, tremors and weakness.        History of sciatica   Psychiatric/Behavioral:  Negative for altered mental status and hallucinations.            Objective:       Vitals:    12/13/23 1158   BP: 131/81   Pulse: 77   Weight: 127.9 kg (281 lb 15.5 oz)   Height: 5' 5" (1.651 m)           Physical Exam  Vitals and nursing note reviewed.   Constitutional:       General: She is not in acute distress.     Appearance: She is well-developed. She is not diaphoretic.      Comments: Alert and oriented x 3. No evidence of depression, anxiety, or agitation. Calm, cooperative, and communicative. Appropriate interactions and affect.     "   Cardiovascular:      Pulses:           Dorsalis pedis pulses are 2+ on the right side and 2+ on the left side.        Posterior tibial pulses are 2+ on the right side and 2+ on the left side.      Comments: dorsalis pedis and posterior tibial pulses are palpable bilaterally. Digits are warm to the touch 1-5 bilaterally. Capillary refill time is within normal limits 1-5 bilaterally.        Musculoskeletal:      Right ankle: No ecchymosis. No tenderness.      Left ankle: No ecchymosis. No tenderness.      Right foot: Normal capillary refill. Tenderness and crepitus (dorsal medial midfoot with palpable spurring in the area) present.      Left foot: Normal capillary refill. Tenderness present.      Comments: Muscle strength is 5/5 in all groups bilaterally.    Decreased arch height noted b/l. Negative too many toes sign. Gait analysis reveals excessive pronation through midstance and propulsion with early heel off. Shoes reveals lateral heel counter wear bilateral     Pain on palpation of metatarsal spaces and with lateral squeeze of metatarsals of both feet.  There is equinus deformity bilateral with decreased dorsiflexion at the ankle joint bilateral. No tenderness with compression of heel. Negative tinels sign.     Decreased first MPJ range of motion both weightbearing and nonweightbearing, no crepitus observed the first MP joint, + dorsal flag sign. Mild  bunion deformity is observed .    Patient has hammertoes of digits 2-5 bilateral partially reducible        Lymphadenopathy:      Comments: No lymphatic streaking    Negative lymphadenopathy bilateral popliteal fossa and tarsal tunnel.     Skin:     General: Skin is warm and dry.      Coloration: Skin is not pale.      Findings: No abrasion, ecchymosis, lesion or rash.      Nails: There is no clubbing.      Comments: Nails 1-5 bilaterally with incurvated borders.     Neurological:      Comments: [place] 5.07 monofilament is intact bilateral feet.  Sharp/dull sensation is also intact Bilateral feet.       Psychiatric:         Speech: Speech normal.         Behavior: Behavior normal.               Assessment:       Encounter Diagnoses   Name Primary?    Comprehensive diabetic foot examination, type 2 DM, encounter for Yes    Hammer toes of both feet     Metatarsalgia, right foot     Metatarsalgia, left foot     Hallux limitus, acquired, left     Hallux limitus, acquired, right     Pincer nail deformity              Plan:       Iqra was seen today for diabetes mellitus and diabetic foot exam.    Diagnoses and all orders for this visit:    Comprehensive diabetic foot examination, type 2 DM, encounter for    Hammer toes of both feet    Metatarsalgia, right foot    Metatarsalgia, left foot    Hallux limitus, acquired, left    Hallux limitus, acquired, right    Pincer nail deformity          I counseled the patient on her conditions, their implications and medical management.          -- Symptoms consistent with metatarsalgia. Conservative treatments for hammer digit discussed include padding, hammertoe crest  Pads, extra-depth shoes; Surgical treatments discussed, including hammertoe correction and generic post-op course. All questions answered. Patient opting to begin with conservative options first.      -- Patient instructed on adequate icing techniques. Patient should ice the affected area at least once per day x 10 minutes for 10 days . I advised the  patient that extra icing would also be beneficial to ensure adequate anti inflammatory effect.    -- Stretching handout dispensed to patient. Instructions on adequate stretching reviewed in clinic      --Dispensed information on proper shoe fit and modification including inserts, met pads.     Education about the diabetic foot, neuropathy, and prevention of limb loss.    Shoe inspection. Diabetic Foot Education. Patient reminded of the importance of good nutrition/healthy diet/weight management and blood sugar  control to help prevent podiatric complications of diabetes. Patient instructed on proper foot hygeine. Wear comfortable, proper fitting shoes. Wash feet daily. Dry well. After drying, apply moisturizer to feet (no lotion to webspaces). Inspect feet daily for skin breaks, blisters, swelling, or redness. Wear cotton socks (preferably white)  Change socks every day. Do NOT walk barefoot. Do NOT use heating pads or hot water soaks. We discussed wearing proper shoe gear, daily foot inspections, never walking without protective shoe gear.     Discussed edema control and the importance of daily moisturizer to the feet such as Gold bonds diabetic foot cream    Recommend applying vicks vaporub to thick abnormal toenails daily x 6 months to treat fungal nail infection.    Stretching handout dispensed to patient. Instructions on adequate stretching reviewed in clinic     Recommend OTC compression socks; patient is to elevate legs. When sleeping, place a pillow under lower extremities. When sitting, support the legs so that they are level with the waist.    She will continue to monitor the areas daily, inspect her feet, wear protective shoe gear when ambulatory, moisturizer to maintain skin integrity and follow in this office in approximately 12 months, sooner p.r.n.

## 2023-12-13 NOTE — PATIENT INSTRUCTIONS
Over the counter pain creams: Voltaren Gel, Biofreeze, Bengay, tiger balm, two old goat, lidocaine gel,  Absorbine Veterinary Liniment Gel Topical Analgesic Sore Muscle and Joint Pain Relief    Recommend lotions: eucerin, eucerin for diabetics, aquaphor, A&D ointment, gold bond for diabetics, sween, [place]'s Bees all purpose baby ointment,  urea 40 with aloe or SkinIntegra rapid crack repair (found on amazon.com)    Shoe recommendations: (try 6pm.com, zappos.com , nordstromrack.Androcial, or shoes.Androcial for discounted prices) you can visit varsity shoes in [place], DSW shoes in [place]  or jeffrey rack in the [facility] (there are also several shoe brand outlets in the [facility])    ONLY purchase stability style tennis shoes NO flex, foam, free, yoga mat style shoes    Shoe examples:    Asics (GT 4000 or gel foundations), new balance stability type shoes (such as the 940 series), saucony (stabil c3),  Seymour (GTS or Beast or   transcend), propet, HokaOne (tennis shoe) Cirilo (tennis shoes and boots)    Alonsoft Guilherme (women) Ovidio&Kris (men), clarks, cronick, aerosoles, naturalizers, SAS, ecco, born, messi gibbs, rockports (dress shoes)    Vionic, burkenstocks, fitflops, propet, taos, baretraps (sandals)    HokaOne sandals, crocs, propet (house shoes)      Nail Home remedy:  Vicks Vapor rub or Emuaid to nails for easier manageability    Occasional soaks for 15-20 mins in luke warm water with 1/2 cup of listerine and 1 cup of apple cider vinegar are ok You may add several drops of oil of oregano or tea tree oil as well    What is Metatarsalgia?  Metatarsalgia is pain in the ball of your foot. It is often caused by wearing shoes with thin soles and high heels. This puts extra pressure on the bones in the ball of the foot. Standing or walking on a hard surface for long periods also puts added pressure on the bones, causing pain. The pain can occur under any of the five metatarsal bones, although it's most common in the  "second. Bent or twisted toes and bunions can make the problem worse. So can being overweight. Sometimes high arches or arthritis can also cause metatarsalgia.    Inside the ball of your foot  The long bones in the middle of your foot are called the metatarsal bones. Each metatarsal bone ends in the ball of the foot. When you walk, these bones bear the weight of your body as your foot pushes off the ground. If there is more pressure on the end of one bone, it presses on the skin beneath it. This causes pain and inflammation in the ball of the foot (metatarsalgia). A callus (a hard growth of skin) may also form on the ball of the foot.    Symptoms  The most common symptom of metatarsalgia is pain in the ball of the foot. It may feel as if you have a stone in your shoe. The ball of the foot may also become red and inflamed, and a callus may form under the end of the metatarsal bone.   Date Last Reviewed: 9/29/2015 © 2000-2017 [employer]. [address] [place] [address]. All rights reserved. This information is not intended as a substitute for professional medical care. Always follow your healthcare professional's instructions.        Treating Metatarsalgia    Metatarsalgia is pain in the "ball of your foot," the area between your arch and your toes. The pain usually starts in one or more of the five bones in this area under the toes. These bones are called the metatarsals. Often, a callus builds up in this area. In most cases, wearing low-heeled, well-cushioned shoes and filing down the callus will ease the pain. If these steps dont work, you may need surgery to remove part of the bone. To take pressure off the ball of your foot and ease the pain, your healthcare provider may suggest that you do one or more of the following.  Wear shoes with padded soles  Wear shoes with thick padding in the soles. Keep heels low. Make sure the shoe is wide across the ball of your foot and your toes.  Using a " metatarsal pad  Put a metatarsal pad in your shoe. This lifts and takes pressure off the painful area. To insert the pad:  Put a small lipstick nya on the callus.  Step into the shoe to leave a nya on the insole.  Peel the backing off the pad. Then put the pad in the shoe just behind the lipstick nya.  You may also be able to find inserts with built-in metatarsal pads.  Filing the callus  Soak your foot in warm water for a few minutes to soften the skin.  Dry the foot.  Gently rub the callus with a pumice stone or nail file to remove the hard skin. Stop if bleeding or pain occurs.  If you have diabetes, see your diabetes healthcare provider for foot care.  Date Last Reviewed: 9/29/2015 © 2000-2017 [employer]. [address] [place] [address]. All rights reserved. This information is not intended as a substitute for professional medical care. Always follow your healthcare professional's instructions.        Wearing Proper Shoes                    You walk on your feet every day, forcing them to support the weight of your body. Repeated stress on your feet can cause damage over time. The right shoes can help protect your feet. The wrong shoes can cause more foot problems. Read the information below to help you find a shoe that fits your foot needs.      A good shoe fit will cover your foot outline. A shoe that doesnt cover the outline is a bad fit.   Whats your foot shape?  To get a good fit, you need to know the shape of your foot. Do this simple test: While standing, place your foot on a piece of paper and trace around it. Is your foot straight or curved? Do you have a foot problem, such as a bunion, that causes your foot outline to show a bulge on the side of your big toe?  Finding your fit  Bring your foot outline to the shoe store to help you find the right shoe. Place a shoe you like on top of the outline to see if it matches the shape. The shoe should cover the outline. (If you  have a bunion, the shoe may not cover the bulge on the outline. Look for soft leather shoes to stretch over the bunion.) Once youve found a pair of proper shoes, put them on. Walk around. Be sure the shoes dont rub or pinch. If the shoes feel good, youve found your fit!  The right shoe for you  A good shoe has features that provide comfort and support. It must also be the right size and shape for your feet. Look for a shoe made of breathable fabric and lining, such as leather or canvas. Be sure that shoes have enough tread to prevent slipping. Go to a good shoe store for help finding the right shoe.  Good shoe features  An ideal shoe has the following:  Laces for support. If tying laces is a problem for you, try shoes with Velcro fasteners or sheila.  A front of the shoe (toe box) with ½ inch space in front of your longest toes.  An arch shape that supports your foot.  No more than 1½ inches of heel.  A stiff, snug back of the shoe to keep your foot from sliding around.  A smooth lining with no rough seams.  Shoe shopping tips  Below are some dos and donts for when you go to the shoe store.  Do:  Select the shoes that feel right. Wear them around the house. Then bring them to your foot healthcare provider to check for fit. If they dont fit well, return them.  Shop late in the day, when your feet will be slightly bigger.  Each time you buy shoes, have both your feet measured while you are standing. Foot size changes with time.  Pick shoes to suit their purpose. High heels are OK for an occasional night on the town. But for everyday wear, choose a more sensible shoe.  Try on shoes while wearing any inserts specially made for your feet (orthoses).  Try on both the right and left shoes. If your feet are different sizes, pick a pair that fits the larger foot.  Dont:  Dont buy shoes based on shoe size alone. Always try on shoes, as sizes differ from brand to brand and within brands.  Dont expect shoes to break  in. If they dont fit at the store, dont buy them.  Dont buy a shoe that doesnt match your foot shape.  What about socks?  Always wear socks with shoes. Socks help absorb sweat and reduce friction and blistering. When shopping for shoes, choose soft, padded socks with seams that dont irritate your feet.  If you have foot problems  Some foot problems cause deformities. This can make it hard to find a good fit. Look for shoes made of soft leather to stretch over the deformity. If you have bunions, buy shoes with a wider toe box. To fit hammertoes, look for shoes with a tall toe box. If you have arch problems, you may need inserts. In some cases, youll need to have custom footwear or orthoses made for your feet.  Suggested footwear  Ask your healthcare provider what kind of footwear you need. He or she may recommend a certain brand or shoe store.  Date Last Reviewed: 8/1/2016 © 2000-2017 [employer]. [address] [place] [address]. All rights reserved. This information is not intended as a substitute for professional medical care. Always follow your healthcare professional's instructions.      Diabetes: Inspecting Your Feet  Diabetes increases your chances of developing foot problems. So inspect your feet every day. This helps you find small skin irritations before they become serious infections. If you have trouble seeing the bottoms of your feet, use a mirror or ask a family member or friend to help.     Pressure spots on the bottom of the foot are common areas where problems develop.   How to check your feet  Below are tips to help you look for foot problems. Try to check your feet at the same time each day, such as when you get out of bed in the morning:  Check the top of each foot. The tops of toes, back of the heel, and outer edge of the foot can get a lot of rubbing from poor-fitting shoes.  Check the bottom of each foot. Daily wear and tear often leads to problems at pressure  spots.  Check the toes and nails. Fungal infections often occur between toes. Toenail problems can also be a sign of fungal infections or lead to breaks in the skin.  Check your shoes, too. Loose objects inside a shoe can injure the foot. Use your hand to feel inside your shoes for things like vicente, loose stitching, or rough areas that could irritate your skin.  Warning signs  Look for any color changes in the foot. Redness with streaks can signal a severe infection, which needs immediate medical attention. Tell your doctor right away if you have any of these problems:  Swelling, sometimes with color changes, may be a sign of poor blood flow or infection. Symptoms include tenderness and an increase in the size of your foot.  Warm or hot areas on your feet may be signs of infection. A foot that is cold may not be getting enough blood.  Sensations such as burning, tingling, or pins and needles can be signs of a problem. Also check for areas that may be numb.  Hot spots are caused by friction or pressure. Look for hot spots in areas that get a lot of rubbing. Hot spots can turn into blisters, calluses, or sores.  Cracks and sores are caused by dry or irritated skin. They are a sign that the skin is breaking down, which can lead to infection.  Toenail problems to watch for include nails growing into the skin (ingrown toenail) and causing redness or pain. Thick, yellow, or discolored nails can signal a fungal infection.  Drainage and odor can develop from untreated sores and ulcers. Call your doctor right away if you notice white or yellow drainage, bleeding, or unpleasant odor.   © 5272-9879 [employer]. [address] [place] [address]. All rights reserved. This information is not intended as a substitute for professional medical care. Always follow your healthcare professional's instructions.        Step-by-Step:  Inspecting Your Feet (Diabetes)    Date Last Reviewed: 10/1/2016  © 8802-8577  [employer]. [address] [place] [address], [place] [address]. All rights reserved. This information is not intended as a substitute for professional medical care. Always follow your healthcare professional's instructions.

## 2023-12-15 DIAGNOSIS — E11.9 TYPE 2 DIABETES MELLITUS WITHOUT COMPLICATION, WITHOUT LONG-TERM CURRENT USE OF INSULIN: Chronic | ICD-10-CM

## 2023-12-15 RX ORDER — METFORMIN HYDROCHLORIDE 500 MG/1
TABLET, EXTENDED RELEASE ORAL
Qty: 360 TABLET | Refills: 0 | Status: SHIPPED | OUTPATIENT
Start: 2023-12-15 | End: 2024-03-21

## 2023-12-15 NOTE — TELEPHONE ENCOUNTER
Refill Routing Note   Medication(s) are not appropriate for processing by [facility] for the following reason(s):        Drug-disease interaction: DDI not previously overridden by provider    [facility] action(s):  Defer   Requires appointment : Yes        Medication Therapy Plan: Drug-Disease: metFORMIN and Chronic diarrhea of unknown origin; Drug-Disease: metFORMIN and NAFLD (nonalcoholic fatty liver disease)    Pharmacist review requested: Yes     Appointments  past 12m or future 3m with PCP    Date Provider   Last Visit   12/12/2022 Jaylon Kothari MD   Next Visit   Visit date not found Jaylon Kothari MD   ED visits in past 90 days: 0        Note composed:10:58 AM 12/15/2023

## 2023-12-15 NOTE — TELEPHONE ENCOUNTER
Care Due:                  Date            Visit Type   Department     Provider  --------------------------------------------------------------------------------                                ESTABLISHED   [facility] FAM[facility]                              PATIENT -    MED/ INTERNAL  Last Visit: 12-      VIRTUAL      MED/ PEDS      Jaylon Kothari  Next Visit: None Scheduled  None         None Found                                                            Last  Test          Frequency    Reason                     Performed    Due Date  --------------------------------------------------------------------------------    Office Visit  15 months..  lisinopriL-hydrochlorothi  12- 03-                             azide, metFORMIN,                             montelukast, omeprazole..    [employer] Embedded Care Due Messages. Reference number: [id number].   12/15/2023 12:42:11 AM CST

## 2023-12-15 NOTE — TELEPHONE ENCOUNTER
Provider Staff:  Action required for this patient    Requires appointment      Please see care gap opportunities below in Care Due Message.    Thanks!  [facility]     Appointments      Date Provider   Last Visit   12/12/2022 Jaylon Kothari MD   Next Visit   Visit date not found Jaylon Kothari MD     Refill Decision Note   Iqra Duque  is requesting a refill authorization.  Brief Assessment and Rationale for Refill:  Approve     Medication Therapy Plan:         Pharmacist review requested: Yes   Comments:     Note composed:11:54 AM 12/15/2023

## 2023-12-20 DIAGNOSIS — E11.3293 TYPE 2 DIABETES MELLITUS WITH BOTH EYES AFFECTED BY MILD NONPROLIFERATIVE RETINOPATHY WITHOUT MACULAR EDEMA, WITHOUT LONG-TERM CURRENT USE OF INSULIN: Chronic | ICD-10-CM

## 2023-12-21 RX ORDER — DULAGLUTIDE 4.5 MG/.5ML
4.5 INJECTION, SOLUTION SUBCUTANEOUS
Qty: 12 PEN | Refills: 0 | Status: SHIPPED | OUTPATIENT
Start: 2023-12-21 | End: 2024-03-10 | Stop reason: SDUPTHER

## 2023-12-21 NOTE — TELEPHONE ENCOUNTER
Refill approved.  Due for routine physical with labs prior.  Please sched with me or Ofelia at [facility] Internal [facility]    If the patient prefers to keep their care on the [facility], they will need to be scheduled with a physician of their choosing to establish care.     Thank you,  David

## 2023-12-21 NOTE — TELEPHONE ENCOUNTER
Refill Routing Note   Medication(s) are not appropriate for processing by [facility] for the following reason(s):        No active prescription written by provider    [facility] action(s):  Defer               Appointments  past 12m or future 3m with PCP    Date Provider   Last Visit   12/12/2022 Jaylon Kothari MD   Next Visit   Visit date not found Jaylon Kothari MD   ED visits in past 90 days: 0        Note composed:6:04 PM 12/20/2023

## 2023-12-24 ENCOUNTER — ON-DEMAND VIRTUAL (OUTPATIENT)
Dept: URGENT CARE | Facility: CLINIC | Age: 53
End: 2023-12-24
Payer: COMMERCIAL

## 2023-12-24 DIAGNOSIS — U07.1 COVID: Primary | ICD-10-CM

## 2023-12-24 PROCEDURE — 99213 PR OFFICE/OUTPT VISIT, EST, LEVL III, 20-29 MIN: ICD-10-PCS | Mod: 95,,, | Performed by: NURSE PRACTITIONER

## 2023-12-24 PROCEDURE — 99213 OFFICE O/P EST LOW 20 MIN: CPT | Mod: 95,,, | Performed by: NURSE PRACTITIONER

## 2023-12-24 RX ORDER — NIRMATRELVIR AND RITONAVIR 300-100 MG
KIT ORAL
Qty: 30 TABLET | Refills: 0 | Status: SHIPPED | OUTPATIENT
Start: 2023-12-24 | End: 2023-12-29

## 2023-12-25 NOTE — PROGRESS NOTES
Subjective:      Patient ID: Iqra Duque is a 53 y.o. female.    Vitals:  vitals were not taken for this visit.     Chief Complaint: Fever      Visit Type: TELE AUDIOVISUAL    Present with the patient at the time of consultation: TELEMED PRESENT WITH PATIENT: None    Past Medical History:   Diagnosis Date    Allergy     Cataract     Diabetes mellitus type II     Diverticulitis 09/2014    Fever blister     Hypertension     Migraine headache      Past Surgical History:   Procedure Laterality Date    COLONOSCOPY N/A 10/21/2019    Procedure: COLONOSCOPY;  Surgeon: Gomez Stark MD;  Location: [facility];  Service: Endoscopy;  Laterality: N/A;    EYE SURGERY Bilateral     cataract    HYSTERECTOMY  06/2009    KJB---DLH/BSO ATYPICAL HYPERPLASIA    OOPHORECTOMY      TUBAL LIGATION       Review of patient's allergies indicates:   Allergen Reactions    Bactrim [sulfamethoxazole-trimethoprim] Hives     Also Septra    Keflex [cephalexin] Shortness Of Breath    Neosporin (neomycin-polymyx) Swelling    Sumatriptan Other (See Comments)     Pain sensation throughout the body like migraine spreading    Amoxicillin Rash    Neosporin  [benzalkonium chloride] Rash    Sulfa (sulfonamide antibiotics) Rash     Current Outpatient Medications on File Prior to Visit   Medication Sig Dispense Refill    atorvastatin (LIPITOR) 10 MG tablet Take 1 tablet (10 mg total) by mouth once daily. 90 tablet 3    cetirizine (ZYRTEC) 10 MG tablet Take 10 mg by mouth once daily.      CONTOUR TEST STRIPS Strp TEST ONCE DAILY 100 strip 1    dulaglutide (TRULICITY) 4.5 mg/0.5 mL pen injector Inject 4.5 mg into the skin every 7 days. 12 pen 0    ergocalciferol (ERGOCALCIFEROL) 50,000 unit Cap Take 1 capsule (50,000 Units total) by mouth every 7 days. 12 capsule 1    lisinopriL-hydrochlorothiazide (PRINZIDE,ZESTORETIC) 20-12.5 mg per tablet TAKE 1 TABLET BY MOUTH EVERY DAY 90 tablet 3    meclizine (ANTIVERT) 25 mg tablet Take 1 tablet (25 mg total) by  mouth every 6 (six) hours as needed for Dizziness. 30 tablet 11    metFORMIN (GLUCOPHAGE-XR) 500 MG ER 24hr tablet TAKE 2 TABLETS BY MOUTH TWICE A DAY WITH MEALS 360 tablet 0    MICROLET LANCET Misc TEST ONCE DAILY 100 each 3    montelukast (SINGULAIR) 10 mg tablet TAKE 1 TABLET BY MOUTH EVERY DAY 90 tablet 1    omeprazole (PRILOSEC) 20 MG capsule TAKE 1 CAPSULE BY MOUTH EVERY DAY IN THE MORNING 90 capsule 0     No current facility-administered medications on file prior to visit.     Family History   Problem Relation Age of Onset    Ovarian cancer Mother 43    Cancer Mother         ovarian    Diabetes Mother     Heart disease Father     Melanoma Neg Hx     Psoriasis Neg Hx     Lupus Neg Hx            Ohs Peq Odvv Intake    12/24/2023  7:24 PM CST - Filed by Patient   Describe your reason for todays visit home test positive for covid, sore throat, fever, cough   What is your current physical [address] in the event of a medical emergency[facility] [address] [facility] [facility] [address]   Are you able to take your vital signs? No   Please attach any relevant images or files           woke up yesterday morning with a sore throat. Thought it was dry mouth due to CPAP. States went to work and wore a mask and felt fatigued. States woke up with 101.4 temp, chills, headache, sinus discharge, and cough.  took 2 covid tests and they were both positive.  has been vaccinated and boosted but not the latest booster.    Fever   Associated symptoms include coughing, headaches and a sore throat.       Constitution: Positive for chills and fever.   HENT:  Positive for postnasal drip and sore throat.    Respiratory:  Positive for cough.    Neurological:  Positive for headaches.        Objective:   The physical exam was conducted virtually.  Physical Exam   Constitutional: She is oriented to person, place, and time. No distress.   HENT:   Head: Normocephalic and atraumatic.   Mouth/Throat: Mucous membranes are normal. Oropharyngeal  exudate present.   Eyes: Conjunctivae are normal. No scleral icterus.   Pulmonary/Chest: Effort normal. No respiratory distress.         Comments: + cough    Musculoskeletal: Normal range of motion.         General: Normal range of motion.   Neurological: She is alert and oriented to person, place, and time.   Skin: Skin is not diaphoretic.   Psychiatric: Her behavior is normal. Judgment and thought content normal.       Assessment:     1. COVID        Plan:       COVID      Discussed that since she's on a statin, she will need to discontinue it for 7-10 days if she chooses to take Paxlovid. She agreed to d/c it and verbalized understanding of rhabdo risk.    Rest. Drink plenty of fluids.     Vitamins C, D, Zinc may help boost your immune system.    OTC tylenol/ibuprofen as needed for fever per the directions on the label.     Get up and move around often instead of lying in bed.     Cover your cough and wash hands often to prevent the spread of germs.     Isolate yourself and wear mask.     If COVID -- Take paxlovid as prescribed.     The cough will be the last symptom to go away.     Follow-up with pcp if no improvement in 3 days, or if SOB, worsening fever, difficulty breathing/walking, chest pain, etc.     All questions were answered to the best of my abilities and all concerns were addressed at this time.     Follow up:   1. Please schedule a virtual follow up visit as needed.  2. Please see an in-person provider if your symptoms are worsening or not improving in 2-3 days.  3. Please print a copy of this note and send it to your regular doctor or take it to your next visit so it may be included in your medical record.   4. You must understand that you've received Telehealth Urgent Care treatment only and that you may be released before all your medical problems are known or treated. You, the patient, will arrange for follow up care as instructed.  5. Follow up with your PCP or specialty clinic as directed. To  schedule an appointment with the appropriate provider with [facility], please call [phone]. For pediatric referrals, please call [phone]  6. Contact customer support at [phone] for questions or concerns  7. For prescription questions or problems, call [phone] anytime for assistance.  8. For [facility] support, call [phone].

## 2024-01-07 DIAGNOSIS — E55.9 VITAMIN D INSUFFICIENCY: Chronic | ICD-10-CM

## 2024-01-08 RX ORDER — ERGOCALCIFEROL 1.25 MG/1
50000 CAPSULE ORAL
Qty: 12 CAPSULE | Refills: 0 | Status: SHIPPED | OUTPATIENT
Start: 2024-01-08

## 2024-01-08 NOTE — TELEPHONE ENCOUNTER
Refill approved.  Due for routine physical with labs prior.  Please sched with me or Ofelia Avila at [facility]    If the patient prefers to keep their care on the [facility], they will need to be scheduled with a physician of their choosing to establish care since we have moved to the [facility] location.     Thank you,  David

## 2024-01-13 DIAGNOSIS — K21.9 GASTROESOPHAGEAL REFLUX: ICD-10-CM

## 2024-01-13 NOTE — TELEPHONE ENCOUNTER
Care Due:                  Date            Visit Type   Department     Provider  --------------------------------------------------------------------------------                                ESTABLISHED   [facility]                              PATIENT -    MED/ INTERNAL  Last Visit: 12-      VIRTUAL      MED/ PEDS      Jaylon Kothari  Next Visit: None Scheduled  None         None Found                                                            Last  Test          Frequency    Reason                     Performed    Due Date  --------------------------------------------------------------------------------    HBA1C.......  6 months...  dulaglutide, metFORMIN...  07- 12-    [facility] Embedded Care Due Messages. Reference number: [id number].   1/13/2024 7:06:24 AM CST

## 2024-01-14 RX ORDER — OMEPRAZOLE 20 MG/1
CAPSULE, DELAYED RELEASE ORAL
Qty: 90 CAPSULE | Refills: 0 | Status: SHIPPED | OUTPATIENT
Start: 2024-01-14

## 2024-01-14 NOTE — TELEPHONE ENCOUNTER
Provider Staff:  Action required for this patient    Requires labs      Please see care gap opportunities below in Care Due Message.    Thanks!  [facility]     Appointments      Date Provider   Last Visit   12/12/2022 Jaylon Kothari MD   Next Visit   Visit date not found Jaylon Kothari MD     Refill Decision Note   Iqra Duque  is requesting a refill authorization.  Brief Assessment and Rationale for Refill:  Approve     Medication Therapy Plan:         Comments:     Note composed:10:12 AM 01/14/2024

## 2024-01-19 DIAGNOSIS — I10 ESSENTIAL HYPERTENSION: Chronic | ICD-10-CM

## 2024-01-19 RX ORDER — LISINOPRIL AND HYDROCHLOROTHIAZIDE 12.5; 2 MG/1; MG/1
TABLET ORAL
Qty: 90 TABLET | Refills: 0 | Status: SHIPPED | OUTPATIENT
Start: 2024-01-19 | End: 2024-04-16

## 2024-01-19 NOTE — TELEPHONE ENCOUNTER
No care due was identified.  [facility] Embedded Care Due Messages. Reference number: [id number].   1/19/2024 12:26:59 AM CST

## 2024-01-20 NOTE — TELEPHONE ENCOUNTER
Refill Decision Note   Iqra Duque  is requesting a refill authorization.  Brief Assessment and Rationale for Refill:  Approve     Medication Therapy Plan:         Comments:     Note composed:8:26 PM 01/19/2024

## 2024-02-04 DIAGNOSIS — J30.9 ALLERGIC RHINITIS: ICD-10-CM

## 2024-02-04 RX ORDER — MONTELUKAST SODIUM 10 MG/1
TABLET ORAL
Qty: 90 TABLET | Refills: 0 | Status: SHIPPED | OUTPATIENT
Start: 2024-02-04 | End: 2024-05-03

## 2024-02-04 NOTE — TELEPHONE ENCOUNTER
No care due was identified.  [facility] Embedded Care Due Messages. Reference number: [id number].   2/04/2024 7:07:49 AM CST

## 2024-02-04 NOTE — TELEPHONE ENCOUNTER
Iqra Duque  is requesting a refill authorization.  Brief Assessment and Rationale for Refill:  Approve     Medication Therapy Plan:         Comments:     Note composed:11:34 AM 02/04/2024

## 2024-03-10 DIAGNOSIS — E11.3293 TYPE 2 DIABETES MELLITUS WITH BOTH EYES AFFECTED BY MILD NONPROLIFERATIVE RETINOPATHY WITHOUT MACULAR EDEMA, WITHOUT LONG-TERM CURRENT USE OF INSULIN: Chronic | ICD-10-CM

## 2024-03-10 NOTE — TELEPHONE ENCOUNTER
Care Due:                  Date            Visit Type   Department     Provider  --------------------------------------------------------------------------------                                ESTABLISHED   [facility] FAMILY                              PATIENT -    MED/ INTERNAL  Last Visit: 12-      VIRTUAL      MED/ PEDS      Jaylon Kothari  Next Visit: None Scheduled  None         None Found                                                            Last  Test          Frequency    Reason                     Performed    Due Date  --------------------------------------------------------------------------------    Office Visit  12 months..  dulaglutide,               12- 12-                             ergocalciferol,                             lisinopriL-hydrochlorothi                             azide, metFORMIN,                             montelukast, omeprazole..    [employer] Embedded Care Due Messages. Reference number: [id number].   3/10/2024 4:01:29 PM CDT

## 2024-03-11 RX ORDER — DULAGLUTIDE 4.5 MG/.5ML
4.5 INJECTION, SOLUTION SUBCUTANEOUS
Qty: 12 PEN | Refills: 0 | Status: SHIPPED | OUTPATIENT
Start: 2024-03-11 | End: 2024-05-31 | Stop reason: SDUPTHER

## 2024-03-11 NOTE — TELEPHONE ENCOUNTER
Refill approved.  Due for routine physical with labs prior.  Please sched with me or Ofelia Avila at [facility]    If the patient prefers to keep their care on the [facility], they will need to be scheduled with a physician of their choosing to establish care since we have moved to the [facility] location.     Thank you,  David

## 2024-03-11 NOTE — TELEPHONE ENCOUNTER
Refill Routing Note   Medication(s) are not appropriate for processing by [facility] for the following reason(s):        Patient not seen by provider within 15 months  Required labs outdated    [facility] action(s):  Defer   Requires appointment : Yes               Appointments  past 12m or future 3m with PCP    Date Provider   Last Visit   12/12/2022 Jaylon Kothari MD   Next Visit   Visit date not found Jaylon Kothari MD   ED visits in past 90 days: 0        Note composed:1:16 PM 03/11/2024

## 2024-03-14 ENCOUNTER — LAB VISIT (OUTPATIENT)
Dept: LAB | Facility: HOSPITAL | Age: 54
End: 2024-03-14
Payer: COMMERCIAL

## 2024-03-14 ENCOUNTER — OFFICE VISIT (OUTPATIENT)
Dept: INTERNAL MEDICINE | Facility: CLINIC | Age: 54
End: 2024-03-14
Payer: COMMERCIAL

## 2024-03-14 VITALS
DIASTOLIC BLOOD PRESSURE: 70 MMHG | WEIGHT: 280.63 LBS | HEART RATE: 105 BPM | OXYGEN SATURATION: 97 % | BODY MASS INDEX: 46.75 KG/M2 | SYSTOLIC BLOOD PRESSURE: 132 MMHG | HEIGHT: 65 IN

## 2024-03-14 DIAGNOSIS — E11.3293 TYPE 2 DIABETES MELLITUS WITH BOTH EYES AFFECTED BY MILD NONPROLIFERATIVE RETINOPATHY WITHOUT MACULAR EDEMA, WITHOUT LONG-TERM CURRENT USE OF INSULIN: Chronic | ICD-10-CM

## 2024-03-14 DIAGNOSIS — G43.109 MIGRAINE WITH AURA AND WITHOUT STATUS MIGRAINOSUS, NOT INTRACTABLE: Chronic | ICD-10-CM

## 2024-03-14 DIAGNOSIS — E66.01 MORBID OBESITY WITH BMI OF 45.0-49.9, ADULT: Chronic | ICD-10-CM

## 2024-03-14 DIAGNOSIS — G47.33 OSA (OBSTRUCTIVE SLEEP APNEA): Chronic | ICD-10-CM

## 2024-03-14 DIAGNOSIS — K76.0 NAFLD (NONALCOHOLIC FATTY LIVER DISEASE): Chronic | ICD-10-CM

## 2024-03-14 DIAGNOSIS — K21.9 GASTROESOPHAGEAL REFLUX DISEASE WITHOUT ESOPHAGITIS: Chronic | ICD-10-CM

## 2024-03-14 DIAGNOSIS — G89.29 CHRONIC BILATERAL LOW BACK PAIN WITHOUT SCIATICA: Chronic | ICD-10-CM

## 2024-03-14 DIAGNOSIS — E55.9 VITAMIN D INSUFFICIENCY: Chronic | ICD-10-CM

## 2024-03-14 DIAGNOSIS — Z00.00 ROUTINE MEDICAL EXAM: Primary | ICD-10-CM

## 2024-03-14 DIAGNOSIS — J30.9 ALLERGIC RHINITIS, UNSPECIFIED SEASONALITY, UNSPECIFIED TRIGGER: Chronic | ICD-10-CM

## 2024-03-14 DIAGNOSIS — D50.9 IRON DEFICIENCY ANEMIA, UNSPECIFIED IRON DEFICIENCY ANEMIA TYPE: ICD-10-CM

## 2024-03-14 DIAGNOSIS — M54.50 CHRONIC BILATERAL LOW BACK PAIN WITHOUT SCIATICA: Chronic | ICD-10-CM

## 2024-03-14 DIAGNOSIS — I10 ESSENTIAL HYPERTENSION: Chronic | ICD-10-CM

## 2024-03-14 LAB
ALBUMIN/CREAT UR: NORMAL UG/MG (ref 0–30)
CREAT UR-MCNC: 75 MG/DL (ref 15–325)
MICROALBUMIN UR DL<=1MG/L-MCNC: <5 UG/ML

## 2024-03-14 PROCEDURE — 4010F ACE/ARB THERAPY RXD/TAKEN: CPT | Mod: CPTII,S$GLB,, | Performed by: NURSE PRACTITIONER

## 2024-03-14 PROCEDURE — 1159F MED LIST DOCD IN RCRD: CPT | Mod: CPTII,S$GLB,, | Performed by: NURSE PRACTITIONER

## 2024-03-14 PROCEDURE — 99999 PR PBB SHADOW E&M-EST. PATIENT-LVL V: CPT | Mod: PBBFAC,,, | Performed by: NURSE PRACTITIONER

## 2024-03-14 PROCEDURE — 99396 PREV VISIT EST AGE 40-64: CPT | Mod: S$GLB,,, | Performed by: NURSE PRACTITIONER

## 2024-03-14 PROCEDURE — 82043 UR ALBUMIN QUANTITATIVE: CPT | Performed by: NURSE PRACTITIONER

## 2024-03-14 PROCEDURE — 3078F DIAST BP <80 MM HG: CPT | Mod: CPTII,S$GLB,, | Performed by: NURSE PRACTITIONER

## 2024-03-14 PROCEDURE — 3075F SYST BP GE 130 - 139MM HG: CPT | Mod: CPTII,S$GLB,, | Performed by: NURSE PRACTITIONER

## 2024-03-14 PROCEDURE — 3008F BODY MASS INDEX DOCD: CPT | Mod: CPTII,S$GLB,, | Performed by: NURSE PRACTITIONER

## 2024-03-14 PROCEDURE — 1160F RVW MEDS BY RX/DR IN RCRD: CPT | Mod: CPTII,S$GLB,, | Performed by: NURSE PRACTITIONER

## 2024-03-14 NOTE — PATIENT INSTRUCTIONS
Let's recheck the anemia and iron levels.  Get back on the Flonase to see if that relieves some of the vertigo.  We are getting you established with a new neurology team.

## 2024-03-14 NOTE — PROGRESS NOTES
History of Present Illness   Iqra Duque is a 53 y.o. woman with medical history as listed below who presents today for routine physical exam. She did have labs prior to our visit, but these are still in process. She is taking medications as prescribed without perceived SE. Complaints today: worsening migraines and vertigo symptoms, needs referral to new neurology. Pertinent negatives as listed in ROS. We will address HM today.    Past Medical History:   Diagnosis Date    Allergy     Cataract     Diabetes mellitus type II     Diverticulitis 09/2014    Fever blister     Hypertension     Migraine headache        Past Surgical History:   Procedure Laterality Date    COLONOSCOPY N/A 10/21/2019    Procedure: COLONOSCOPY;  Surgeon: Gomez Stark MD;  Location: [facility];  Service: Endoscopy;  Laterality: N/A;    EYE SURGERY Bilateral     cataract    HYSTERECTOMY  06/2009    KJB---DLH/BSO ATYPICAL HYPERPLASIA    OOPHORECTOMY      TUBAL LIGATION         Social History     Socioeconomic History    Marital status:    Occupational History    Occupation: Dispatcher     Employer: [employer]   Tobacco Use    Smoking status: Never     Passive exposure: Never    Smokeless tobacco: Never   Substance and Sexual Activity    Alcohol use: No    Drug use: No    Sexual activity: Yes     Partners: Male     Birth control/protection: None     Social Determinants of Health     Financial Resource Strain: Low Risk  (7/21/2023)    Overall Financial Resource Strain (CARDIA)     Difficulty of Paying Living Expenses: Not very hard   Food Insecurity: No Food Insecurity (7/21/2023)    Hunger Vital Sign     Worried About Running Out of Food in the Last Year: Never true     Ran Out of Food in the Last Year: Never true   Transportation Needs: No Transportation Needs (7/21/2023)    PRAPARE - Transportation     Lack of Transportation (Medical): No     Lack of Transportation (Non-Medical): No   Physical Activity: Unknown  (7/21/2023)    Exercise Vital Sign     Days of Exercise per Week: 0 days   Stress: Stress Concern Present (7/21/2023)    [employer] - Occupational Stress Questionnaire     Feeling of Stress : To some extent   Social Connections: Unknown (7/21/2023)    Social Connection and Isolation Panel [NHANES]     Frequency of Communication with Friends and Family: More than three times a week     Frequency of Social Gatherings with Friends and Family: Once a week     Active Member of Clubs or Organizations: No     Marital Status:    Housing Stability: Low Risk  (7/21/2023)    Housing Stability Vital Sign     Unable to Pay for Housing in the Last Year: No     Number of Places Lived in the Last Year: 1     Unstable Housing in the Last Year: No       Family History   Problem Relation Age of Onset    Ovarian cancer Mother 43    Cancer Mother         ovarian    Diabetes Mother     Heart disease Father     Melanoma Neg Hx     Psoriasis Neg Hx     Lupus Neg Hx        Review of Systems  Review of Systems   Constitutional:  Negative for malaise/fatigue and weight loss.   HENT:  Negative for hearing loss and tinnitus.    Eyes:  Negative for blurred vision and double vision.   Respiratory:  Negative for shortness of breath and wheezing.    Cardiovascular:  Negative for chest pain, palpitations, orthopnea, claudication and leg swelling.   Gastrointestinal:  Negative for blood in stool and melena.   Genitourinary:  Negative for flank pain and hematuria.   Musculoskeletal:  Negative for back pain, joint pain and neck pain.   Skin:  Negative for itching and rash.   Neurological:  Positive for dizziness and headaches. Negative for loss of consciousness.   Endo/Heme/Allergies:  Positive for environmental allergies. Negative for polydipsia.   Psychiatric/Behavioral:  Negative for depression. The patient is not nervous/anxious and does not have insomnia.      A complete review of systems was otherwise  "negative.    Physical Exam  /70 (BP Location: Left arm, Patient Position: Sitting, BP Method: Large (Manual))   Pulse 105   Ht 5' 5" (1.651 m)   Wt 127.3 kg (280 lb 10.3 oz)   SpO2 97%   BMI 46.70 kg/m²   General appearance: alert, appears stated age, cooperative, and no distress  Head: Normocephalic, without obvious abnormality, atraumatic  Eyes: conjunctivae/corneas clear. PERRL, EOM's intact. Fundi benign.  Ears: normal TM's and external ear canals both ears  Nose: Nares normal. Septum midline. Mucosa normal. No drainage or sinus tenderness.  Throat: lips, mucosa, and tongue normal; teeth and gums normal  Neck: no carotid bruit, no JVD, supple, symmetrical, trachea midline, and thyroid not enlarged, symmetric, no tenderness/mass/nodules  Back: symmetric, no curvature. ROM normal. No CVA tenderness.  Lungs: clear to auscultation bilaterally  Heart: regular rate and rhythm, S1, S2 normal, no murmur, click, rub or gallop  Abdomen: soft, non-tender; bowel sounds normal; no masses,  no organomegaly  Extremities: extremities normal, atraumatic, no cyanosis or edema  Pulses: 2+ and symmetric  Skin: Skin color, texture, turgor normal. No rashes or lesions  Lymph nodes: Cervical, supraclavicular, and axillary nodes normal.  Neurologic: Grossly normal    Assessment/Plan  Routine medical exam  Age appropriate screening recommendations and HM were discussed and updated.  Discussed importance of heart healthy diet and exercise.  Labs in process.  Follow-up 6 months, routine.    Type 2 diabetes mellitus with both eyes affected by mild nonproliferative retinopathy without macular edema, without long-term current use of insulin  The current medical regimen is effective;  continue present plan and medications.  Check labs in process.    Essential hypertension  The current medical regimen is effective;  continue present plan and medications.    Migraine with aura and without status migrainosus, not intractable  The " current medical regimen is effective;  continue present plan and medications.  Referral to neurology.  -     Ambulatory referral/consult to Neurology; Future; Expected date: 03/21/2024  -     rimegepant 75 mg odt; Take 1 tablet (75 mg total) by mouth as needed for Migraine. Place ODT tablet on the tongue; alternatively the ODT tablet may be placed under the tongue  Dispense: 15 tablet; Refill: 5    Morbid obesity with BMI of 45.0-49.9, adult  The patient is asked to make an attempt to improve diet and exercise patterns to aid in medical management of this problem. We specifically discussed cutting calorie intake by 500-1000 calories per day for a goal of a 1-2 pound weight loss per week and recommendations for a mostly plant based diet with limited red meats/refined grains/processed foods/added sugars.    Vitamin D insufficiency  The current medical regimen is effective;  continue present plan and medications.    Gastroesophageal reflux disease without esophagitis  The current medical regimen is effective;  continue present plan and medications.    NAFLD (nonalcoholic fatty liver disease)  The current medical regimen is effective;  continue present plan and medications.    Chronic bilateral low back pain without sciatica  The current medical regimen is effective;  continue present plan and medications.    ALIZA (obstructive sleep apnea)  The current medical regimen is effective;  continue present plan and medications.    Iron deficiency anemia, unspecified iron deficiency anemia type  Check repeat iron studies given worsening in fatigue.  -     CBC Auto Differential; Future; Expected date: 03/14/2024  -     IRON AND TIBC; Future; Expected date: 03/14/2024  -     FERRITIN; Future; Expected date: 03/14/2024    Allergic rhinitis, unspecified seasonality, unspecified trigger  The current medical regimen is effective;  continue present plan and medications.  Resume Flonase daily, may help some with the vertigo  symptoms.    Patient has verbalized understanding and is in agreement with plan of care.    Follow up in about 6 months (around 9/14/2024) for routine .

## 2024-03-15 ENCOUNTER — TELEPHONE (OUTPATIENT)
Dept: INTERNAL MEDICINE | Facility: CLINIC | Age: 54
End: 2024-03-15
Payer: COMMERCIAL

## 2024-03-15 ENCOUNTER — HOSPITAL ENCOUNTER (OUTPATIENT)
Dept: RADIOLOGY | Facility: HOSPITAL | Age: 54
Discharge: HOME | End: 2024-03-15
Attending: INTERNAL MEDICINE
Payer: COMMERCIAL

## 2024-03-15 DIAGNOSIS — E11.3293 TYPE 2 DIABETES MELLITUS WITH BOTH EYES AFFECTED BY MILD NONPROLIFERATIVE RETINOPATHY WITHOUT MACULAR EDEMA, WITHOUT LONG-TERM CURRENT USE OF INSULIN: Primary | Chronic | ICD-10-CM

## 2024-03-15 DIAGNOSIS — D50.9 IRON DEFICIENCY ANEMIA, UNSPECIFIED IRON DEFICIENCY ANEMIA TYPE: ICD-10-CM

## 2024-03-15 DIAGNOSIS — Z12.31 ENCOUNTER FOR SCREENING MAMMOGRAM FOR BREAST CANCER: ICD-10-CM

## 2024-03-15 PROCEDURE — 77063 BREAST TOMOSYNTHESIS BI: CPT | Mod: 26,,, | Performed by: RADIOLOGY

## 2024-03-15 PROCEDURE — 77067 SCR MAMMO BI INCL CAD: CPT | Mod: TC,PO

## 2024-03-15 PROCEDURE — 77067 SCR MAMMO BI INCL CAD: CPT | Mod: 26,,, | Performed by: RADIOLOGY

## 2024-03-15 RX ORDER — FERROUS SULFATE 325(65) MG
325 TABLET ORAL DAILY
Qty: 90 TABLET | Refills: 1 | Status: SHIPPED | OUTPATIENT
Start: 2024-03-15

## 2024-03-15 NOTE — TELEPHONE ENCOUNTER
Pt was updated on her lab results. Pt agrees to the daily iron supplement to help improve her fatigue. Pt is also okay with adding on another medication to help get her hemoglobin A1c down. Pt states she will schedule a virtual visit if she does have any questions about the medication.

## 2024-03-15 NOTE — TELEPHONE ENCOUNTER
Please let the patient know the following regarding her labs: her iron is low. I suggest we add on a daily iron supplement to help, this may help improve her fatigue.Also, her hemoglobin A1c is still elevated at 7.9 (although it was up to 8 the last time we checked). I would suggest we consider adding on another medication to help get this down. Is she okay with this? If so, I will send it in to the pharmacy. I suggest we repeat labs in 3 months to see how things are looking. If she has questions about additional medications, we can set up a virtual visit to discuss.    Thanks!  fOelia CHOI

## 2024-03-15 NOTE — TELEPHONE ENCOUNTER
Contacted pt, to help schedule her labs in three months. Also to inform pt that her medications have been sent to her preferred pharmacy.

## 2024-03-21 DIAGNOSIS — E11.9 TYPE 2 DIABETES MELLITUS WITHOUT COMPLICATION, WITHOUT LONG-TERM CURRENT USE OF INSULIN: Chronic | ICD-10-CM

## 2024-03-21 RX ORDER — METFORMIN HYDROCHLORIDE 500 MG/1
TABLET, EXTENDED RELEASE ORAL
Qty: 360 TABLET | Refills: 0 | Status: SHIPPED | OUTPATIENT
Start: 2024-03-21

## 2024-03-21 NOTE — TELEPHONE ENCOUNTER
Refill Routing Note   Medication(s) are not appropriate for processing by [facility] for the following reason(s):        Patient not seen by provider within 15 months    [facility] action(s):  Defer               Appointments  past 12m or future 3m with PCP    Date Provider   Last Visit   12/12/2022 Jaylon Kothari MD   Next Visit   Visit date not found Jaylon Kothari MD   ED visits in past 90 days: 0        Note composed:10:15 AM 03/21/2024

## 2024-03-21 NOTE — TELEPHONE ENCOUNTER
No care due was identified.  [facility] Embedded Care Due Messages. Reference number: [id number].   3/21/2024 2:56:23 AM CDT

## 2024-03-30 DIAGNOSIS — E11.3293 TYPE 2 DIABETES MELLITUS WITH BOTH EYES AFFECTED BY MILD NONPROLIFERATIVE RETINOPATHY WITHOUT MACULAR EDEMA, WITHOUT LONG-TERM CURRENT USE OF INSULIN: Chronic | ICD-10-CM

## 2024-04-01 RX ORDER — ATORVASTATIN CALCIUM 10 MG/1
10 TABLET, FILM COATED ORAL
Qty: 90 TABLET | Refills: 1 | Status: SHIPPED | OUTPATIENT
Start: 2024-04-01

## 2024-04-01 NOTE — TELEPHONE ENCOUNTER
Refill Routing Note   Medication(s) are not appropriate for processing by [facility] for the following reason(s):        No active prescription written by PCP  Patient not seen by provider within 15 months: last office visit 3/14/24 with Ofelia Avila NP  Required labs outdated: LFTs    [facility] action(s):  Defer               Appointments  past 12m or future 3m with PCP    Date Provider   Last Visit   3/14/2024 Ofelia Avila NP   Next Visit   Visit date not found Ofelia Avila NP   ED visits in past 90 days: 0        Note composed:8:28 PM 03/31/2024

## 2024-04-02 ENCOUNTER — E-VISIT (OUTPATIENT)
Dept: INTERNAL MEDICINE | Facility: CLINIC | Age: 54
End: 2024-04-02
Payer: COMMERCIAL

## 2024-04-02 DIAGNOSIS — N76.0 ACUTE VAGINITIS: Primary | ICD-10-CM

## 2024-04-02 PROCEDURE — 99421 OL DIG E/M SVC 5-10 MIN: CPT | Mod: ,,, | Performed by: NURSE PRACTITIONER

## 2024-04-02 RX ORDER — FLUCONAZOLE 150 MG/1
150 TABLET ORAL DAILY
Qty: 2 TABLET | Refills: 0 | Status: SHIPPED | OUTPATIENT
Start: 2024-04-02 | End: 2024-04-04

## 2024-04-02 NOTE — PROGRESS NOTES
Patient ID: Iqra Duque is a 53 y.o. female.    Chief Complaint: Vaginal Discharge (Entered automatically based on patient selection in Patient Portal.)    The patient initiated a request through [employer] on 4/2/2024 for evaluation and management with a chief complaint of Vaginal Discharge (Entered automatically based on patient selection in Patient Portal.)     I evaluated the questionnaire submission on 04/02/2024.    [facility] Peq Evisit Vaginal Discharge    4/2/2024  9:55 AM CDT - Filed by Patient   Do you agree to participate in an E-Visit? Yes   If you have any of the following symptoms,  please do not complete an E-Visit,  schedule an appointment with your provider: I acknowledge   What is the main issue you would like addressed today? yeast infection not responding to OTC treatments   Are you able to take your vital signs? No   Are you pregnant, could you be pregnant, or are you breast feeding? None of the above   Which of the following are you experiencing? Vaginal Itching    Are you having pain while passing urine? No, I have no pain while urinating.   Which of the following applies to your vaginal discharge? I have a white/milky discharge.    Which of the following are you experiencing? Frequent urination   Do you have any sores on your genitals? No    Have you taken antibiotics recently? I have not been on any antibiotics    Do you use any of the following? None of the above   Which of the following applies to your menstrual period? I do not have menstrual periods   Have you had similar symptoms in the past? Yes, I have these symptoms frequently.   When you had similar symptoms in the past, did any of the following work? Pills for yeast infection   Have you had a fever? No   During the last 2 months, have you had sexual contact with a specific person for the first time? No   Provide any additional information you feel is important. just started taking Jardiance   Please attach any relevant images or files           Encounter Diagnosis   Name Primary?    Acute vaginitis Yes        No orders of the defined types were placed in this encounter.     Medications Ordered This Encounter   Medications    fluconazole (DIFLUCAN) 150 MG Tab     Sig: Take 1 tablet (150 mg total) by mouth once daily. for 2 days     Dispense:  2 tablet     Refill:  0        No follow-ups on file.      E-Visit Time Tracking:    Day 1 Time (in minutes): 5    Total Time (in minutes): 5

## 2024-04-08 DIAGNOSIS — E55.9 VITAMIN D INSUFFICIENCY: Chronic | ICD-10-CM

## 2024-04-08 RX ORDER — ERGOCALCIFEROL 1.25 MG/1
50000 CAPSULE ORAL
Qty: 12 CAPSULE | Refills: 0 | Status: SHIPPED | OUTPATIENT
Start: 2024-04-08

## 2024-04-08 NOTE — TELEPHONE ENCOUNTER
Care Due:                  Date            Visit Type   Department     Provider  --------------------------------------------------------------------------------                                ESTABLISHED   [facility]                              PATIENT -    MED/ INTERNAL  Last Visit: 12-      VIRTUAL      MED/ PEDS      Jaylon Kothari  Next Visit: None Scheduled  None         None Found                                                            Last  Test          Frequency    Reason                     Performed    Due Date  --------------------------------------------------------------------------------    CMP.........  12 months..  atorvastatin.............  03- 03-    Lipid Panel.  12 months..  atorvastatin.............  07- 06-    [employer] Embedded Care Due Messages. Reference number: [id number].   4/08/2024 1:38:27 PM CDT

## 2024-04-08 NOTE — TELEPHONE ENCOUNTER
Refill Routing Note   Medication(s) are not appropriate for processing by [facility] for the following reason(s):        Outside of protocol    [facility] action(s):  Route     Requires labs : Yes             Appointments  past 12m or future 3m with PCP    Date Provider   Last Visit   12/12/2022 Jaylon Kothari MD   Next Visit   Visit date not found Jaylon Kothari MD   ED visits in past 90 days: 0        Note composed:2:09 PM 04/08/2024

## 2024-04-10 DIAGNOSIS — K21.9 GASTROESOPHAGEAL REFLUX: ICD-10-CM

## 2024-04-10 NOTE — TELEPHONE ENCOUNTER
No care due was identified.  [facility] Embedded Care Due Messages. Reference number: [id number].   4/10/2024 1:06:03 AM CDT

## 2024-04-10 NOTE — TELEPHONE ENCOUNTER
Refill Routing Note   Medication(s) are not appropriate for processing by [facility] for the following reason(s):        Patient not seen by provider within 15 months    [facility] action(s):  Defer               Appointments  past 12m or future 3m with PCP    Date Provider   Last Visit   12/12/2022 Jaylon Kothari MD   Next Visit   Visit date not found Jalyon Kothari MD   ED visits in past 90 days: 0        Note composed:3:57 PM 04/10/2024

## 2024-04-11 RX ORDER — OMEPRAZOLE 20 MG/1
CAPSULE, DELAYED RELEASE ORAL
Qty: 90 CAPSULE | Refills: 0 | Status: SHIPPED | OUTPATIENT
Start: 2024-04-11

## 2024-04-15 DIAGNOSIS — I10 ESSENTIAL HYPERTENSION: Chronic | ICD-10-CM

## 2024-04-15 NOTE — TELEPHONE ENCOUNTER
No care due was identified.  [facility] Embedded Care Due Messages. Reference number: [id number].   4/15/2024 7:30:59 AM CDT

## 2024-04-15 NOTE — TELEPHONE ENCOUNTER
Refill Routing Note   Medication(s) are not appropriate for processing by [facility] for the following reason(s):        Patient not seen by provider within 15 months    [facility] action(s):  Defer   Requires appointment : Yes             Appointments  past 12m or future 3m with PCP    Date Provider   Last Visit   12/12/2022 Jaylon Kothari MD   Next Visit   Visit date not found Jaylon Kothari MD   ED visits in past 90 days: 0        Note composed:5:56 PM 04/15/2024

## 2024-04-16 RX ORDER — LISINOPRIL AND HYDROCHLOROTHIAZIDE 12.5; 2 MG/1; MG/1
TABLET ORAL
Qty: 90 TABLET | Refills: 3 | Status: SHIPPED | OUTPATIENT
Start: 2024-04-16

## 2024-04-17 DIAGNOSIS — K21.9 GASTROESOPHAGEAL REFLUX: ICD-10-CM

## 2024-04-17 RX ORDER — OMEPRAZOLE 20 MG/1
CAPSULE, DELAYED RELEASE ORAL
Qty: 90 CAPSULE | Refills: 0 | OUTPATIENT
Start: 2024-04-17

## 2024-04-19 ENCOUNTER — E-VISIT (OUTPATIENT)
Dept: INTERNAL MEDICINE | Facility: CLINIC | Age: 54
End: 2024-04-19
Payer: COMMERCIAL

## 2024-04-19 DIAGNOSIS — B37.31 YEAST VAGINITIS: Primary | ICD-10-CM

## 2024-04-19 PROCEDURE — 99421 OL DIG E/M SVC 5-10 MIN: CPT | Mod: ,,, | Performed by: INTERNAL MEDICINE

## 2024-04-19 RX ORDER — FLUCONAZOLE 150 MG/1
TABLET ORAL
Qty: 1 TABLET | Refills: 3 | Status: SHIPPED | OUTPATIENT
Start: 2024-04-19

## 2024-04-19 NOTE — PROGRESS NOTES
Patient ID: Iqra Duque is a 53 y.o. female.    Chief Complaint: Vaginal Discharge (Entered automatically based on patient selection in Patient Portal.)    The patient initiated a request through [employer] on 4/19/2024 for evaluation and management with a chief complaint of Vaginal Discharge (Entered automatically based on patient selection in Patient Portal.)     I evaluated the questionnaire submission on 04/19/2024  .    [facility]s Peq Evisit Vaginal Discharge    4/19/2024  5:10 AM CDT - Filed by Patient   Do you agree to participate in an E-Visit? Yes   If you have any of the following symptoms,  please do not complete an E-Visit,  schedule an appointment with your provider: I acknowledge   What is the main issue you would like addressed today? another yeast infection   Are you able to take your vital signs? No   Are you pregnant, could you be pregnant, or are you breast feeding? None of the above   Which of the following are you experiencing? Vaginal Itching    Are you having pain while passing urine? No, I have no pain while urinating.   Which of the following applies to your vaginal discharge? I have a white/milky discharge.    Which of the following are you experiencing? None of the above   Do you have any sores on your genitals? No    Have you taken antibiotics recently? I have not been on any antibiotics    Do you use any of the following? None of the above   Which of the following applies to your menstrual period? I do not have menstrual periods   Have you had similar symptoms in the past? Yes, I have had similar symptoms once before.   When you had similar symptoms in the past, did any of the following work? Pills for yeast infection   Have you had a fever? No   During the last 2 months, have you had sexual contact with a specific person for the first time? No   Provide any additional information you feel is important.    Please attach any relevant images or files          Encounter Diagnosis   Name Primary?     Yeast vaginitis Yes        No orders of the defined types were placed in this encounter.     Medications Ordered This Encounter   Medications    fluconazole (DIFLUCAN) 150 MG Tab     Sig: Take 1 tab PO once a day PRN yeast infection     Dispense:  1 tablet     Refill:  3      Yeast vaginitis.  On jardiance.  Repeat treatment with fluconazole.  Need to weigh pros and cons of being on SGLT2    Follow up if symptoms worsen or fail to improve.      E-Visit Time Tracking:    Day 1 Time (in minutes): 5    Total Time (in minutes): 5

## 2024-04-25 ENCOUNTER — OFFICE VISIT (OUTPATIENT)
Dept: NEUROLOGY | Facility: CLINIC | Age: 54
End: 2024-04-25
Payer: COMMERCIAL

## 2024-04-25 VITALS
HEART RATE: 89 BPM | SYSTOLIC BLOOD PRESSURE: 134 MMHG | WEIGHT: 277.31 LBS | HEIGHT: 65 IN | BODY MASS INDEX: 46.2 KG/M2 | DIASTOLIC BLOOD PRESSURE: 69 MMHG

## 2024-04-25 DIAGNOSIS — G47.33 OSA (OBSTRUCTIVE SLEEP APNEA): Chronic | ICD-10-CM

## 2024-04-25 DIAGNOSIS — G43.109 MIGRAINE WITH AURA AND WITHOUT STATUS MIGRAINOSUS, NOT INTRACTABLE: Primary | Chronic | ICD-10-CM

## 2024-04-25 DIAGNOSIS — G44.40 MEDICATION OVERUSE HEADACHE: ICD-10-CM

## 2024-04-25 DIAGNOSIS — M54.2 CERVICALGIA: ICD-10-CM

## 2024-04-25 PROCEDURE — 99999 PR PBB SHADOW E&M-EST. PATIENT-LVL IV: CPT | Mod: PBBFAC,,, | Performed by: STUDENT IN AN ORGANIZED HEALTH CARE EDUCATION/TRAINING PROGRAM

## 2024-04-25 PROCEDURE — 99214 OFFICE O/P EST MOD 30 MIN: CPT | Mod: S$GLB,,, | Performed by: STUDENT IN AN ORGANIZED HEALTH CARE EDUCATION/TRAINING PROGRAM

## 2024-04-25 PROCEDURE — 3078F DIAST BP <80 MM HG: CPT | Mod: CPTII,S$GLB,, | Performed by: STUDENT IN AN ORGANIZED HEALTH CARE EDUCATION/TRAINING PROGRAM

## 2024-04-25 PROCEDURE — 1159F MED LIST DOCD IN RCRD: CPT | Mod: CPTII,S$GLB,, | Performed by: STUDENT IN AN ORGANIZED HEALTH CARE EDUCATION/TRAINING PROGRAM

## 2024-04-25 PROCEDURE — 3075F SYST BP GE 130 - 139MM HG: CPT | Mod: CPTII,S$GLB,, | Performed by: STUDENT IN AN ORGANIZED HEALTH CARE EDUCATION/TRAINING PROGRAM

## 2024-04-25 PROCEDURE — 3066F NEPHROPATHY DOC TX: CPT | Mod: CPTII,S$GLB,, | Performed by: STUDENT IN AN ORGANIZED HEALTH CARE EDUCATION/TRAINING PROGRAM

## 2024-04-25 PROCEDURE — 3051F HG A1C>EQUAL 7.0%<8.0%: CPT | Mod: CPTII,S$GLB,, | Performed by: STUDENT IN AN ORGANIZED HEALTH CARE EDUCATION/TRAINING PROGRAM

## 2024-04-25 PROCEDURE — 3008F BODY MASS INDEX DOCD: CPT | Mod: CPTII,S$GLB,, | Performed by: STUDENT IN AN ORGANIZED HEALTH CARE EDUCATION/TRAINING PROGRAM

## 2024-04-25 PROCEDURE — 4010F ACE/ARB THERAPY RXD/TAKEN: CPT | Mod: CPTII,S$GLB,, | Performed by: STUDENT IN AN ORGANIZED HEALTH CARE EDUCATION/TRAINING PROGRAM

## 2024-04-25 PROCEDURE — 3061F NEG MICROALBUMINURIA REV: CPT | Mod: CPTII,S$GLB,, | Performed by: STUDENT IN AN ORGANIZED HEALTH CARE EDUCATION/TRAINING PROGRAM

## 2024-04-25 RX ORDER — RIMEGEPANT SULFATE 75 MG/75MG
75 TABLET, ORALLY DISINTEGRATING ORAL
Qty: 16 TABLET | Refills: 5 | Status: SHIPPED | OUTPATIENT
Start: 2024-04-25

## 2024-04-25 NOTE — PROGRESS NOTES
Chief Complaint and Duration     Chronic migraines    History of Present Illness     Iqra Duque is a 53 y.o. female with a history of hypertension, type 2 diabetes, morbid obesity, sleep apnea.  Patient is here to establish care for her chronic migraines, has been establish with prior neurologists.  Has been getting FMLA for her migraines as well.    When she is on Nurtec, it works.  Difficult to get a baseline given that she has been out of her medication.  Acute medications failed: rizatriptan (ineffective), sumatriptan (ineffective)     Description of migraines include dizziness feeling, pulsatile quality, sensitive to light and noise, can be frontal or temporal.  Sees a chiropractor for neck pain.  Has a insomnia as well.    Review of patient's allergies indicates:   Allergen Reactions    Bactrim [sulfamethoxazole-trimethoprim] Hives     Also Septra    Keflex [cephalexin] Shortness Of Breath    Neosporin (neomycin-polymyx) Swelling    Sumatriptan Other (See Comments)     Pain sensation throughout the body like migraine spreading    Amoxicillin Rash    Neosporin  [benzalkonium chloride] Rash    Sulfa (sulfonamide antibiotics) Rash     Current Outpatient Medications   Medication Sig Dispense Refill    atorvastatin (LIPITOR) 10 MG tablet TAKE 1 TABLET BY MOUTH EVERY DAY 90 tablet 1    cetirizine (ZYRTEC) 10 MG tablet Take 10 mg by mouth once daily.      CONTOUR TEST STRIPS Strp TEST ONCE DAILY 100 strip 1    dulaglutide (TRULICITY) 4.5 mg/0.5 mL pen injector Inject 4.5 mg into the skin every 7 days. 12 pen 0    ergocalciferol (ERGOCALCIFEROL) 50,000 unit Cap TAKE 1 CAPSULE BY MOUTH ONE TIME PER WEEK 12 capsule 0    ferrous sulfate (FEOSOL) 325 mg (65 mg iron) Tab tablet Take 1 tablet (325 mg total) by mouth once daily. 90 tablet 1    fluconazole (DIFLUCAN) 150 MG Tab Take 1 tab PO once a day PRN yeast infection 1 tablet 3    lisinopriL-hydrochlorothiazide (PRINZIDE,ZESTORETIC) 20-12.5 mg per tablet TAKE 1  TABLET BY MOUTH EVERY DAY 90 tablet 3    metFORMIN (GLUCOPHAGE-XR) 500 MG ER 24hr tablet TAKE 2 TABLETS BY MOUTH TWICE A DAY WITH MEALS 360 tablet 0    MICROLET LANCET Misc TEST ONCE DAILY 100 each 3    montelukast (SINGULAIR) 10 mg tablet TAKE 1 TABLET BY MOUTH EVERY DAY 90 tablet 0    omeprazole (PRILOSEC) 20 MG capsule TAKE 1 CAPSULE BY MOUTH EVERY DAY IN THE MORNING 90 capsule 0    empagliflozin (JARDIANCE) 10 mg tablet Take 1 tablet (10 mg total) by mouth once daily. (Patient not taking: Reported on 4/25/2024) 90 tablet 1    rimegepant (NURTEC) 75 mg odt Take 1 tablet (75 mg total) by mouth as needed for Migraine. Place ODT tablet on the tongue; alternatively the ODT tablet may be placed under the tongue. Okay to take 2nd tablet in 24 hrs, no more than 2 in 24 hrs. 16 tablet 5     No current facility-administered medications for this visit.       Medical History     Past Medical History:   Diagnosis Date    Allergy     Cataract     Diabetes mellitus type II     Diverticulitis 09/2014    Fever blister     Hypertension     Migraine headache      Past Surgical History:   Procedure Laterality Date    COLONOSCOPY N/A 10/21/2019    Procedure: COLONOSCOPY;  Surgeon: Gomez Stark MD;  Location: [facility];  Service: Endoscopy;  Laterality: N/A;    EYE SURGERY Bilateral     cataract    HYSTERECTOMY  06/2009    KJB---DLH/BSO ATYPICAL HYPERPLASIA    OOPHORECTOMY      TUBAL LIGATION       Family History   Problem Relation Name Age of Onset    Ovarian cancer Mother  43    Cancer Mother          ovarian    Diabetes Mother      Heart disease Father      Melanoma Neg Hx      Psoriasis Neg Hx      Lupus Neg Hx       Social History     Socioeconomic History    Marital status:    Occupational History    Occupation: Dispatcher     Employer: [employer]   Tobacco Use    Smoking status: Never     Passive exposure: Never    Smokeless tobacco: Never   Substance and Sexual Activity    Alcohol use: No    Drug use: No     Sexual activity: Yes     Partners: Male     Birth control/protection: None     Social Determinants of Health     Financial Resource Strain: Low Risk  (4/23/2024)    Overall Financial Resource Strain (CARDIA)     Difficulty of Paying Living Expenses: Not very hard   Food Insecurity: No Food Insecurity (4/23/2024)    Hunger Vital Sign     Worried About Running Out of Food in the Last Year: Never true     Ran Out of Food in the Last Year: Never true   Transportation Needs: No Transportation Needs (4/23/2024)    PRAPARE - Transportation     Lack of Transportation (Medical): No     Lack of Transportation (Non-Medical): No   Physical Activity: Unknown (4/23/2024)    Exercise Vital Sign     Days of Exercise per Week: Patient declined   Stress: Stress Concern Present (4/23/2024)    [employer] [employer] - Occupational Stress Questionnaire     Feeling of Stress : To some extent   Social Connections: Unknown (4/23/2024)    Social Connection and Isolation Panel [NHANES]     Frequency of Communication with Friends and Family: More than three times a week     Frequency of Social Gatherings with Friends and Family: Once a week     Active Member of Clubs or Organizations: No     Attends Club or Organization Meetings: Never     Marital Status:    Housing Stability: Low Risk  (7/21/2023)    Housing Stability Vital Sign     Unable to Pay for Housing in the Last Year: No     Number of Places Lived in the Last Year: 1     Unstable Housing in the Last Year: No       Exam     Vitals:    04/25/24 0934   BP: 134/69   Pulse: 89      Physical Exam:  General: Not in acute distress. Not ill-appearing.   HENT: Normocephalic and atraumatic. Moist mucous membranes.  Eyes: Conjunctivae normal.   Pulmonary: Pulmonary effort is normal.   Abdominal: Abdomen is soft and flat.   Skin: Skin is warm and dry. No rashes.   Psychiatric: Mood normal.        Neurologic Exam   Mental status: oriented to person, place, and  time  Attention: Normal. Concentration: normal.  Speech: speech is normal.  Cranial Nerves: PERRL, EOMI intact, V1-V3 Facial sensation intact. Symmetric facies. Hearing grossly intact. Palate and uvula midline, symmetric. No tongue deviation. Trapezius strength intact.     Motor exam: bulk and tone normal. Strength 5/5 grossly in upper and lower extremities    Sensory exam: light touch intact    Gait exam: normal  Romberg: negative  Coordination: normal    Tremor: none  Cogwheel rigidity: none    Labs and Imaging     Labs: reviewed  No results found for this or any previous visit (from the past 24 hour(s)).    Thyroid normal  HgA1C%:  7.9  LDL:  57  Vit B12: 288    Assessment and Plan     Problem List Items Addressed This Visit          Neuro    Migraine with aura and without status migrainosus, not intractable - Primary (Chronic)    Relevant Medications    rimegepant (NURTEC) 75 mg odt    Other Relevant Orders    Ambulatory referral/consult to Neurology    Medication overuse headache       Other    ALIZA (obstructive sleep apnea) (Chronic)     Other Visit Diagnoses       Cervicalgia              This is a 53-year-old female new to me, with significant medical comorbidities including obesity, sleep apnea requiring CPAP.  Also has hypertension, type 2 diabetes.  Chronic neck pain.  Has been out of her Nurtec, history of chronic migraines in which she failed rizatriptan and sumatriptan.  Discussed with the patient multifactorial nature of her headaches, has tension headaches as well as cervicalgia.  Discussed lifestyle modifications including diet and exercise and noticing triggers at length.  We will fill patient's Nurtec, patient was referred over to the headache Clinic at [facility].  Patient requires FMLA and also paperwork with her job.    In regards to her chronic neck pain, discussed can be contributing to her headaches.  Discussed possibility of her doing physical therapy, patient is discussed with the PCP.   Otherwise, patient has been seeing a chiropractor, discussed neck manipulation concerns.    Follow-up:  Headache Clinic at [facility]    Time spent on this encounter: 40 minutes. This includes face to face time and non-face to face time preparing to see the patient (eg, review of tests), obtaining and/or reviewing separately obtained history, documenting clinical information in the electronic or other health record, independently interpreting results and communicating results to the patient/family/caregiver, or care coordinator.     This note was created by combination of typed  and M-Modal dictation. Transcription and phonetic errors may be present.  If there are any questions, please contact me.

## 2024-05-03 DIAGNOSIS — J30.9 ALLERGIC RHINITIS: ICD-10-CM

## 2024-05-03 RX ORDER — MONTELUKAST SODIUM 10 MG/1
TABLET ORAL
Qty: 90 TABLET | Refills: 0 | Status: SHIPPED | OUTPATIENT
Start: 2024-05-03

## 2024-05-03 NOTE — TELEPHONE ENCOUNTER
No care due was identified.  [facility] Embedded Care Due Messages. Reference number: [id number].   5/03/2024 12:37:28 AM CDT

## 2024-05-03 NOTE — TELEPHONE ENCOUNTER
Refill Routing Note   Medication(s) are not appropriate for processing by [facility] for the following reason(s):        Patient not seen by provider within 15 months    [facility] action(s):  Defer             Appointments  past 12m or future 3m with PCP    Date Provider   Last Visit   4/19/2024 Jaylon Kothari MD   Next Visit   Visit date not found Jaylon Kothari MD   ED visits in past 90 days: 0        Note composed:8:19 AM 05/03/2024

## 2024-05-09 LAB
LEFT EYE DM RETINOPATHY: POSITIVE
RIGHT EYE DM RETINOPATHY: POSITIVE

## 2024-05-28 ENCOUNTER — OFFICE VISIT (OUTPATIENT)
Dept: INTERNAL MEDICINE | Facility: CLINIC | Age: 54
End: 2024-05-28
Payer: COMMERCIAL

## 2024-05-28 VITALS
OXYGEN SATURATION: 99 % | BODY MASS INDEX: 46.28 KG/M2 | HEART RATE: 91 BPM | SYSTOLIC BLOOD PRESSURE: 132 MMHG | HEIGHT: 65 IN | DIASTOLIC BLOOD PRESSURE: 74 MMHG | WEIGHT: 277.75 LBS

## 2024-05-28 DIAGNOSIS — I10 ESSENTIAL HYPERTENSION: Chronic | ICD-10-CM

## 2024-05-28 DIAGNOSIS — E11.3293 TYPE 2 DIABETES MELLITUS WITH BOTH EYES AFFECTED BY MILD NONPROLIFERATIVE RETINOPATHY WITHOUT MACULAR EDEMA, WITHOUT LONG-TERM CURRENT USE OF INSULIN: Chronic | ICD-10-CM

## 2024-05-28 DIAGNOSIS — G43.109 MIGRAINE WITH AURA AND WITHOUT STATUS MIGRAINOSUS, NOT INTRACTABLE: Chronic | ICD-10-CM

## 2024-05-28 DIAGNOSIS — R42 VERTIGO: Primary | ICD-10-CM

## 2024-05-28 DIAGNOSIS — E66.01 MORBID OBESITY WITH BMI OF 45.0-49.9, ADULT: Chronic | ICD-10-CM

## 2024-05-28 PROCEDURE — 3061F NEG MICROALBUMINURIA REV: CPT | Mod: CPTII,S$GLB,, | Performed by: NURSE PRACTITIONER

## 2024-05-28 PROCEDURE — 3075F SYST BP GE 130 - 139MM HG: CPT | Mod: CPTII,S$GLB,, | Performed by: NURSE PRACTITIONER

## 2024-05-28 PROCEDURE — 3078F DIAST BP <80 MM HG: CPT | Mod: CPTII,S$GLB,, | Performed by: NURSE PRACTITIONER

## 2024-05-28 PROCEDURE — 3051F HG A1C>EQUAL 7.0%<8.0%: CPT | Mod: CPTII,S$GLB,, | Performed by: NURSE PRACTITIONER

## 2024-05-28 PROCEDURE — 1159F MED LIST DOCD IN RCRD: CPT | Mod: CPTII,S$GLB,, | Performed by: NURSE PRACTITIONER

## 2024-05-28 PROCEDURE — 3066F NEPHROPATHY DOC TX: CPT | Mod: CPTII,S$GLB,, | Performed by: NURSE PRACTITIONER

## 2024-05-28 PROCEDURE — 3008F BODY MASS INDEX DOCD: CPT | Mod: CPTII,S$GLB,, | Performed by: NURSE PRACTITIONER

## 2024-05-28 PROCEDURE — 99214 OFFICE O/P EST MOD 30 MIN: CPT | Mod: S$GLB,,, | Performed by: NURSE PRACTITIONER

## 2024-05-28 PROCEDURE — 4010F ACE/ARB THERAPY RXD/TAKEN: CPT | Mod: CPTII,S$GLB,, | Performed by: NURSE PRACTITIONER

## 2024-05-28 PROCEDURE — 1160F RVW MEDS BY RX/DR IN RCRD: CPT | Mod: CPTII,S$GLB,, | Performed by: NURSE PRACTITIONER

## 2024-05-28 PROCEDURE — 99999 PR PBB SHADOW E&M-EST. PATIENT-LVL V: CPT | Mod: PBBFAC,,, | Performed by: NURSE PRACTITIONER

## 2024-05-28 RX ORDER — MECLIZINE HYDROCHLORIDE 25 MG/1
25 TABLET ORAL 3 TIMES DAILY PRN
Qty: 90 TABLET | Refills: 1 | Status: SHIPPED | OUTPATIENT
Start: 2024-05-28

## 2024-05-28 NOTE — PROGRESS NOTES
History of Present Illness   Iqra Duque is a 53 y.o. woman with medical history as listed below who presents today for evaluation of dizziness/vertigo episode that began on 5/18 while out of town. She thought this may be an aura associated with a migraine and tried Nurtec with no relief. She has been taking Dramamine around the clock to control symptoms. Describes dizziness as rotational spinning and being pulled down. She did have one episode of vomiting with onset of dizziness. She has no additional complaints and is otherwise healthy on today's visit.      Past Medical History:   Diagnosis Date    Allergy     Cataract     Diabetes mellitus type II     Diverticulitis 09/2014    Fever blister     Hypertension     Migraine headache        Past Surgical History:   Procedure Laterality Date    COLONOSCOPY N/A 10/21/2019    Procedure: COLONOSCOPY;  Surgeon: Gomez Stark MD;  Location: [facility];  Service: Endoscopy;  Laterality: N/A;    EYE SURGERY Bilateral     cataract    HYSTERECTOMY  06/2009    KJB---DLH/BSO ATYPICAL HYPERPLASIA    OOPHORECTOMY      TUBAL LIGATION         Social History     Socioeconomic History    Marital status:    Occupational History    Occupation: Dispatcher     Employer: [employer]   Tobacco Use    Smoking status: Never     Passive exposure: Never    Smokeless tobacco: Never   Substance and Sexual Activity    Alcohol use: No    Drug use: No    Sexual activity: Yes     Partners: Male     Birth control/protection: None     Social Determinants of Health     Financial Resource Strain: Low Risk  (4/23/2024)    Overall Financial Resource Strain (CARDIA)     Difficulty of Paying Living Expenses: Not very hard   Food Insecurity: No Food Insecurity (4/23/2024)    Hunger Vital Sign     Worried About Running Out of Food in the Last Year: Never true     Ran Out of Food in the Last Year: Never true   Transportation Needs: No Transportation Needs (4/23/2024)    PRAPARE -  "Transportation     Lack of Transportation (Medical): No     Lack of Transportation (Non-Medical): No   Physical Activity: Unknown (4/23/2024)    Exercise Vital Sign     Days of Exercise per Week: Patient declined   Stress: Stress Concern Present (4/23/2024)    [employer] [place] [employer] - Occupational Stress Questionnaire     Feeling of Stress : To some extent   Housing Stability: Low Risk  (7/21/2023)    Housing Stability Vital Sign     Unable to Pay for Housing in the Last Year: No     Number of Places Lived in the Last Year: 1     Unstable Housing in the Last Year: No       Family History   Problem Relation Name Age of Onset    Ovarian cancer Mother  43    Cancer Mother          ovarian    Diabetes Mother      Heart disease Father      Melanoma Neg Hx      Psoriasis Neg Hx      Lupus Neg Hx         Review of Systems  Review of Systems   Constitutional:  Negative for chills and fever.   HENT:  Negative for hearing loss.    Eyes:  Negative for blurred vision, double vision and discharge.   Respiratory:  Negative for wheezing.    Cardiovascular:  Negative for chest pain and palpitations.   Gastrointestinal:  Negative for blood in stool, constipation, diarrhea, nausea and vomiting.   Genitourinary:  Negative for dysuria and hematuria.   Musculoskeletal:  Negative for neck pain.   Neurological:  Positive for dizziness and headaches. Negative for weakness.   Endo/Heme/Allergies:  Negative for polydipsia.     A complete review of systems was otherwise negative.    Physical Exam  /74 (BP Location: Left arm, Patient Position: Sitting, BP Method: Large (Manual))   Pulse 91   Ht 5' 5" (1.651 m)   Wt 126 kg (277 lb 12.5 oz)   SpO2 99%   BMI 46.22 kg/m²   General appearance: alert, appears stated age, cooperative, and no distress  Ears: normal TM's and external ear canals both ears  Nose: Nares normal. Septum midline. Mucosa normal. No drainage or sinus tenderness.  Throat: lips, mucosa, and tongue " normal; teeth and gums normal  Lungs: clear to auscultation bilaterally  Heart: regular rate and rhythm, S1, S2 normal, no murmur, click, rub or gallop  Skin: Skin color, texture, turgor normal. No rashes or lesions  Neurologic: Mental status: Alert, oriented, thought content appropriate  Cranial nerves: normal  Sensory: normal  Motor:grossly normal  Coordination: finger to nose normal bilaterally  Gait: Normal    Assessment/Plan  Vertigo  Trial of Meclizine, recommend follow-up with neurology for persistent symptoms.  ER precautions discussed, no red flags on exam, reassuring neuro exam.   RTC PRN.  -     meclizine (ANTIVERT) 25 mg tablet; Take 1 tablet (25 mg total) by mouth 3 (three) times daily as needed for Dizziness.  Dispense: 90 tablet; Refill: 1    Migraine with aura and without status migrainosus, not intractable  Referral to [facility] clinic, referred by general neurology roughly one month ago, but has not received appointment to date.  -     Ambulatory referral/consult to Neurology; Future; Expected date: 06/04/2024    Essential hypertension  The current medical regimen is effective;  continue present plan and medications.    Type 2 diabetes mellitus with both eyes affected by mild nonproliferative retinopathy without macular edema, without long-term current use of insulin  The current medical regimen is effective;  continue present plan and medications.    Morbid obesity with BMI of 45.0-49.9, adult  The patient is asked to make an attempt to improve diet and exercise patterns to aid in medical management of this problem.    Patient has verbalized understanding and is in agreement with plan of care.    Follow up if symptoms worsen or fail to improve.        Answers submitted by the patient for this visit:  Review of Systems Questionnaire (Submitted on 5/26/2024)  activity change: No  unexpected weight change: No  rhinorrhea: Yes  trouble swallowing: No  visual disturbance: No  chest tightness: No  polyuria:  No  difficulty urinating: No  menstrual problem: No  joint swelling: No  arthralgias: No  confusion: No  dysphoric mood: No

## 2024-05-31 DIAGNOSIS — E11.3293 TYPE 2 DIABETES MELLITUS WITH BOTH EYES AFFECTED BY MILD NONPROLIFERATIVE RETINOPATHY WITHOUT MACULAR EDEMA, WITHOUT LONG-TERM CURRENT USE OF INSULIN: Chronic | ICD-10-CM

## 2024-05-31 RX ORDER — DULAGLUTIDE 4.5 MG/.5ML
4.5 INJECTION, SOLUTION SUBCUTANEOUS
Qty: 12 PEN | Refills: 0 | Status: SHIPPED | OUTPATIENT
Start: 2024-05-31 | End: 2024-08-23

## 2024-05-31 NOTE — TELEPHONE ENCOUNTER
Refill Routing Note   Medication(s) are not appropriate for processing by [facility] for the following reason(s):        Patient not seen by provider within 15 months    [facility] action(s):  Defer   Requires appointment : Yes               Appointments  past 12m or future 3m with PCP    Date Provider   Last Visit   4/19/2024 Jaylon Kothari MD   Next Visit   Visit date not found Jaylon Kothari MD   ED visits in past 90 days: 0        Note composed:1:08 PM 05/31/2024

## 2024-05-31 NOTE — TELEPHONE ENCOUNTER
Care Due:                  Date            Visit Type   Department     Provider  --------------------------------------------------------------------------------                                ESTABLISHED   [facility]                              PATIENT -    MED/ INTERNAL  Last Visit: 12-      VIRTUAL      MED/ PEDS      Jaylon Kothari  Next Visit: None Scheduled  None         None Found                                                            Last  Test          Frequency    Reason                     Performed    Due Date  --------------------------------------------------------------------------------    Office Visit  12 months..  atorvastatin,              12- 12-                             dulaglutide,                             ergocalciferol,                             lisinopriL-hydrochlorothi                             azide....................    [employer] Embedded Care Due Messages. Reference number: [id number].   5/31/2024 10:48:38 AM CDT

## 2024-06-10 ENCOUNTER — PATIENT MESSAGE (OUTPATIENT)
Dept: INTERNAL MEDICINE | Facility: CLINIC | Age: 54
End: 2024-06-10
Payer: COMMERCIAL

## 2024-06-17 ENCOUNTER — TELEPHONE (OUTPATIENT)
Dept: NEUROLOGY | Facility: CLINIC | Age: 54
End: 2024-06-17
Payer: COMMERCIAL

## 2024-06-18 ENCOUNTER — PATIENT MESSAGE (OUTPATIENT)
Dept: INTERNAL MEDICINE | Facility: CLINIC | Age: 54
End: 2024-06-18
Payer: COMMERCIAL

## 2024-06-19 ENCOUNTER — PATIENT OUTREACH (OUTPATIENT)
Dept: ADMINISTRATIVE | Facility: HOSPITAL | Age: 54
End: 2024-06-19
Payer: COMMERCIAL

## 2024-06-19 DIAGNOSIS — E78.5 HYPERLIPIDEMIA, UNSPECIFIED HYPERLIPIDEMIA TYPE: Primary | ICD-10-CM

## 2024-06-19 NOTE — PROGRESS NOTES
Overdue Diabetes Labs - appointment scheduled on 06/20/2024. Fasting instructions were given.    Diabetic Eye Exam - A request was sent today for patient's record.

## 2024-06-19 NOTE — LETTER
AUTHORIZATION FOR RELEASE OF   CONFIDENTIAL INFORMATION    Dear Dr. Olayinka Phelan ,    We are seeing Iqra Duque, date of birth 1970, in the clinic at [facility] INTERNAL MEDICINE. Jaylon Kothari MD is the patient's PCP. Iqra Duque has an outstanding lab/procedure at the time we reviewed her chart. In order to help keep her health information updated, she has authorized us to request the following medical record(s):        (  )  MAMMOGRAM                                      (  )  COLONOSCOPY      (  )  PAP SMEAR                                          (  )  OUTSIDE LAB RESULTS     (  )  DEXA SCAN                                          ( x ) EYE EXAM(diabetic) [id number]            (  )  FOOT EXAM                                          (  )  ENTIRE RECORD     (  )  OUTSIDE IMMUNIZATIONS                 (  )  _______________         Please fax records to [facility], [place], Oliver B, MD,  [phone].     If you have any questions, please contact Yolande Spencer LPN [facility] at   [phone].     Patient Name: Iqra Duque  : 1970  Patient Phone #: [phone]

## 2024-06-20 ENCOUNTER — LAB VISIT (OUTPATIENT)
Dept: LAB | Facility: HOSPITAL | Age: 54
End: 2024-06-20
Payer: COMMERCIAL

## 2024-06-20 ENCOUNTER — TELEPHONE (OUTPATIENT)
Dept: NEUROLOGY | Facility: CLINIC | Age: 54
End: 2024-06-20
Payer: COMMERCIAL

## 2024-06-20 ENCOUNTER — TELEPHONE (OUTPATIENT)
Dept: INTERNAL MEDICINE | Facility: CLINIC | Age: 54
End: 2024-06-20
Payer: COMMERCIAL

## 2024-06-20 DIAGNOSIS — E11.3293 TYPE 2 DIABETES MELLITUS WITH BOTH EYES AFFECTED BY MILD NONPROLIFERATIVE RETINOPATHY WITHOUT MACULAR EDEMA, WITHOUT LONG-TERM CURRENT USE OF INSULIN: Chronic | ICD-10-CM

## 2024-06-20 DIAGNOSIS — E78.5 HYPERLIPIDEMIA, UNSPECIFIED HYPERLIPIDEMIA TYPE: ICD-10-CM

## 2024-06-20 LAB
ANION GAP SERPL CALC-SCNC: 9 MMOL/L (ref 8–16)
BUN SERPL-MCNC: 13 MG/DL (ref 6–20)
CALCIUM SERPL-MCNC: 9.5 MG/DL (ref 8.7–10.5)
CHLORIDE SERPL-SCNC: 104 MMOL/L (ref 95–110)
CHOLEST SERPL-MCNC: 105 MG/DL (ref 120–199)
CHOLEST/HDLC SERPL: 2.5 {RATIO} (ref 2–5)
CO2 SERPL-SCNC: 27 MMOL/L (ref 23–29)
CREAT SERPL-MCNC: 0.8 MG/DL (ref 0.5–1.4)
EST. GFR  (NO RACE VARIABLE): >60 ML/MIN/1.73 M^2
ESTIMATED AVG GLUCOSE: 186 MG/DL (ref 68–131)
GLUCOSE SERPL-MCNC: 161 MG/DL (ref 70–110)
HBA1C MFR BLD: 8.1 % (ref 4–5.6)
HDLC SERPL-MCNC: 42 MG/DL (ref 40–75)
HDLC SERPL: 40 % (ref 20–50)
LDLC SERPL CALC-MCNC: 42.2 MG/DL (ref 63–159)
NONHDLC SERPL-MCNC: 63 MG/DL
POTASSIUM SERPL-SCNC: 4.6 MMOL/L (ref 3.5–5.1)
SODIUM SERPL-SCNC: 140 MMOL/L (ref 136–145)
TRIGL SERPL-MCNC: 104 MG/DL (ref 30–150)

## 2024-06-20 PROCEDURE — 36415 COLL VENOUS BLD VENIPUNCTURE: CPT | Performed by: NURSE PRACTITIONER

## 2024-06-20 PROCEDURE — 80061 LIPID PANEL: CPT | Performed by: INTERNAL MEDICINE

## 2024-06-20 PROCEDURE — 83036 HEMOGLOBIN GLYCOSYLATED A1C: CPT | Performed by: NURSE PRACTITIONER

## 2024-06-20 PROCEDURE — 80048 BASIC METABOLIC PNL TOTAL CA: CPT | Performed by: NURSE PRACTITIONER

## 2024-06-20 NOTE — TELEPHONE ENCOUNTER
----- Message from Yolande Spencer LPN sent at 6/19/2024  1:56 PM CDT -----  Regarding: Additional Labs  Good Afternoon Ofelia!    I hope today finds you well and in good spirits. I received a campaign message for the patient to schedule her lipid panel, but there was this message left in the appointment notes. See below. I didn't see any orders to repeat her iron levels. I linked the A1c, Bmp, & lipid. Please add the additional labs needed and I will link it.    Do you want to repeat her urine microalbumin as well?     Appt notes - A1C, hemoglobin, iron, cholesterol, urine. Tim said doctor's orders are in.    Thanks,  Yolande

## 2024-06-21 ENCOUNTER — PATIENT OUTREACH (OUTPATIENT)
Dept: ADMINISTRATIVE | Facility: HOSPITAL | Age: 54
End: 2024-06-21
Payer: COMMERCIAL

## 2024-06-21 ENCOUNTER — TELEPHONE (OUTPATIENT)
Dept: INTERNAL MEDICINE | Facility: CLINIC | Age: 54
End: 2024-06-21
Payer: COMMERCIAL

## 2024-06-21 NOTE — TELEPHONE ENCOUNTER
Please call patient. Diabetes is looking a bit worse. I suggest we set up a virtual visit for next week to discuss options.    Thanks!  Ofelia CHOI

## 2024-06-23 DIAGNOSIS — E11.9 TYPE 2 DIABETES MELLITUS WITHOUT COMPLICATION, WITHOUT LONG-TERM CURRENT USE OF INSULIN: Chronic | ICD-10-CM

## 2024-06-24 RX ORDER — METFORMIN HYDROCHLORIDE 500 MG/1
TABLET, EXTENDED RELEASE ORAL
Qty: 360 TABLET | Refills: 1 | Status: SHIPPED | OUTPATIENT
Start: 2024-06-24

## 2024-06-24 NOTE — TELEPHONE ENCOUNTER
Refill Routing Note   Medication(s) are not appropriate for processing by [facility] for the following reason(s):        Patient not seen by provider within 15 months  No active prescription written by provider    [facility] action(s):  Defer               Appointments  past 12m or future 3m with PCP    Date Provider   Last Visit   4/19/2024 Jaylon Kothari MD   Next Visit   Visit date not found Jaylon Kothari MD   ED visits in past 90 days: 0        Note composed:8:06 AM 06/24/2024

## 2024-06-25 ENCOUNTER — OFFICE VISIT (OUTPATIENT)
Dept: INTERNAL MEDICINE | Facility: CLINIC | Age: 54
End: 2024-06-25
Payer: COMMERCIAL

## 2024-06-25 DIAGNOSIS — E66.01 MORBID OBESITY WITH BMI OF 45.0-49.9, ADULT: Chronic | ICD-10-CM

## 2024-06-25 DIAGNOSIS — E11.3293 TYPE 2 DIABETES MELLITUS WITH BOTH EYES AFFECTED BY MILD NONPROLIFERATIVE RETINOPATHY WITHOUT MACULAR EDEMA, WITHOUT LONG-TERM CURRENT USE OF INSULIN: Primary | Chronic | ICD-10-CM

## 2024-06-25 DIAGNOSIS — K76.0 NAFLD (NONALCOHOLIC FATTY LIVER DISEASE): Chronic | ICD-10-CM

## 2024-06-25 PROCEDURE — 3061F NEG MICROALBUMINURIA REV: CPT | Mod: CPTII,95,, | Performed by: NURSE PRACTITIONER

## 2024-06-25 PROCEDURE — 4010F ACE/ARB THERAPY RXD/TAKEN: CPT | Mod: CPTII,95,, | Performed by: NURSE PRACTITIONER

## 2024-06-25 PROCEDURE — 3066F NEPHROPATHY DOC TX: CPT | Mod: CPTII,95,, | Performed by: NURSE PRACTITIONER

## 2024-06-25 PROCEDURE — 99214 OFFICE O/P EST MOD 30 MIN: CPT | Mod: 95,,, | Performed by: NURSE PRACTITIONER

## 2024-06-25 PROCEDURE — 1160F RVW MEDS BY RX/DR IN RCRD: CPT | Mod: CPTII,95,, | Performed by: NURSE PRACTITIONER

## 2024-06-25 PROCEDURE — 3052F HG A1C>EQUAL 8.0%<EQUAL 9.0%: CPT | Mod: CPTII,95,, | Performed by: NURSE PRACTITIONER

## 2024-06-25 PROCEDURE — 1159F MED LIST DOCD IN RCRD: CPT | Mod: CPTII,95,, | Performed by: NURSE PRACTITIONER

## 2024-06-25 RX ORDER — GLIPIZIDE 5 MG/1
5 TABLET, FILM COATED, EXTENDED RELEASE ORAL
Qty: 90 TABLET | Refills: 1 | Status: SHIPPED | OUTPATIENT
Start: 2024-06-25 | End: 2025-06-25

## 2024-06-25 NOTE — PROGRESS NOTES
The patient location is: [facility]  The chief complaint leading to consultation is: T2DM follow-up    Visit type: audiovisual    Face to Face time with patient: 10  15 minutes of total time spent on the encounter, which includes face to face time and non-face to face time preparing to see the patient (eg, review of tests), Obtaining and/or reviewing separately obtained history, Documenting clinical information in the electronic or other health record, Independently interpreting results (not separately reported) and communicating results to the patient/family/caregiver, or Care coordination (not separately reported).         Each patient to whom he or she provides medical services by telemedicine is:  (1) informed of the relationship between the physician and patient and the respective role of any other health care provider with respect to management of the patient; and (2) notified that he or she may decline to receive medical services by telemedicine and may withdraw from such care at any time.    Notes:   History of Present Illness   Iqra Duque is a 53 y.o. woman with medical history as listed below who presents today for routine follow-up, T2DM. She did have labs prior to our visit with worsening hemoglobin A1c, now 8.1. She is not taking medications as prescribed stopped Jardiance due to recurrent yeast infections. She does monitor her glucose at home with typical readings in the 180s without symptoms of hypoglycemia. Diet recall: admits she could be better monitoring her diet, does well at work, but tends to eat poorly at home for dinner and often eats dessert. Complaints today: none. Pertinent negatives as listed in ROS. We will address HM today.    Past Medical History:   Diagnosis Date    Allergy     Cataract     Diabetes mellitus type II     Diverticulitis 09/2014    Fever blister     Hypertension     Migraine headache        Past Surgical History:   Procedure Laterality Date     COLONOSCOPY N/A 10/21/2019    Procedure: COLONOSCOPY;  Surgeon: Gomez Stark MD;  Location: [facility];  Service: Endoscopy;  Laterality: N/A;    EYE SURGERY Bilateral     cataract    HYSTERECTOMY  06/2009    KJB---DLH/BSO ATYPICAL HYPERPLASIA    OOPHORECTOMY      TUBAL LIGATION         Social History     Socioeconomic History    Marital status:    Occupational History    Occupation: Dispatcher     Employer: [employer]   Tobacco Use    Smoking status: Never     Passive exposure: Never    Smokeless tobacco: Never   Substance and Sexual Activity    Alcohol use: No    Drug use: No    Sexual activity: Yes     Partners: Male     Birth control/protection: None     Social Determinants of Health     Financial Resource Strain: Low Risk  (4/23/2024)    Overall Financial Resource Strain (CARDIA)     Difficulty of Paying Living Expenses: Not very hard   Food Insecurity: No Food Insecurity (4/23/2024)    Hunger Vital Sign     Worried About Running Out of Food in the Last Year: Never true     Ran Out of Food in the Last Year: Never true   Transportation Needs: No Transportation Needs (4/23/2024)    PRAPARE - Transportation     Lack of Transportation (Medical): No     Lack of Transportation (Non-Medical): No   Physical Activity: Unknown (4/23/2024)    Exercise Vital Sign     Days of Exercise per Week: Patient declined   Stress: Stress Concern Present (4/23/2024)    [employer] [place] [employer] - Occupational Stress Questionnaire     Feeling of Stress : To some extent   Housing Stability: Low Risk  (7/21/2023)    Housing Stability Vital Sign     Unable to Pay for Housing in the Last Year: No     Number of Places Lived in the Last Year: 1     Unstable Housing in the Last Year: No       Family History   Problem Relation Name Age of Onset    Ovarian cancer Mother  43    Cancer Mother          ovarian    Diabetes Mother      Heart disease Father      Melanoma Neg Hx      Psoriasis Neg Hx      Lupus Neg  Hx         Review of Systems  Review of Systems   Constitutional:  Negative for malaise/fatigue and weight loss.   Eyes:  Negative for blurred vision and double vision.   Respiratory:  Negative for shortness of breath.    Cardiovascular:  Negative for chest pain, palpitations, orthopnea, claudication, leg swelling and PND.   Gastrointestinal:  Negative for blood in stool and melena.   Genitourinary:  Negative for flank pain and hematuria.   Neurological:  Positive for dizziness and headaches. Negative for tremors, seizures, loss of consciousness and weakness.   Endo/Heme/Allergies:  Positive for polydipsia.   Psychiatric/Behavioral:  The patient is not nervous/anxious.      A complete review of systems was otherwise negative.    Physical Exam  General appearance: alert, appears stated age, cooperative, and no distress  Lungs:  respirations are even and unlabored  Skin:  no visible rash or lesions  Neurologic: Grossly normal    Assessment/Plan  Type 2 diabetes mellitus with both eyes affected by mild nonproliferative retinopathy without macular edema, without long-term current use of insulin  Add Glipizide to regimen, discussed taking with food and risk of hypoglycemia.  Resume focus on diabetic diet, try cutting out the dessert at night and try to focus on improving dinner habits.  Labs and follow-up with Dr. Kothari in 3 months.  -     HEMOGLOBIN A1C; Future; Expected date: 09/25/2024  -     BASIC METABOLIC PANEL; Future; Expected date: 09/25/2024  -     glipiZIDE 5 MG TR24; Take 1 tablet (5 mg total) by mouth daily with breakfast.  Dispense: 90 tablet; Refill: 1    Morbid obesity with BMI of 45.0-49.9, adult  The patient is asked to make an attempt to improve diet and exercise patterns to aid in medical management of this problem. We specifically discussed cutting calorie intake by 500-1000 calories per day for a goal of a 1-2 pound weight loss per week and recommendations for a mostly plant based diet with  limited red meats/refined grains/processed foods/added sugars.    NAFLD (nonalcoholic fatty liver disease)  The patient is asked to make an attempt to improve diet and exercise patterns to aid in medical management of this problem. We specifically discussed cutting calorie intake by 500-1000 calories per day for a goal of a 1-2 pound weight loss per week and recommendations for a mostly plant based diet with limited red meats/refined grains/processed foods/added sugars.    Patient has verbalized understanding and is in agreement with plan of care.    Follow up in about 3 months (around 9/25/2024).    Answers submitted by the patient for this visit:  Diabetes Questionnaire (Submitted on 6/25/2024)  Chief Complaint: Diabetes problem  Diabetes type: type 2  MedicAlert ID: No  Disease duration: 15 Years  fatigue: Yes  foot paresthesias: No  foot ulcerations: No  polyphagia: Yes  polyuria: Yes  visual change: No  Symptom course: worsening  confusion: No  speech difficulty: No  hunger: Yes  mood changes: Yes  pallor: No  sleepiness: Yes  sweats: No  blackouts: No  hospitalization: No  nocturnal hypoglycemia: No  required assistance: No  required glucagon: No  CVA: No  heart disease: No  nephropathy: No  peripheral neuropathy: No  PVD: No  retinopathy: No  autonomic neuropathy: No  CAD risks: hypertension, obesity, stress  Current treatments: oral agent (dual therapy)  Treatment compliance: most of the time  Home blood tests: 1-2 x per day  Monitoring compliance: fair  Blood glucose trend: increasing steadily  Weight trend: stable  Exercise: rarely  Dietitian visit: No  Eye exam current: Yes  Sees podiatrist: Yes

## 2024-07-05 DIAGNOSIS — E55.9 VITAMIN D INSUFFICIENCY: Chronic | ICD-10-CM

## 2024-07-05 NOTE — TELEPHONE ENCOUNTER
Refill Routing Note   Medication(s) are not appropriate for processing by [facility] for the following reason(s):        Outside of protocol    [facility] action(s):  Route        Medication Therapy Plan: FLOS      Appointments  past 12m or future 3m with PCP    Date Provider   Last Visit   4/19/2024 Jaylon Kothari MD   Next Visit   12/30/2024 Jaylon Kothari MD   ED visits in past 90 days: 0        Note composed:4:35 PM 07/05/2024

## 2024-07-05 NOTE — TELEPHONE ENCOUNTER
Care Due:                  Date            Visit Type   Department     Provider  --------------------------------------------------------------------------------                                ESTABLISHED   [facility]                              PATIENT -    MED/ INTERNAL  Last Visit: 12-      VIRTUAL      MED/ PEDS      Jaylon Kothari  Next Visit: None Scheduled  None         None Found                                                            Last  Test          Frequency    Reason                     Performed    Due Date  --------------------------------------------------------------------------------    CMP.........  12 months..  atorvastatin.............  03- 03-    [facility] [employer] Embedded Care Due Messages. Reference number: [id number].   7/05/2024 3:09:48 PM CDT

## 2024-07-08 RX ORDER — ERGOCALCIFEROL 1.25 MG/1
50000 CAPSULE ORAL
Qty: 12 CAPSULE | Refills: 1 | Status: SHIPPED | OUTPATIENT
Start: 2024-07-08

## 2024-07-30 DIAGNOSIS — K21.9 GASTROESOPHAGEAL REFLUX: ICD-10-CM

## 2024-07-30 NOTE — TELEPHONE ENCOUNTER
Care Due:                  Date            Visit Type   Department     Provider  --------------------------------------------------------------------------------                                ESTABLISHED   [facility] FAMILY                              PATIENT -    MED/ INTERNAL  Last Visit: 12-      VIRTUAL      MED/ PEDS      Jaylon Kothari                              EP -                              PRIMARY      [facility] INTERNAL  Next Visit: 12-      CARE ([facility])   MEDICINE       Jaylon Kothari                                                            Last  Test          Frequency    Reason                     Performed    Due Date  --------------------------------------------------------------------------------    Office Visit  12 months..  atorvastatin,              12- 12-                             ergocalciferol,                             lisinopriL-hydrochlorothi                             azide, metFORMIN.........    [employer] Embedded Care Due Messages. Reference number: [id number].   7/30/2024 4:31:04 PM CDT

## 2024-07-31 RX ORDER — OMEPRAZOLE 20 MG/1
20 CAPSULE, DELAYED RELEASE ORAL EVERY MORNING
Qty: 90 CAPSULE | Refills: 1 | Status: SHIPPED | OUTPATIENT
Start: 2024-07-31

## 2024-07-31 NOTE — TELEPHONE ENCOUNTER
Refill Routing Note   Medication(s) are not appropriate for processing by [facility] for the following reason(s):        Patient not seen by provider within 15 months  No active prescription written by provider    [facility] action(s):  Defer        Medication Therapy Plan: FOV 12/30/24      Appointments  past 12m or future 3m with PCP    Date Provider   Last Visit   4/19/2024 Jaylon Kothari MD   Next Visit   12/30/2024 Jaylon Kothari MD   ED visits in past 90 days: 0        Note composed:8:19 AM 07/31/2024

## 2024-08-14 ENCOUNTER — PATIENT MESSAGE (OUTPATIENT)
Dept: INTERNAL MEDICINE | Facility: CLINIC | Age: 54
End: 2024-08-14
Payer: COMMERCIAL

## 2024-08-16 DIAGNOSIS — J30.9 ALLERGIC RHINITIS: ICD-10-CM

## 2024-08-16 NOTE — TELEPHONE ENCOUNTER
No care due was identified.  [facility] Embedded Care Due Messages. Reference number: [id number].   8/16/2024 2:51:02 PM CDT

## 2024-08-17 NOTE — TELEPHONE ENCOUNTER
Refill Routing Note   Medication(s) are not appropriate for processing by [facility] for the following reason(s):        Patient not seen by provider within 15 months  No active prescription written by provider  4/19/24 E-visit but does not factor for refill approval    [facility] action(s):  Defer        Medication Therapy Plan: Last med order under the NP but has been previously prescribed by PCP; 4/19/24 an E-Visit occurred with PCP but does not factor for approval.      Appointments  past 12m or future 3m with PCP    Date Provider   Last Visit   4/19/2024 Jaylon Kothari MD   Next Visit   12/30/2024 Jaylon Kothari MD   ED visits in past 90 days: 0        Note composed:9:58 AM 08/17/2024

## 2024-08-19 RX ORDER — MONTELUKAST SODIUM 10 MG/1
10 TABLET ORAL DAILY
Qty: 90 TABLET | Refills: 1 | Status: SHIPPED | OUTPATIENT
Start: 2024-08-19

## 2024-08-27 ENCOUNTER — PATIENT MESSAGE (OUTPATIENT)
Dept: INTERNAL MEDICINE | Facility: CLINIC | Age: 54
End: 2024-08-27
Payer: COMMERCIAL

## 2024-08-27 DIAGNOSIS — R42 VERTIGO: ICD-10-CM

## 2024-08-28 RX ORDER — MECLIZINE HYDROCHLORIDE 25 MG/1
25 TABLET ORAL 3 TIMES DAILY PRN
Qty: 90 TABLET | Refills: 1 | Status: SHIPPED | OUTPATIENT
Start: 2024-08-28

## 2024-09-03 ENCOUNTER — PATIENT MESSAGE (OUTPATIENT)
Dept: INTERNAL MEDICINE | Facility: CLINIC | Age: 54
End: 2024-09-03
Payer: COMMERCIAL

## 2024-09-03 DIAGNOSIS — E11.3293 TYPE 2 DIABETES MELLITUS WITH BOTH EYES AFFECTED BY MILD NONPROLIFERATIVE RETINOPATHY WITHOUT MACULAR EDEMA, WITHOUT LONG-TERM CURRENT USE OF INSULIN: Chronic | ICD-10-CM

## 2024-09-03 NOTE — TELEPHONE ENCOUNTER
No care due was identified.  [facility] Embedded Care Due Messages. Reference number: [id number].   9/03/2024 8:56:29 AM CDT

## 2024-09-04 RX ORDER — DULAGLUTIDE 4.5 MG/.5ML
4.5 INJECTION, SOLUTION SUBCUTANEOUS
Qty: 12 PEN | Refills: 0 | Status: SHIPPED | OUTPATIENT
Start: 2024-09-04 | End: 2024-11-27

## 2024-09-04 NOTE — TELEPHONE ENCOUNTER
Refill Routing Note   Medication(s) are not appropriate for processing by [facility] for the following reason(s):        Patient not seen by provider within 15 months    [facility] action(s):  Route             Appointments  past 12m or future 3m with PCP    Date Provider   Last Visit   4/19/2024 Jaylon Kothari MD   Next Visit   12/30/2024 Jaylon Kothari MD   ED visits in past 90 days: 0        Note composed:4:27 AM 09/04/2024

## 2024-09-18 ENCOUNTER — PATIENT OUTREACH (OUTPATIENT)
Dept: ADMINISTRATIVE | Facility: HOSPITAL | Age: 54
End: 2024-09-18
Payer: COMMERCIAL

## 2024-09-18 DIAGNOSIS — E11.9 TYPE 2 DIABETES MELLITUS WITHOUT COMPLICATION, WITHOUT LONG-TERM CURRENT USE OF INSULIN: Chronic | ICD-10-CM

## 2024-09-18 DIAGNOSIS — Z12.11 ENCOUNTER FOR SCREENING FOR COLORECTAL MALIGNANT NEOPLASM: Primary | ICD-10-CM

## 2024-09-18 DIAGNOSIS — Z12.12 ENCOUNTER FOR SCREENING FOR COLORECTAL MALIGNANT NEOPLASM: Primary | ICD-10-CM

## 2024-09-18 NOTE — PROGRESS NOTES
Colonoscopy will be due soon - ambulatory referral placed and a pre-admit testing appointment scheduled on 12/06/2024.

## 2024-09-19 RX ORDER — METFORMIN HYDROCHLORIDE 500 MG/1
1000 TABLET, EXTENDED RELEASE ORAL 2 TIMES DAILY WITH MEALS
Qty: 360 TABLET | Refills: 1 | Status: SHIPPED | OUTPATIENT
Start: 2024-09-19

## 2024-09-19 NOTE — TELEPHONE ENCOUNTER
Refill Routing Note   Medication(s) are not appropriate for processing by [facility] for the following reason(s):        Patient not seen by provider within 15 months    [facility] action(s):  Defer               Appointments  past 12m or future 3m with PCP    Date Provider   Last Visit   4/19/2024 Jaylon Kothari MD   Next Visit   12/30/2024 Jaylon Kothari MD   ED visits in past 90 days: 0        Note composed:11:12 AM 09/19/2024

## 2024-09-23 ENCOUNTER — OFFICE VISIT (OUTPATIENT)
Dept: NEUROLOGY | Facility: CLINIC | Age: 54
End: 2024-09-23
Payer: COMMERCIAL

## 2024-09-23 VITALS
SYSTOLIC BLOOD PRESSURE: 110 MMHG | WEIGHT: 275 LBS | HEART RATE: 99 BPM | DIASTOLIC BLOOD PRESSURE: 79 MMHG | HEIGHT: 65 IN | BODY MASS INDEX: 45.82 KG/M2

## 2024-09-23 DIAGNOSIS — G47.33 OSA (OBSTRUCTIVE SLEEP APNEA): Primary | ICD-10-CM

## 2024-09-23 DIAGNOSIS — R42 VERTIGO: ICD-10-CM

## 2024-09-23 DIAGNOSIS — G43.109 MIGRAINE WITH AURA AND WITHOUT STATUS MIGRAINOSUS, NOT INTRACTABLE: Chronic | ICD-10-CM

## 2024-09-23 PROCEDURE — 99999 PR PBB SHADOW E&M-EST. PATIENT-LVL V: CPT | Mod: PBBFAC,,, | Performed by: STUDENT IN AN ORGANIZED HEALTH CARE EDUCATION/TRAINING PROGRAM

## 2024-09-23 NOTE — PROGRESS NOTES
[facility]  Clinic Note    Date of Service: 9/23/2024  Patient seen at the request of: Ofelia Avila NP    Reason for Consultation  Migraine follow-up    Assessment:  Iqra Duque is a 54 y.o. female past medical history of chronic migraine, sleep apnea not on CPAP, overweight with BMI> 45, vertigo spells who presented for migraine follow-up.  Headache been stable for the past 2 years with 2 migraine episodes per month + component of early morning headache most likely related to sleep apnea an overweight.  Patient still complains of vertigo episodes on and off with sudden movements, never seen by ENT before.  No nystagmus on exam, sudden movement did not trigger vertigo during examination,.  Likely she has component of migraine with obstructive sleep apnea headaches.  Seems controlled fairly with Nurtec and prevention maneuver by the patient.      Plan:    - discussed that her headache is not frequent to the point that she needs daily medication, can continue Nurtec for abortive and add riboflavin 400 mg daily  -we discussed obstructive sleep apnea might play a big factor in her morning headache and she needs to follow up for for better fit CPAP, we discussed weight loss, and nutritional options, I offered nutritional consult but she refused  -referred to sleep Clinic for evaluation  -referred to ENT for further vertigo evaluation    - RTC in 6 months      Signed:    Feng Resendiz MD  Neurology/Vascular neurology   09/23/2024 4:02 PM      HPI:  Iqra Duque is a 54 y.o. female with   Past Medical History:   Diagnosis Date    Allergy     Cataract     Diabetes mellitus type II     Diverticulitis 09/2014    Fever blister     Hypertension     Migraine headache      She decided to come back to [place] for headache follow-up, she still have the headache twice a month, can continue for 2 days, usually headache started in the morning after waking up in 1 or 2 hours, reported when she catches it early take the  Nurtec she would be able to abort the headache but sometimes she would wake up with a headache.  Describe migraine pulsatile, sensitive to light and noise, can be frontal temporal and also described frontal morning headaches, she use extreme previously reported now once or twice a month, tried magnesium for 6 months and did not help    Fail  rizatriptan and sumatriptan before  Patient also with history of sleep apnea, snore and waking up gasping for air, she has a diagnosis of sleep apnea but she does not use CPAP for many years.    Still endorse dizziness with sudden movement but it is not all the time, no nystagmus on exam, Milaca-Hallpike test did not performed due to overweight and is unable to lay on the table.         Past visits hx ;  Seen in [facility] with Dr. Chelo Escalona VERONIQUE Duque is a 53 y.o. female with a history of hypertension, type 2 diabetes, morbid obesity, sleep apnea.  Patient is here to establish care for her chronic migraines, has been establish with prior neurologists.  Has been getting FMLA for her migraines as well.     When she is on Nurtec, it works.  Difficult to get a baseline given that she has been out of her medication.  Acute medications failed: rizatriptan (ineffective), sumatriptan (ineffective)      Description of migraines include dizziness feeling, pulsatile quality, sensitive to light and noise, can be frontal or temporal.  Sees a chiropractor for neck pain.  Has a insomnia as well.  Plan was continue current regimen and follow up with headache clinic but appointment cancelled.     Seen in [place] on 06/27/2023 with INÉS Hughes  6/27/23: Patient presents for follow up headaches and migraines. She reports about 2 migraines per month at least, but may have up to 3 or 4. Endorses frequent near daily low grade headaches that do not last all day.      She presents to discuss completion of FMLA paperwork for her job as she has missed 7 days of work (2-3 per month) this year already due to  debilitating migraines that impact her her ability to function at her job. Note that she does work 12 hour shifts.      Rizatriptan has not been effective for acute relief of migraine. She would like to discuss other acute medications. Would like to defer preventative at this time and focus on lifestyle approaches.  Acute medications failed: rizatriptan (ineffective), sumatriptan (ineffective)     Endorses frequent dizziness, and vertigo. Endorses that it occurs often after standing up. Meclizine has been effective for relief in the past.    ====================================  10/10/22:  Ms. Iqra Duque is a 52 y.o. female presenting for follow up. Estimates that last headache occurred about one month ago. She received a new CPAP machine mid September after her PCP appointment and has been getting better quality sleep. Since then, she has had significant improvements in headaches. As rizatriptan has been effective in the past, she would like to continue this medication as needed for acute headache.     No new neurological complaints or concerns.  ===========================================  Per documentation by Dr. Aniceto Anderson DO on 8/9/22:  Ms. Iqra Duque presents today to discuss their ongoing uncontrolled headaches.  The patient shares that she has of long-time history of migraines but experienced a significant decrease in headache activity after a hysterectomy and starting CPAP therapy for obstructive sleep apnea.  She has been unable to use CPAP for a year due to supply chain issues related to COVID 19 pandemic.  Over the course of the last year, the patient has noted a slow increase in the number of headaches suffered per month.  She has frequent lower level morning time headaches that had previously resolved with CPAP use.  Estimates that average migraine frequency was 1 per month or 1 every other month a couple of years ago.  Not experiencing 3-4 migraines per month.  Overall headache qualities  in features are unchanged for many years.     Patient describes short lasting (seconds) visual phenomenon related to migraine.  Can be glittering bilateral or a single hazy color added to bilateral visual fields for 1-2 seconds. VARS 0.     Age of onset: 10  When did they become more of a problem: about a year  # of Total headache days per month: 15  # of Migraine or severe days per month: 3-4  Intensity of average and most severe headaches: 5-6/10, 9/10  Duration of headache pain: >4hrs untreated   Quality of pain: Pulsating/Throbbing  Laterality: left side mostly, not side locked  Location: left eye and radiation back through temporal region  Photophobia and phonophobia: yes  Nausea and vomiting: yes  Causes avoidance of physical activity: yes  Worsened by physical activity: yes  Triggers: lack of sleep, skipping meals  Preventive Medications failed: none  Acute medications failed: sumatriptan (>3 months, intolerable side effects), rizatriptan (new, thinks its working)  Supplements: none  OTC Medications: none  Hx of (Bolded items are positive): depression, anxiety, fibromyalgia, autoimmune disorder, head trauma, neurological infection, glaucoma, asthma, nephrolithiasis, MI, Stroke, Raynaud's phen.  Is medication overuse contributing to the patient's current migraine burden: no  Aura: no  Autonomic symptoms: no  Family Hx of migraines: mother, aunt     The VARS score is the weighted sum of the presence of five visual symptom characteristics:    duration 5-60 min (3 points) 3   develops gradually >=5 min (2 points) 2   scotoma (2 points) 2   zig?zag lines (2 points) 2   and unilateral (1 point) 1   Total points:  0  The maximum score is 10 points.    A VARS score of 5 or more diagnosed MA    sensitivity of 96% (95% CI 92-99%)   specificity of 98%(95% CI %)      Current HA Regimen:  Rizatriptan 5mg tab prn           TSH   Date Value Ref Range Status   10/20/2021 1.048 0.400 - 4.000 uIU/mL Final   03/12/2018  1.008 0.400 - 4.000 uIU/mL Final     Vitamin B-12   Date Value Ref Range Status   03/14/2024 288 210 - 950 pg/mL Final   03/28/2016 372 210 - 950 pg/mL Final         Review of Systems:  ROS negative unless noted in HPI    Past Surgical History:  Past Surgical History:   Procedure Laterality Date    COLONOSCOPY N/A 10/21/2019    Procedure: COLONOSCOPY;  Surgeon: Gomez Stark MD;  Location: [facility];  Service: Endoscopy;  Laterality: N/A;    EYE SURGERY Bilateral     cataract    HYSTERECTOMY  06/2009    KJB---DLH/BSO ATYPICAL HYPERPLASIA    OOPHORECTOMY      TUBAL LIGATION         Family History:  Family History   Problem Relation Name Age of Onset    Ovarian cancer Mother  43    Cancer Mother          ovarian    Diabetes Mother      Heart disease Father      Melanoma Neg Hx      Psoriasis Neg Hx      Lupus Neg Hx         Social History:  Social History     Tobacco Use    Smoking status: Never     Passive exposure: Never    Smokeless tobacco: Never   Substance Use Topics    Alcohol use: No    Drug use: No       Allergies:  Bactrim [sulfamethoxazole-trimethoprim], Keflex [cephalexin], Neosporin (neomycin-polymyx), Sumatriptan, Amoxicillin, Neosporin  [benzalkonium chloride], and Sulfa (sulfonamide antibiotics)    Outpatient Medications:  Prior to Admission medications    Medication Sig Start Date End Date Taking? Authorizing Provider   atorvastatin (LIPITOR) 10 MG tablet TAKE 1 TABLET BY MOUTH EVERY DAY 4/1/24   Jaylon Kothari MD   cetirizine (ZYRTEC) 10 MG tablet Take 10 mg by mouth once daily.    Provider, Historical   CONTOUR TEST STRIPS Strp TEST ONCE DAILY 3/9/18   Jaylon Kothari MD   dulaglutide (TRULICITY) 4.5 mg/0.5 mL pen injector Inject 4.5 mg (one pen) into the skin every 7 days. 9/4/24 12/2/24  Jaylon Kothari MD   ergocalciferol (ERGOCALCIFEROL) 50,000 unit Cap TAKE 1 CAPSULE BY MOUTH ONE TIME PER WEEK 7/8/24   Jaylon Kothari MD   ferrous sulfate (FEOSOL) 325 mg (65 mg iron) Tab tablet  Take 1 tablet (325 mg total) by mouth once daily. 3/15/24   Ofelia Avila, NP   fluconazole (DIFLUCAN) 150 MG Tab Take 1 tab PO once a day PRN yeast infection 4/19/24   Jaylon Kothari MD   glipiZIDE 5 MG TR24 Take 1 tablet (5 mg total) by mouth daily with breakfast. 6/25/24 6/25/25  Ofelia Avila NP   lisinopriL-hydrochlorothiazide (PRINZIDE,ZESTORETIC) 20-12.5 mg per tablet TAKE 1 TABLET BY MOUTH EVERY DAY 4/16/24   Jaylon Kothari MD   meclizine (ANTIVERT) 25 mg tablet Take 1 tablet (25 mg total) by mouth 3 (three) times daily as needed for Dizziness. 8/28/24   Jaylon Kothari MD   metFORMIN (GLUCOPHAGE-XR) 500 MG ER 24hr tablet Take 2 tablets (1,000 mg total) by mouth 2 (two) times daily with meals. 9/19/24   Jaylon Kothari MD   MICROLET LANCET Misc TEST ONCE DAILY 1/22/18   Jaylon Kothari MD   montelukast (SINGULAIR) 10 mg tablet Take 1 tablet (10 mg total) by mouth once daily. 8/19/24   Jaylon Kothari MD   omeprazole (PRILOSEC) 20 MG capsule Take 1 capsule (20 mg total) by mouth every morning. 7/31/24   Jaylon Kothari MD   rimegepant (NURTEC) 75 mg odt Take 1 tablet (75 mg total) by mouth as needed for Migraine. Place ODT tablet on the tongue; alternatively the ODT tablet may be placed under the tongue. Okay to take 2nd tablet in 24 hrs, no more than 2 in 24 hrs. 4/25/24   Oksana Whitney MD       Physical exam:    Vitals: There were no vitals taken for this visit.    General:   Sitting in chair, in no distress, well-nourished, well-developed, appears stated age.  Head/Neck:   Normocephalic,atraumatic  Pulm:  Non-labored breathing     Mental Status: Alert and oriented to person, time, place, situation. Speech spontaneous and fluent without paraphasias; no dysarthria  CN:  II: visual fields full  III, IV, VI: EOM intact without nystagmus or diplopia.   V: Sensation to light touch full and symmetric in V1-3. Masseter contraction full bilaterally.   VII: Facial movement full and  symmetric.   VIII: Hearing grossly normal to conversation.  IX, X: Palate midline with symmetric elevation.    XI: SCM and trapezius: 5/5 bilaterally.   XII: Tongue midline without fasciculations.  Motor: Normal bulk and tone throughout all four extremities.   RUE: D: 5/5; B: 5/5; T:  5/5; WF:5/5; WE:  5/5; IO: 5/5   LUE: D: 5/5; B: 5/5; T:  5/5; WF: 5/5; WE:  5/5; IO: 5/5   RLE: HF: 5/5, KE: 5/5, KF: 5/5, DF: 5/5, PF: 5/5  LLE: HF: 5/5, KE: 5/5, KF: 5/5, DF: 5/5, PF: 5/5  No tremors   Sensory: Intact and symmetric to light touch throughout.  Reflexes: RUE: Triceps 2+, biceps 2+, brachioradialis 2+  LUE: Triceps 2+, biceps 2+ brachioradialis 2+  RLE: Knee 2+, ankle 2+  LLE: Knee 2+, ankle 2+  Coordination:  Intact and symmetric to finger-to-nose and heel-to-shin.  Gait:  Intact to casual gait.    Imaging:  All pertinent imaging was personally reviewed.        I spent a total of 45 minutes on the day of the visit. This includes face to face time and non-face to face time preparing to see the patient (eg, review of tests), obtaining and/or reviewing separately obtained history, documenting clinical information in the electronic or other health record, independently interpreting results and communicating results to the patient/family/caregiver, or care coordinator.

## 2024-09-24 ENCOUNTER — TELEPHONE (OUTPATIENT)
Dept: OTOLARYNGOLOGY | Facility: CLINIC | Age: 54
End: 2024-09-24
Payer: COMMERCIAL

## 2024-10-15 DIAGNOSIS — E11.3293 TYPE 2 DIABETES MELLITUS WITH BOTH EYES AFFECTED BY MILD NONPROLIFERATIVE RETINOPATHY WITHOUT MACULAR EDEMA, WITHOUT LONG-TERM CURRENT USE OF INSULIN: Chronic | ICD-10-CM

## 2024-10-16 ENCOUNTER — OFFICE VISIT (OUTPATIENT)
Dept: PODIATRY | Facility: CLINIC | Age: 54
End: 2024-10-16
Payer: COMMERCIAL

## 2024-10-16 VITALS — DIASTOLIC BLOOD PRESSURE: 63 MMHG | OXYGEN SATURATION: 93 % | SYSTOLIC BLOOD PRESSURE: 132 MMHG | HEART RATE: 68 BPM

## 2024-10-16 DIAGNOSIS — E11.9 COMPREHENSIVE DIABETIC FOOT EXAMINATION, TYPE 2 DM, ENCOUNTER FOR: Primary | ICD-10-CM

## 2024-10-16 PROCEDURE — 99213 OFFICE O/P EST LOW 20 MIN: CPT | Mod: S$GLB,,, | Performed by: PODIATRIST

## 2024-10-16 PROCEDURE — 3066F NEPHROPATHY DOC TX: CPT | Mod: CPTII,S$GLB,, | Performed by: PODIATRIST

## 2024-10-16 PROCEDURE — 1160F RVW MEDS BY RX/DR IN RCRD: CPT | Mod: CPTII,S$GLB,, | Performed by: PODIATRIST

## 2024-10-16 PROCEDURE — 3052F HG A1C>EQUAL 8.0%<EQUAL 9.0%: CPT | Mod: CPTII,S$GLB,, | Performed by: PODIATRIST

## 2024-10-16 PROCEDURE — 3061F NEG MICROALBUMINURIA REV: CPT | Mod: CPTII,S$GLB,, | Performed by: PODIATRIST

## 2024-10-16 PROCEDURE — 99999 PR PBB SHADOW E&M-EST. PATIENT-LVL IV: CPT | Mod: PBBFAC,,, | Performed by: PODIATRIST

## 2024-10-16 PROCEDURE — 1159F MED LIST DOCD IN RCRD: CPT | Mod: CPTII,S$GLB,, | Performed by: PODIATRIST

## 2024-10-16 PROCEDURE — 3075F SYST BP GE 130 - 139MM HG: CPT | Mod: CPTII,S$GLB,, | Performed by: PODIATRIST

## 2024-10-16 PROCEDURE — 4010F ACE/ARB THERAPY RXD/TAKEN: CPT | Mod: CPTII,S$GLB,, | Performed by: PODIATRIST

## 2024-10-16 PROCEDURE — 3078F DIAST BP <80 MM HG: CPT | Mod: CPTII,S$GLB,, | Performed by: PODIATRIST

## 2024-10-16 RX ORDER — ATORVASTATIN CALCIUM 10 MG/1
10 TABLET, FILM COATED ORAL DAILY
Qty: 90 TABLET | Refills: 0 | Status: SHIPPED | OUTPATIENT
Start: 2024-10-16

## 2024-10-16 NOTE — PATIENT INSTRUCTIONS
Over the counter pain creams: Voltaren Gel, Biofreeze, Bengay, tiger balm, two old goat, lidocaine gel,  Absorbine Veterinary Liniment Gel Topical Analgesic Sore Muscle and Joint Pain Relief    Recommend lotions: eucerin, eucerin for diabetics, aquaphor, A&D ointment, gold bond for diabetics, sween, Chino's Bees all purpose baby ointment,  urea 40 with aloe or SkinIntegra rapid crack repair (found on amazon.com)    Shoe recommendations: (try 6pm.com, zappos.com , nordstromrack.Resort Gems, or shoes.com for discounted prices) you can visit varsity shoes in [place], DSW shoes in [place]  or jeffrey rack in the [facility] (there are also several shoe brand outlets in the [facility])    ONLY purchase stability style tennis shoes AVOID flex, foam, free, yoga mat style shoes or shoes that claim to feel like clouds  If you have a flatter foot purchase shoes for PRONATION  If you have a high arch purchase shoes for SUPINATION    Shoe examples:    Asics (GT or gel foundations, gel-kayano-30), new balance stability type shoes (such as the 940 series or the [id number]), tavia (Guide 16, stabil c3),  Seymour (GTS or Beast or   transcend), propet, HokaOne (aviva 7, arahi, cristy) Cirilo (tennis shoes and boots)    Sofft Brand (women) Ovidio&Kris (men), clarks, crocs, aerosoles, naturalizers, SAS, ecco, born, messi gibbs, rockports (dress shoes)    Vionic, burkenstocks, fitflops, propet, taos, baretraps, Hoka or vionic recovery slides/sandals (sandals)    Hoka or vionic recovery slides/sandals, crocs, propet (house shoes)      Nail Home remedy:  Vicks Vapor rub or Emuaid to nails for easier manageability    Diabetes: Inspecting Your Feet  Diabetes increases your chances of developing foot problems. So inspect your feet every day. This helps you find small skin irritations before they become serious infections. If you have trouble seeing the bottoms of your feet, use a mirror or ask a family member or friend to help.     Pressure  spots on the bottom of the foot are common areas where problems develop.   How to check your feet  Below are tips to help you look for foot problems. Try to check your feet at the same time each day, such as when you get out of bed in the morning:  Check the top of each foot. The tops of toes, back of the heel, and outer edge of the foot can get a lot of rubbing from poor-fitting shoes.  Check the bottom of each foot. Daily wear and tear often leads to problems at pressure spots.  Check the toes and nails. Fungal infections often occur between toes. Toenail problems can also be a sign of fungal infections or lead to breaks in the skin.  Check your shoes, too. Loose objects inside a shoe can injure the foot. Use your hand to feel inside your shoes for things like vicente, loose stitching, or rough areas that could irritate your skin.  Warning signs  Look for any color changes in the foot. Redness with streaks can signal a severe infection, which needs immediate medical attention. Tell your doctor right away if you have any of these problems:  Swelling, sometimes with color changes, may be a sign of poor blood flow or infection. Symptoms include tenderness and an increase in the size of your foot.  Warm or hot areas on your feet may be signs of infection. A foot that is cold may not be getting enough blood.  Sensations such as burning, tingling, or pins and needles can be signs of a problem. Also check for areas that may be numb.  Hot spots are caused by friction or pressure. Look for hot spots in areas that get a lot of rubbing. Hot spots can turn into blisters, calluses, or sores.  Cracks and sores are caused by dry or irritated skin. They are a sign that the skin is breaking down, which can lead to infection.  Toenail problems to watch for include nails growing into the skin (ingrown toenail) and causing redness or pain. Thick, yellow, or discolored nails can signal a fungal infection.  Drainage and odor can  develop from untreated sores and ulcers. Call your doctor right away if you notice white or yellow drainage, bleeding, or unpleasant odor.   © 3440-9588 [employer]. [address] [place] [address]. All rights reserved. This information is not intended as a substitute for professional medical care. Always follow your healthcare professional's instructions.        Step-by-Step:  Inspecting Your Feet (Diabetes)    Date Last Reviewed: 10/1/2016  © 4625-6927 [employer]. [address] [place] [address]. All rights reserved. This information is not intended as a substitute for professional medical care. Always follow your healthcare professional's instructions.

## 2024-10-16 NOTE — TELEPHONE ENCOUNTER
Refill Routing Note   Medication(s) are not appropriate for processing by [facility] for the following reason(s):        Patient not seen by provider within 15 months  Required labs outdated    [facility] action(s):  Defer               Appointments  past 12m or future 3m with PCP    Date Provider   Last Visit   4/19/2024 Jaylon Kothari MD   Next Visit   12/30/2024 Jaylon Kothari MD   ED visits in past 90 days: 0        Note composed:8:55 PM 10/15/2024

## 2024-10-16 NOTE — PROGRESS NOTES
Subjective:      Patient ID: Iqra Duque is a 54 y.o. female.    Chief Complaint: Diabetic Foot Exam (Yearly check)      Iqra is a 54 y.o. female who presents to the clinic for evaluation and treatment of high risk feet. Iqra has a past medical history of Allergy, Cataract, Diabetes mellitus type II, Diverticulitis (09/2014), Fever blister, Hypertension, and Migraine headache.  The patient has no acute major complaints with feet. Chief concern is how to care for feet as a diabetic.    PCP: Jaylon Kothari MD    Date Last Seen by PCP:   Chief Complaint   Patient presents with    Diabetic Foot Exam     Yearly check       Current shoe gear:  casual shoes       Hemoglobin A1C   Date Value Ref Range Status   06/20/2024 8.1 (H) 4.0 - 5.6 % Final     Comment:     ADA Screening Guidelines:  5.7-6.4%  Consistent with prediabetes  >or=6.5%  Consistent with diabetes    High levels of fetal hemoglobin interfere with the HbA1C  assay. Heterozygous hemoglobin variants (HbS, HgC, etc)do  not significantly interfere with this assay.   However, presence of multiple variants may affect accuracy.     03/14/2024 7.9 (H) 4.0 - 5.6 % Final     Comment:     ADA Screening Guidelines:  5.7-6.4%  Consistent with prediabetes  >or=6.5%  Consistent with diabetes    High levels of fetal hemoglobin interfere with the HbA1C  assay. Heterozygous hemoglobin variants (HbS, HgC, etc)do  not significantly interfere with this assay.   However, presence of multiple variants may affect accuracy.     07/01/2023 8.0 (H) 4.0 - 5.6 % Final     Comment:     ADA Screening Guidelines:  5.7-6.4%  Consistent with prediabetes  >or=6.5%  Consistent with diabetes    High levels of fetal hemoglobin interfere with the HbA1C  assay. Heterozygous hemoglobin variants (HbS, HgC, etc)do  not significantly interfere with this assay.   However, presence of multiple variants may affect accuracy.       Patient Active Problem List   Diagnosis    Dermatitis     Essential hypertension    Type 2 diabetes mellitus with mild nonproliferative retinopathy of both eyes without macular edema    Allergic rhinitis    Ganglion cyst    Gastroesophageal reflux    Chronic diarrhea of unknown origin    Morbid obesity with BMI of 45.0-49.9, adult    Muscle cramps at night    Vitamin D insufficiency    Migraine with aura and without status migrainosus, not intractable    ALIZA (obstructive sleep apnea)    Chronic bilateral low back pain without sciatica    NAFLD (nonalcoholic fatty liver disease)    Medication overuse headache     Current Outpatient Medications on File Prior to Visit   Medication Sig Dispense Refill    atorvastatin (LIPITOR) 10 MG tablet Take 1 tablet (10 mg total) by mouth once daily. 90 tablet 0    cetirizine (ZYRTEC) 10 MG tablet Take 10 mg by mouth once daily.      CONTOUR TEST STRIPS Strp TEST ONCE DAILY 100 strip 1    dulaglutide (TRULICITY) 4.5 mg/0.5 mL pen injector Inject 4.5 mg (one pen) into the skin every 7 days. 12 pen 0    ergocalciferol (ERGOCALCIFEROL) 50,000 unit Cap TAKE 1 CAPSULE BY MOUTH ONE TIME PER WEEK 12 capsule 1    ferrous sulfate (FEOSOL) 325 mg (65 mg iron) Tab tablet Take 1 tablet (325 mg total) by mouth once daily. 90 tablet 1    glipiZIDE 5 MG TR24 Take 1 tablet (5 mg total) by mouth daily with breakfast. 90 tablet 1    lisinopriL-hydrochlorothiazide (PRINZIDE,ZESTORETIC) 20-12.5 mg per tablet TAKE 1 TABLET BY MOUTH EVERY DAY 90 tablet 3    meclizine (ANTIVERT) 25 mg tablet Take 1 tablet (25 mg total) by mouth 3 (three) times daily as needed for Dizziness. 90 tablet 1    metFORMIN (GLUCOPHAGE-XR) 500 MG ER 24hr tablet Take 2 tablets (1,000 mg total) by mouth 2 (two) times daily with meals. 360 tablet 1    MICROLET LANCET Misc TEST ONCE DAILY 100 each 3    montelukast (SINGULAIR) 10 mg tablet Take 1 tablet (10 mg total) by mouth once daily. 90 tablet 1    omeprazole (PRILOSEC) 20 MG capsule Take 1 capsule (20 mg total) by mouth every morning. 90  capsule 1    rimegepant (NURTEC) 75 mg odt Take 1 tablet (75 mg total) by mouth as needed for Migraine. Place ODT tablet on the tongue; alternatively the ODT tablet may be placed under the tongue. Okay to take 2nd tablet in 24 hrs, no more than 2 in 24 hrs. 16 tablet 5    fluconazole (DIFLUCAN) 150 MG Tab Take 1 tab PO once a day PRN yeast infection 1 tablet 3     No current facility-administered medications on file prior to visit.     Allergies   Allergen Reactions    Bactrim [Sulfamethoxazole-Trimethoprim] Hives     Also Septra    Keflex [Cephalexin] Shortness Of Breath    Neosporin (Neomycin-Polymyx) Swelling    Amoxicillin Rash     Past Surgical History:   Procedure Laterality Date    COLONOSCOPY N/A 10/21/2019    Procedure: COLONOSCOPY;  Surgeon: Gomez Stark MD;  Location: [facility];  Service: Endoscopy;  Laterality: N/A;    EYE SURGERY Bilateral     cataract    HYSTERECTOMY  06/2009    KJB---DLH/BSO ATYPICAL HYPERPLASIA    OOPHORECTOMY      TUBAL LIGATION       Family History   Problem Relation Name Age of Onset    Ovarian cancer Mother  43    Cancer Mother          ovarian    Diabetes Mother      Heart disease Father      Melanoma Neg Hx      Psoriasis Neg Hx      Lupus Neg Hx       Social History     Socioeconomic History    Marital status:    Occupational History    Occupation: Dispatcher     Employer: [employer]   Tobacco Use    Smoking status: Never     Passive exposure: Never    Smokeless tobacco: Never   Substance and Sexual Activity    Alcohol use: No    Drug use: No    Sexual activity: Yes     Partners: Male     Birth control/protection: None     Social Drivers of Health     Financial Resource Strain: Low Risk  (4/23/2024)    Overall Financial Resource Strain (CARDIA)     Difficulty of Paying Living Expenses: Not very hard   Food Insecurity: No Food Insecurity (4/23/2024)    Hunger Vital Sign     Worried About Running Out of Food in the Last Year: Never true     Ran Out of Food  in the Last Year: Never true   Transportation Needs: No Transportation Needs (4/23/2024)    PRAPARE - Transportation     Lack of Transportation (Medical): No     Lack of Transportation (Non-Medical): No   Physical Activity: Unknown (4/23/2024)    Exercise Vital Sign     Days of Exercise per Week: Patient declined   Stress: Stress Concern Present (4/23/2024)    [facility] of Occupational Health - Occupational Stress Questionnaire     Feeling of Stress : To some extent   Housing Stability: Low Risk  (7/21/2023)    Housing Stability Vital Sign     Unable to Pay for Housing in the Last Year: No     Number of Places Lived in the Last Year: 1     Unstable Housing in the Last Year: No       Review of Systems   Constitutional: Negative for chills and fever.   Cardiovascular:  Positive for leg swelling. Negative for chest pain and claudication.   Respiratory:  Negative for cough and shortness of breath.    Skin:  Positive for dry skin and nail changes. Negative for itching.   Musculoskeletal:  Positive for back pain and myalgias. Negative for arthritis, falls, joint pain, joint swelling and muscle weakness.   Gastrointestinal:  Negative for diarrhea, nausea and vomiting.   Neurological:  Negative for numbness, paresthesias, tremors and weakness.        History of sciatica   Psychiatric/Behavioral:  Negative for altered mental status and hallucinations.            Objective:       Vitals:    10/16/24 1031   BP: 132/63   Pulse: 68   SpO2: (!) 93%   PainSc: 0-No pain             Physical Exam  Vitals and nursing note reviewed.   Constitutional:       General: She is not in acute distress.     Appearance: She is well-developed. She is not diaphoretic.      Comments: Alert and oriented x 3. No evidence of depression, anxiety, or agitation. Calm, cooperative, and communicative. Appropriate interactions and affect.       Cardiovascular:      Pulses:           Dorsalis pedis pulses are 2+ on the right side and 2+ on the left  side.        Posterior tibial pulses are 2+ on the right side and 2+ on the left side.      Comments: dorsalis pedis and posterior tibial pulses are palpable bilaterally. Digits are warm to the touch 1-5 bilaterally. Capillary refill time is within normal limits 1-5 bilaterally.        Musculoskeletal:      Right ankle: No ecchymosis. No tenderness.      Left ankle: No ecchymosis. No tenderness.      Right foot: Normal capillary refill. Tenderness and crepitus (dorsal medial midfoot with palpable spurring in the area) present.      Left foot: Normal capillary refill. Tenderness present.      Comments: Muscle strength is 5/5 in all groups bilaterally.    Decreased arch height noted b/l. Negative too many toes sign. Gait analysis reveals excessive pronation through midstance and propulsion with early heel off. Shoes reveals lateral heel counter wear bilateral     There is equinus deformity bilateral with decreased dorsiflexion at the ankle joint bilateral. No tenderness with compression of heel. Negative tinels sign.     Decreased first MPJ range of motion both weightbearing and nonweightbearing, no crepitus observed the first MP joint, + dorsal flag sign. Mild  bunion deformity is observed .    Patient has hammertoes of digits 2-5 bilateral partially reducible        Lymphadenopathy:      Comments: No lymphatic streaking    Negative lymphadenopathy bilateral popliteal fossa and tarsal tunnel.     Skin:     General: Skin is warm and dry.      Coloration: Skin is not pale.      Findings: No abrasion, ecchymosis, lesion or rash.      Nails: There is no clubbing.      Comments: Nails 1-5 bilaterally with incurvated borders.     Neurological:      Comments: Monaca-Alphonso 5.07 monofilament is intact bilateral feet. Sharp/dull sensation is also intact Bilateral feet.       Psychiatric:         Speech: Speech normal.         Behavior: Behavior normal.               Assessment:       Encounter Diagnosis   Name Primary?     Comprehensive diabetic foot examination, type 2 DM, encounter for Yes               Plan:       Iqra was seen today for diabetic foot exam.    Diagnoses and all orders for this visit:    Comprehensive diabetic foot examination, type 2 DM, encounter for            I counseled the patient on her conditions, their implications and medical management.          Education about the diabetic foot, neuropathy, and prevention of limb loss.    Shoe inspection. Diabetic Foot Education. Patient reminded of the importance of good nutrition/healthy diet/weight management and blood sugar control to help prevent podiatric complications of diabetes. Patient instructed on proper foot hygeine. Wear comfortable, proper fitting shoes. Wash feet daily. Dry well. After drying, apply moisturizer to feet (no lotion to webspaces). Inspect feet daily for skin breaks, blisters, swelling, or redness. Wear cotton socks (preferably white)  Change socks every day. Do NOT walk barefoot. Do NOT use heating pads or hot water soaks. We discussed wearing proper shoe gear, daily foot inspections, never walking without protective shoe gear.     Discussed edema control and the importance of daily moisturizer to the feet such as Gold bonds diabetic foot cream    Recommend applying vicks vaporub to thick abnormal toenails daily x 6 months to treat fungal nail infection.    Stretching handout dispensed to patient. Instructions on adequate stretching reviewed in clinic     Recommend OTC compression socks; patient is to elevate legs. When sleeping, place a pillow under lower extremities. When sitting, support the legs so that they are level with the waist.    She will continue to monitor the areas daily, inspect her feet, wear protective shoe gear when ambulatory, moisturizer to maintain skin integrity and follow in this office in approximately 12 months, sooner p.r.n.

## 2024-10-16 NOTE — TELEPHONE ENCOUNTER
Care Due:                  Date            Visit Type   Department     Provider  --------------------------------------------------------------------------------                                ESTABLISHED   [facility] FAMILY                              PATIENT -    MED/ INTERNAL  Last Visit: 12-      VIRTUAL      MED/ PEDS      Jaylon Kothari                              EP -                              PRIMARY      [facility] INTERNAL  Next Visit: 12-      CARE ([facility])   MEDICINE       Jaylon Kothari                                                            Last  Test          Frequency    Reason                     Performed    Due Date  --------------------------------------------------------------------------------    CMP.........  12 months..  atorvastatin.............  03- 03-    HBA1C.......  6 months...  dulaglutide, metFORMIN...  06- 12-    [facility] Embedded Care Due Messages. Reference number: [id number].   10/15/2024 8:55:33 PM CDT

## 2024-10-30 ENCOUNTER — OFFICE VISIT (OUTPATIENT)
Dept: OTOLARYNGOLOGY | Facility: CLINIC | Age: 54
End: 2024-10-30
Payer: COMMERCIAL

## 2024-10-30 ENCOUNTER — CLINICAL SUPPORT (OUTPATIENT)
Dept: AUDIOLOGY | Facility: CLINIC | Age: 54
End: 2024-10-30
Payer: COMMERCIAL

## 2024-10-30 VITALS
DIASTOLIC BLOOD PRESSURE: 73 MMHG | WEIGHT: 274.94 LBS | SYSTOLIC BLOOD PRESSURE: 112 MMHG | HEIGHT: 65 IN | BODY MASS INDEX: 45.81 KG/M2

## 2024-10-30 DIAGNOSIS — Z01.10 NORMAL HEARING EXAM: Primary | ICD-10-CM

## 2024-10-30 DIAGNOSIS — H93.293 ABNORMAL AUDITORY PERCEPTION OF BOTH EARS: Primary | ICD-10-CM

## 2024-10-30 DIAGNOSIS — G43.809 VESTIBULAR MIGRAINE: ICD-10-CM

## 2024-10-30 PROCEDURE — 3078F DIAST BP <80 MM HG: CPT | Mod: CPTII,S$GLB,, | Performed by: STUDENT IN AN ORGANIZED HEALTH CARE EDUCATION/TRAINING PROGRAM

## 2024-10-30 PROCEDURE — 99204 OFFICE O/P NEW MOD 45 MIN: CPT | Mod: S$GLB,,, | Performed by: STUDENT IN AN ORGANIZED HEALTH CARE EDUCATION/TRAINING PROGRAM

## 2024-10-30 PROCEDURE — 92567 TYMPANOMETRY: CPT | Mod: S$GLB,,,

## 2024-10-30 PROCEDURE — 3008F BODY MASS INDEX DOCD: CPT | Mod: CPTII,S$GLB,, | Performed by: STUDENT IN AN ORGANIZED HEALTH CARE EDUCATION/TRAINING PROGRAM

## 2024-10-30 PROCEDURE — 1159F MED LIST DOCD IN RCRD: CPT | Mod: CPTII,S$GLB,, | Performed by: STUDENT IN AN ORGANIZED HEALTH CARE EDUCATION/TRAINING PROGRAM

## 2024-10-30 PROCEDURE — 92557 COMPREHENSIVE HEARING TEST: CPT | Mod: S$GLB,,,

## 2024-10-30 PROCEDURE — 3061F NEG MICROALBUMINURIA REV: CPT | Mod: CPTII,S$GLB,, | Performed by: STUDENT IN AN ORGANIZED HEALTH CARE EDUCATION/TRAINING PROGRAM

## 2024-10-30 PROCEDURE — 4010F ACE/ARB THERAPY RXD/TAKEN: CPT | Mod: CPTII,S$GLB,, | Performed by: STUDENT IN AN ORGANIZED HEALTH CARE EDUCATION/TRAINING PROGRAM

## 2024-10-30 PROCEDURE — 1160F RVW MEDS BY RX/DR IN RCRD: CPT | Mod: CPTII,S$GLB,, | Performed by: STUDENT IN AN ORGANIZED HEALTH CARE EDUCATION/TRAINING PROGRAM

## 2024-10-30 PROCEDURE — 3066F NEPHROPATHY DOC TX: CPT | Mod: CPTII,S$GLB,, | Performed by: STUDENT IN AN ORGANIZED HEALTH CARE EDUCATION/TRAINING PROGRAM

## 2024-10-30 PROCEDURE — 3052F HG A1C>EQUAL 8.0%<EQUAL 9.0%: CPT | Mod: CPTII,S$GLB,, | Performed by: STUDENT IN AN ORGANIZED HEALTH CARE EDUCATION/TRAINING PROGRAM

## 2024-10-30 PROCEDURE — 3074F SYST BP LT 130 MM HG: CPT | Mod: CPTII,S$GLB,, | Performed by: STUDENT IN AN ORGANIZED HEALTH CARE EDUCATION/TRAINING PROGRAM

## 2024-10-30 RX ORDER — ATENOLOL 50 MG/1
50 TABLET ORAL DAILY
Qty: 30 TABLET | Refills: 5 | Status: SHIPPED | OUTPATIENT
Start: 2024-10-30 | End: 2025-04-28

## 2024-10-31 DIAGNOSIS — I10 ESSENTIAL HYPERTENSION: Chronic | ICD-10-CM

## 2024-10-31 RX ORDER — LISINOPRIL AND HYDROCHLOROTHIAZIDE 12.5; 2 MG/1; MG/1
1 TABLET ORAL DAILY
Qty: 90 TABLET | Refills: 0 | Status: SHIPPED | OUTPATIENT
Start: 2024-10-31

## 2024-11-26 ENCOUNTER — PATIENT MESSAGE (OUTPATIENT)
Dept: INTERNAL MEDICINE | Facility: CLINIC | Age: 54
End: 2024-11-26
Payer: COMMERCIAL

## 2024-12-04 DIAGNOSIS — E11.3293 TYPE 2 DIABETES MELLITUS WITH BOTH EYES AFFECTED BY MILD NONPROLIFERATIVE RETINOPATHY WITHOUT MACULAR EDEMA, WITHOUT LONG-TERM CURRENT USE OF INSULIN: Chronic | ICD-10-CM

## 2024-12-04 DIAGNOSIS — E55.9 VITAMIN D INSUFFICIENCY: Chronic | ICD-10-CM

## 2024-12-04 RX ORDER — ERGOCALCIFEROL 1.25 MG/1
50000 CAPSULE ORAL
Qty: 12 CAPSULE | Refills: 1 | Status: SHIPPED | OUTPATIENT
Start: 2024-12-04

## 2024-12-04 NOTE — TELEPHONE ENCOUNTER
Care Due:                  Date            Visit Type   Department     Provider  --------------------------------------------------------------------------------    Last Visit: None Found      None         None Found  Next Visit: None Scheduled  None         None Found                                                            Last  Test          Frequency    Reason                     Performed    Due Date  --------------------------------------------------------------------------------    Office Visit  12 months..  ergocalciferol,            Not Found    Overdue                             metFORMIN, montelukast,                             omeprazole...............    [employer] Embedded Care Due Messages. Reference number: [id number].   12/04/2024 9:51:35 AM CST

## 2024-12-04 NOTE — TELEPHONE ENCOUNTER
Refill Routing Note   Medication(s) are not appropriate for processing by [facility] for the following reason(s):        Outside of protocol    [facility] action(s):  Route               Appointments  past 12m or future 3m with PCP    Date Provider   Last Visit   4/19/2024 Jaylon Kothari MD   Next Visit   12/4/2024 Jaylon Kothari MD   ED visits in past 90 days: 0        Note composed:11:55 AM 12/04/2024

## 2024-12-04 NOTE — TELEPHONE ENCOUNTER
No care due was identified.  [facility] Embedded Care Due Messages. Reference number: [id number].   12/04/2024 9:52:53 AM CST

## 2024-12-05 RX ORDER — DULAGLUTIDE 4.5 MG/.5ML
4.5 INJECTION, SOLUTION SUBCUTANEOUS
Qty: 12 PEN | Refills: 0 | Status: SHIPPED | OUTPATIENT
Start: 2024-12-05 | End: 2025-02-27

## 2024-12-05 RX ORDER — GLIPIZIDE 5 MG/1
5 TABLET, FILM COATED, EXTENDED RELEASE ORAL
Qty: 90 TABLET | Refills: 1 | Status: SHIPPED | OUTPATIENT
Start: 2024-12-05 | End: 2025-12-05

## 2024-12-05 NOTE — TELEPHONE ENCOUNTER
Refill Routing Note   Medication(s) are not appropriate for processing by [facility] for the following reason(s):        Patient not seen by provider within 15 months    [facility] action(s):  Defer               Appointments  past 12m or future 3m with PCP    Date Provider   Last Visit   4/19/2024 Jaylon Kothari MD   Next Visit   Visit date not found Jaylon Kothari MD   ED visits in past 90 days: 0        Note composed:6:48 PM 12/04/2024

## 2024-12-05 NOTE — TELEPHONE ENCOUNTER
Refill Routing Note   Medication(s) are not appropriate for processing by [facility] for the following reason(s):        Non-participating provider    [facility] action(s):  Route               Appointments  past 12m or future 3m with PCP    Date Provider   Last Visit   6/25/2024 Ofelia Avila, YESENIA   Next Visit   1/2/2025 Ofelia Avila, YESENIA   ED visits in past 90 days: 0        Note composed:6:48 PM 12/04/2024

## 2024-12-06 ENCOUNTER — CLINICAL SUPPORT (OUTPATIENT)
Dept: ENDOSCOPY | Facility: HOSPITAL | Age: 54
End: 2024-12-06
Attending: INTERNAL MEDICINE
Payer: COMMERCIAL

## 2024-12-06 ENCOUNTER — TELEPHONE (OUTPATIENT)
Dept: ENDOSCOPY | Facility: HOSPITAL | Age: 54
End: 2024-12-06

## 2024-12-06 VITALS — BODY MASS INDEX: 45.82 KG/M2 | WEIGHT: 275 LBS | HEIGHT: 65 IN

## 2024-12-06 DIAGNOSIS — Z12.12 ENCOUNTER FOR SCREENING FOR COLORECTAL MALIGNANT NEOPLASM: ICD-10-CM

## 2024-12-06 DIAGNOSIS — Z12.11 ENCOUNTER FOR SCREENING FOR COLORECTAL MALIGNANT NEOPLASM: ICD-10-CM

## 2024-12-06 DIAGNOSIS — Z86.0100 HISTORY OF COLON POLYPS: Primary | ICD-10-CM

## 2024-12-06 RX ORDER — SODIUM, POTASSIUM,MAG SULFATES 17.5-3.13G
1 SOLUTION, RECONSTITUTED, ORAL ORAL DAILY
Qty: 1 KIT | Refills: 0 | Status: SHIPPED | OUTPATIENT
Start: 2024-12-06 | End: 2024-12-08

## 2024-12-06 NOTE — TELEPHONE ENCOUNTER
Referral for procedure from PAT appointment      Spoke to patient to schedule procedure(s) Colonoscopy       Physician to perform procedure(s) Dr. SAPPHIRE Beach  Date of Procedure (s) 01/16/25  Arrival Time 11:30 AM  Time of Procedure(s) 12:30 PM   Location of Procedure(s) [place] 2nd Floor  Type of Rx Prep sent to patient: Suprep  Instructions provided to patient via [facility]    Patient was informed on the following information and verbalized understanding. Screening questionnaire reviewed with patient and complete. If procedure requires anesthesia, a responsible adult needs to be present to accompany the patient home, patient cannot drive after receiving anesthesia. Appointment details are tentative, especially check-in time. Patient will receive a prep-op call 7 days prior to confirm check-in time for procedure. If applicable the patient should contact their pharmacy to verify Rx for procedure prep is ready for pick-up. Patient was advised to call the scheduling department at [phone] if pharmacy states no Rx is available. Patient was advised to call the endoscopy scheduling department if any questions or concerns arise.       Endoscopy Scheduling Department         If you are taking any injectable medication (s) for weight loss and/or diabetes  weekly, hold for 8 days prior to your scheduled procedure, please stop taking Trulicity (Dulaglutide)}on 01/08/25. After the procedure, your provider will inform you  of when to resume injection.

## 2024-12-28 ENCOUNTER — LAB VISIT (OUTPATIENT)
Dept: LAB | Facility: HOSPITAL | Age: 54
End: 2024-12-28
Payer: COMMERCIAL

## 2024-12-28 DIAGNOSIS — E11.3293 TYPE 2 DIABETES MELLITUS WITH BOTH EYES AFFECTED BY MILD NONPROLIFERATIVE RETINOPATHY WITHOUT MACULAR EDEMA, WITHOUT LONG-TERM CURRENT USE OF INSULIN: Chronic | ICD-10-CM

## 2024-12-28 LAB
ANION GAP SERPL CALC-SCNC: 12 MMOL/L (ref 8–16)
BUN SERPL-MCNC: 13 MG/DL (ref 6–20)
CALCIUM SERPL-MCNC: 9.7 MG/DL (ref 8.7–10.5)
CHLORIDE SERPL-SCNC: 101 MMOL/L (ref 95–110)
CO2 SERPL-SCNC: 26 MMOL/L (ref 23–29)
CREAT SERPL-MCNC: 0.7 MG/DL (ref 0.5–1.4)
EST. GFR  (NO RACE VARIABLE): >60 ML/MIN/1.73 M^2
ESTIMATED AVG GLUCOSE: 186 MG/DL (ref 68–131)
GLUCOSE SERPL-MCNC: 133 MG/DL (ref 70–110)
HBA1C MFR BLD: 8.1 % (ref 4–5.6)
POTASSIUM SERPL-SCNC: 4.7 MMOL/L (ref 3.5–5.1)
SODIUM SERPL-SCNC: 139 MMOL/L (ref 136–145)

## 2024-12-28 PROCEDURE — 80048 BASIC METABOLIC PNL TOTAL CA: CPT | Performed by: NURSE PRACTITIONER

## 2024-12-28 PROCEDURE — 36415 COLL VENOUS BLD VENIPUNCTURE: CPT | Mod: PO | Performed by: NURSE PRACTITIONER

## 2024-12-28 PROCEDURE — 83036 HEMOGLOBIN GLYCOSYLATED A1C: CPT | Performed by: NURSE PRACTITIONER

## 2025-01-02 ENCOUNTER — OFFICE VISIT (OUTPATIENT)
Dept: INTERNAL MEDICINE | Facility: CLINIC | Age: 55
End: 2025-01-02
Payer: COMMERCIAL

## 2025-01-02 VITALS
HEART RATE: 66 BPM | OXYGEN SATURATION: 91 % | DIASTOLIC BLOOD PRESSURE: 72 MMHG | BODY MASS INDEX: 47.07 KG/M2 | SYSTOLIC BLOOD PRESSURE: 122 MMHG | HEIGHT: 65 IN | WEIGHT: 282.5 LBS

## 2025-01-02 DIAGNOSIS — E11.3293 TYPE 2 DIABETES MELLITUS WITH BOTH EYES AFFECTED BY MILD NONPROLIFERATIVE RETINOPATHY WITHOUT MACULAR EDEMA, WITHOUT LONG-TERM CURRENT USE OF INSULIN: Primary | Chronic | ICD-10-CM

## 2025-01-02 DIAGNOSIS — G43.109 MIGRAINE WITH AURA AND WITHOUT STATUS MIGRAINOSUS, NOT INTRACTABLE: Chronic | ICD-10-CM

## 2025-01-02 DIAGNOSIS — Z12.31 ENCOUNTER FOR SCREENING MAMMOGRAM FOR MALIGNANT NEOPLASM OF BREAST: ICD-10-CM

## 2025-01-02 DIAGNOSIS — I10 ESSENTIAL HYPERTENSION: Chronic | ICD-10-CM

## 2025-01-02 DIAGNOSIS — G47.33 OSA (OBSTRUCTIVE SLEEP APNEA): Chronic | ICD-10-CM

## 2025-01-02 DIAGNOSIS — J01.90 ACUTE SINUSITIS WITH SYMPTOMS GREATER THAN 10 DAYS: ICD-10-CM

## 2025-01-02 DIAGNOSIS — E66.01 MORBID OBESITY WITH BMI OF 45.0-49.9, ADULT: Chronic | ICD-10-CM

## 2025-01-02 DIAGNOSIS — K76.0 NAFLD (NONALCOHOLIC FATTY LIVER DISEASE): Chronic | ICD-10-CM

## 2025-01-02 PROCEDURE — 3078F DIAST BP <80 MM HG: CPT | Mod: CPTII,S$GLB,, | Performed by: NURSE PRACTITIONER

## 2025-01-02 PROCEDURE — 3074F SYST BP LT 130 MM HG: CPT | Mod: CPTII,S$GLB,, | Performed by: NURSE PRACTITIONER

## 2025-01-02 PROCEDURE — 1160F RVW MEDS BY RX/DR IN RCRD: CPT | Mod: CPTII,S$GLB,, | Performed by: NURSE PRACTITIONER

## 2025-01-02 PROCEDURE — 3008F BODY MASS INDEX DOCD: CPT | Mod: CPTII,S$GLB,, | Performed by: NURSE PRACTITIONER

## 2025-01-02 PROCEDURE — 99214 OFFICE O/P EST MOD 30 MIN: CPT | Mod: S$GLB,,, | Performed by: NURSE PRACTITIONER

## 2025-01-02 PROCEDURE — 1159F MED LIST DOCD IN RCRD: CPT | Mod: CPTII,S$GLB,, | Performed by: NURSE PRACTITIONER

## 2025-01-02 PROCEDURE — 99999 PR PBB SHADOW E&M-EST. PATIENT-LVL V: CPT | Mod: PBBFAC,,, | Performed by: NURSE PRACTITIONER

## 2025-01-02 RX ORDER — AZITHROMYCIN 250 MG/1
TABLET, FILM COATED ORAL
Qty: 6 TABLET | Refills: 0 | Status: SHIPPED | OUTPATIENT
Start: 2025-01-02 | End: 2025-01-07

## 2025-01-02 RX ORDER — GLIPIZIDE 10 MG/1
10 TABLET, FILM COATED, EXTENDED RELEASE ORAL
Qty: 90 TABLET | Refills: 1 | Status: SHIPPED | OUTPATIENT
Start: 2025-01-02 | End: 2026-01-02

## 2025-01-02 NOTE — PROGRESS NOTES
History of Present Illness   Iqra Duque is a 54 y.o. woman with medical history as listed below who presents today for routine follow-up, T2DM. She did have labs prior to our visit significant for hemoglobin A1c 8.1. She is taking medications as prescribed without perceived SE. She does not monitor her glucose at home but denies symptoms of hypoglycemia. Diet recall: admits she has not been doing well monitoring her diet. Complaints today: sinus issues for about 3 weeks. Pertinent negatives as listed in ROS. We will address HM today.    Past Medical History:   Diagnosis Date    Allergy     Cataract     Diabetes mellitus type II     Diverticulitis 09/2014    Fever blister     Hypertension     Migraine headache        Past Surgical History:   Procedure Laterality Date    COLONOSCOPY N/A 10/21/2019    Procedure: COLONOSCOPY;  Surgeon: Gomez Stark MD;  Location: [facility];  Service: Endoscopy;  Laterality: N/A;    EYE SURGERY Bilateral     cataract    HYSTERECTOMY  06/2009    KJB---DLH/BSO ATYPICAL HYPERPLASIA    OOPHORECTOMY      TUBAL LIGATION         Social History     Socioeconomic History    Marital status:    Occupational History    Occupation: Dispatcher     Employer: [employer]   Tobacco Use    Smoking status: Never     Passive exposure: Never    Smokeless tobacco: Never   Substance and Sexual Activity    Alcohol use: No    Drug use: No    Sexual activity: Yes     Partners: Male     Birth control/protection: None     Social Drivers of Health     Financial Resource Strain: Low Risk  (4/23/2024)    Overall Financial Resource Strain (CARDIA)     Difficulty of Paying Living Expenses: Not very hard   Food Insecurity: No Food Insecurity (4/23/2024)    Hunger Vital Sign     Worried About Running Out of Food in the Last Year: Never true     Ran Out of Food in the Last Year: Never true   Transportation Needs: No Transportation Needs (4/23/2024)    PRAPARE - Transportation     Lack of  "Transportation (Medical): No     Lack of Transportation (Non-Medical): No   Physical Activity: Unknown (4/23/2024)    Exercise Vital Sign     Days of Exercise per Week: Patient declined   Stress: Stress Concern Present (4/23/2024)    [employer] [place] [employer] - Occupational Stress Questionnaire     Feeling of Stress : To some extent   Housing Stability: Low Risk  (7/21/2023)    Housing Stability Vital Sign     Unable to Pay for Housing in the Last Year: No     Number of Places Lived in the Last Year: 1     Unstable Housing in the Last Year: No       Family History   Problem Relation Name Age of Onset    Ovarian cancer Mother  43    Cancer Mother          ovarian    Diabetes Mother      Heart disease Father      Melanoma Neg Hx      Psoriasis Neg Hx      Lupus Neg Hx         Review of Systems  Review of Systems   Constitutional:  Negative for malaise/fatigue and weight loss.   HENT:  Positive for congestion and sinus pain. Negative for ear pain and sore throat.    Eyes:  Negative for blurred vision and double vision.   Respiratory:  Positive for cough. Negative for shortness of breath and wheezing.    Cardiovascular:  Negative for chest pain, palpitations, orthopnea, claudication, leg swelling and PND.   Gastrointestinal:  Negative for blood in stool and melena.   Genitourinary:  Negative for flank pain and hematuria.   Neurological:  Positive for headaches. Negative for dizziness, tremors, seizures, loss of consciousness and weakness.   Endo/Heme/Allergies:  Negative for polydipsia.   Psychiatric/Behavioral:  The patient is nervous/anxious.      A complete review of systems was otherwise negative.    Physical Exam  /72 (BP Location: Left arm, Patient Position: Sitting)   Pulse 66   Ht 5' 5" (1.651 m)   Wt 128.1 kg (282 lb 8.3 oz)   SpO2 (!) 91%   BMI 47.01 kg/m²   General appearance: alert, appears stated age, cooperative, and no distress  Neck: no carotid bruit, no JVD, supple, " symmetrical, trachea midline, and thyroid not enlarged, symmetric, no tenderness/mass/nodules  Lungs: clear to auscultation bilaterally  Heart: regular rate and rhythm, S1, S2 normal, no murmur, click, rub or gallop  Extremities: extremities normal, atraumatic, no cyanosis or edema  Pulses: 2+ and symmetric  Skin: Skin color, texture, turgor normal. No rashes or lesions  Neurologic: Grossly normal    Assessment/Plan  Type 2 diabetes mellitus with both eyes affected by mild nonproliferative retinopathy without macular edema, without long-term current use of insulin  Increase Glipizide to 10 mg.  We discussed switching Trulicity to Mounjaro, but she is hesitant because her daughter had negative SEs to Mounujaro.  Repeat labs in 3 months with VV.  -     glipiZIDE (GLUCOTROL) 10 MG TR24; Take 1 tablet (10 mg total) by mouth daily with breakfast.  Dispense: 90 tablet; Refill: 1  -     Hemoglobin A1C; Future; Expected date: 04/02/2025  -     BASIC METABOLIC PANEL; Future; Expected date: 04/02/2025    Essential hypertension  The current medical regimen is effective;  continue present plan and medications.  BP WNL today.    Morbid obesity with BMI of 45.0-49.9, adult  The patient is asked to make an attempt to improve diet and exercise patterns to aid in medical management of this problem. We specifically discussed cutting calorie intake by 500-1000 calories per day for a goal of a 1-2 pound weight loss per week and recommendations for a mostly plant based diet with limited red meats/refined grains/processed foods/added sugars.    NAFLD (nonalcoholic fatty liver disease)  The patient is asked to make an attempt to improve diet and exercise patterns to aid in medical management of this problem.    ALIZA (obstructive sleep apnea)  Has upcoming follow-up with sleep medicine clinic.  She is not currently wearing her CPAP at night as she has concerns after the machine recall.   Discussed importance of wearing nightly and risks  associated with untreated ALIZA.    Acute sinusitis with symptoms greater than 10 days  Take antibiotics until complete.  Continue symptoms management.  -     azithromycin (Z-CONSTANCE) 250 MG tablet; Take 2 tablets by mouth on day 1; Take 1 tablet by mouth on days 2-5  Dispense: 6 tablet; Refill: 0    Migraine with aura and without status migrainosus, not intractable  Followed by neurology, doing well on BB for preventative management.    Encounter for screening mammogram for malignant neoplasm of breast  Routine screening.  -     Mammo Digital Screening Bilat; Future; Expected date: 03/17/2025          Answers submitted by the patient for this visit:  Diabetes Questionnaire (Submitted on 12/26/2024)  Chief Complaint: Diabetes problem  Diabetes type: type 2  MedicAlert ID: No  Disease duration: 12 Years  fatigue: No  foot paresthesias: No  foot ulcerations: No  polyphagia: No  polyuria: No  visual change: No  Symptom course: worsening  confusion: No  speech difficulty: No  hunger: No  mood changes: No  pallor: No  sleepiness: No  sweats: No  blackouts: No  hospitalization: No  nocturnal hypoglycemia: No  required assistance: No  required glucagon: No  CVA: No  heart disease: No  nephropathy: No  peripheral neuropathy: No  PVD: No  retinopathy: No  autonomic neuropathy: No  CAD risks: hypertension, obesity, sedentary lifestyle, stress, diabetes mellitus  Current treatments: oral agent (triple therapy)  Treatment compliance: some of the time  Home blood tests: 1-2 x per day  Monitoring compliance: adequate  Blood glucose trend: increasing steadily  Weight trend: stable  Current diet: low salt  Meal planning: none  Exercise: intermittently  Dietitian visit: No  Eye exam current: Yes  Sees podiatrist: Yes    Patient has verbalized understanding and is in agreement with plan of care.    Follow up in about 3 months (around 4/2/2025) for labs and follow-up.

## 2025-01-08 DIAGNOSIS — E11.3293 TYPE 2 DIABETES MELLITUS WITH BOTH EYES AFFECTED BY MILD NONPROLIFERATIVE RETINOPATHY WITHOUT MACULAR EDEMA, WITHOUT LONG-TERM CURRENT USE OF INSULIN: Chronic | ICD-10-CM

## 2025-01-09 RX ORDER — ATORVASTATIN CALCIUM 10 MG/1
10 TABLET, FILM COATED ORAL
Qty: 90 TABLET | Refills: 0 | Status: SHIPPED | OUTPATIENT
Start: 2025-01-09

## 2025-01-09 NOTE — TELEPHONE ENCOUNTER
Refill Routing Note   Medication(s) are not appropriate for processing by [facility] for the following reason(s):        Patient not seen by provider within 15 months  Required labs outdated    [facility] action(s):  Route             Appointments  past 12m or future 3m with PCP    Date Provider   Last Visit   E-Visit 4/19/2024 Jaylon Kothari MD   Next Visit   Visit date not found Jaylon Kothari MD   ED visits in past 90 days: 0        Note composed:10:05 PM 01/08/2025

## 2025-01-09 NOTE — TELEPHONE ENCOUNTER
No care due was identified.  [facility] Embedded Care Due Messages. Reference number: [id number].   1/08/2025 10:03:09 PM CST

## 2025-01-16 ENCOUNTER — ANESTHESIA EVENT (OUTPATIENT)
Dept: ENDOSCOPY | Facility: HOSPITAL | Age: 55
End: 2025-01-16
Payer: COMMERCIAL

## 2025-01-16 ENCOUNTER — ANESTHESIA (OUTPATIENT)
Dept: ENDOSCOPY | Facility: HOSPITAL | Age: 55
End: 2025-01-16
Payer: COMMERCIAL

## 2025-01-16 ENCOUNTER — HOSPITAL ENCOUNTER (OUTPATIENT)
Facility: HOSPITAL | Age: 55
Discharge: HOME | End: 2025-01-16
Attending: INTERNAL MEDICINE | Admitting: INTERNAL MEDICINE
Payer: COMMERCIAL

## 2025-01-16 VITALS
BODY MASS INDEX: 46.98 KG/M2 | SYSTOLIC BLOOD PRESSURE: 105 MMHG | TEMPERATURE: 98 F | DIASTOLIC BLOOD PRESSURE: 67 MMHG | OXYGEN SATURATION: 94 % | HEIGHT: 65 IN | WEIGHT: 282 LBS | RESPIRATION RATE: 17 BRPM | HEART RATE: 84 BPM

## 2025-01-16 DIAGNOSIS — Z12.11 COLON CANCER SCREENING: ICD-10-CM

## 2025-01-16 LAB
GLUCOSE SERPL-MCNC: 146 MG/DL (ref 70–110)
POCT GLUCOSE: 146 MG/DL (ref 70–110)

## 2025-01-16 PROCEDURE — 37000008 HC ANESTHESIA 1ST 15 MINUTES: Performed by: INTERNAL MEDICINE

## 2025-01-16 PROCEDURE — 82962 GLUCOSE BLOOD TEST: CPT | Performed by: INTERNAL MEDICINE

## 2025-01-16 PROCEDURE — 88305 TISSUE EXAM BY PATHOLOGIST: CPT | Mod: 26,,, | Performed by: PATHOLOGY

## 2025-01-16 PROCEDURE — 45380 COLONOSCOPY AND BIOPSY: CPT | Mod: 33,59 | Performed by: INTERNAL MEDICINE

## 2025-01-16 PROCEDURE — 27201012 HC FORCEPS, HOT/COLD, DISP: Performed by: INTERNAL MEDICINE

## 2025-01-16 PROCEDURE — 45385 COLONOSCOPY W/LESION REMOVAL: CPT | Mod: 33,,, | Performed by: INTERNAL MEDICINE

## 2025-01-16 PROCEDURE — 25000003 PHARM REV CODE 250: Performed by: NURSE ANESTHETIST, CERTIFIED REGISTERED

## 2025-01-16 PROCEDURE — 45385 COLONOSCOPY W/LESION REMOVAL: CPT | Mod: 33 | Performed by: INTERNAL MEDICINE

## 2025-01-16 PROCEDURE — D9220A PRA ANESTHESIA: Mod: CRNA,,, | Performed by: NURSE ANESTHETIST, CERTIFIED REGISTERED

## 2025-01-16 PROCEDURE — 63600175 PHARM REV CODE 636 W HCPCS: Performed by: NURSE ANESTHETIST, CERTIFIED REGISTERED

## 2025-01-16 PROCEDURE — 37000009 HC ANESTHESIA EA ADD 15 MINS: Performed by: INTERNAL MEDICINE

## 2025-01-16 PROCEDURE — 27201089 HC SNARE, DISP (ANY): Performed by: INTERNAL MEDICINE

## 2025-01-16 PROCEDURE — 25000003 PHARM REV CODE 250: Performed by: INTERNAL MEDICINE

## 2025-01-16 PROCEDURE — 88305 TISSUE EXAM BY PATHOLOGIST: CPT | Mod: 59 | Performed by: PATHOLOGY

## 2025-01-16 PROCEDURE — D9220A PRA ANESTHESIA: Mod: ANES,,, | Performed by: ANESTHESIOLOGY

## 2025-01-16 PROCEDURE — 45380 COLONOSCOPY AND BIOPSY: CPT | Mod: 33,59,, | Performed by: INTERNAL MEDICINE

## 2025-01-16 RX ORDER — PROPOFOL 10 MG/ML
VIAL (ML) INTRAVENOUS CONTINUOUS PRN
Status: DISCONTINUED | OUTPATIENT
Start: 2025-01-16 | End: 2025-01-16

## 2025-01-16 RX ORDER — SODIUM CHLORIDE 0.9 % (FLUSH) 0.9 %
20 SYRINGE (ML) INJECTION ONCE
Status: DISCONTINUED | OUTPATIENT
Start: 2025-01-16 | End: 2025-01-16 | Stop reason: HOSPADM

## 2025-01-16 RX ORDER — PROPOFOL 10 MG/ML
VIAL (ML) INTRAVENOUS
Status: DISCONTINUED | OUTPATIENT
Start: 2025-01-16 | End: 2025-01-16

## 2025-01-16 RX ORDER — LIDOCAINE HYDROCHLORIDE 20 MG/ML
INJECTION INTRAVENOUS
Status: DISCONTINUED | OUTPATIENT
Start: 2025-01-16 | End: 2025-01-16

## 2025-01-16 RX ORDER — DEXTROMETHORPHAN/PSEUDOEPHED 2.5-7.5/.8
DROPS ORAL
Status: COMPLETED | OUTPATIENT
Start: 2025-01-16 | End: 2025-01-16

## 2025-01-16 RX ORDER — SODIUM CHLORIDE 9 MG/ML
INJECTION, SOLUTION INTRAVENOUS CONTINUOUS
Status: DISCONTINUED | OUTPATIENT
Start: 2025-01-16 | End: 2025-01-16 | Stop reason: HOSPADM

## 2025-01-16 RX ADMIN — PROPOFOL 150 MCG/KG/MIN: 10 INJECTION, EMULSION INTRAVENOUS at 01:01

## 2025-01-16 RX ADMIN — PROPOFOL 70 MG: 10 INJECTION, EMULSION INTRAVENOUS at 01:01

## 2025-01-16 RX ADMIN — LIDOCAINE HYDROCHLORIDE 80 MG: 20 INJECTION, SOLUTION INTRAVENOUS at 01:01

## 2025-01-16 RX ADMIN — SODIUM CHLORIDE: 0.9 INJECTION, SOLUTION INTRAVENOUS at 01:01

## 2025-01-16 NOTE — PROVATION PATIENT INSTRUCTIONS
Discharge Summary/Instructions after an Endoscopic Procedure  Patient Name: Iqra Duque  Patient MRN: [id number]  Patient YOB: 1970 Thursday, January 16, 2025  Iam Beach MD  Dear patient,  As a result of recent federal legislation (The Federal Cures Act), you may   receive lab or pathology results from your procedure in your [facility]   account before your physician is able to contact you. Your physician or   their representative will relay the results to you with their   recommendations at their soonest availability.  Thank you,  Your health is very important to us during the Covid Crisis. Following your   procedure today, you will receive a daily text for 2 weeks asking about   signs or symptoms of Covid 19.  Please respond to this text when you   receive it so we can follow up and keep you as safe as possible.   RESTRICTIONS:  During your procedure today, you received medications for sedation.  These   medications may affect your judgment, balance and coordination.  Therefore,   for 24 hours, you have the following restrictions:   - DO NOT drive a car, operate machinery, make legal/financial decisions,   sign important papers or drink alcohol.    ACTIVITY:  Today: no heavy lifting, straining or running due to procedural   sedation/anesthesia.  The following day: return to full activity including work.  DIET:  Eat and drink normally unless instructed otherwise.     TREATMENT FOR COMMON SIDE EFFECTS:  - Mild abdominal pain, nausea, belching, bloating or excessive gas:  rest,   eat lightly and use a heating pad.  - Sore Throat: treat with throat lozenges and/or gargle with warm salt   water.  - Because air was used during the procedure, expelling large amounts of air   from your rectum or belching is normal.  - If a bowel prep was taken, you may not have a bowel movement for 1-3 days.    This is normal.  SYMPTOMS TO WATCH FOR AND REPORT TO YOUR PHYSICIAN:  1. Abdominal pain or bloating,  other than gas cramps.  2. Chest pain.  3. Back pain.  4. Signs of infection such as: chills or fever occurring within 24 hours   after the procedure.  5. Rectal bleeding, which would show as bright red, maroon, or black stools.   (A tablespoon of blood from the rectum is not serious, especially if   hemorrhoids are present.)  6. Vomiting.  7. Weakness or dizziness.  GO DIRECTLY TO THE NEAREST EMERGENCY ROOM IF YOU HAVE ANY OF THE FOLLOWING:      Difficulty breathing              Chills and/or fever over 101 F   Persistent vomiting and/or vomiting blood   Severe abdominal pain   Severe chest pain   Black, tarry stools   Bleeding- more than one tablespoon   Any other symptom or condition that you feel may need urgent attention  Your doctor recommends these additional instructions:  If any biopsies were taken, your doctors clinic will contact you in 1 to 2   weeks with any results.  - Discharge patient to home (ambulatory).   - Patient has a contact number available for emergencies.  The signs and   symptoms of potential delayed complications were discussed with the   patient.  Return to normal activities tomorrow.  Written discharge   instructions were provided to the patient.   - Resume previous diet.   - Continue present medications.   - Return to primary care physician as previously scheduled.   - Repeat colonoscopy in 5 years for surveillance.  For questions, problems or results please call your physician - Iam Beach MD.  EMERGENCY PHONE NUMBER: [phone],  LAB RESULTS: [phone]  IF A COMPLICATION OR EMERGENCY SITUATION ARISES AND YOU ARE UNABLE TO REACH   YOUR PHYSICIAN - GO DIRECTLY TO THE EMERGENCY ROOM.  Iam Beach MD  1/16/2025 1:32:01 PM  This report has been verified and signed electronically.  Dear patient,  As a result of recent federal legislation (The Federal Cures Act), you may   receive lab or pathology results from your procedure in your [facility]   account  before your physician is able to contact you. Your physician or   their representative will relay the results to you with their   recommendations at their soonest availability.  Thank you,  PROVATION

## 2025-01-16 NOTE — ANESTHESIA PREPROCEDURE EVALUATION
01/16/2025  Iqra Duque is a 54 y.o., female here for a colonoscopy.       Pre-op Assessment    I have reviewed the Patient Summary Reports.    I have reviewed the NPO Status.   I have reviewed the Medications.     Review of Systems  Anesthesia Hx:  No problems with previous Anesthesia               Denies Personal Hx of Anesthesia complications.                    Social:  No Alcohol Use, Non-Smoker       Cardiovascular:     Hypertension                                          Pulmonary:        Sleep Apnea                Hepatic/GI:     GERD Liver Disease,   Taking GLP-1 Agonists            Neurological:      Headaches                                 Endocrine:  Diabetes         Morbid Obesity / BMI > 40      Physical Exam  General: Well nourished, Cooperative, Alert and Oriented    Airway:  Mallampati: II   Mouth Opening: Normal  TM Distance: Normal  Tongue: Normal  Neck ROM: Normal ROM    Chest/Lungs:  Clear to auscultation, Normal Respiratory Rate    Heart:  Rate: Normal  Rhythm: Regular Rhythm        Anesthesia Plan  Type of Anesthesia, risks & benefits discussed:    Anesthesia Type: Gen Natural Airway  Intra-op Monitoring Plan: Standard ASA Monitors  Post Op Pain Control Plan: multimodal analgesia  Induction:  IV  Informed Consent: Informed consent signed with the Patient and all parties understand the risks and agree with anesthesia plan.  All questions answered.   ASA Score: 3    Ready For Surgery From Anesthesia Perspective.     .

## 2025-01-16 NOTE — H&P
Short Stay Endoscopy History and Physical    PCP - Jaylon Kothari MD    Procedure - Colonoscopy  ASA - III  Mallampati - per anesthesia  History of Anesthesia problems - no  Family history Anesthesia problems - no     HPI:  This is a 54 y.o. female here for evaluation of : Colon polyps    Pt here today for surveillance of prior colon polyps. The most recent exam was in 2019, at which time patients recalls having polyps removed. Since patient denies change in bowel habits or overt blood in stool. Denies weight loss.     ROS:  Constitutional: No fevers, chills, No weight loss  ENT: No allergies  CV: No chest pain  Pulm: No shortness of breath  GI: see HPI  Derm: No rash    Medical History:  has a past medical history of Allergy, Cataract, Diabetes mellitus type II, Diverticulitis (09/2014), Fever blister, Hypertension, and Migraine headache.    Surgical History:  has a past surgical history that includes Tubal ligation; Hysterectomy (06/2009); Oophorectomy; Colonoscopy (N/A, 10/21/2019); and Eye surgery (Bilateral).    Family History: family history includes Cancer in her mother; Diabetes in her mother; Heart disease in her father; Ovarian cancer (age of onset: 43) in her mother.. Otherwise no colon cancer, inflammatory bowel disease, or GI malignancies.    Social History:  reports that she has never smoked. She has never been exposed to tobacco smoke. She has never used smokeless tobacco. She reports that she does not drink alcohol and does not use drugs.    Review of patient's allergies indicates:   Allergen Reactions    Bactrim [sulfamethoxazole-trimethoprim] Hives     Also Septra    Keflex [cephalexin] Shortness Of Breath    Neosporin (neomycin-polymyx) Swelling    Sumatriptan Other (See Comments)     Pain sensation throughout the body like migraine spreading    Amoxicillin Rash    Neosporin  [benzalkonium chloride] Rash    Sulfa (sulfonamide antibiotics) Rash       Medications:   Medications Prior to  Admission   Medication Sig Dispense Refill Last Dose/Taking    atenoloL (TENORMIN) 50 MG tablet Take 1 tablet (50 mg total) by mouth once daily. 30 tablet 5 Past Week    atorvastatin (LIPITOR) 10 MG tablet Take 1 tablet by mouth once daily 90 tablet 0 1/15/2025    ergocalciferol (ERGOCALCIFEROL) 50,000 unit Cap Take 1 capsule (50,000 Units total) by mouth every 7 days. 12 capsule 1 1/15/2025    ferrous sulfate (FEOSOL) 325 mg (65 mg iron) Tab tablet Take 1 tablet (325 mg total) by mouth once daily. 90 tablet 1 Past Week    glipiZIDE (GLUCOTROL) 10 MG TR24 Take 1 tablet (10 mg total) by mouth daily with breakfast. 90 tablet 1 1/15/2025    lisinopriL-hydrochlorothiazide (PRINZIDE,ZESTORETIC) 20-12.5 mg per tablet Take 1 tablet by mouth once daily. 90 tablet 0 1/15/2025    meclizine (ANTIVERT) 25 mg tablet Take 1 tablet (25 mg total) by mouth 3 (three) times daily as needed for Dizziness. 90 tablet 1 Past Week    metFORMIN (GLUCOPHAGE-XR) 500 MG ER 24hr tablet Take 2 tablets (1,000 mg total) by mouth 2 (two) times daily with meals. 360 tablet 1 1/15/2025    omeprazole (PRILOSEC) 20 MG capsule Take 1 capsule (20 mg total) by mouth every morning. 90 capsule 1 1/15/2025    cetirizine (ZYRTEC) 10 MG tablet Take 10 mg by mouth once daily.       CONTOUR TEST STRIPS Strp TEST ONCE DAILY 100 strip 1     dulaglutide (TRULICITY) 4.5 mg/0.5 mL pen injector Inject 4.5 mg (one pen) into the skin every 7 days. 12 pen 0 1/7/2025    fluconazole (DIFLUCAN) 150 MG Tab Take 1 tab PO once a day PRN yeast infection 1 tablet 3 Unknown    MICROLET LANCET Misc TEST ONCE DAILY 100 each 3     montelukast (SINGULAIR) 10 mg tablet Take 1 tablet (10 mg total) by mouth once daily. 90 tablet 1     rimegepant (NURTEC) 75 mg odt Take 1 tablet (75 mg total) by mouth as needed for Migraine. Place ODT tablet on the tongue; alternatively the ODT tablet may be placed under the tongue. Okay to take 2nd tablet in 24 hours; No more than 2 tablets in 24 hours.  16 tablet 5 Unknown         Objective Findings:    Vital Signs: see nursing notes  Physical Exam:  General Appearance: Well appearing in no acute distress  Eyes:    No scleral icterus  ENT: Neck supple  Lungs: CTA anteriorly  Heart:  S1, S2 normal, no murmurs heard  Abdomen: Soft, non tender, non distended with positive bowel sounds. No hepatosplenomegaly, ascites, or mass  Extremities: no edema  Skin: No rash      Labs:  Lab Results   Component Value Date    WBC 10.43 03/14/2024    HGB 12.8 03/14/2024    HCT 42.7 03/14/2024     03/14/2024    CHOL 105 (L) 06/20/2024    TRIG 104 06/20/2024    HDL 42 06/20/2024    ALT 35 03/30/2023    AST 55 (H) 03/30/2023     12/28/2024    K 4.7 12/28/2024     12/28/2024    CREATININE 0.7 12/28/2024    BUN 13 12/28/2024    CO2 26 12/28/2024    TSH 1.048 10/20/2021    HGBA1C 8.1 (H) 12/28/2024       I have explained the risks and benefits of endoscopy procedures to the patient including but not limited to bleeding, perforation, infection, and death.    Iam Beach MD

## 2025-01-16 NOTE — TRANSFER OF CARE
"Anesthesia Transfer of Care Note    Patient: Iqra Duque    Procedure(s) Performed: Procedure(s) (LRB):  COLONOSCOPY, SCREENING, HIGH RISK PATIENT (N/A)    Patient location: PACU    Anesthesia Type: general    Transport from OR: Transported from OR on 6-10 L/min O2 by face mask with adequate spontaneous ventilation    Post pain: adequate analgesia    Post assessment: no apparent anesthetic complications    Post vital signs: stable    Level of consciousness: awake    Nausea/Vomiting: no nausea/vomiting    Complications: none    Transfer of care protocol was followed      Last vitals: Visit Vitals  /75   Pulse 82   Temp 36.8 °C (98.2 °F)   Resp 20   Ht 5' 5" (1.651 m)   Wt 127.9 kg (282 lb)   SpO2 96%   Breastfeeding No   BMI 46.93 kg/m²     "

## 2025-01-16 NOTE — ANESTHESIA POSTPROCEDURE EVALUATION
Anesthesia Post Evaluation    Patient: Iqra Duque    Procedure(s) Performed: Procedure(s) (LRB):  COLONOSCOPY, SCREENING, HIGH RISK PATIENT (N/A)    Final Anesthesia Type: general      Patient location during evaluation: GI PACU  Patient participation: Yes- Able to Participate  Level of consciousness: awake and alert, oriented and awake  Post-procedure vital signs: reviewed and stable  Pain management: adequate  Airway patency: patent    PONV status at discharge: No PONV  Anesthetic complications: no      Cardiovascular status: stable  Respiratory status: unassisted and room air  Hydration status: euvolemic  Follow-up not needed.              Vitals Value Taken Time   /67 01/16/25 1346   Temp 36.8 01/16/25 1451   Pulse 84 01/16/25 1346   Resp 17 01/16/25 1346   SpO2 94 % 01/16/25 1346         Event Time   Out of Recovery 13:57:15         Pain/Jose Elias Score: Ojse Elias Score: 10 (1/16/2025  1:47 PM)

## 2025-01-17 LAB
FINAL PATHOLOGIC DIAGNOSIS: NORMAL
GROSS: NORMAL
Lab: NORMAL

## 2025-01-24 DIAGNOSIS — K21.9 GASTROESOPHAGEAL REFLUX: ICD-10-CM

## 2025-01-24 RX ORDER — OMEPRAZOLE 20 MG/1
20 CAPSULE, DELAYED RELEASE ORAL EVERY MORNING
Qty: 90 CAPSULE | Refills: 0 | Status: SHIPPED | OUTPATIENT
Start: 2025-01-24

## 2025-01-24 NOTE — TELEPHONE ENCOUNTER
No care due was identified.  [facility] Embedded Care Due Messages. Reference number: [id number].   1/24/2025 9:13:07 AM CST

## 2025-01-24 NOTE — TELEPHONE ENCOUNTER
Refill Routing Note   Medication(s) are not appropriate for processing by [facility] for the following reason(s):        Patient not seen by provider within 15 months    [facility] action(s):  Defer               Appointments  past 12m or future 3m with PCP    Date Provider   Last Visit   4/19/2024 Jaylon Kothari MD   Next Visit   Visit date not found Jaylon Kothari MD   ED visits in past 90 days: 0        Note composed:12:11 PM 01/24/2025

## 2025-01-31 ENCOUNTER — OFFICE VISIT (OUTPATIENT)
Dept: INTERNAL MEDICINE | Facility: CLINIC | Age: 55
End: 2025-01-31
Payer: COMMERCIAL

## 2025-01-31 VITALS
HEIGHT: 65 IN | RESPIRATION RATE: 18 BRPM | DIASTOLIC BLOOD PRESSURE: 68 MMHG | WEIGHT: 280.63 LBS | SYSTOLIC BLOOD PRESSURE: 130 MMHG | TEMPERATURE: 98 F | BODY MASS INDEX: 46.75 KG/M2 | HEART RATE: 70 BPM | OXYGEN SATURATION: 96 %

## 2025-01-31 DIAGNOSIS — M54.50 ACUTE EXACERBATION OF CHRONIC LOW BACK PAIN: Primary | ICD-10-CM

## 2025-01-31 DIAGNOSIS — G89.29 ACUTE EXACERBATION OF CHRONIC LOW BACK PAIN: Primary | ICD-10-CM

## 2025-01-31 PROCEDURE — 3078F DIAST BP <80 MM HG: CPT | Mod: CPTII,S$GLB,, | Performed by: NURSE PRACTITIONER

## 2025-01-31 PROCEDURE — 3008F BODY MASS INDEX DOCD: CPT | Mod: CPTII,S$GLB,, | Performed by: NURSE PRACTITIONER

## 2025-01-31 PROCEDURE — 1159F MED LIST DOCD IN RCRD: CPT | Mod: CPTII,S$GLB,, | Performed by: NURSE PRACTITIONER

## 2025-01-31 PROCEDURE — 3075F SYST BP GE 130 - 139MM HG: CPT | Mod: CPTII,S$GLB,, | Performed by: NURSE PRACTITIONER

## 2025-01-31 PROCEDURE — 4010F ACE/ARB THERAPY RXD/TAKEN: CPT | Mod: CPTII,S$GLB,, | Performed by: NURSE PRACTITIONER

## 2025-01-31 PROCEDURE — 99214 OFFICE O/P EST MOD 30 MIN: CPT | Mod: S$GLB,,, | Performed by: NURSE PRACTITIONER

## 2025-01-31 PROCEDURE — 99999 PR PBB SHADOW E&M-EST. PATIENT-LVL V: CPT | Mod: PBBFAC,,, | Performed by: NURSE PRACTITIONER

## 2025-01-31 PROCEDURE — 1160F RVW MEDS BY RX/DR IN RCRD: CPT | Mod: CPTII,S$GLB,, | Performed by: NURSE PRACTITIONER

## 2025-01-31 RX ORDER — DICLOFENAC SODIUM 50 MG/1
50 TABLET, DELAYED RELEASE ORAL 2 TIMES DAILY
Qty: 60 TABLET | Refills: 2 | Status: SHIPPED | OUTPATIENT
Start: 2025-01-31 | End: 2025-05-01

## 2025-01-31 NOTE — PROGRESS NOTES
History of Present Illness   Iqra Duque is a 54 y.o. woman with medical history as listed below who presents today for evaluation of right sided low back pain for about 4 weeks. Pain does not radiate, worsens with certain position changes. She has tried ibuprofen, heat, and ice. Pain is gradually improving. No known injury.    Past Medical History:   Diagnosis Date    Allergy     Cataract     Diabetes mellitus type II     Diverticulitis 09/2014    Fever blister     Hypertension     Migraine headache        Past Surgical History:   Procedure Laterality Date    COLONOSCOPY N/A 10/21/2019    Procedure: COLONOSCOPY;  Surgeon: Gomez Stark MD;  Location: [facility] ENDO;  Service: Endoscopy;  Laterality: N/A;    COLONOSCOPY, SCREENING, HIGH RISK PATIENT N/A 1/16/2025    Procedure: COLONOSCOPY, SCREENING, HIGH RISK PATIENT;  Surgeon: Iam Beach MD;  Location: [facility] ENDO;  Service: Endoscopy;  Laterality: N/A;  Ref by Dr FAN Kothari, Efrenity, Suprep, portal - PC  1/9-pre call complete-last glp1 1/7/25-tb    EYE SURGERY Bilateral     cataract    HYSTERECTOMY  06/2009    KJB---DLH/BSO ATYPICAL HYPERPLASIA    OOPHORECTOMY      TUBAL LIGATION         Social History     Socioeconomic History    Marital status:    Occupational History    Occupation: Dispatcher     Employer: [employer]   Tobacco Use    Smoking status: Never     Passive exposure: Never    Smokeless tobacco: Never   Substance and Sexual Activity    Alcohol use: No    Drug use: No    Sexual activity: Yes     Partners: Male     Birth control/protection: None     Social Drivers of Health     Financial Resource Strain: Low Risk  (4/23/2024)    Overall Financial Resource Strain (CARDIA)     Difficulty of Paying Living Expenses: Not very hard   Food Insecurity: No Food Insecurity (4/23/2024)    Hunger Vital Sign     Worried About Running Out of Food in the Last Year: Never true     Ran Out of Food in the Last Year: Never true  "  Transportation Needs: No Transportation Needs (4/23/2024)    PRAPARE - Transportation     Lack of Transportation (Medical): No     Lack of Transportation (Non-Medical): No   Physical Activity: Unknown (4/23/2024)    Exercise Vital Sign     Days of Exercise per Week: Patient declined   Stress: Stress Concern Present (4/23/2024)    [place] [place] [employer] - Occupational Stress Questionnaire     Feeling of Stress : To some extent   Housing Stability: Low Risk  (7/21/2023)    Housing Stability Vital Sign     Unable to Pay for Housing in the Last Year: No     Number of Places Lived in the Last Year: 1     Unstable Housing in the Last Year: No       Family History   Problem Relation Name Age of Onset    Ovarian cancer Mother  43    Cancer Mother          ovarian    Diabetes Mother      Heart disease Father      Melanoma Neg Hx      Psoriasis Neg Hx      Lupus Neg Hx         Review of Systems  Review of Systems   Constitutional:  Negative for fever and weight loss.   Cardiovascular:  Negative for chest pain.   Gastrointestinal:  Negative for abdominal pain, blood in stool and melena.   Genitourinary:  Negative for dysuria, flank pain and hematuria.   Musculoskeletal:  Positive for back pain. Negative for joint pain and neck pain.   Skin:  Negative for rash.   Neurological:  Negative for tingling, weakness and headaches.     A complete review of systems was otherwise negative.    Physical Exam  /68   Pulse 70   Temp 98.4 °F (36.9 °C) (Oral)   Resp 18   Ht 5' 5" (1.651 m)   Wt 127.3 kg (280 lb 10.3 oz)   SpO2 96%   BMI 46.70 kg/m²   General appearance: alert, appears stated age, cooperative, and no distress  Neck: supple, symmetrical, trachea midline  Back: symmetric, no curvature. ROM normal. No CVA tenderness., no saddle anesthesia, no bowel or bladder incontinence  Skin: Skin color, texture, turgor normal. No rashes or lesions  Neurologic: Grossly normal    Assessment/Plan  Acute " exacerbation of chronic low back pain  NSAID BID x1 week then PRN.  Continue heat/ice.  HEP recommended.  ER precautions discussed, no red flags on exam.  RTC PRN.  -     diclofenac (VOLTAREN) 50 MG EC tablet; Take 1 tablet (50 mg total) by mouth 2 (two) times daily.  Dispense: 60 tablet; Refill: 2    Patient has verbalized understanding and is in agreement with plan of care.    Follow up if symptoms worsen or fail to improve.      Answers submitted by the patient for this visit:  Back Pain Questionnaire (Submitted on 1/26/2025)  Chief Complaint: Back pain  Chronicity: new  Onset: 1 to 4 weeks ago  Frequency: constantly  Progression since onset: waxing and waning  Pain location: lumbar spine  Pain quality: aching, burning, stabbing  Radiates to: does not radiate  Pain - numeric: 7/10  Pain is: worse during the day  Aggravated by: bending, coughing, position, standing, twisting  Stiffness is present: all day  bladder incontinence: No  bowel incontinence: No  leg pain: No  numbness: No  paresis: No  paresthesias: No  pelvic pain: No  perianal numbness: No  genital pain: No  Risk factors: obesity, sedentary lifestyle  Pain severity: moderate  Improvement on treatment: no relief

## 2025-01-31 NOTE — LETTER
January 31, 2025      [facility]  [address]  [facility] [address]  Phone: [phone]  Fax: [phone]       Patient: Iqra Duque   YOB: 1970  Date of Visit: 01/31/2025    Please excuse: 01/28/25-01/31/25    To Whom It May Concern:    Gertrude Duque  was at [facility] on 01/31/2025. The patient may return to work/school on 02/01/25 with no restrictions. If you have any questions or concerns, or if I can be of further assistance, please do not hesitate to contact me.    Sincerely,    Ofelia Avila NP

## 2025-02-12 DIAGNOSIS — J30.9 ALLERGIC RHINITIS: ICD-10-CM

## 2025-02-12 RX ORDER — MONTELUKAST SODIUM 10 MG/1
10 TABLET ORAL
Qty: 90 TABLET | Refills: 1 | Status: SHIPPED | OUTPATIENT
Start: 2025-02-12

## 2025-02-12 NOTE — TELEPHONE ENCOUNTER
Refill Routing Note   Medication(s) are not appropriate for processing by [facility] for the following reason(s):        Patient not seen by provider within 15 months    [facility] action(s):  Defer   Requires appointment : Yes               Appointments  past 12m or future 3m with PCP    Date Provider   Last Visit   4/19/2024 Jaylon Kothari MD   Next Visit   Visit date not found Jaylon Kothari MD   ED visits in past 90 days: 0        Note composed:11:30 AM 02/12/2025

## 2025-02-12 NOTE — TELEPHONE ENCOUNTER
Care Due:                  Date            Visit Type   Department     Provider  --------------------------------------------------------------------------------    Last Visit: None Found      None         None Found  Next Visit: None Scheduled  None         None Found                                                            Last  Test          Frequency    Reason                     Performed    Due Date  --------------------------------------------------------------------------------    Office Visit  15 months..  metFORMIN, montelukast...  Not Found    Overdue    [employer] Embedded Care Due Messages. Reference number: [id number].   2/12/2025 9:05:22 AM CST

## 2025-03-11 DIAGNOSIS — E11.3293 TYPE 2 DIABETES MELLITUS WITH BOTH EYES AFFECTED BY MILD NONPROLIFERATIVE RETINOPATHY WITHOUT MACULAR EDEMA, WITHOUT LONG-TERM CURRENT USE OF INSULIN: Chronic | ICD-10-CM

## 2025-03-12 RX ORDER — DULAGLUTIDE 4.5 MG/.5ML
4.5 INJECTION, SOLUTION SUBCUTANEOUS
Qty: 12 PEN | Refills: 0 | Status: SHIPPED | OUTPATIENT
Start: 2025-03-12 | End: 2025-06-06

## 2025-03-12 NOTE — TELEPHONE ENCOUNTER
No care due was identified.  [facility] Embedded Care Due Messages. Reference number: [id number].   3/11/2025 7:55:23 PM CDT

## 2025-03-24 ENCOUNTER — OFFICE VISIT (OUTPATIENT)
Dept: NEUROLOGY | Facility: CLINIC | Age: 55
End: 2025-03-24
Payer: COMMERCIAL

## 2025-03-24 VITALS
HEIGHT: 65 IN | WEIGHT: 280 LBS | BODY MASS INDEX: 46.65 KG/M2 | DIASTOLIC BLOOD PRESSURE: 73 MMHG | SYSTOLIC BLOOD PRESSURE: 113 MMHG | HEART RATE: 91 BPM

## 2025-03-24 DIAGNOSIS — G44.40 MEDICATION OVERUSE HEADACHE: ICD-10-CM

## 2025-03-24 DIAGNOSIS — G43.009 MIGRAINE WITHOUT AURA AND WITHOUT STATUS MIGRAINOSUS, NOT INTRACTABLE: Primary | ICD-10-CM

## 2025-03-24 DIAGNOSIS — G43.809 VESTIBULAR MIGRAINE: ICD-10-CM

## 2025-03-24 PROCEDURE — 4010F ACE/ARB THERAPY RXD/TAKEN: CPT | Mod: CPTII,S$GLB,, | Performed by: STUDENT IN AN ORGANIZED HEALTH CARE EDUCATION/TRAINING PROGRAM

## 2025-03-24 PROCEDURE — 1159F MED LIST DOCD IN RCRD: CPT | Mod: CPTII,S$GLB,, | Performed by: STUDENT IN AN ORGANIZED HEALTH CARE EDUCATION/TRAINING PROGRAM

## 2025-03-24 PROCEDURE — 99214 OFFICE O/P EST MOD 30 MIN: CPT | Mod: S$GLB,,, | Performed by: STUDENT IN AN ORGANIZED HEALTH CARE EDUCATION/TRAINING PROGRAM

## 2025-03-24 PROCEDURE — 3008F BODY MASS INDEX DOCD: CPT | Mod: CPTII,S$GLB,, | Performed by: STUDENT IN AN ORGANIZED HEALTH CARE EDUCATION/TRAINING PROGRAM

## 2025-03-24 PROCEDURE — 99999 PR PBB SHADOW E&M-EST. PATIENT-LVL III: CPT | Mod: PBBFAC,,, | Performed by: STUDENT IN AN ORGANIZED HEALTH CARE EDUCATION/TRAINING PROGRAM

## 2025-03-24 PROCEDURE — 3078F DIAST BP <80 MM HG: CPT | Mod: CPTII,S$GLB,, | Performed by: STUDENT IN AN ORGANIZED HEALTH CARE EDUCATION/TRAINING PROGRAM

## 2025-03-24 PROCEDURE — 3074F SYST BP LT 130 MM HG: CPT | Mod: CPTII,S$GLB,, | Performed by: STUDENT IN AN ORGANIZED HEALTH CARE EDUCATION/TRAINING PROGRAM

## 2025-03-24 NOTE — PROGRESS NOTES
GENERAL NEUROLOGY VISIT   Date: 3/24/25  Patient Name: Iqra Duque   MRN: [id number]   PCP: Jaylon Kothari  Referring Provider: Ofelia Avila NP    History:    Visit  3/24/2025  Reported headache is may be improving, down to 1-2/month , sometimes can go up tp 6 weekswithout migraine comparing to previously 1-2 week.    Started on atenolol by ENT for possible vestibular migraine and reported helps with frequency.     Reported road trip trigger her vertigo and sometimes headache     HPI   Patient is a 54 y.o.  female who is here  for migraine follow up.    She decided to come back to [facility] for headache follow-up, she still have the headache twice a month, can continue for 2 days, usually headache started in the morning after waking up in 1 or 2 hours, reported when she catches it early take the Nurtec she would be able to abort the headache but sometimes she would wake up with a headache.  Describe migraine pulsatile, sensitive to light and noise, can be frontal temporal and also described frontal morning headaches, she use extreme previously reported now once or twice a month, tried magnesium for 6 months and did not help     Fail  rizatriptan and sumatriptan before  Patient also with history of sleep apnea, snore and waking up gasping for air, she has a diagnosis of sleep apnea but she does not use CPAP for many years.     Still endorse dizziness with sudden movement but it is not all the time, no nystagmus on exam, Consuelo-Hallpike test did not performed due to overweight and is unable to lay on the table.            Past visits hx ;  Seen in [facility] with Dr. Whitney   Iqra Duque is a 53 y.o. female with a history of hypertension, type 2 diabetes, morbid obesity, sleep apnea.  Patient is here to establish care for her chronic migraines, has been establish with prior neurologists.  Has been getting FMLA for her migraines as well.     When she is on Nurtec, it works.  Difficult to get a baseline given that  she has been out of her medication.  Acute medications failed: rizatriptan (ineffective), sumatriptan (ineffective)      Description of migraines include dizziness feeling, pulsatile quality, sensitive to light and noise, can be frontal or temporal.  Sees a chiropractor for neck pain.  Has a insomnia as well.  Plan was continue current regimen and follow up with headache clinic but appointment cancelled.      Seen in [place] on 06/27/2023 with INÉS Hughes  6/27/23: Patient presents for follow up headaches and migraines. She reports about 2 migraines per month at least, but may have up to 3 or 4. Endorses frequent near daily low grade headaches that do not last all day.      She presents to discuss [place] of [place] for [place] as she has missed 7 days of work (2-3 per month) this year already due to debilitating migraines that impact her her ability to function at her job. Note that she does work 12 hour shifts.      Rizatriptan has not been effective for acute relief of migraine. She would like to discuss other acute medications. Would like to defer preventative at this time and focus on lifestyle approaches.  Acute medications failed: rizatriptan (ineffective), sumatriptan (ineffective)     Endorses frequent dizziness, and vertigo. Endorses that it occurs often after standing up. Meclizine has been effective for relief in the past.     ====================================  10/10/22:  Ms. Iqra Duque is a 52 y.o. female presenting for follow up. Estimates that last headache occurred about one month ago. She received a new CPAP machine mid September after her PCP appointment and has been getting better quality sleep. Since then, she has had significant improvements in headaches. As rizatriptan has been effective in the past, she would like to continue this medication as needed for acute headache.     No new neurological complaints or concerns.  ===========================================  Per  documentation by Dr. Aniceto Anderson, DO on 8/9/22:  Ms. Iqra Duque presents today to discuss their ongoing uncontrolled headaches.  The patient shares that she has of long-time history of migraines but experienced a significant decrease in headache activity after a hysterectomy and starting CPAP therapy for obstructive sleep apnea.  She has been unable to use CPAP for a year due to supply chain issues related to COVID 19 pandemic.  Over the course of the last year, the patient has noted a slow increase in the number of headaches suffered per month.  She has frequent lower level morning time headaches that had previously resolved with CPAP use.  Estimates that average migraine frequency was 1 per month or 1 every other month a couple of years ago.  Not experiencing 3-4 migraines per month.  Overall headache qualities in features are unchanged for many years.     Patient describes short lasting (seconds) visual phenomenon related to migraine.  Can be glittering bilateral or a single hazy color added to bilateral visual fields for 1-2 seconds. VARS 0.     Age of onset: 10  When did they become more of a problem: about a year  # of Total headache days per month: 15  # of Migraine or severe days per month: 3-4  Intensity of average and most severe headaches: 5-6/10, 9/10  Duration of headache pain: >4hrs untreated   Quality of pain: Pulsating/Throbbing  Laterality: left side mostly, not side locked  Location: left eye and radiation back through temporal region  Photophobia and phonophobia: yes  Nausea and vomiting: yes  Causes avoidance of physical activity: yes  Worsened by physical activity: yes  Triggers: lack of sleep, skipping meals  Preventive Medications failed: none  Acute medications failed: sumatriptan (>3 months, intolerable side effects), rizatriptan (new, thinks its working)  Supplements: none  OTC Medications: none  Hx of (Bolded items are positive): depression, anxiety, fibromyalgia, autoimmune disorder,  head trauma, neurological infection, glaucoma, asthma, nephrolithiasis, MI, Stroke, Raynaud's phen.  Is medication overuse contributing to the patient's current migraine burden: no  Aura: no  Autonomic symptoms: no  Family Hx of migraines: mother, aunt     The VARS score is the weighted sum of the presence of five visual symptom characteristics:    duration 5-60 min (3 points) 3   develops gradually >=5 min (2 points) 2   scotoma (2 points) 2   zig?zag lines (2 points) 2   and unilateral (1 point) 1   Total points:  0  The maximum score is 10 points.    A VARS score of 5 or more diagnosed MA    sensitivity of 96% (95% CI 92-99%)   specificity of 98%(95% CI %)      Current HA Regimen:  Rizatriptan 5mg tab prn    Past Medical History:   Diagnosis Date    Allergy     Cataract     Diabetes mellitus type II     Diverticulitis 09/2014    Fever blister     Hypertension     Migraine headache        Past Surgical History:   Procedure Laterality Date    COLONOSCOPY N/A 10/21/2019    Procedure: COLONOSCOPY;  Surgeon: Gomez Stark MD;  Location: [facility] ENDO;  Service: Endoscopy;  Laterality: N/A;    COLONOSCOPY, SCREENING, HIGH RISK PATIENT N/A 1/16/2025    Procedure: COLONOSCOPY, SCREENING, HIGH RISK PATIENT;  Surgeon: Iam Beach MD;  Location: [facility] ENDO;  Service: Endoscopy;  Laterality: N/A;  Ref by Dr FAN Kothari, Trulicity, Suprep, portal -   1/9-pre call complete-last glp1 1/7/25-tb    EYE SURGERY Bilateral     cataract    HYSTERECTOMY  06/2009    KJ---DLH/BSO ATYPICAL HYPERPLASIA    OOPHORECTOMY      TUBAL LIGATION         Social History[1]    Review of patient's allergies indicates:   Allergen Reactions    Bactrim [sulfamethoxazole-trimethoprim] Hives     Also Septra    Keflex [cephalexin] Shortness Of Breath    Neosporin (neomycin-polymyx) Swelling    Sumatriptan Other (See Comments)     Pain sensation throughout the body like migraine spreading    Amoxicillin Rash    Neosporin  [benzalkonium  "chloride] Rash    Sulfa (sulfonamide antibiotics) Rash       Medications Ordered Prior to Encounter[2]     Family history:  Family History   Problem Relation Name Age of Onset    Ovarian cancer Mother  43    Cancer Mother          ovarian    Diabetes Mother      Heart disease Father      Melanoma Neg Hx      Psoriasis Neg Hx      Lupus Neg Hx         Review Of Systems     Constitutional Negative for fevers, chills, weigh loss   HEENT Negative for hearing loss, dysphagia, sore throat, diplopia   Respiratory Negative for shortness of breath, cough    Cardiovascular Negative for chest pain, palpitations    Gastrointestinal Negative for constipation, diarrhea, early satiety    Skin Negative for rashes    Musculoskeletal Negative for joint pains, myalgias.   Neurological See Above    Psychological Negative for sleep disturbances.    Heme/Lymph Negative for easy bruising, easy bleeding    Endocrine Negative for polyuria, polydypsia     Physical Exam:     Physical Examination    Vitals: /73 (BP Location: Right arm, Patient Position: Sitting)   Pulse 91   Ht 5' 5" (1.651 m)   Wt 127 kg (280 lb)   BMI 46.59 kg/m²       NEURO    Neurological Exam  Mental Status:  Alert and oriented to person, place and time, recent and remote memory intact, normal attention span and fund of knowledge; Speech is spontaneous and fluent     Cranial Nerve exam:  II: Visual fields full to confrontation; Pupils equal round and reactive about 3mm  III, IV, VI: Extraoccular movements intact with no nystagmus  V: Sensation in V1, V2, V3 intact to light-touch bilaterally,  VII:  No facial weakness,  VIII: Hearing grossly intact  IX,X: palate elevation symmetric   XI: SCM & Trapezius normal,  XII: tongue midline, normal morphology, tongue movement normal     Motor Exam: No involuntary movement. Normal tone and bulk in all 4 extremities  Strength: 5/5 throughout   Reflexes: 2+ throughout    Sensory Exam: Intact touch, pain and vibration in all " 4 limbs    Romberg negative / deferred due to high fall risk  Cerebellar Sign: Normal Finger-to-nose, Heel-to-shin, Rapid alternating movements  bilaterally.  Gait:  Normal steady physiologic gait with normal arm swinging on both side / Deferred due to high fall risk      Interval/Previous Work-up:   Reviewed      Assessment and Plan:   Iqra Duque is a 54 y.o. female  past medical history of chronic migraine, sleep apnea not on CPAP, overweight with BMI> 45, vertigo spells who presented for migraine follow-up.  Headache been stable for the past 2 years with 2 migraine episodes per month + component of early morning headache most likely related to sleep apnea an overweight.  Patient still complains of vertigo episodes on and off with sudden movements, No nystagmus on exam, sudden movement did not trigger vertigo during examination,recent ENT visit with impression of vestibular migraine.      -Migraine with vestibular component   -ALIZA no on CPAP  -Overwt   Problem List Items Addressed This Visit          Neuro    Medication overuse headache     Other Visit Diagnoses         Migraine without aura and without status migrainosus, not intractable    -  Primary      Vestibular migraine              -- Preventive: her headache is not frequent to the point that she needs daily medication  --Currently on atenolol per ENT, reported helps.   -- Abortive: continue Nurtec for abortive and add riboflavin 400 mg daily   --we discussed obstructive sleep apnea might play a big factor in her morning headache and she needs to follow up for for better fit CPAP, we discussed weight loss, and nutritional options, I offered nutritional consult but she refused  -referred to sleep Clinic for evaluation, pending    -- Headache diary  -- Counseled on the followin) Medication overuse headache  2) Trigger avoidance  3) Sleep hygiene  4) Exercise. 30 mins of cardio atleast 3-4 days per week.    Discussed Mediterranean Diet  recommendations (Adopted from Delaney et al, NEJ, 2018.)  -- Abundant use of Olive Oil for cooking and dressing dishes. Use herbs and spices instead to salt flavored foods.  -- Consumption of >=2 daily servings of vegetables (at least 1 of them as fresh vegetables in a salad), discounting side dishes.  -- >=2-3 daily servings of fresh fruits, >=3 weekly servings of legumes  -- >=3 weekly servings of fish or seafood (at least 1 serving of fatty fish).  Limit red meat and processed meats to no more than few times a month.  -- >=1 weekly serving of nuts or seeds  -- Limit consumption of cream, butter, margarine, processed foods, refined carbohydrates, sugary sodas and sweets               RTC 6 months     Time spent on this encounter: 30 minutes. This includes face to face time and non-face to face time preparing to see the patient (eg, review of tests), obtaining and/or reviewing separately obtained history, documenting clinical information in the electronic or other health record, independently interpreting results and communicating results to the patient/family/caregiver, or care coordinator.       A dictation device was used to produce this document. Use of such devices sometimes results in grammatical errors or replacement of words that sound similarly.     Feng Resendiz MD, M.B.Ch.B  Neurology, Vascular neurology  [facility]         [1]   Social History  Socioeconomic History    Marital status:    Occupational History    Occupation: Dispatcher     Employer: [employer]   Tobacco Use    Smoking status: Never     Passive exposure: Never    Smokeless tobacco: Never   Substance and Sexual Activity    Alcohol use: No    Drug use: No    Sexual activity: Yes     Partners: Male     Birth control/protection: None     Social Drivers of Health     Financial Resource Strain: Low Risk  (4/23/2024)    Overall Financial Resource Strain (CARDIA)     Difficulty of Paying Living Expenses: Not very hard   Food  Insecurity: No Food Insecurity (4/23/2024)    Hunger Vital Sign     Worried About Running Out of Food in the Last Year: Never true     Ran Out of Food in the Last Year: Never true   Transportation Needs: No Transportation Needs (4/23/2024)    PRAPARE - Transportation     Lack of Transportation (Medical): No     Lack of Transportation (Non-Medical): No   Physical Activity: Unknown (4/23/2024)    Exercise Vital Sign     Days of Exercise per Week: Patient declined   Stress: Stress Concern Present (4/23/2024)    [employer] [place] [employer] - Occupational Stress Questionnaire     Feeling of Stress : To some extent   Housing Stability: Low Risk  (7/21/2023)    Housing Stability Vital Sign     Unable to Pay for Housing in the Last Year: No     Number of Places Lived in the Last Year: 1     Unstable Housing in the Last Year: No   [2]   Current Outpatient Medications on File Prior to Visit   Medication Sig Dispense Refill    atenoloL (TENORMIN) 50 MG tablet Take 1 tablet (50 mg total) by mouth once daily. 30 tablet 5    atorvastatin (LIPITOR) 10 MG tablet Take 1 tablet by mouth once daily 90 tablet 0    cetirizine (ZYRTEC) 10 MG tablet Take 10 mg by mouth once daily.      CONTOUR TEST STRIPS Strp TEST ONCE DAILY 100 strip 1    diclofenac (VOLTAREN) 50 MG EC tablet Take 1 tablet (50 mg total) by mouth 2 (two) times daily. 60 tablet 2    dulaglutide (TRULICITY) 4.5 mg/0.5 mL pen injector Inject 4.5 mg (one pen) into the skin every 7 days. 12 pen 0    ergocalciferol (ERGOCALCIFEROL) 50,000 unit Cap Take 1 capsule (50,000 Units total) by mouth every 7 days. 12 capsule 1    ferrous sulfate (FEOSOL) 325 mg (65 mg iron) Tab tablet Take 1 tablet (325 mg total) by mouth once daily. 90 tablet 1    glipiZIDE (GLUCOTROL) 10 MG TR24 Take 1 tablet (10 mg total) by mouth daily with breakfast. 90 tablet 1    lisinopriL-hydrochlorothiazide (PRINZIDE,ZESTORETIC) 20-12.5 mg per tablet Take 1 tablet by mouth once daily. 90 tablet 0     meclizine (ANTIVERT) 25 mg tablet Take 1 tablet (25 mg total) by mouth 3 (three) times daily as needed for Dizziness. 90 tablet 1    metFORMIN (GLUCOPHAGE-XR) 500 MG ER 24hr tablet Take 2 tablets (1,000 mg total) by mouth 2 (two) times daily with meals. 360 tablet 1    MICROLET LANCET Misc TEST ONCE DAILY 100 each 3    montelukast (SINGULAIR) 10 mg tablet Take 1 tablet by mouth once daily 90 tablet 1    omeprazole (PRILOSEC) 20 MG capsule Take 1 capsule by mouth once daily in the morning 90 capsule 0    rimegepant (NURTEC) 75 mg odt Take 1 tablet (75 mg total) by mouth as needed for Migraine. Place ODT tablet on the tongue; alternatively the ODT tablet may be placed under the tongue. Okay to take 2nd tablet in 24 hours; No more than 2 tablets in 24 hours. 16 tablet 5    fluconazole (DIFLUCAN) 150 MG Tab Take 1 tab PO once a day PRN yeast infection 1 tablet 3     No current facility-administered medications on file prior to visit.

## 2025-03-28 ENCOUNTER — HOSPITAL ENCOUNTER (OUTPATIENT)
Dept: RADIOLOGY | Facility: HOSPITAL | Age: 55
Discharge: HOME | End: 2025-03-28
Attending: NURSE PRACTITIONER
Payer: COMMERCIAL

## 2025-03-28 DIAGNOSIS — Z12.31 ENCOUNTER FOR SCREENING MAMMOGRAM FOR MALIGNANT NEOPLASM OF BREAST: ICD-10-CM

## 2025-03-28 PROCEDURE — 77063 BREAST TOMOSYNTHESIS BI: CPT | Mod: 26,,, | Performed by: RADIOLOGY

## 2025-03-28 PROCEDURE — 77067 SCR MAMMO BI INCL CAD: CPT | Mod: 26,,, | Performed by: RADIOLOGY

## 2025-03-28 PROCEDURE — 77067 SCR MAMMO BI INCL CAD: CPT | Mod: TC,PO

## 2025-04-05 ENCOUNTER — LAB VISIT (OUTPATIENT)
Dept: LAB | Facility: HOSPITAL | Age: 55
End: 2025-04-05
Payer: COMMERCIAL

## 2025-04-05 DIAGNOSIS — E11.3293 TYPE 2 DIABETES MELLITUS WITH BOTH EYES AFFECTED BY MILD NONPROLIFERATIVE RETINOPATHY WITHOUT MACULAR EDEMA, WITHOUT LONG-TERM CURRENT USE OF INSULIN: Chronic | ICD-10-CM

## 2025-04-05 LAB
ANION GAP (OHS): 11 MMOL/L (ref 8–16)
BUN SERPL-MCNC: 16 MG/DL (ref 6–20)
CALCIUM SERPL-MCNC: 8.9 MG/DL (ref 8.7–10.5)
CHLORIDE SERPL-SCNC: 101 MMOL/L (ref 95–110)
CO2 SERPL-SCNC: 25 MMOL/L (ref 23–29)
CREAT SERPL-MCNC: 0.7 MG/DL (ref 0.5–1.4)
EAG (OHS): 194 MG/DL (ref 68–131)
GFR SERPLBLD CREATININE-BSD FMLA CKD-EPI: >60 ML/MIN/1.73/M2
GLUCOSE SERPL-MCNC: 155 MG/DL (ref 70–110)
HBA1C MFR BLD: 8.4 % (ref 4–5.6)
POTASSIUM SERPL-SCNC: 4.5 MMOL/L (ref 3.5–5.1)
SODIUM SERPL-SCNC: 137 MMOL/L (ref 136–145)

## 2025-04-05 PROCEDURE — 80048 BASIC METABOLIC PNL TOTAL CA: CPT

## 2025-04-05 PROCEDURE — 36415 COLL VENOUS BLD VENIPUNCTURE: CPT

## 2025-04-05 PROCEDURE — 83036 HEMOGLOBIN GLYCOSYLATED A1C: CPT

## 2025-04-07 ENCOUNTER — RESULTS FOLLOW-UP (OUTPATIENT)
Dept: INTERNAL MEDICINE | Facility: CLINIC | Age: 55
End: 2025-04-07

## 2025-04-09 DIAGNOSIS — E11.3293 TYPE 2 DIABETES MELLITUS WITH BOTH EYES AFFECTED BY MILD NONPROLIFERATIVE RETINOPATHY WITHOUT MACULAR EDEMA, WITHOUT LONG-TERM CURRENT USE OF INSULIN: Chronic | ICD-10-CM

## 2025-04-09 RX ORDER — ATORVASTATIN CALCIUM 10 MG/1
10 TABLET, FILM COATED ORAL
Qty: 90 TABLET | Refills: 0 | Status: SHIPPED | OUTPATIENT
Start: 2025-04-09

## 2025-04-09 NOTE — TELEPHONE ENCOUNTER
Refill Routing Note   Medication(s) are not appropriate for processing by [facility] for the following reason(s):        Non-participating provider    [facility] action(s):  Route               Appointments  past 12m or future 3m with PCP    Date Provider   Last Visit   1/31/2025 Ofelia Avila NP   Next Visit   4/15/2025 Ofelia Avila, YESENIA   ED visits in past 90 days: 0        Note composed:11:43 AM 04/09/2025

## 2025-04-15 ENCOUNTER — OFFICE VISIT (OUTPATIENT)
Dept: INTERNAL MEDICINE | Facility: CLINIC | Age: 55
End: 2025-04-15
Payer: COMMERCIAL

## 2025-04-15 DIAGNOSIS — E66.01 MORBID OBESITY WITH BMI OF 45.0-49.9, ADULT: Chronic | ICD-10-CM

## 2025-04-15 DIAGNOSIS — E11.3293 TYPE 2 DIABETES MELLITUS WITH BOTH EYES AFFECTED BY MILD NONPROLIFERATIVE RETINOPATHY WITHOUT MACULAR EDEMA, WITHOUT LONG-TERM CURRENT USE OF INSULIN: Primary | Chronic | ICD-10-CM

## 2025-04-15 DIAGNOSIS — G43.109 MIGRAINE WITH AURA AND WITHOUT STATUS MIGRAINOSUS, NOT INTRACTABLE: Chronic | ICD-10-CM

## 2025-04-15 DIAGNOSIS — I10 ESSENTIAL HYPERTENSION: Chronic | ICD-10-CM

## 2025-04-15 RX ORDER — TIRZEPATIDE 5 MG/.5ML
5 INJECTION, SOLUTION SUBCUTANEOUS
Qty: 2 ML | Refills: 2 | Status: SHIPPED | OUTPATIENT
Start: 2025-04-15

## 2025-04-15 NOTE — PROGRESS NOTES
The patient location is: [facility]  The chief complaint leading to consultation is: T2DM follow-up    Visit type: audiovisual    Face to Face time with patient: 10 minutes  15 minutes of total time spent on the encounter, which includes face to face time and non-face to face time preparing to see the patient (eg, review of tests), Obtaining and/or reviewing separately obtained history, Documenting clinical information in the electronic or other health record, Independently interpreting results (not separately reported) and communicating results to the patient/family/caregiver, or Care coordination (not separately reported).         Each patient to whom he or she provides medical services by telemedicine is:  (1) informed of the relationship between the physician and patient and the respective role of any other health care provider with respect to management of the patient; and (2) notified that he or she may decline to receive medical services by telemedicine and may withdraw from such care at any time.    Notes:   History of Present Illness   Iqra Duque is a 54 y.o. woman with medical history as listed below who presents today for routine follow-up, T2DM. She did have labs prior to our visit with worsening hemoglobin A1c from 8.1 to 8.4. She is taking medications as prescribed without perceived SE. She does not monitor her glucose at home but denies symptoms of hypoglycemia. Complaints today: currently having a vertigo flair, migraines have been well controlled. Needs [place] paperwork to excuse up to 2/3 days per week for migraines and vertigo. Pertinent negatives as listed in ROS. We will address HM today.    Past Medical History:   Diagnosis Date    Allergy     Cataract     Diabetes mellitus type II     Diverticulitis 09/2014    Fever blister     Hypertension     Migraine headache        Past Surgical History:   Procedure Laterality Date    COLONOSCOPY N/A 10/21/2019    Procedure: COLONOSCOPY;   Surgeon: Gomez Stark MD;  Location: [facility] ENDO;  Service: Endoscopy;  Laterality: N/A;    COLONOSCOPY, SCREENING, HIGH RISK PATIENT N/A 1/16/2025    Procedure: COLONOSCOPY, SCREENING, HIGH RISK PATIENT;  Surgeon: Iam Beach MD;  Location: [facility] ENDO;  Service: Endoscopy;  Laterality: N/A;  Ref by Dr FAN Kothari, Trulicity, Suprep, portal - PC  1/9-pre call complete-last glp1 1/7/25-tb    EYE SURGERY Bilateral     cataract    HYSTERECTOMY  06/2009    KJB---DLH/BSO ATYPICAL HYPERPLASIA    OOPHORECTOMY      TUBAL LIGATION         Social History     Socioeconomic History    Marital status:    Occupational History    Occupation: Dispatcher     Employer: [employer]   Tobacco Use    Smoking status: Never     Passive exposure: Never    Smokeless tobacco: Never   Substance and Sexual Activity    Alcohol use: No    Drug use: No    Sexual activity: Yes     Partners: Male     Birth control/protection: None     Social Drivers of Health     Financial Resource Strain: Low Risk  (4/23/2024)    Overall Financial Resource Strain (CARDIA)     Difficulty of Paying Living Expenses: Not very hard   Food Insecurity: No Food Insecurity (4/23/2024)    Hunger Vital Sign     Worried About Running Out of Food in the Last Year: Never true     Ran Out of Food in the Last Year: Never true   Transportation Needs: No Transportation Needs (4/23/2024)    PRAPARE - Transportation     Lack of Transportation (Medical): No     Lack of Transportation (Non-Medical): No   Physical Activity: Unknown (4/23/2024)    Exercise Vital Sign     Days of Exercise per Week: Patient declined   Stress: Stress Concern Present (4/23/2024)    [employer] - Occupational Stress Questionnaire     Feeling of Stress : To some extent   Housing Stability: Low Risk  (7/21/2023)    Housing Stability Vital Sign     Unable to Pay for Housing in the Last Year: No     Number of Places Lived in the Last Year: 1     Unstable  Housing in the Last Year: No       Family History   Problem Relation Name Age of Onset    Ovarian cancer Mother  43    Cancer Mother          ovarian    Diabetes Mother      Heart disease Father      Melanoma Neg Hx      Psoriasis Neg Hx      Lupus Neg Hx         Review of Systems  Review of Systems   Constitutional:  Negative for malaise/fatigue and weight loss.   Eyes:  Negative for blurred vision and double vision.   Respiratory:  Negative for shortness of breath.    Cardiovascular:  Negative for chest pain, palpitations, orthopnea, claudication, leg swelling and PND.   Gastrointestinal:  Negative for blood in stool and melena.   Genitourinary:  Negative for flank pain and hematuria.   Neurological:  Positive for dizziness and headaches. Negative for tremors, seizures, loss of consciousness and weakness.   Endo/Heme/Allergies:  Negative for polydipsia.   Psychiatric/Behavioral:  The patient is not nervous/anxious.        A complete review of systems was otherwise negative.    Physical Exam  General appearance: alert, appears stated age, cooperative, and no distress  Lungs:  respirations are even and unlabored  Skin:  no visible rash or lesions  Neurologic: Grossly normal    Assessment/Plan  Type 2 diabetes mellitus with both eyes affected by mild nonproliferative retinopathy without macular edema, without long-term current use of insulin  Continue Metformin and Glipizide.  Stop Trulicity and switch to Mounjaro. Will increase dose in one month if tolerating well.  Labs and follow-up in 3 months.  -     tirzepatide (MOUNJARO) 5 mg/0.5 mL PnIj; Inject 5 mg (one pen) into the skin every 7 days.  Dispense: 2 mL; Refill: 2  -     Microalbumin/Creatinine Ratio, Urine; Future; Expected date: 07/15/2025  -     LIPID PANEL; Future; Expected date: 07/15/2025  -     Hemoglobin A1C; Future; Expected date: 07/15/2025  -     Comprehensive Metabolic Panel; Future; Expected date: 07/15/2025    Essential hypertension  The current  medical regimen is effective;  continue present plan and medications.    Morbid obesity with BMI of 45.0-49.9, adult  The patient is asked to make an attempt to improve diet and exercise patterns to aid in medical management of this problem. We specifically discussed cutting calorie intake by 500-1000 calories per day for a goal of a 1-2 pound weight loss per week and recommendations for a mostly plant based diet with limited red meats/refined grains/processed foods/added sugars.    Migraine with aura and without status migrainosus, not intractable  The current medical regimen is effective;  continue present plan and medications.  Followed by neurology.    Patient has verbalized understanding and is in agreement with plan of care.    Follow up in about 3 months (around 7/15/2025).      Answers submitted by the patient for this visit:  Diabetes Questionnaire (Submitted on 4/10/2025)  Chief Complaint: Diabetes problem  Diabetes type: type 2  MedicAlert ID: No  Disease duration: 12 Years  fatigue: Yes  foot paresthesias: No  foot ulcerations: No  polyphagia: Yes  polyuria: No  visual change: No  Symptom course: worsening  confusion: No  speech difficulty: No  hunger: Yes  mood changes: No  pallor: No  sleepiness: No  sweats: No  blackouts: No  hospitalization: No  nocturnal hypoglycemia: No  required assistance: No  required glucagon: No  CVA: No  heart disease: No  nephropathy: No  peripheral neuropathy: No  PVD: No  retinopathy: No  autonomic neuropathy: No  CAD risks: hypertension, obesity, sedentary lifestyle, stress, diabetes mellitus  Current treatments: oral agent (triple therapy)  Treatment compliance: most of the time  Home blood tests: 1-2 x per day  Monitoring compliance: fair  Blood glucose trend: increasing steadily  Weight trend: stable  Meal planning: calorie counting, carbohydrate counting  Exercise: rarely  Dietitian visit: No  Eye exam current: Yes  Sees podiatrist: Yes

## 2025-04-16 ENCOUNTER — PATIENT MESSAGE (OUTPATIENT)
Dept: INTERNAL MEDICINE | Facility: CLINIC | Age: 55
End: 2025-04-16
Payer: COMMERCIAL

## 2025-04-29 DIAGNOSIS — K21.9 GASTROESOPHAGEAL REFLUX: ICD-10-CM

## 2025-04-29 RX ORDER — OMEPRAZOLE 20 MG/1
20 CAPSULE, DELAYED RELEASE ORAL EVERY MORNING
Qty: 90 CAPSULE | Refills: 0 | Status: SHIPPED | OUTPATIENT
Start: 2025-04-29

## 2025-04-29 NOTE — TELEPHONE ENCOUNTER
Refill Routing Note   Medication(s) are not appropriate for processing by [facility] for the following reason(s):        Patient not seen by provider within 15 months    [facility] action(s):  Defer   Requires appointment : Yes             Appointments  past 12m or future 3m with PCP    Date Provider   Last Visit   4/19/2024 Jaylon Kothari MD   Next Visit   Visit date not found Jaylon Kothari MD   ED visits in past 90 days: 0        Note composed:12:33 PM 04/29/2025

## 2025-04-29 NOTE — TELEPHONE ENCOUNTER
Care Due:                  Date            Visit Type   Department     Provider  --------------------------------------------------------------------------------    Last Visit: None Found      None         None Found  Next Visit: None Scheduled  None         None Found                                                            Last  Test          Frequency    Reason                     Performed    Due Date  --------------------------------------------------------------------------------    Office Visit  15 months..  metFORMIN, montelukast...  Not Found    Overdue    [employer] Embedded Care Due Messages. Reference number: [id number].   4/29/2025 10:28:44 AM CDT

## 2025-05-10 DIAGNOSIS — I10 ESSENTIAL HYPERTENSION: Chronic | ICD-10-CM

## 2025-05-10 NOTE — TELEPHONE ENCOUNTER
No care due was identified.  [facility] Embedded Care Due Messages. Reference number: [id number].   5/10/2025 5:14:36 AM CDT

## 2025-05-12 RX ORDER — LISINOPRIL AND HYDROCHLOROTHIAZIDE 12.5; 2 MG/1; MG/1
1 TABLET ORAL DAILY
Qty: 90 TABLET | Refills: 3 | Status: SHIPPED | OUTPATIENT
Start: 2025-05-12

## 2025-05-13 ENCOUNTER — PATIENT MESSAGE (OUTPATIENT)
Dept: INTERNAL MEDICINE | Facility: CLINIC | Age: 55
End: 2025-05-13
Payer: COMMERCIAL

## 2025-05-13 ENCOUNTER — TELEPHONE (OUTPATIENT)
Dept: INTERNAL MEDICINE | Facility: CLINIC | Age: 55
End: 2025-05-13
Payer: COMMERCIAL

## 2025-05-14 ENCOUNTER — TELEPHONE (OUTPATIENT)
Dept: NEUROLOGY | Facility: CLINIC | Age: 55
End: 2025-05-14
Payer: COMMERCIAL

## 2025-05-14 LAB
LEFT EYE DM RETINOPATHY: POSITIVE
RIGHT EYE DM RETINOPATHY: POSITIVE

## 2025-05-19 ENCOUNTER — OFFICE VISIT (OUTPATIENT)
Dept: SLEEP MEDICINE | Facility: CLINIC | Age: 55
End: 2025-05-19
Attending: PSYCHIATRY & NEUROLOGY
Payer: COMMERCIAL

## 2025-05-19 VITALS
BODY MASS INDEX: 47.55 KG/M2 | WEIGHT: 285.38 LBS | SYSTOLIC BLOOD PRESSURE: 119 MMHG | HEART RATE: 68 BPM | DIASTOLIC BLOOD PRESSURE: 73 MMHG | HEIGHT: 65 IN

## 2025-05-19 DIAGNOSIS — G47.33 OSA (OBSTRUCTIVE SLEEP APNEA): ICD-10-CM

## 2025-05-19 PROCEDURE — 4010F ACE/ARB THERAPY RXD/TAKEN: CPT | Mod: CPTII,S$GLB,, | Performed by: PSYCHIATRY & NEUROLOGY

## 2025-05-19 PROCEDURE — 3078F DIAST BP <80 MM HG: CPT | Mod: CPTII,S$GLB,, | Performed by: PSYCHIATRY & NEUROLOGY

## 2025-05-19 PROCEDURE — 3074F SYST BP LT 130 MM HG: CPT | Mod: CPTII,S$GLB,, | Performed by: PSYCHIATRY & NEUROLOGY

## 2025-05-19 PROCEDURE — 3052F HG A1C>EQUAL 8.0%<EQUAL 9.0%: CPT | Mod: CPTII,S$GLB,, | Performed by: PSYCHIATRY & NEUROLOGY

## 2025-05-19 PROCEDURE — 99204 OFFICE O/P NEW MOD 45 MIN: CPT | Mod: S$GLB,,, | Performed by: PSYCHIATRY & NEUROLOGY

## 2025-05-19 PROCEDURE — 99999 PR PBB SHADOW E&M-EST. PATIENT-LVL III: CPT | Mod: PBBFAC,,, | Performed by: PSYCHIATRY & NEUROLOGY

## 2025-05-19 PROCEDURE — 3008F BODY MASS INDEX DOCD: CPT | Mod: CPTII,S$GLB,, | Performed by: PSYCHIATRY & NEUROLOGY

## 2025-05-19 NOTE — PROGRESS NOTES
Since her last visit in our clinic in 2018 - was diagnosed with severe ALIZA in 2-018 with RDI 34.  Has been using APAP on and off sicne last visit - but stopped after COVID 2023; since her machine was replaced for DS2 by [employer] post recall - was unable to use it;   Not comfortable with settings or the mask since replacement.  Not sure why High CPAP 90% and at time ashleigh ing effect at 20 cm H2O.    Lost confidence int he treatment.     DS2    tient ID: [id number]... Iqra Duque Compliance Summary 9/16/2023 - 12/14/2023 (90 days) Days with Device Usage 30 days Days without Device Usage 60 days Percent Days with Device Usage 33.3% Cumulative Usage 7 days 23 hrs. 7 mins. 59 secs. Maximum Usage (1 Day) 7 hrs. 54 mins. 55 secs. Average Usage (All Days) 2 hrs. 7 mins. 25 secs. Average Usage (Days Used) 6 hrs. 22 mins. 15 secs. Minimum Usage (1 Day) 4 hrs. 24 mins. 50 secs. Percent of Days with Usage >= 4 Hours 33.3% Percent of Days with Usage < 4 Hours 66.7% Date Range Total Blower Time 8 days 4 hrs. 40 mins. 42 secs. Average AHI 1.2 Auto-CPAP Summary Auto-CPAP Mean Pressure 14.3 cmH2O Auto-CPAP Peak Average Pressure 17.5 cmH2O Device Pressure <= 90% of Time 17.9 cmH2O Average Time in Large Leak Per Day 2 secs. Device Settings as of 12/14/2023 AutoCPAP - A-Flex Device Settings Device Mode Parameter Value Min Pressure 6 cmH2O Max Pressure 20 cmH2O A-Flex Setting 3 Auto Off Off Auto On Off Ramp+ Time 30 minutes Ramp+ Start Pressure 4.0 cmH2O Mask Resistance Off Tubing Type 15 Opti-Start Off EZ-Start Disabled Patient Controls Access On Patient Data Access On Tube Temperature 3 Humidifier 3                INTERVAL HISTORY:    Visit date not found:          Iqra Duque  was seen as a f/u today for the mgt of ALIZA. Last seen by MD 4/12/18, this is my initial visit with her    She has since undergone a home sleep study which we reviewed together today in detail. Continued loud snoring.  Patient endorsed waking up  "gasping for air nightly.  Feeling tired upon awakening.   No parasomnia.  No cataplexy.  No rls symptoms.+disrupted sleep but returns more easily to sleep onw.ESS=9. +daytime sleepiness. Would love to get a good night sleep. Denies sleepiness while driving, remains engaged. AM headaches. Lot of wake time night of study due to AC being out.     HST AHI 31/low sat 76%, mild ahi 5 SIDE    HgBA1c 7.2      Occupation:   for [employer]    REVIEW OF SYSTEMS: Sleep related symptoms as per HPI, + sinus congestion.  Otherwise, a balance of systems reviewed is negative.          PHYSICAL EXAM:  /73 (BP Location: Left arm, Patient Position: Sitting)   Pulse 68   Ht 5' 5" (1.651 m)   Wt 129.5 kg (285 lb 6.2 oz)   BMI 47.49 kg/m²   GENERAL: Normal development, well groomed ,morbid obese                           Echo 4-3-17  CONCLUSIONS     1 - Normal left ventricular systolic function (EF 55-60%).     2 - Concentric remodeling.     3 - Trivial mitral regurgitation.     4 - Trivial tricuspid regurgitation.     Lab Results   Component Value Date    TSH 1.048 10/20/2021     ASSESSMENT  ALIZA, severe position dependent.   Has been no longer able to tolerate APAP since recall  + still reports snoring, residual fatigue and excessive daytime sleepiness and morning headache      PLAN:    She will resume APAP 6-20 from [facility] (I hav e remote access)   Willt ry with her existing mask first and will let me know  It's possible that CPAP 20 is not enough to control her ALIZA - in that case will order CPAP/BPAp titration - will be eligible for a new machine      We discussed potential treatment options, which could include weight loss (10-15%), body positioning, continuous positive airway pressure (CPAP-defintive), mandibular advancement splint by dentist, or referral for surgical consideration. Discussed etiology of ALIZA and potential ramifications of untreated ALIZA, including heart disease, hypertension, cognitive difficulties, " stroke, and diabetes.      APAP 6-20cm setup pending auth. RTC 4-5 wks AFTER setup adherence  Encouraged weight loss efforts for potential improvement of ALIZA and overall health benefits  See PCP re: DM mgt

## 2025-05-23 RX ORDER — ATENOLOL 50 MG/1
50 TABLET ORAL DAILY
Qty: 30 TABLET | Refills: 5 | Status: SHIPPED | OUTPATIENT
Start: 2025-05-23 | End: 2025-11-19

## 2025-05-28 ENCOUNTER — PATIENT OUTREACH (OUTPATIENT)
Dept: ADMINISTRATIVE | Facility: HOSPITAL | Age: 55
End: 2025-05-28
Payer: COMMERCIAL

## 2025-06-02 ENCOUNTER — OFFICE VISIT (OUTPATIENT)
Dept: INTERNAL MEDICINE | Facility: CLINIC | Age: 55
End: 2025-06-02
Payer: COMMERCIAL

## 2025-06-02 VITALS — DIASTOLIC BLOOD PRESSURE: 68 MMHG | SYSTOLIC BLOOD PRESSURE: 124 MMHG | HEART RATE: 68 BPM | OXYGEN SATURATION: 97 %

## 2025-06-02 DIAGNOSIS — M54.50 ACUTE EXACERBATION OF CHRONIC LOW BACK PAIN: Primary | ICD-10-CM

## 2025-06-02 DIAGNOSIS — G89.29 ACUTE EXACERBATION OF CHRONIC LOW BACK PAIN: Primary | ICD-10-CM

## 2025-06-02 DIAGNOSIS — I10 ESSENTIAL HYPERTENSION: Chronic | ICD-10-CM

## 2025-06-02 DIAGNOSIS — R19.8 ALTERNATING CONSTIPATION AND DIARRHEA: ICD-10-CM

## 2025-06-02 DIAGNOSIS — E55.9 VITAMIN D INSUFFICIENCY: Chronic | ICD-10-CM

## 2025-06-02 DIAGNOSIS — G47.33 OSA (OBSTRUCTIVE SLEEP APNEA): Chronic | ICD-10-CM

## 2025-06-02 DIAGNOSIS — Z00.00 ROUTINE MEDICAL EXAM: ICD-10-CM

## 2025-06-02 DIAGNOSIS — E11.3293 TYPE 2 DIABETES MELLITUS WITH BOTH EYES AFFECTED BY MILD NONPROLIFERATIVE RETINOPATHY WITHOUT MACULAR EDEMA, WITHOUT LONG-TERM CURRENT USE OF INSULIN: Chronic | ICD-10-CM

## 2025-06-02 PROCEDURE — 1159F MED LIST DOCD IN RCRD: CPT | Mod: CPTII,S$GLB,, | Performed by: NURSE PRACTITIONER

## 2025-06-02 PROCEDURE — 99999 PR PBB SHADOW E&M-EST. PATIENT-LVL IV: CPT | Mod: PBBFAC,,, | Performed by: NURSE PRACTITIONER

## 2025-06-02 PROCEDURE — 3052F HG A1C>EQUAL 8.0%<EQUAL 9.0%: CPT | Mod: CPTII,S$GLB,, | Performed by: NURSE PRACTITIONER

## 2025-06-02 PROCEDURE — 3078F DIAST BP <80 MM HG: CPT | Mod: CPTII,S$GLB,, | Performed by: NURSE PRACTITIONER

## 2025-06-02 PROCEDURE — 4010F ACE/ARB THERAPY RXD/TAKEN: CPT | Mod: CPTII,S$GLB,, | Performed by: NURSE PRACTITIONER

## 2025-06-02 PROCEDURE — 99214 OFFICE O/P EST MOD 30 MIN: CPT | Mod: 25,S$GLB,, | Performed by: NURSE PRACTITIONER

## 2025-06-02 PROCEDURE — 96372 THER/PROPH/DIAG INJ SC/IM: CPT | Mod: S$GLB,,, | Performed by: NURSE PRACTITIONER

## 2025-06-02 PROCEDURE — 1160F RVW MEDS BY RX/DR IN RCRD: CPT | Mod: CPTII,S$GLB,, | Performed by: NURSE PRACTITIONER

## 2025-06-02 PROCEDURE — 3074F SYST BP LT 130 MM HG: CPT | Mod: CPTII,S$GLB,, | Performed by: NURSE PRACTITIONER

## 2025-06-02 RX ORDER — KETOROLAC TROMETHAMINE 30 MG/ML
30 INJECTION, SOLUTION INTRAMUSCULAR; INTRAVENOUS
Status: COMPLETED | OUTPATIENT
Start: 2025-06-02 | End: 2025-06-02

## 2025-06-02 RX ORDER — TIZANIDINE 4 MG/1
4 TABLET ORAL EVERY 8 HOURS
Qty: 30 TABLET | Refills: 0 | Status: SHIPPED | OUTPATIENT
Start: 2025-06-02 | End: 2025-06-12

## 2025-06-02 RX ADMIN — KETOROLAC TROMETHAMINE 30 MG: 30 INJECTION, SOLUTION INTRAMUSCULAR; INTRAVENOUS at 09:06

## 2025-06-30 DIAGNOSIS — E11.3293 TYPE 2 DIABETES MELLITUS WITH BOTH EYES AFFECTED BY MILD NONPROLIFERATIVE RETINOPATHY WITHOUT MACULAR EDEMA, WITHOUT LONG-TERM CURRENT USE OF INSULIN: Chronic | ICD-10-CM

## 2025-07-01 RX ORDER — GLIPIZIDE 10 MG/1
10 TABLET, FILM COATED, EXTENDED RELEASE ORAL
Qty: 90 TABLET | Refills: 1 | Status: SHIPPED | OUTPATIENT
Start: 2025-07-01

## 2025-07-01 NOTE — TELEPHONE ENCOUNTER
No care due was identified.  [facility] Embedded Care Due Messages. Reference number: [id number].   6/30/2025 9:35:12 PM CDT

## 2025-07-01 NOTE — TELEPHONE ENCOUNTER
PAIN MANAGEMENT IN ADULTS    What is pain? Pain is your body’s way to reacting to injury or illness. Everybody reacts to pain in different ways. What you think is painful may not be painful to someone else. But, pain is whatever you say it is!  What causes pain? Many things, such as an injury, surgery, or a disease can cause pain. Some pain is caused by pressure one a nerve, such as a cancerous tumor or slipped disc. Other pain is caused when nerves are cut as in an accident or surgery. After an injury or surgery you may not want to move the painful part of our body at all. But, you may have pain because you are not moving this body part. Sometimes there is no clear reason for your pain.    What are the different types of pain?  Acute pain is short?lived and lasts less than 3 months. Caregivers help first work to remove the cause of the pain, such as fixing a broken arm. Acute pain usually can be controlled or stopped with pain medicine.  Chronic pain lasts longer than 3?6 months. This kind of pain is often more complicated. Caregivers may use medicine along with other treatments, like self?hypnosis and relaxation to help you learn to deal with the chronic pain.  What is your pain like? Caregivers want you to talk to them about your pain. This helps them learn what may be causing the pain and how best to treat it.  Why is pain control important? Pain can affect your appetite, how well you sleep, your energy and your ability to do things. Pain can also affect your mood and relationship with others. If caregivers can help you control your pain, you will suffer less and can even heal faster.  How can pain medicine be given? Following are the many different ways pain medicine can be given depending on the kind of pain you are experiencing.  By mouth: you may be given pills or liquid to swallow or you may be given a pill or liquid to put under your tongue. Some medicine can also be given as a lozenge like a cough drop or  Refill Routing Note   Medication(s) are not appropriate for processing by [facility] for the following reason(s):        Patient not seen by provider within 15 months  Responsible provider unclear    [facility] action(s):  Defer      Medication Therapy Plan: Last ordered: 1/2/25 by Ofelia Avila NP. LOV 4/19/24, no current order by PCP      Appointments  past 12m or future 3m with PCP    Date Provider   Last Visit   4/19/2024 Jaylon Kothari MD   Next Visit   7/8/2025 Jaylon Kothari MD   ED visits in past 90 days: 0        Note composed:9:41 PM 06/30/2025           even a special lollipop.  Rectal: medicine in a suppository is put into your rectum.  Shot/Injection: pain medicine can be given as a shot/injection into an IV, into a muscle or under the skin  Topical: medicine in a cream or gel is spread over the skin.  Transdermal: medicine given in a patch and put onto the skin. This medicine is released slowly to give pain relief for as long as 72 hours.    How can you take pain medicine safely and make it work best for you? The following are many different ways pain medicine can be given depending on the kind of pain you have.  Some pain medicines can make you breathe less deeply and less often. The medicine may also make you sleepy, dizzy, and unsafe to drive a car or use heavy equipment. For these reasons, it is very important to follow your caregiver’s advice on how to use this medicine.  Sometimes the pain is worse when you first wake up in the morning. This may happened if you did not have enough pain medicine in your blood stream to last through the night. Caregivers may tell you to take a dose of pain medicine during the night.  Some foods, alcohol, and other medicines may cause unpleasant side effects when you take pain medicine. Follow your caregiver’s advice about how to prevent these problems.  Pain medicine can make you constipated. Straining with a BM can make you pain worse. Do not try to push the BM out if it’s too hard. Contact your caregiver if you become constipated.  Other non?drug pain control methods. There are many pain control techniques that can held you deal with pain even if it does not go away completely. It is important to practice some of the techniques when you don’t have pain if possible. This will help the technique work better during an attack of pain.  Cold and heat: both cold and heat can help lessen some types of post?op pain. Caregivers will tell you if cold and/or hot packs will help your pain.  Distraction: teaches you to focus your  attention on something other than pain. Playing cards or games, talking and visiting family may relax you and keep you from thinking about pain.  Hydrotherapy: is a gentle water exercise program. It can strengthen muscles that are not injured and lessen inflammation. Talk to your physical therapist or your caregiver about starting a hydrotherapy program.  Massage  Music  Physical therapy  Rest  Surgery/Nerve blocks  Call your caregiver if you have any of the following problems:  The pain medicine you are taking makes you sleepier than usual or confused  You have new pain or the pain seems different than before  You have constipation  Care agreement: You have the right to help plan your care. To help with this plan you must learn about your pain, what is causing it, and how it can be treated. You can then discuss treatment options with your caregivers. Work with them to decide what care will be used to treat you. You always have the right to refuse treatment.

## 2025-07-03 ENCOUNTER — LAB VISIT (OUTPATIENT)
Dept: LAB | Facility: HOSPITAL | Age: 55
End: 2025-07-03
Attending: NURSE PRACTITIONER
Payer: COMMERCIAL

## 2025-07-03 ENCOUNTER — RESULTS FOLLOW-UP (OUTPATIENT)
Dept: INTERNAL MEDICINE | Facility: CLINIC | Age: 55
End: 2025-07-03

## 2025-07-03 ENCOUNTER — TELEPHONE (OUTPATIENT)
Dept: NEUROLOGY | Facility: CLINIC | Age: 55
End: 2025-07-03
Payer: COMMERCIAL

## 2025-07-03 DIAGNOSIS — G47.33 OSA (OBSTRUCTIVE SLEEP APNEA): Chronic | ICD-10-CM

## 2025-07-03 DIAGNOSIS — E11.3293 TYPE 2 DIABETES MELLITUS WITH BOTH EYES AFFECTED BY MILD NONPROLIFERATIVE RETINOPATHY WITHOUT MACULAR EDEMA, WITHOUT LONG-TERM CURRENT USE OF INSULIN: Chronic | ICD-10-CM

## 2025-07-03 DIAGNOSIS — E55.9 VITAMIN D INSUFFICIENCY: Chronic | ICD-10-CM

## 2025-07-03 DIAGNOSIS — Z00.00 ROUTINE MEDICAL EXAM: ICD-10-CM

## 2025-07-03 LAB
25(OH)D3+25(OH)D2 SERPL-MCNC: 36 NG/ML (ref 30–96)
ABSOLUTE EOSINOPHIL (OHS): 0.23 K/UL
ABSOLUTE MONOCYTE (OHS): 0.49 K/UL (ref 0.3–1)
ABSOLUTE NEUTROPHIL COUNT (OHS): 3.87 K/UL (ref 1.8–7.7)
ALBUMIN SERPL BCP-MCNC: 3.4 G/DL (ref 3.5–5.2)
ALP SERPL-CCNC: 55 UNIT/L (ref 40–150)
ALT SERPL W/O P-5'-P-CCNC: 24 UNIT/L (ref 10–44)
ANION GAP (OHS): 11 MMOL/L (ref 8–16)
AST SERPL-CCNC: 37 UNIT/L (ref 11–45)
BASOPHILS # BLD AUTO: 0.05 K/UL
BASOPHILS NFR BLD AUTO: 0.7 %
BILIRUB SERPL-MCNC: 1 MG/DL (ref 0.1–1)
BUN SERPL-MCNC: 17 MG/DL (ref 6–20)
CALCIUM SERPL-MCNC: 9.3 MG/DL (ref 8.7–10.5)
CHLORIDE SERPL-SCNC: 102 MMOL/L (ref 95–110)
CHOLEST SERPL-MCNC: 113 MG/DL (ref 120–199)
CHOLEST/HDLC SERPL: 3 {RATIO} (ref 2–5)
CO2 SERPL-SCNC: 27 MMOL/L (ref 23–29)
CREAT SERPL-MCNC: 0.7 MG/DL (ref 0.5–1.4)
EAG (OHS): 194 MG/DL (ref 68–131)
ERYTHROCYTE [DISTWIDTH] IN BLOOD BY AUTOMATED COUNT: 13.9 % (ref 11.5–14.5)
FERRITIN SERPL-MCNC: 45 NG/ML (ref 20–300)
GFR SERPLBLD CREATININE-BSD FMLA CKD-EPI: >60 ML/MIN/1.73/M2
GLUCOSE SERPL-MCNC: 165 MG/DL (ref 70–110)
HBA1C MFR BLD: 8.4 % (ref 4–5.6)
HCT VFR BLD AUTO: 41.9 % (ref 37–48.5)
HDLC SERPL-MCNC: 38 MG/DL (ref 40–75)
HDLC SERPL: 33.6 % (ref 20–50)
HGB BLD-MCNC: 13.9 GM/DL (ref 12–16)
IMM GRANULOCYTES # BLD AUTO: 0.02 K/UL (ref 0–0.04)
IMM GRANULOCYTES NFR BLD AUTO: 0.3 % (ref 0–0.5)
IRON SATN MFR SERPL: 18 % (ref 20–50)
IRON SERPL-MCNC: 77 UG/DL (ref 30–160)
LDLC SERPL CALC-MCNC: 48 MG/DL (ref 63–159)
LYMPHOCYTES # BLD AUTO: 2.37 K/UL (ref 1–4.8)
MCH RBC QN AUTO: 28 PG (ref 27–31)
MCHC RBC AUTO-ENTMCNC: 33.2 G/DL (ref 32–36)
MCV RBC AUTO: 85 FL (ref 82–98)
NONHDLC SERPL-MCNC: 75 MG/DL
NUCLEATED RBC (/100WBC) (OHS): 0 /100 WBC
PLATELET # BLD AUTO: 242 K/UL (ref 150–450)
PMV BLD AUTO: 10.7 FL (ref 9.2–12.9)
POTASSIUM SERPL-SCNC: 5 MMOL/L (ref 3.5–5.1)
PROT SERPL-MCNC: 6.6 GM/DL (ref 6–8.4)
RBC # BLD AUTO: 4.96 M/UL (ref 4–5.4)
RELATIVE EOSINOPHIL (OHS): 3.3 %
RELATIVE LYMPHOCYTE (OHS): 33.7 % (ref 18–48)
RELATIVE MONOCYTE (OHS): 7 % (ref 4–15)
RELATIVE NEUTROPHIL (OHS): 55 % (ref 38–73)
SODIUM SERPL-SCNC: 140 MMOL/L (ref 136–145)
TIBC SERPL-MCNC: 426 UG/DL (ref 250–450)
TRANSFERRIN SERPL-MCNC: 288 MG/DL (ref 200–375)
TRIGL SERPL-MCNC: 135 MG/DL (ref 30–150)
TSH SERPL-ACNC: 1.37 UIU/ML (ref 0.4–4)
WBC # BLD AUTO: 7.03 K/UL (ref 3.9–12.7)

## 2025-07-03 PROCEDURE — 36415 COLL VENOUS BLD VENIPUNCTURE: CPT | Mod: PO

## 2025-07-03 PROCEDURE — 82306 VITAMIN D 25 HYDROXY: CPT

## 2025-07-03 PROCEDURE — 80061 LIPID PANEL: CPT

## 2025-07-03 PROCEDURE — 85025 COMPLETE CBC W/AUTO DIFF WBC: CPT

## 2025-07-03 PROCEDURE — 84460 ALANINE AMINO (ALT) (SGPT): CPT

## 2025-07-03 PROCEDURE — 83036 HEMOGLOBIN GLYCOSYLATED A1C: CPT

## 2025-07-03 PROCEDURE — 82728 ASSAY OF FERRITIN: CPT

## 2025-07-03 PROCEDURE — 83540 ASSAY OF IRON: CPT

## 2025-07-03 PROCEDURE — 84443 ASSAY THYROID STIM HORMONE: CPT

## 2025-07-03 NOTE — TELEPHONE ENCOUNTER
"advised her that She needs to follow up with Dr. Resendiz for medication refills being she has been seeing him for her migraines. She stated taht he "will not see her or deal with her headaches". Also advised her taht the provider can not give medication without a visit due to her not being seen by Dr. Oksana Whitney since April 2024. She got upset and ended our call.       Copied from CRM #[id number]. Topic: Medications - Medication Refill  >> Jul 3, 2025  2:20 PM Jose wrote:  Requesting an RX refill or new RX.    Is this a refill or new RX: refill    RX name and strength (copy/paste from chart):  rimegepant (NURTEC) 75 mg odt      Is this a 30 day or 90 day RX:     Pharmacy name and phone # (copy/paste from chart):    [id number] - [place] - [address]  [address]  [place] [address]  Phone: [phone] Fax: [phone]        Who called and call back number: Call; [phone]     Comments: Patient is completely out  "

## 2025-07-07 ENCOUNTER — TELEPHONE (OUTPATIENT)
Dept: INTERNAL MEDICINE | Facility: CLINIC | Age: 55
End: 2025-07-07
Payer: COMMERCIAL

## 2025-07-08 ENCOUNTER — OFFICE VISIT (OUTPATIENT)
Dept: INTERNAL MEDICINE | Facility: CLINIC | Age: 55
End: 2025-07-08
Payer: COMMERCIAL

## 2025-07-08 DIAGNOSIS — G43.109 MIGRAINE WITH AURA AND WITHOUT STATUS MIGRAINOSUS, NOT INTRACTABLE: Chronic | ICD-10-CM

## 2025-07-08 DIAGNOSIS — E66.01 MORBID OBESITY WITH BMI OF 45.0-49.9, ADULT: Chronic | ICD-10-CM

## 2025-07-08 DIAGNOSIS — E11.3293 TYPE 2 DIABETES MELLITUS WITH BOTH EYES AFFECTED BY MILD NONPROLIFERATIVE RETINOPATHY WITHOUT MACULAR EDEMA, WITHOUT LONG-TERM CURRENT USE OF INSULIN: Chronic | ICD-10-CM

## 2025-07-08 DIAGNOSIS — I10 ESSENTIAL HYPERTENSION: Chronic | ICD-10-CM

## 2025-07-08 DIAGNOSIS — E55.9 VITAMIN D INSUFFICIENCY: Chronic | ICD-10-CM

## 2025-07-08 DIAGNOSIS — Z00.00 ROUTINE MEDICAL EXAM: Primary | ICD-10-CM

## 2025-07-08 PROCEDURE — 3052F HG A1C>EQUAL 8.0%<EQUAL 9.0%: CPT | Mod: CPTII,95,, | Performed by: INTERNAL MEDICINE

## 2025-07-08 PROCEDURE — 1159F MED LIST DOCD IN RCRD: CPT | Mod: CPTII,95,, | Performed by: INTERNAL MEDICINE

## 2025-07-08 PROCEDURE — 98006 SYNCH AUDIO-VIDEO EST MOD 30: CPT | Mod: 95,,, | Performed by: INTERNAL MEDICINE

## 2025-07-08 PROCEDURE — 1160F RVW MEDS BY RX/DR IN RCRD: CPT | Mod: CPTII,95,, | Performed by: INTERNAL MEDICINE

## 2025-07-08 PROCEDURE — 4010F ACE/ARB THERAPY RXD/TAKEN: CPT | Mod: CPTII,95,, | Performed by: INTERNAL MEDICINE

## 2025-07-08 RX ORDER — RIMEGEPANT SULFATE 75 MG/75MG
75 TABLET, ORALLY DISINTEGRATING ORAL
Qty: 16 TABLET | Refills: 5 | Status: SHIPPED | OUTPATIENT
Start: 2025-07-08

## 2025-07-08 RX ORDER — METFORMIN HYDROCHLORIDE 500 MG/1
1000 TABLET, EXTENDED RELEASE ORAL 2 TIMES DAILY WITH MEALS
Qty: 360 TABLET | Refills: 1 | Status: SHIPPED | OUTPATIENT
Start: 2025-07-08

## 2025-07-08 RX ORDER — ATORVASTATIN CALCIUM 10 MG/1
10 TABLET, FILM COATED ORAL DAILY
Qty: 90 TABLET | Refills: 3 | Status: SHIPPED | OUTPATIENT
Start: 2025-07-08

## 2025-07-08 RX ORDER — ERGOCALCIFEROL 1.25 MG/1
50000 CAPSULE ORAL
Qty: 12 CAPSULE | Refills: 0 | Status: SHIPPED | OUTPATIENT
Start: 2025-07-08

## 2025-07-08 RX ORDER — ATORVASTATIN CALCIUM 10 MG/1
10 TABLET, FILM COATED ORAL
Qty: 90 TABLET | Refills: 0 | Status: SHIPPED | OUTPATIENT
Start: 2025-07-08 | End: 2025-07-08 | Stop reason: SDUPTHER

## 2025-07-08 NOTE — ASSESSMENT & PLAN NOTE
On Lisinopril/HCTZ. The current medical regimen is effective;  continue present plan and medications.

## 2025-07-08 NOTE — PROGRESS NOTES
Assessment & Plan  Assessment & Plan              1. Routine medical exam    2. Essential hypertension  Assessment & Plan:  On Lisinopril/HCTZ. The current medical regimen is effective;  continue present plan and medications.       3. Type 2 diabetes mellitus with both eyes affected by mild nonproliferative retinopathy without macular edema, without long-term current use of insulin  -     atorvastatin (LIPITOR) 10 MG tablet; Take 1 tablet (10 mg total) by mouth once daily.  Dispense: 90 tablet; Refill: 3  -     tirzepatide 7.5 mg/0.5 mL PnIj; Inject 7.5 mg into the skin every 7 days.  Dispense: 12 Pen; Refill: 0    4. Morbid obesity with BMI of 45.0-49.9, adult    5. Vitamin D insufficiency  -     ergocalciferol (ERGOCALCIFEROL) 50,000 unit Cap; Take 1 capsule (50,000 Units total) by mouth every 7 days.  Dispense: 12 capsule; Refill: 0    6. Type 2 diabetes mellitus without complication, without long-term current use of insulin  -     metFORMIN (GLUCOPHAGE-XR) 500 MG ER 24hr tablet; Take 2 tablets (1,000 mg total) by mouth 2 (two) times daily with meals.  Dispense: 360 tablet; Refill: 1          Follow-up: No follow-ups on file.    This note was generated with the assistance of ambient listening technology. Verbal consent was obtained by the patient and accompanying visitor(s) for the recording of patient appointment to facilitate this note. I attest to having reviewed and edited the generated note for accuracy, though some syntax or spelling errors may persist. Please contact the author of this note for any clarification.      ______________________________________________________________________    Chief Complaint  Chief Complaint   Patient presents with    Annual Exam       History of Present Illness               Answers submitted by the patient for this visit:  Diabetes Questionnaire (Submitted on 7/1/2025)  Chief Complaint: Diabetes problem  Diabetes type: type 2  MedicAlert ID: No  Disease duration: 10  Years  blurred vision: No  foot paresthesias: No  foot ulcerations: No  visual change: No  weight loss: No  Symptom course: worsening  hunger: Yes  mood changes: No  sleepiness: No  sweats: Yes  blackouts: No  hospitalization: No  nocturnal hypoglycemia: No  required assistance: No  required glucagon: No  CVA: No  heart disease: No  nephropathy: No  peripheral neuropathy: No  PVD: No  retinopathy: No  autonomic neuropathy: No  CAD risks: hypertension, obesity, post-menopausal, sedentary lifestyle, stress, diabetes mellitus  Current treatments: oral agent (triple therapy)  Treatment compliance: most of the time  Home blood tests: 1-2 x per day  Monitoring compliance: good  Blood glucose trend: increasing steadily  Weight trend: stable  Current diet: low salt  Meal planning: none  Exercise: rarely  Dietitian visit: No  Eye exam current: Yes  Sees podiatrist: Yes    {List of Health Risk Calculators:29273}     ROS  Review of Systems   Constitutional:  Positive for fatigue.   Cardiovascular:  Negative for chest pain.   Endocrine: Positive for polydipsia and polyuria. Negative for polyphagia.   Skin:  Negative for pallor.   Neurological:  Positive for tremors and headaches. Negative for dizziness, seizures, speech difficulty and weakness.   Psychiatric/Behavioral:  Negative for confusion. The patient is not nervous/anxious.            Physical Exam  There were no vitals filed for this visit. There is no height or weight on file to calculate BMI.          Physical Exam  Constitutional:       General: She is not in acute distress.     Appearance: She is well-developed.   HENT:      Head: Normocephalic and atraumatic.   Eyes:      Conjunctiva/sclera: Conjunctivae normal.   Pulmonary:      Effort: Pulmonary effort is normal. No respiratory distress.   Neurological:      Mental Status: She is alert and oriented to person, place, and time.      Comments: Symmetric facial movements   Psychiatric:         Behavior: Behavior  normal.         Thought Content: Thought content normal.

## 2025-07-08 NOTE — PROGRESS NOTES
"Assessment & Plan  Assessment & Plan    IMPRESSION:  - Reviewed recent lab results, noting improvement in iron levels but still on lower end of normal range.  - Blood sugar (165) and A1C increased compared to previous year.  - Evaluated effectiveness and side effects of Mounjaro, considering and implementing dose increase to 7.5 mg to improve glycemic control.  - Assessed hydration habits and discussed potential impact on overall health, including migraines.  - Evaluated liver function, noting improvement potentially related to Mounjaro use.    PLAN SUMMARY:  - Increase Mounjaro to 7.5 mg dose  - Continue atenolol at current dose  - Continue Metformin 1000 mg twice daily  - Discontinue iron and vitamin D supplements after 3 more months  - Refill Nurtec for migraines  - Order digital medicine program to track health metrics  - Implement strategies to increase daily water intake  - Order repeat labs in 3 months for A1C and basic tests  - Follow up in 3 months for lab review and medication assessment    TYPE 2 DIABETES MELLITUS:  - Monitored blood glucose level at 165.  - A1C has been elevated since 2022 when it was 5.9 and has not changed from 3 months ago.  - Increasing Mounjaro to 7.5 mg dose to improve glycemic control.  - Continuing Metformin 1000 mg twice daily.  - Ordered repeat labs in 3 months to check A1C and other basic tests.  - Instructed patient to contact office if experiencing any concerns with Mounjaro dose increase.  - Ordered digital medicine program to track health metrics and improve accountability and motivation for lifestyle changes.    LIFESTYLE AND HABIT CHANGES:  - Explained concept of habit stacking and its application in forming new habits, particularly for improving hydration.  - Discussed relationship between frontal lobe (knowledge center) and midbrain (habit center) in behavior change.  - Recommend book "Atomic Habits" for further understanding of habit formation.  - Ms. Flick to " implement strategies to increase daily water intake, such as using a clear water bottle to track consumption.    CARDIOVASCULAR MANAGEMENT:  - Continued atenolol at current dose.    NUTRITIONAL SUPPLEMENTATION:  - Discontinuing both iron and vitamin D supplements after 3 more months of use (around Halloween).    MIGRAINE MANAGEMENT:  - Refilled Nurtec for migraines.    FOLLOW-UP:  - Scheduled follow up in 3 months for lab review and medication assessment.         1. Routine medical exam  -    Discussed healthy diet, regular exercise, necessary labs, age appropriate cancer screening, and routine vaccinations.       2. Essential hypertension  Assessment & Plan:  On Lisinopril/HCTZ. The current medical regimen is effective;  continue present plan and medications.     Orders:  -     Hypertension Digital Medicine (HDMP) Enrollment Order    3. Type 2 diabetes mellitus with both eyes affected by mild nonproliferative retinopathy without macular edema, without long-term current use of insulin  -     metFORMIN (GLUCOPHAGE-XR) 500 MG ER 24hr tablet; Take 2 tablets (1,000 mg total) by mouth 2 (two) times daily with meals.  Dispense: 360 tablet; Refill: 1  -     atorvastatin (LIPITOR) 10 MG tablet; Take 1 tablet (10 mg total) by mouth once daily.  Dispense: 90 tablet; Refill: 3  -     tirzepatide 7.5 mg/0.5 mL PnIj; Inject 7.5 mg into the skin every 7 days.  Dispense: 12 Pen; Refill: 0  -     Diabetes Digital Medicine (DDMP) Enrollment Order  -     Hemoglobin A1C; Future; Expected date: 10/08/2025  -     Comprehensive Metabolic Panel; Future; Expected date: 10/08/2025    4. Morbid obesity with BMI of 45.0-49.9, adult    5. Vitamin D insufficiency  Assessment & Plan:  Complete 3 more months of high dose replacement then transition to daily dosing at 2000 units a day    Orders:  -     ergocalciferol (ERGOCALCIFEROL) 50,000 unit Cap; Take 1 capsule (50,000 Units total) by mouth every 7 days.  Dispense: 12 capsule; Refill: 0    6.  Migraine with aura and without status migrainosus, not intractable  -     rimegepant (NURTEC) 75 mg odt; Take 1 tablet (75 mg total) by mouth as needed for Migraine. Place ODT tablet on the tongue; alternatively the ODT tablet may be placed under the tongue. Okay to take 2nd tablet in 24 hours; No more than 2 tablets in 24 hours.  Dispense: 16 tablet; Refill: 5        Follow-up: Follow up for virtual, with Ofelia Avila.    This note was generated with the assistance of ambient listening technology. Verbal consent was obtained by the patient and accompanying visitor(s) for the recording of patient appointment to facilitate this note. I attest to having reviewed and edited the generated note for accuracy, though some syntax or spelling errors may persist. Please contact the author of this note for any clarification.        The patient location is:  Patient home   The chief complaint leading to consultation is: noted below  Visit type: Virtual visit with synchronous audio and video  Total time spent with patient: 35  Each patient to whom he or she provides medical services by telemedicine is:  (1) informed of the relationship between the physician and patient and the respective role of any other health care provider with respect to management of the patient; and (2) notified that he or she may decline to receive medical services by telemedicine and may withdraw from such care at any time.      ______________________________________________________________________    Chief Complaint  Chief Complaint   Patient presents with    Annual Exam         History of Present Illness    CHIEF COMPLAINT:  Ms. Duque presents today for regular check-up    BACK PAIN:  She reports ongoing back pain with sciatic nerve involvement, exacerbated by caring for her 14-month-old grand nephew which involves frequent lifting and movement. She practices proper lifting techniques to minimize back strain. She uses muscle relaxers as needed,  primarily at night for sleep but occasionally during the day.    MEDICATIONS:  She takes diclofenac (Voltaren) orally and topically for arthritis in hands and back pain, iron supplements irregularly (every other day), Mounjaro (initial GI side effects now resolved), metformin 2 tablets twice daily, and Nurtec for migraines (requests refill though current need is minimal). She denies any significant adverse reactions to current medications.          Answers submitted by the patient for this visit:  Diabetes Questionnaire (Submitted on 7/1/2025)  Chief Complaint: Diabetes problem  Diabetes type: type 2  MedicAlert ID: No  Disease duration: 10 Years  blurred vision: No  foot paresthesias: No  foot ulcerations: No  visual change: No  weight loss: No  Symptom course: worsening  hunger: Yes  mood changes: No  sleepiness: No  sweats: Yes  blackouts: No  hospitalization: No  nocturnal hypoglycemia: No  required assistance: No  required glucagon: No  CVA: No  heart disease: No  nephropathy: No  peripheral neuropathy: No  PVD: No  retinopathy: No  autonomic neuropathy: No  CAD risks: hypertension, obesity, post-menopausal, sedentary lifestyle, stress, diabetes mellitus  Current treatments: oral agent (triple therapy)  Treatment compliance: most of the time  Home blood tests: 1-2 x per day  Monitoring compliance: good  Blood glucose trend: increasing steadily  Weight trend: stable  Current diet: low salt  Meal planning: none  Exercise: rarely  Dietitian visit: No  Eye exam current: Yes  Sees podiatrist: Yes      ROS  Review of Systems   Constitutional:  Positive for fatigue.   Cardiovascular:  Negative for chest pain.   Endocrine: Positive for polydipsia and polyuria. Negative for polyphagia.   Skin:  Negative for pallor.   Neurological:  Positive for tremors and headaches. Negative for dizziness, seizures, speech difficulty and weakness.   Psychiatric/Behavioral:  Negative for confusion. The patient is not nervous/anxious.             Physical Exam  Physical Exam  Constitutional:       General: She is not in acute distress.     Appearance: She is well-developed.   HENT:      Head: Normocephalic and atraumatic.   Eyes:      Conjunctiva/sclera: Conjunctivae normal.   Pulmonary:      Effort: Pulmonary effort is normal. No respiratory distress.   Neurological:      Mental Status: She is alert and oriented to person, place, and time.      Comments: Symmetric facial movements   Psychiatric:         Behavior: Behavior normal.         Thought Content: Thought content normal.

## 2025-07-08 NOTE — ASSESSMENT & PLAN NOTE
Complete 3 more months of high dose replacement then transition to daily dosing at 2000 units a day

## 2025-07-23 DIAGNOSIS — K21.9 GASTROESOPHAGEAL REFLUX: ICD-10-CM

## 2025-07-23 NOTE — TELEPHONE ENCOUNTER
Refill Routing Note   Medication(s) are not appropriate for processing by [facility] for the following reason(s):        Other    [facility] action(s):  Defer        Medication Therapy Plan: Note in previous refill. Recommend discussing alternatives to avoid side effects      Appointments  past 12m or future 3m with PCP    Date Provider   Last Visit   7/8/2025 Jaylon Kothari MD   Next Visit   Visit date not found Jaylon Kothari MD   ED visits in past 90 days: 0        Note composed:12:11 PM 07/23/2025

## 2025-07-23 NOTE — TELEPHONE ENCOUNTER
No care due was identified.  [facility] Embedded Care Due Messages. Reference number: [id number].   7/23/2025 9:03:28 AM CDT

## 2025-07-24 RX ORDER — OMEPRAZOLE 20 MG/1
20 CAPSULE, DELAYED RELEASE ORAL EVERY MORNING
Qty: 90 CAPSULE | Refills: 3 | Status: SHIPPED | OUTPATIENT
Start: 2025-07-24

## 2025-08-07 DIAGNOSIS — R42 VERTIGO: ICD-10-CM

## 2025-08-08 RX ORDER — MECLIZINE HYDROCHLORIDE 25 MG/1
25 TABLET ORAL 3 TIMES DAILY PRN
Qty: 90 TABLET | Refills: 1 | Status: SHIPPED | OUTPATIENT
Start: 2025-08-08

## 2025-08-08 NOTE — TELEPHONE ENCOUNTER
Refill Routing Note   Medication(s) are not appropriate for processing by [facility] for the following reason(s):        Outside of protocol    [facility] action(s):  Route               Appointments  past 12m or future 3m with PCP    Date Provider   Last Visit   7/8/2025 Jaylon Kothari MD   Next Visit   Visit date not found Jaylon Kothari MD   ED visits in past 90 days: 0        Note composed:8:19 AM 08/08/2025

## 2025-08-11 DIAGNOSIS — J30.9 ALLERGIC RHINITIS: ICD-10-CM

## 2025-08-11 RX ORDER — MONTELUKAST SODIUM 10 MG/1
10 TABLET ORAL
Qty: 90 TABLET | Refills: 3 | Status: SHIPPED | OUTPATIENT
Start: 2025-08-11